# Patient Record
Sex: FEMALE | Race: BLACK OR AFRICAN AMERICAN | NOT HISPANIC OR LATINO | Employment: OTHER | URBAN - METROPOLITAN AREA
[De-identification: names, ages, dates, MRNs, and addresses within clinical notes are randomized per-mention and may not be internally consistent; named-entity substitution may affect disease eponyms.]

---

## 2017-01-01 ENCOUNTER — HOSPITAL ENCOUNTER (OUTPATIENT)
Dept: CARDIOLOGY | Facility: CLINIC | Age: 82
Discharge: HOME | End: 2017-01-11
Payer: MEDICARE

## 2017-01-01 ENCOUNTER — TELEPHONE (OUTPATIENT)
Dept: VASCULAR SURGERY | Facility: CLINIC | Age: 82
End: 2017-01-01

## 2017-01-01 ENCOUNTER — TELEPHONE (OUTPATIENT)
Dept: INTERNAL MEDICINE | Facility: CLINIC | Age: 82
End: 2017-01-01

## 2017-01-01 ENCOUNTER — HOSPITAL ENCOUNTER (INPATIENT)
Facility: HOSPITAL | Age: 82
LOS: 2 days | Discharge: ANOTHER HOSPITAL | DRG: 559 | End: 2017-04-15
Attending: HOSPITALIST | Admitting: HOSPITALIST
Payer: MEDICARE

## 2017-01-01 ENCOUNTER — TELEPHONE (OUTPATIENT)
Dept: ELECTROPHYSIOLOGY | Facility: CLINIC | Age: 82
End: 2017-01-01

## 2017-01-01 ENCOUNTER — CLINICAL SUPPORT (OUTPATIENT)
Dept: ELECTROPHYSIOLOGY | Facility: CLINIC | Age: 82
End: 2017-01-01
Payer: MEDICARE

## 2017-01-01 ENCOUNTER — HOSPITAL ENCOUNTER (OUTPATIENT)
Facility: HOSPITAL | Age: 82
Discharge: HOME | End: 2017-03-22
Attending: SURGERY | Admitting: SURGERY
Payer: MEDICARE

## 2017-01-01 ENCOUNTER — HOSPITAL ENCOUNTER (INPATIENT)
Facility: HOSPITAL | Age: 82
LOS: 10 days | Discharge: NURSING, REHAB OR LONG-TERM CARE | DRG: 469 | End: 2017-04-13
Attending: EMERGENCY MEDICINE | Admitting: HOSPITALIST
Payer: MEDICARE

## 2017-01-01 ENCOUNTER — OFFICE VISIT (OUTPATIENT)
Dept: ELECTROPHYSIOLOGY | Facility: CLINIC | Age: 82
End: 2017-01-01
Payer: MEDICARE

## 2017-01-01 ENCOUNTER — HOSPITAL ENCOUNTER (OUTPATIENT)
Dept: RADIOLOGY | Facility: HOSPITAL | Age: 82
Discharge: HOME | End: 2017-01-20
Attending: THORACIC SURGERY (CARDIOTHORACIC VASCULAR SURGERY)
Payer: MEDICARE

## 2017-01-01 ENCOUNTER — OFFICE VISIT (OUTPATIENT)
Dept: PODIATRY | Facility: CLINIC | Age: 82
End: 2017-01-01
Payer: MEDICARE

## 2017-01-01 ENCOUNTER — HOSPITAL ENCOUNTER (OUTPATIENT)
Dept: VASCULAR SURGERY | Facility: CLINIC | Age: 82
Discharge: HOME | End: 2017-03-17
Attending: SURGERY
Payer: MEDICARE

## 2017-01-01 ENCOUNTER — OFFICE VISIT (OUTPATIENT)
Dept: VASCULAR SURGERY | Facility: CLINIC | Age: 82
End: 2017-01-01
Attending: SURGERY
Payer: MEDICARE

## 2017-01-01 ENCOUNTER — HOSPITAL ENCOUNTER (OUTPATIENT)
Dept: RADIOLOGY | Facility: HOSPITAL | Age: 82
Discharge: HOME | End: 2017-02-22
Attending: THORACIC SURGERY (CARDIOTHORACIC VASCULAR SURGERY)
Payer: MEDICARE

## 2017-01-01 ENCOUNTER — HOSPITAL ENCOUNTER (OUTPATIENT)
Dept: PULMONOLOGY | Facility: CLINIC | Age: 82
Discharge: HOME | End: 2017-03-13
Payer: MEDICARE

## 2017-01-01 ENCOUNTER — CLINICAL SUPPORT (OUTPATIENT)
Dept: CARDIOLOGY | Facility: CLINIC | Age: 82
End: 2017-01-01
Payer: MEDICARE

## 2017-01-01 ENCOUNTER — PATIENT OUTREACH (OUTPATIENT)
Dept: ADMINISTRATIVE | Facility: CLINIC | Age: 82
End: 2017-01-01
Payer: MEDICARE

## 2017-01-01 ENCOUNTER — OFFICE VISIT (OUTPATIENT)
Dept: INTERNAL MEDICINE | Facility: CLINIC | Age: 82
End: 2017-01-01
Payer: MEDICARE

## 2017-01-01 ENCOUNTER — OFFICE VISIT (OUTPATIENT)
Dept: VASCULAR SURGERY | Facility: CLINIC | Age: 82
End: 2017-01-01
Payer: MEDICARE

## 2017-01-01 ENCOUNTER — HOSPITAL ENCOUNTER (OUTPATIENT)
Dept: VASCULAR SURGERY | Facility: CLINIC | Age: 82
Discharge: HOME | End: 2017-03-31
Attending: SURGERY
Payer: MEDICARE

## 2017-01-01 ENCOUNTER — ANESTHESIA (OUTPATIENT)
Dept: SURGERY | Facility: HOSPITAL | Age: 82
DRG: 469 | End: 2017-01-01
Payer: MEDICARE

## 2017-01-01 ENCOUNTER — ANESTHESIA (OUTPATIENT)
Dept: MEDSURG UNIT | Facility: HOSPITAL | Age: 82
DRG: 070 | End: 2017-01-01
Payer: MEDICARE

## 2017-01-01 ENCOUNTER — HOSPITAL ENCOUNTER (INPATIENT)
Facility: HOSPITAL | Age: 82
LOS: 11 days | DRG: 070 | End: 2017-04-26
Attending: EMERGENCY MEDICINE | Admitting: INTERNAL MEDICINE
Payer: MEDICARE

## 2017-01-01 ENCOUNTER — PATIENT MESSAGE (OUTPATIENT)
Dept: NEPHROLOGY | Facility: CLINIC | Age: 82
End: 2017-01-01

## 2017-01-01 ENCOUNTER — OFFICE VISIT (OUTPATIENT)
Dept: PULMONOLOGY | Facility: CLINIC | Age: 82
End: 2017-01-01
Payer: MEDICARE

## 2017-01-01 ENCOUNTER — ANESTHESIA EVENT (OUTPATIENT)
Dept: SURGERY | Facility: HOSPITAL | Age: 82
DRG: 469 | End: 2017-01-01
Payer: MEDICARE

## 2017-01-01 ENCOUNTER — ANESTHESIA EVENT (OUTPATIENT)
Dept: MEDSURG UNIT | Facility: HOSPITAL | Age: 82
DRG: 070 | End: 2017-01-01
Payer: MEDICARE

## 2017-01-01 ENCOUNTER — DOCUMENTATION ONLY (OUTPATIENT)
Dept: NEUROLOGY | Facility: CLINIC | Age: 82
End: 2017-01-01

## 2017-01-01 ENCOUNTER — TELEPHONE (OUTPATIENT)
Dept: CARDIOLOGY | Facility: CLINIC | Age: 82
End: 2017-01-01

## 2017-01-01 ENCOUNTER — OFFICE VISIT (OUTPATIENT)
Dept: CARDIOTHORACIC SURGERY | Facility: CLINIC | Age: 82
End: 2017-01-01
Payer: MEDICARE

## 2017-01-01 ENCOUNTER — SURGERY (OUTPATIENT)
Age: 82
End: 2017-01-01

## 2017-01-01 ENCOUNTER — PATIENT MESSAGE (OUTPATIENT)
Dept: INTERNAL MEDICINE | Facility: CLINIC | Age: 82
End: 2017-01-01

## 2017-01-01 VITALS
HEART RATE: 42 BPM | DIASTOLIC BLOOD PRESSURE: 66 MMHG | OXYGEN SATURATION: 97 % | WEIGHT: 117 LBS | BODY MASS INDEX: 21.4 KG/M2 | SYSTOLIC BLOOD PRESSURE: 118 MMHG

## 2017-01-01 VITALS
RESPIRATION RATE: 18 BRPM | HEIGHT: 62 IN | OXYGEN SATURATION: 93 % | BODY MASS INDEX: 25.4 KG/M2 | TEMPERATURE: 97 F | HEART RATE: 44 BPM | WEIGHT: 138 LBS | DIASTOLIC BLOOD PRESSURE: 54 MMHG | SYSTOLIC BLOOD PRESSURE: 102 MMHG

## 2017-01-01 VITALS
HEART RATE: 41 BPM | WEIGHT: 122 LBS | OXYGEN SATURATION: 92 % | RESPIRATION RATE: 20 BRPM | DIASTOLIC BLOOD PRESSURE: 65 MMHG | SYSTOLIC BLOOD PRESSURE: 142 MMHG | BODY MASS INDEX: 22.45 KG/M2 | TEMPERATURE: 98 F | HEIGHT: 62 IN

## 2017-01-01 VITALS
HEART RATE: 44 BPM | HEART RATE: 48 BPM | WEIGHT: 121 LBS | HEIGHT: 62 IN | SYSTOLIC BLOOD PRESSURE: 121 MMHG | DIASTOLIC BLOOD PRESSURE: 52 MMHG | WEIGHT: 122 LBS | SYSTOLIC BLOOD PRESSURE: 159 MMHG | HEIGHT: 62 IN | BODY MASS INDEX: 22.45 KG/M2 | BODY MASS INDEX: 22.26 KG/M2 | DIASTOLIC BLOOD PRESSURE: 46 MMHG

## 2017-01-01 VITALS
TEMPERATURE: 97 F | SYSTOLIC BLOOD PRESSURE: 109 MMHG | BODY MASS INDEX: 23 KG/M2 | WEIGHT: 125 LBS | HEIGHT: 62 IN | OXYGEN SATURATION: 96 % | RESPIRATION RATE: 18 BRPM | DIASTOLIC BLOOD PRESSURE: 56 MMHG | HEART RATE: 37 BPM

## 2017-01-01 VITALS
HEART RATE: 46 BPM | WEIGHT: 121 LBS | HEIGHT: 62 IN | BODY MASS INDEX: 22.26 KG/M2 | HEART RATE: 45 BPM | BODY MASS INDEX: 22.26 KG/M2 | WEIGHT: 121 LBS | DIASTOLIC BLOOD PRESSURE: 47 MMHG | TEMPERATURE: 97 F | SYSTOLIC BLOOD PRESSURE: 131 MMHG | SYSTOLIC BLOOD PRESSURE: 143 MMHG | DIASTOLIC BLOOD PRESSURE: 51 MMHG | HEIGHT: 62 IN | TEMPERATURE: 97 F

## 2017-01-01 VITALS
BODY MASS INDEX: 20.61 KG/M2 | DIASTOLIC BLOOD PRESSURE: 67 MMHG | TEMPERATURE: 92 F | OXYGEN SATURATION: 83 % | SYSTOLIC BLOOD PRESSURE: 142 MMHG | WEIGHT: 112 LBS | HEIGHT: 62 IN

## 2017-01-01 VITALS
BODY MASS INDEX: 22.22 KG/M2 | BODY MASS INDEX: 23.74 KG/M2 | WEIGHT: 121.5 LBS | SYSTOLIC BLOOD PRESSURE: 127 MMHG | HEART RATE: 92 BPM | HEART RATE: 64 BPM | HEIGHT: 62 IN | WEIGHT: 129 LBS | DIASTOLIC BLOOD PRESSURE: 57 MMHG | DIASTOLIC BLOOD PRESSURE: 49 MMHG | TEMPERATURE: 98 F | SYSTOLIC BLOOD PRESSURE: 130 MMHG | RESPIRATION RATE: 16 BRPM

## 2017-01-01 VITALS
OXYGEN SATURATION: 95 % | HEART RATE: 62 BPM | BODY MASS INDEX: 23.82 KG/M2 | SYSTOLIC BLOOD PRESSURE: 136 MMHG | WEIGHT: 129.44 LBS | HEIGHT: 62 IN | DIASTOLIC BLOOD PRESSURE: 66 MMHG

## 2017-01-01 VITALS — HEART RATE: 77 BPM | DIASTOLIC BLOOD PRESSURE: 58 MMHG | SYSTOLIC BLOOD PRESSURE: 116 MMHG | HEIGHT: 62 IN

## 2017-01-01 VITALS
HEIGHT: 62 IN | HEART RATE: 70 BPM | DIASTOLIC BLOOD PRESSURE: 66 MMHG | WEIGHT: 129 LBS | SYSTOLIC BLOOD PRESSURE: 126 MMHG | BODY MASS INDEX: 23.74 KG/M2

## 2017-01-01 VITALS
BODY MASS INDEX: 21.62 KG/M2 | HEART RATE: 95 BPM | OXYGEN SATURATION: 98 % | DIASTOLIC BLOOD PRESSURE: 75 MMHG | SYSTOLIC BLOOD PRESSURE: 157 MMHG | WEIGHT: 117.5 LBS | HEIGHT: 62 IN

## 2017-01-01 DIAGNOSIS — N18.6 ESRD ON HEMODIALYSIS: Chronic | ICD-10-CM

## 2017-01-01 DIAGNOSIS — I48.0 PAF (PAROXYSMAL ATRIAL FIBRILLATION): ICD-10-CM

## 2017-01-01 DIAGNOSIS — I50.42 CHRONIC COMBINED SYSTOLIC AND DIASTOLIC HEART FAILURE: ICD-10-CM

## 2017-01-01 DIAGNOSIS — Z95.818 PRESENCE OF CARDIAC DEVICE: ICD-10-CM

## 2017-01-01 DIAGNOSIS — I15.0 RENOVASCULAR HYPERTENSION: Chronic | ICD-10-CM

## 2017-01-01 DIAGNOSIS — I25.5 ISCHEMIC CARDIOMYOPATHY: ICD-10-CM

## 2017-01-01 DIAGNOSIS — Z99.2 ESRD ON HEMODIALYSIS: ICD-10-CM

## 2017-01-01 DIAGNOSIS — E03.9 ACQUIRED HYPOTHYROIDISM: ICD-10-CM

## 2017-01-01 DIAGNOSIS — J96.01 ACUTE RESPIRATORY FAILURE WITH HYPOXIA: ICD-10-CM

## 2017-01-01 DIAGNOSIS — R00.1 BRADYCARDIA, DRUG INDUCED: ICD-10-CM

## 2017-01-01 DIAGNOSIS — B35.1 DERMATOPHYTOSIS OF NAIL: ICD-10-CM

## 2017-01-01 DIAGNOSIS — D72.829 LEUKOCYTOSIS, UNSPECIFIED TYPE: ICD-10-CM

## 2017-01-01 DIAGNOSIS — R09.89 BIBASILAR CRACKLES: ICD-10-CM

## 2017-01-01 DIAGNOSIS — R60.9 EDEMA, UNSPECIFIED TYPE: ICD-10-CM

## 2017-01-01 DIAGNOSIS — I73.9 PERIPHERAL VASCULAR DISEASE: ICD-10-CM

## 2017-01-01 DIAGNOSIS — I50.9 PLEURAL EFFUSION DUE TO CHF (CONGESTIVE HEART FAILURE): Primary | ICD-10-CM

## 2017-01-01 DIAGNOSIS — I50.42 SYSTOLIC AND DIASTOLIC CHF, CHRONIC: ICD-10-CM

## 2017-01-01 DIAGNOSIS — L97.522 ULCER OF TOE, LEFT, WITH FAT LAYER EXPOSED: ICD-10-CM

## 2017-01-01 DIAGNOSIS — I73.9 PAD (PERIPHERAL ARTERY DISEASE): ICD-10-CM

## 2017-01-01 DIAGNOSIS — Z18.81 GLASS FRAGMENT IN LOWER EXTREMITY: Primary | ICD-10-CM

## 2017-01-01 DIAGNOSIS — R13.10 ODYNOPHAGIA: ICD-10-CM

## 2017-01-01 DIAGNOSIS — M79.606 ISCHEMIC REST PAIN OF LOWER EXTREMITY: ICD-10-CM

## 2017-01-01 DIAGNOSIS — T50.905A BRADYCARDIA, DRUG INDUCED: ICD-10-CM

## 2017-01-01 DIAGNOSIS — N18.6 ESRD (END STAGE RENAL DISEASE) ON DIALYSIS: Primary | ICD-10-CM

## 2017-01-01 DIAGNOSIS — I21.4 NON-ST ELEVATION MI (NSTEMI): ICD-10-CM

## 2017-01-01 DIAGNOSIS — D63.1 ANEMIA IN ESRD (END-STAGE RENAL DISEASE): ICD-10-CM

## 2017-01-01 DIAGNOSIS — I50.42 SYSTOLIC AND DIASTOLIC CHF, CHRONIC: Chronic | ICD-10-CM

## 2017-01-01 DIAGNOSIS — I27.20 PULMONARY HTN: Primary | ICD-10-CM

## 2017-01-01 DIAGNOSIS — E03.9 ACQUIRED HYPOTHYROIDISM: Chronic | ICD-10-CM

## 2017-01-01 DIAGNOSIS — I48.91 ATRIAL FIBRILLATION: ICD-10-CM

## 2017-01-01 DIAGNOSIS — D63.1 ANEMIA OF CHRONIC RENAL FAILURE, UNSPECIFIED STAGE: ICD-10-CM

## 2017-01-01 DIAGNOSIS — N18.6 ESRD (END STAGE RENAL DISEASE): ICD-10-CM

## 2017-01-01 DIAGNOSIS — G93.40 ENCEPHALOPATHY: Primary | ICD-10-CM

## 2017-01-01 DIAGNOSIS — E87.1 HYPONATREMIA: ICD-10-CM

## 2017-01-01 DIAGNOSIS — S72.001A CLOSED DISPLACED FRACTURE OF RIGHT FEMORAL NECK: ICD-10-CM

## 2017-01-01 DIAGNOSIS — N18.6 ESRD ON HEMODIALYSIS: ICD-10-CM

## 2017-01-01 DIAGNOSIS — Z99.2 ESRD (END STAGE RENAL DISEASE) ON DIALYSIS: ICD-10-CM

## 2017-01-01 DIAGNOSIS — D62 ACUTE BLOOD LOSS ANEMIA: ICD-10-CM

## 2017-01-01 DIAGNOSIS — R00.1 BRADYCARDIA: ICD-10-CM

## 2017-01-01 DIAGNOSIS — I27.20 PULMONARY HTN: ICD-10-CM

## 2017-01-01 DIAGNOSIS — I70.249 ATHEROSCLEROSIS OF NATIVE ARTERY OF LEFT LOWER EXTREMITY WITH ULCERATION, UNSPECIFIED ULCERATION SITE: ICD-10-CM

## 2017-01-01 DIAGNOSIS — I73.9 PAD (PERIPHERAL ARTERY DISEASE): Primary | ICD-10-CM

## 2017-01-01 DIAGNOSIS — I63.9 STROKE: ICD-10-CM

## 2017-01-01 DIAGNOSIS — Z01.818 PRE-OP TESTING: Primary | ICD-10-CM

## 2017-01-01 DIAGNOSIS — I48.0 PAF (PAROXYSMAL ATRIAL FIBRILLATION): Primary | ICD-10-CM

## 2017-01-01 DIAGNOSIS — D63.1 ANEMIA IN CHRONIC RENAL DISEASE: ICD-10-CM

## 2017-01-01 DIAGNOSIS — I25.10 3-VESSEL CAD: ICD-10-CM

## 2017-01-01 DIAGNOSIS — T82.858A STENOSIS OF ARTERIOVENOUS DIALYSIS FISTULA, INITIAL ENCOUNTER: ICD-10-CM

## 2017-01-01 DIAGNOSIS — Z99.2 ESRD ON HEMODIALYSIS: Chronic | ICD-10-CM

## 2017-01-01 DIAGNOSIS — J90 PLEURAL EFFUSION ON RIGHT: Primary | ICD-10-CM

## 2017-01-01 DIAGNOSIS — I10 ESSENTIAL HYPERTENSION: Chronic | ICD-10-CM

## 2017-01-01 DIAGNOSIS — T82.858A STENOSIS OF ARTERIOVENOUS DIALYSIS FISTULA, INITIAL ENCOUNTER: Primary | ICD-10-CM

## 2017-01-01 DIAGNOSIS — M25.551 RIGHT HIP PAIN: ICD-10-CM

## 2017-01-01 DIAGNOSIS — L97.529 TOE ULCER, LEFT, WITH UNSPECIFIED SEVERITY: ICD-10-CM

## 2017-01-01 DIAGNOSIS — S72.001D ENCOUNTER FOR AFTERCARE FOR HEALING CLOSED TRAUMATIC FRACTURE OF RIGHT HIP: ICD-10-CM

## 2017-01-01 DIAGNOSIS — I42.9 CARDIOMYOPATHY: ICD-10-CM

## 2017-01-01 DIAGNOSIS — J90 PLEURAL EFFUSION: ICD-10-CM

## 2017-01-01 DIAGNOSIS — I73.9 PERIPHERAL VASCULAR DISEASE: Primary | ICD-10-CM

## 2017-01-01 DIAGNOSIS — S72.001A CLOSED FRACTURE OF NECK OF RIGHT FEMUR, INITIAL ENCOUNTER: Primary | ICD-10-CM

## 2017-01-01 DIAGNOSIS — S72.001D CLOSED DISPLACED FRACTURE OF RIGHT FEMORAL NECK, WITH ROUTINE HEALING, SUBSEQUENT ENCOUNTER: ICD-10-CM

## 2017-01-01 DIAGNOSIS — G93.40 ACUTE ENCEPHALOPATHY: ICD-10-CM

## 2017-01-01 DIAGNOSIS — S72.001A CLOSED RIGHT HIP FRACTURE, INITIAL ENCOUNTER: ICD-10-CM

## 2017-01-01 DIAGNOSIS — I48.19 PERSISTENT ATRIAL FIBRILLATION: ICD-10-CM

## 2017-01-01 DIAGNOSIS — N18.6 ANEMIA IN ESRD (END-STAGE RENAL DISEASE): ICD-10-CM

## 2017-01-01 DIAGNOSIS — E78.2 MIXED HYPERLIPIDEMIA: Chronic | ICD-10-CM

## 2017-01-01 DIAGNOSIS — I70.244 ATHEROSCLEROSIS OF NATIVE ARTERY OF LEFT LOWER EXTREMITY WITH ULCERATION OF HEEL: ICD-10-CM

## 2017-01-01 DIAGNOSIS — I49.5 SINUS BRADY-TACHY SYNDROME: ICD-10-CM

## 2017-01-01 DIAGNOSIS — J90 PLEURAL EFFUSION: Primary | ICD-10-CM

## 2017-01-01 DIAGNOSIS — N18.6 ESRD ON DIALYSIS: ICD-10-CM

## 2017-01-01 DIAGNOSIS — I70.229 CRITICAL LOWER LIMB ISCHEMIA: Primary | ICD-10-CM

## 2017-01-01 DIAGNOSIS — N18.6 ESRD (END STAGE RENAL DISEASE) ON DIALYSIS: ICD-10-CM

## 2017-01-01 DIAGNOSIS — N18.9 ANEMIA IN CHRONIC RENAL DISEASE: ICD-10-CM

## 2017-01-01 DIAGNOSIS — I99.8 ISCHEMIC REST PAIN OF LOWER EXTREMITY: ICD-10-CM

## 2017-01-01 DIAGNOSIS — R00.0 TACHYCARDIA: ICD-10-CM

## 2017-01-01 DIAGNOSIS — R53.83 FATIGUE: ICD-10-CM

## 2017-01-01 DIAGNOSIS — Z99.2 ESRD ON DIALYSIS: ICD-10-CM

## 2017-01-01 DIAGNOSIS — R79.89 ABNORMAL LFTS: ICD-10-CM

## 2017-01-01 DIAGNOSIS — I50.43 ACUTE ON CHRONIC COMBINED SYSTOLIC AND DIASTOLIC HEART FAILURE: Primary | ICD-10-CM

## 2017-01-01 DIAGNOSIS — Z79.01 LONG-TERM (CURRENT) USE OF ANTICOAGULANTS: ICD-10-CM

## 2017-01-01 DIAGNOSIS — S72.001A CLOSED DISPLACED FRACTURE OF RIGHT FEMORAL NECK, INITIAL ENCOUNTER: ICD-10-CM

## 2017-01-01 DIAGNOSIS — Z78.9 FULL CODE STATUS: ICD-10-CM

## 2017-01-01 DIAGNOSIS — K59.01 SLOW TRANSIT CONSTIPATION: ICD-10-CM

## 2017-01-01 DIAGNOSIS — R09.02 HYPOXIA: ICD-10-CM

## 2017-01-01 DIAGNOSIS — J90 RECURRENT RIGHT PLEURAL EFFUSION: ICD-10-CM

## 2017-01-01 DIAGNOSIS — R29.6 RECURRENT FALLS WHILE WALKING: ICD-10-CM

## 2017-01-01 DIAGNOSIS — B37.0 ORAL THRUSH: ICD-10-CM

## 2017-01-01 DIAGNOSIS — N18.9 ANEMIA OF CHRONIC RENAL FAILURE, UNSPECIFIED STAGE: ICD-10-CM

## 2017-01-01 DIAGNOSIS — Z99.2 ESRD (END STAGE RENAL DISEASE) ON DIALYSIS: Primary | ICD-10-CM

## 2017-01-01 DIAGNOSIS — S80.859A GLASS FRAGMENT IN LOWER EXTREMITY: Primary | ICD-10-CM

## 2017-01-01 DIAGNOSIS — S72.001A CLOSED FRACTURE OF NECK OF RIGHT FEMUR, INITIAL ENCOUNTER: ICD-10-CM

## 2017-01-01 LAB
25(OH)D3+25(OH)D2 SERPL-MCNC: 21 NG/ML
ABO + RH BLD: NORMAL
ACANTHOCYTES BLD QL SMEAR: PRESENT
ACTH PLAS-MCNC: NORMAL PG/ML
ALBUMIN SERPL BCP-MCNC: 1.9 G/DL
ALBUMIN SERPL BCP-MCNC: 2 G/DL
ALBUMIN SERPL BCP-MCNC: 2.4 G/DL
ALBUMIN SERPL BCP-MCNC: 2.4 G/DL
ALBUMIN SERPL BCP-MCNC: 2.5 G/DL
ALBUMIN SERPL BCP-MCNC: 2.6 G/DL
ALBUMIN SERPL BCP-MCNC: 2.7 G/DL
ALBUMIN SERPL BCP-MCNC: 2.8 G/DL
ALBUMIN SERPL BCP-MCNC: 2.9 G/DL
ALBUMIN SERPL BCP-MCNC: 2.9 G/DL
ALBUMIN SERPL BCP-MCNC: 3.1 G/DL
ALBUMIN SERPL BCP-MCNC: 3.2 G/DL
ALBUMIN SERPL BCP-MCNC: 3.2 G/DL
ALLENS TEST: ABNORMAL
ALP SERPL-CCNC: 49 U/L
ALP SERPL-CCNC: 51 U/L
ALP SERPL-CCNC: 62 U/L
ALP SERPL-CCNC: 63 U/L
ALP SERPL-CCNC: 69 U/L
ALP SERPL-CCNC: 72 U/L
ALP SERPL-CCNC: 74 U/L
ALP SERPL-CCNC: 77 U/L
ALP SERPL-CCNC: 86 U/L
ALT SERPL W/O P-5'-P-CCNC: 10 U/L
ALT SERPL W/O P-5'-P-CCNC: 11 U/L
ALT SERPL W/O P-5'-P-CCNC: 134 U/L
ALT SERPL W/O P-5'-P-CCNC: 17 U/L
ALT SERPL W/O P-5'-P-CCNC: 17 U/L
ALT SERPL W/O P-5'-P-CCNC: 21 U/L
ALT SERPL W/O P-5'-P-CCNC: 5 U/L
ALT SERPL W/O P-5'-P-CCNC: 7 U/L
ALT SERPL W/O P-5'-P-CCNC: <5 U/L
ANION GAP SERPL CALC-SCNC: 10 MMOL/L
ANION GAP SERPL CALC-SCNC: 10 MMOL/L
ANION GAP SERPL CALC-SCNC: 11 MMOL/L
ANION GAP SERPL CALC-SCNC: 12 MMOL/L
ANION GAP SERPL CALC-SCNC: 16 MMOL/L
ANION GAP SERPL CALC-SCNC: 23 MMOL/L
ANION GAP SERPL CALC-SCNC: 26 MMOL/L
ANION GAP SERPL CALC-SCNC: 8 MMOL/L
ANION GAP SERPL CALC-SCNC: 8 MMOL/L
ANION GAP SERPL CALC-SCNC: 9 MMOL/L
ANISOCYTOSIS BLD QL SMEAR: SLIGHT
AORTIC VALVE STENOSIS: ABNORMAL
APTT BLDCRRT: 26.3 SEC
APTT BLDCRRT: 31.5 SEC
APTT BLDCRRT: 55.9 SEC
AST SERPL-CCNC: 102 U/L
AST SERPL-CCNC: 130 U/L
AST SERPL-CCNC: 135 U/L
AST SERPL-CCNC: 36 U/L
AST SERPL-CCNC: 38 U/L
AST SERPL-CCNC: 39 U/L
AST SERPL-CCNC: 43 U/L
AST SERPL-CCNC: 716 U/L
AST SERPL-CCNC: 93 U/L
BACTERIA #/AREA URNS AUTO: NORMAL /HPF
BACTERIA BLD CULT: NORMAL
BACTERIA UR CULT: NO GROWTH
BASO STIPL BLD QL SMEAR: ABNORMAL
BASOPHILS # BLD AUTO: 0 K/UL
BASOPHILS # BLD AUTO: 0.01 K/UL
BASOPHILS # BLD AUTO: 0.03 K/UL
BASOPHILS # BLD AUTO: ABNORMAL K/UL
BASOPHILS NFR BLD: 0 %
BASOPHILS NFR BLD: 0.1 %
BASOPHILS NFR BLD: 0.2 %
BASOPHILS NFR BLD: 0.3 %
BILIRUB SERPL-MCNC: 0.6 MG/DL
BILIRUB SERPL-MCNC: 0.7 MG/DL
BILIRUB SERPL-MCNC: 0.7 MG/DL
BILIRUB SERPL-MCNC: 0.8 MG/DL
BILIRUB SERPL-MCNC: 1.9 MG/DL
BILIRUB SERPL-MCNC: 2.1 MG/DL
BILIRUB SERPL-MCNC: 2.3 MG/DL
BILIRUB UR QL STRIP: NEGATIVE
BLD GP AB SCN CELLS X3 SERPL QL: NORMAL
BLD PROD TYP BPU: NORMAL
BLOOD GROUP ANTIBODIES SERPL: NORMAL
BLOOD GROUP ANTIBODIES SERPL: NORMAL
BLOOD UNIT EXPIRATION DATE: NORMAL
BLOOD UNIT TYPE CODE: 6200
BLOOD UNIT TYPE: NORMAL
BUN SERPL-MCNC: 10 MG/DL
BUN SERPL-MCNC: 13 MG/DL
BUN SERPL-MCNC: 13 MG/DL
BUN SERPL-MCNC: 15 MG/DL
BUN SERPL-MCNC: 15 MG/DL
BUN SERPL-MCNC: 17 MG/DL
BUN SERPL-MCNC: 20 MG/DL
BUN SERPL-MCNC: 22 MG/DL
BUN SERPL-MCNC: 24 MG/DL
BUN SERPL-MCNC: 25 MG/DL
BUN SERPL-MCNC: 26 MG/DL
BUN SERPL-MCNC: 28 MG/DL
BUN SERPL-MCNC: 30 MG/DL
BUN SERPL-MCNC: 31 MG/DL
BUN SERPL-MCNC: 32 MG/DL
BUN SERPL-MCNC: 34 MG/DL
BUN SERPL-MCNC: 35 MG/DL
BUN SERPL-MCNC: 36 MG/DL
BUN SERPL-MCNC: 41 MG/DL
BUN SERPL-MCNC: 41 MG/DL
BUN SERPL-MCNC: 42 MG/DL
BUN SERPL-MCNC: 45 MG/DL
BUN SERPL-MCNC: 46 MG/DL
BUN SERPL-MCNC: 47 MG/DL
BUN SERPL-MCNC: 51 MG/DL
BUN SERPL-MCNC: 57 MG/DL
BUN SERPL-MCNC: 63 MG/DL
BUN SERPL-MCNC: 72 MG/DL
BUN SERPL-MCNC: 9 MG/DL
BURR CELLS BLD QL SMEAR: ABNORMAL
BURR CELLS BLD QL SMEAR: ABNORMAL
CALCIUM SERPL-MCNC: 7.5 MG/DL
CALCIUM SERPL-MCNC: 7.5 MG/DL
CALCIUM SERPL-MCNC: 7.6 MG/DL
CALCIUM SERPL-MCNC: 7.8 MG/DL
CALCIUM SERPL-MCNC: 7.8 MG/DL
CALCIUM SERPL-MCNC: 7.9 MG/DL
CALCIUM SERPL-MCNC: 8 MG/DL
CALCIUM SERPL-MCNC: 8.1 MG/DL
CALCIUM SERPL-MCNC: 8.2 MG/DL
CALCIUM SERPL-MCNC: 8.2 MG/DL
CALCIUM SERPL-MCNC: 8.3 MG/DL
CALCIUM SERPL-MCNC: 8.4 MG/DL
CALCIUM SERPL-MCNC: 8.5 MG/DL
CALCIUM SERPL-MCNC: 8.6 MG/DL
CALCIUM SERPL-MCNC: 8.7 MG/DL
CALCIUM SERPL-MCNC: 8.8 MG/DL
CALCIUM SERPL-MCNC: 8.9 MG/DL
CALCIUM SERPL-MCNC: 9 MG/DL
CALCIUM SERPL-MCNC: 9 MG/DL
CALCIUM SERPL-MCNC: 9.4 MG/DL
CHLORIDE SERPL-SCNC: 100 MMOL/L
CHLORIDE SERPL-SCNC: 101 MMOL/L
CHLORIDE SERPL-SCNC: 102 MMOL/L
CHLORIDE SERPL-SCNC: 103 MMOL/L
CHLORIDE SERPL-SCNC: 104 MMOL/L
CHLORIDE SERPL-SCNC: 105 MMOL/L
CHLORIDE SERPL-SCNC: 106 MMOL/L
CHLORIDE SERPL-SCNC: 92 MMOL/L
CHLORIDE SERPL-SCNC: 92 MMOL/L
CHLORIDE SERPL-SCNC: 93 MMOL/L
CHLORIDE SERPL-SCNC: 95 MMOL/L
CHLORIDE SERPL-SCNC: 95 MMOL/L
CHLORIDE SERPL-SCNC: 96 MMOL/L
CHLORIDE SERPL-SCNC: 97 MMOL/L
CHLORIDE SERPL-SCNC: 98 MMOL/L
CLARITY UR REFRACT.AUTO: CLEAR
CO2 SERPL-SCNC: 19 MMOL/L
CO2 SERPL-SCNC: 20 MMOL/L
CO2 SERPL-SCNC: 21 MMOL/L
CO2 SERPL-SCNC: 22 MMOL/L
CO2 SERPL-SCNC: 23 MMOL/L
CO2 SERPL-SCNC: 24 MMOL/L
CO2 SERPL-SCNC: 25 MMOL/L
CO2 SERPL-SCNC: 25 MMOL/L
CO2 SERPL-SCNC: 26 MMOL/L
CO2 SERPL-SCNC: 26 MMOL/L
CODING SYSTEM: NORMAL
COLOR UR AUTO: YELLOW
CORTIS SERPL-MCNC: 14.7 UG/DL
CORTIS SERPL-MCNC: 18.7 UG/DL
CORTIS SERPL-MCNC: 18.7 UG/DL
CORTIS SERPL-MCNC: 19 UG/DL
CORTIS SERPL-MCNC: 29 UG/DL
CORTIS SERPL-MCNC: 29 UG/DL
CREAT SERPL-MCNC: 2.2 MG/DL
CREAT SERPL-MCNC: 2.4 MG/DL
CREAT SERPL-MCNC: 2.5 MG/DL
CREAT SERPL-MCNC: 2.5 MG/DL
CREAT SERPL-MCNC: 2.6 MG/DL
CREAT SERPL-MCNC: 2.7 MG/DL
CREAT SERPL-MCNC: 2.8 MG/DL
CREAT SERPL-MCNC: 3 MG/DL
CREAT SERPL-MCNC: 3 MG/DL
CREAT SERPL-MCNC: 3.1 MG/DL
CREAT SERPL-MCNC: 3.1 MG/DL
CREAT SERPL-MCNC: 3.2 MG/DL
CREAT SERPL-MCNC: 3.4 MG/DL
CREAT SERPL-MCNC: 3.7 MG/DL
CREAT SERPL-MCNC: 3.9 MG/DL
CREAT SERPL-MCNC: 4 MG/DL
CREAT SERPL-MCNC: 4.1 MG/DL
CREAT SERPL-MCNC: 4.2 MG/DL
CREAT SERPL-MCNC: 4.2 MG/DL
CREAT SERPL-MCNC: 4.5 MG/DL
CREAT SERPL-MCNC: 4.6 MG/DL
CREAT SERPL-MCNC: 4.7 MG/DL
CREAT SERPL-MCNC: 4.8 MG/DL
CREAT SERPL-MCNC: 5.5 MG/DL
CREAT SERPL-MCNC: 5.6 MG/DL
CREAT SERPL-MCNC: 5.7 MG/DL
CREAT SERPL-MCNC: 6.8 MG/DL
DACRYOCYTES BLD QL SMEAR: ABNORMAL
DELSYS: ABNORMAL
DIASTOLIC DYSFUNCTION: YES
DIFFERENTIAL METHOD: ABNORMAL
DISPENSE STATUS: NORMAL
EOSINOPHIL # BLD AUTO: 0 K/UL
EOSINOPHIL # BLD AUTO: 0.1 K/UL
EOSINOPHIL # BLD AUTO: 0.2 K/UL
EOSINOPHIL # BLD AUTO: 0.2 K/UL
EOSINOPHIL # BLD AUTO: ABNORMAL K/UL
EOSINOPHIL NFR BLD: 0 %
EOSINOPHIL NFR BLD: 0.1 %
EOSINOPHIL NFR BLD: 0.1 %
EOSINOPHIL NFR BLD: 0.2 %
EOSINOPHIL NFR BLD: 0.4 %
EOSINOPHIL NFR BLD: 0.5 %
EOSINOPHIL NFR BLD: 0.6 %
EOSINOPHIL NFR BLD: 1 %
EOSINOPHIL NFR BLD: 1.3 %
EOSINOPHIL NFR BLD: 1.5 %
EOSINOPHIL NFR BLD: 1.5 %
EOSINOPHIL NFR BLD: 1.6 %
EOSINOPHIL NFR BLD: 1.8 %
EOSINOPHIL NFR BLD: 2.1 %
EOSINOPHIL NFR BLD: 2.3 %
EOSINOPHIL NFR BLD: 2.5 %
EOSINOPHIL NFR BLD: 2.7 %
ERYTHROCYTE [DISTWIDTH] IN BLOOD BY AUTOMATED COUNT: 16.6 %
ERYTHROCYTE [DISTWIDTH] IN BLOOD BY AUTOMATED COUNT: 16.9 %
ERYTHROCYTE [DISTWIDTH] IN BLOOD BY AUTOMATED COUNT: 17.1 %
ERYTHROCYTE [DISTWIDTH] IN BLOOD BY AUTOMATED COUNT: 17.2 %
ERYTHROCYTE [DISTWIDTH] IN BLOOD BY AUTOMATED COUNT: 17.3 %
ERYTHROCYTE [DISTWIDTH] IN BLOOD BY AUTOMATED COUNT: 17.4 %
ERYTHROCYTE [DISTWIDTH] IN BLOOD BY AUTOMATED COUNT: 17.4 %
ERYTHROCYTE [DISTWIDTH] IN BLOOD BY AUTOMATED COUNT: 17.8 %
ERYTHROCYTE [DISTWIDTH] IN BLOOD BY AUTOMATED COUNT: 18.1 %
ERYTHROCYTE [DISTWIDTH] IN BLOOD BY AUTOMATED COUNT: 18.6 %
ERYTHROCYTE [DISTWIDTH] IN BLOOD BY AUTOMATED COUNT: 20 %
ERYTHROCYTE [DISTWIDTH] IN BLOOD BY AUTOMATED COUNT: 20.7 %
ERYTHROCYTE [DISTWIDTH] IN BLOOD BY AUTOMATED COUNT: 20.8 %
ERYTHROCYTE [DISTWIDTH] IN BLOOD BY AUTOMATED COUNT: 20.9 %
ERYTHROCYTE [SEDIMENTATION RATE] IN BLOOD BY WESTERGREN METHOD: 16 MM/H
EST. GFR  (AFRICAN AMERICAN): 10.5 ML/MIN/1.73 M^2
EST. GFR  (AFRICAN AMERICAN): 10.5 ML/MIN/1.73 M^2
EST. GFR  (AFRICAN AMERICAN): 10.8 ML/MIN/1.73 M^2
EST. GFR  (AFRICAN AMERICAN): 11.1 ML/MIN/1.73 M^2
EST. GFR  (AFRICAN AMERICAN): 11.5 ML/MIN/1.73 M^2
EST. GFR  (AFRICAN AMERICAN): 12.2 ML/MIN/1.73 M^2
EST. GFR  (AFRICAN AMERICAN): 13.5 ML/MIN/1.73 M^2
EST. GFR  (AFRICAN AMERICAN): 14.5 ML/MIN/1.73 M^2
EST. GFR  (AFRICAN AMERICAN): 15.1 ML/MIN/1.73 M^2
EST. GFR  (AFRICAN AMERICAN): 15.1 ML/MIN/1.73 M^2
EST. GFR  (AFRICAN AMERICAN): 15.7 ML/MIN/1.73 M^2
EST. GFR  (AFRICAN AMERICAN): 15.7 ML/MIN/1.73 M^2
EST. GFR  (AFRICAN AMERICAN): 17.1 ML/MIN/1.73 M^2
EST. GFR  (AFRICAN AMERICAN): 17.9 ML/MIN/1.73 M^2
EST. GFR  (AFRICAN AMERICAN): 18.7 ML/MIN/1.73 M^2
EST. GFR  (AFRICAN AMERICAN): 19.6 ML/MIN/1.73 M^2
EST. GFR  (AFRICAN AMERICAN): 19.6 ML/MIN/1.73 M^2
EST. GFR  (AFRICAN AMERICAN): 20.6 ML/MIN/1.73 M^2
EST. GFR  (AFRICAN AMERICAN): 22.9 ML/MIN/1.73 M^2
EST. GFR  (AFRICAN AMERICAN): 5.8 ML/MIN/1.73 M^2
EST. GFR  (AFRICAN AMERICAN): 7.2 ML/MIN/1.73 M^2
EST. GFR  (AFRICAN AMERICAN): 7.4 ML/MIN/1.73 M^2
EST. GFR  (AFRICAN AMERICAN): 7.6 ML/MIN/1.73 M^2
EST. GFR  (AFRICAN AMERICAN): 8.9 ML/MIN/1.73 M^2
EST. GFR  (AFRICAN AMERICAN): 9.1 ML/MIN/1.73 M^2
EST. GFR  (AFRICAN AMERICAN): 9.4 ML/MIN/1.73 M^2
EST. GFR  (AFRICAN AMERICAN): 9.6 ML/MIN/1.73 M^2
EST. GFR  (NON AFRICAN AMERICAN): 10.6 ML/MIN/1.73 M^2
EST. GFR  (NON AFRICAN AMERICAN): 11.7 ML/MIN/1.73 M^2
EST. GFR  (NON AFRICAN AMERICAN): 12.6 ML/MIN/1.73 M^2
EST. GFR  (NON AFRICAN AMERICAN): 13.1 ML/MIN/1.73 M^2
EST. GFR  (NON AFRICAN AMERICAN): 13.1 ML/MIN/1.73 M^2
EST. GFR  (NON AFRICAN AMERICAN): 13.6 ML/MIN/1.73 M^2
EST. GFR  (NON AFRICAN AMERICAN): 13.6 ML/MIN/1.73 M^2
EST. GFR  (NON AFRICAN AMERICAN): 14.8 ML/MIN/1.73 M^2
EST. GFR  (NON AFRICAN AMERICAN): 15.5 ML/MIN/1.73 M^2
EST. GFR  (NON AFRICAN AMERICAN): 16.2 ML/MIN/1.73 M^2
EST. GFR  (NON AFRICAN AMERICAN): 17 ML/MIN/1.73 M^2
EST. GFR  (NON AFRICAN AMERICAN): 17 ML/MIN/1.73 M^2
EST. GFR  (NON AFRICAN AMERICAN): 17.9 ML/MIN/1.73 M^2
EST. GFR  (NON AFRICAN AMERICAN): 19.9 ML/MIN/1.73 M^2
EST. GFR  (NON AFRICAN AMERICAN): 5.1 ML/MIN/1.73 M^2
EST. GFR  (NON AFRICAN AMERICAN): 6.3 ML/MIN/1.73 M^2
EST. GFR  (NON AFRICAN AMERICAN): 6.4 ML/MIN/1.73 M^2
EST. GFR  (NON AFRICAN AMERICAN): 6.6 ML/MIN/1.73 M^2
EST. GFR  (NON AFRICAN AMERICAN): 7.7 ML/MIN/1.73 M^2
EST. GFR  (NON AFRICAN AMERICAN): 7.9 ML/MIN/1.73 M^2
EST. GFR  (NON AFRICAN AMERICAN): 8.1 ML/MIN/1.73 M^2
EST. GFR  (NON AFRICAN AMERICAN): 8.4 ML/MIN/1.73 M^2
EST. GFR  (NON AFRICAN AMERICAN): 9.1 ML/MIN/1.73 M^2
EST. GFR  (NON AFRICAN AMERICAN): 9.1 ML/MIN/1.73 M^2
EST. GFR  (NON AFRICAN AMERICAN): 9.4 ML/MIN/1.73 M^2
EST. GFR  (NON AFRICAN AMERICAN): 9.6 ML/MIN/1.73 M^2
EST. GFR  (NON AFRICAN AMERICAN): 9.9 ML/MIN/1.73 M^2
ESTIMATED PA SYSTOLIC PRESSURE: 43
FERRITIN SERPL-MCNC: 674 NG/ML
FIO2: 100
FIO2: 100
FIO2: 40
FLOW: 15
FLOW: 2
FOLATE SERPL-MCNC: 11.6 NG/ML
GIANT PLATELETS BLD QL SMEAR: PRESENT
GLUCOSE SERPL-MCNC: 109 MG/DL
GLUCOSE SERPL-MCNC: 110 MG/DL
GLUCOSE SERPL-MCNC: 113 MG/DL
GLUCOSE SERPL-MCNC: 122 MG/DL
GLUCOSE SERPL-MCNC: 145 MG/DL
GLUCOSE SERPL-MCNC: 151 MG/DL
GLUCOSE SERPL-MCNC: 57 MG/DL
GLUCOSE SERPL-MCNC: 58 MG/DL
GLUCOSE SERPL-MCNC: 62 MG/DL
GLUCOSE SERPL-MCNC: 64 MG/DL
GLUCOSE SERPL-MCNC: 65 MG/DL
GLUCOSE SERPL-MCNC: 68 MG/DL
GLUCOSE SERPL-MCNC: 68 MG/DL
GLUCOSE SERPL-MCNC: 69 MG/DL
GLUCOSE SERPL-MCNC: 69 MG/DL
GLUCOSE SERPL-MCNC: 72 MG/DL
GLUCOSE SERPL-MCNC: 76 MG/DL
GLUCOSE SERPL-MCNC: 78 MG/DL
GLUCOSE SERPL-MCNC: 78 MG/DL
GLUCOSE SERPL-MCNC: 80 MG/DL
GLUCOSE SERPL-MCNC: 82 MG/DL
GLUCOSE SERPL-MCNC: 83 MG/DL
GLUCOSE SERPL-MCNC: 83 MG/DL
GLUCOSE SERPL-MCNC: 90 MG/DL
GLUCOSE SERPL-MCNC: 92 MG/DL
GLUCOSE SERPL-MCNC: 93 MG/DL
GLUCOSE SERPL-MCNC: 98 MG/DL
GLUCOSE UR QL STRIP: NEGATIVE
HAPTOGLOB SERPL-MCNC: <10 MG/DL
HCO3 UR-SCNC: 24.3 MMOL/L (ref 24–28)
HCO3 UR-SCNC: 26.1 MMOL/L (ref 24–28)
HCO3 UR-SCNC: 28.2 MMOL/L (ref 24–28)
HCO3 UR-SCNC: 31.1 MMOL/L (ref 24–28)
HCT VFR BLD AUTO: 14.2 %
HCT VFR BLD AUTO: 17.6 %
HCT VFR BLD AUTO: 17.8 %
HCT VFR BLD AUTO: 20.8 %
HCT VFR BLD AUTO: 21.8 %
HCT VFR BLD AUTO: 22.1 %
HCT VFR BLD AUTO: 22.9 %
HCT VFR BLD AUTO: 23.4 %
HCT VFR BLD AUTO: 24.6 %
HCT VFR BLD AUTO: 24.8 %
HCT VFR BLD AUTO: 25.4 %
HCT VFR BLD AUTO: 25.7 %
HCT VFR BLD AUTO: 25.9 %
HCT VFR BLD AUTO: 26 %
HCT VFR BLD AUTO: 26.1 %
HCT VFR BLD AUTO: 26.1 %
HCT VFR BLD AUTO: 26.3 %
HCT VFR BLD AUTO: 26.3 %
HCT VFR BLD AUTO: 27 %
HCT VFR BLD AUTO: 28.4 %
HCT VFR BLD AUTO: 32.4 %
HCT VFR BLD AUTO: 33.7 %
HCT VFR BLD AUTO: 34.1 %
HCT VFR BLD CALC: 35 %PCV (ref 36–54)
HCT VFR BLD CALC: <15 %PCV (ref 36–54)
HGB BLD-MCNC: 10.2 G/DL
HGB BLD-MCNC: 11 G/DL
HGB BLD-MCNC: 11.2 G/DL
HGB BLD-MCNC: 4.6 G/DL
HGB BLD-MCNC: 5.6 G/DL
HGB BLD-MCNC: 6 G/DL
HGB BLD-MCNC: 6.8 G/DL
HGB BLD-MCNC: 7.4 G/DL
HGB BLD-MCNC: 7.5 G/DL
HGB BLD-MCNC: 7.5 G/DL
HGB BLD-MCNC: 7.7 G/DL
HGB BLD-MCNC: 7.7 G/DL
HGB BLD-MCNC: 8.1 G/DL
HGB BLD-MCNC: 8.3 G/DL
HGB BLD-MCNC: 8.4 G/DL
HGB BLD-MCNC: 8.6 G/DL
HGB BLD-MCNC: 8.7 G/DL
HGB BLD-MCNC: 8.7 G/DL
HGB BLD-MCNC: 9.5 G/DL
HGB UR QL STRIP: NEGATIVE
HYALINE CASTS UR QL AUTO: 0 /LPF
HYPOCHROMIA BLD QL SMEAR: ABNORMAL
INR PPP: 1.2
INR PPP: 1.3
INR PPP: 1.4
INR PPP: 3.9
IRON SERPL-MCNC: 13 UG/DL
KETONES UR QL STRIP: NEGATIVE
LACTATE SERPL-SCNC: 2.2 MMOL/L
LACTATE SERPL-SCNC: >12 MMOL/L
LDH SERPL L TO P-CCNC: 657 U/L
LEUKOCYTE ESTERASE UR QL STRIP: NEGATIVE
LYMPHOCYTES # BLD AUTO: 0.4 K/UL
LYMPHOCYTES # BLD AUTO: 0.5 K/UL
LYMPHOCYTES # BLD AUTO: 0.6 K/UL
LYMPHOCYTES # BLD AUTO: 0.7 K/UL
LYMPHOCYTES # BLD AUTO: 0.9 K/UL
LYMPHOCYTES # BLD AUTO: 1.4 K/UL
LYMPHOCYTES # BLD AUTO: 3 K/UL
LYMPHOCYTES # BLD AUTO: ABNORMAL K/UL
LYMPHOCYTES NFR BLD: 0 %
LYMPHOCYTES NFR BLD: 10 %
LYMPHOCYTES NFR BLD: 10.2 %
LYMPHOCYTES NFR BLD: 10.4 %
LYMPHOCYTES NFR BLD: 10.6 %
LYMPHOCYTES NFR BLD: 13.2 %
LYMPHOCYTES NFR BLD: 14.6 %
LYMPHOCYTES NFR BLD: 26.7 %
LYMPHOCYTES NFR BLD: 4 %
LYMPHOCYTES NFR BLD: 5.4 %
LYMPHOCYTES NFR BLD: 6.1 %
LYMPHOCYTES NFR BLD: 6.6 %
LYMPHOCYTES NFR BLD: 6.9 %
LYMPHOCYTES NFR BLD: 7 %
LYMPHOCYTES NFR BLD: 7 %
LYMPHOCYTES NFR BLD: 7.2 %
LYMPHOCYTES NFR BLD: 7.7 %
LYMPHOCYTES NFR BLD: 7.9 %
LYMPHOCYTES NFR BLD: 8.6 %
LYMPHOCYTES NFR BLD: 8.7 %
LYMPHOCYTES NFR BLD: 9.8 %
MAGNESIUM SERPL-MCNC: 1.6 MG/DL
MAGNESIUM SERPL-MCNC: 1.8 MG/DL
MAGNESIUM SERPL-MCNC: 1.9 MG/DL
MAGNESIUM SERPL-MCNC: 1.9 MG/DL
MAGNESIUM SERPL-MCNC: 2 MG/DL
MAGNESIUM SERPL-MCNC: 2 MG/DL
MAGNESIUM SERPL-MCNC: 2.1 MG/DL
MAYO MISCELLANEOUS RESULT (REF): NORMAL
MCH RBC QN AUTO: 26.5 PG
MCH RBC QN AUTO: 26.8 PG
MCH RBC QN AUTO: 26.9 PG
MCH RBC QN AUTO: 26.9 PG
MCH RBC QN AUTO: 27 PG
MCH RBC QN AUTO: 27.2 PG
MCH RBC QN AUTO: 27.3 PG
MCH RBC QN AUTO: 27.3 PG
MCH RBC QN AUTO: 27.4 PG
MCH RBC QN AUTO: 27.5 PG
MCH RBC QN AUTO: 27.7 PG
MCH RBC QN AUTO: 27.7 PG
MCHC RBC AUTO-ENTMCNC: 30.7 %
MCHC RBC AUTO-ENTMCNC: 31.3 %
MCHC RBC AUTO-ENTMCNC: 31.5 %
MCHC RBC AUTO-ENTMCNC: 31.8 %
MCHC RBC AUTO-ENTMCNC: 31.9 %
MCHC RBC AUTO-ENTMCNC: 31.9 %
MCHC RBC AUTO-ENTMCNC: 32.1 %
MCHC RBC AUTO-ENTMCNC: 32.3 %
MCHC RBC AUTO-ENTMCNC: 32.3 %
MCHC RBC AUTO-ENTMCNC: 32.4 %
MCHC RBC AUTO-ENTMCNC: 32.7 %
MCHC RBC AUTO-ENTMCNC: 33.1 %
MCHC RBC AUTO-ENTMCNC: 33.1 %
MCHC RBC AUTO-ENTMCNC: 33.2 %
MCHC RBC AUTO-ENTMCNC: 33.5 %
MCHC RBC AUTO-ENTMCNC: 33.5 %
MCHC RBC AUTO-ENTMCNC: 33.6 %
MCHC RBC AUTO-ENTMCNC: 33.9 %
MCHC RBC AUTO-ENTMCNC: 33.9 %
MCV RBC AUTO: 80 FL
MCV RBC AUTO: 80 FL
MCV RBC AUTO: 81 FL
MCV RBC AUTO: 82 FL
MCV RBC AUTO: 82 FL
MCV RBC AUTO: 83 FL
MCV RBC AUTO: 84 FL
MCV RBC AUTO: 84 FL
MCV RBC AUTO: 85 FL
MCV RBC AUTO: 85 FL
MCV RBC AUTO: 86 FL
MCV RBC AUTO: 87 FL
MCV RBC AUTO: 88 FL
MCV RBC AUTO: 89 FL
METAMYELOCYTES NFR BLD MANUAL: 2 %
MICROSCOPIC COMMENT: NORMAL
MITRAL VALVE MOBILITY: NORMAL
MITRAL VALVE REGURGITATION: ABNORMAL
MODE: ABNORMAL
MONOCYTES # BLD AUTO: 0.4 K/UL
MONOCYTES # BLD AUTO: 0.4 K/UL
MONOCYTES # BLD AUTO: 0.5 K/UL
MONOCYTES # BLD AUTO: 0.6 K/UL
MONOCYTES # BLD AUTO: 0.7 K/UL
MONOCYTES # BLD AUTO: 0.8 K/UL
MONOCYTES # BLD AUTO: 0.8 K/UL
MONOCYTES # BLD AUTO: 0.9 K/UL
MONOCYTES # BLD AUTO: 0.9 K/UL
MONOCYTES # BLD AUTO: 1 K/UL
MONOCYTES # BLD AUTO: 1.9 K/UL
MONOCYTES # BLD AUTO: ABNORMAL K/UL
MONOCYTES NFR BLD: 1 %
MONOCYTES NFR BLD: 10.3 %
MONOCYTES NFR BLD: 10.5 %
MONOCYTES NFR BLD: 10.6 %
MONOCYTES NFR BLD: 11.1 %
MONOCYTES NFR BLD: 11.6 %
MONOCYTES NFR BLD: 12.7 %
MONOCYTES NFR BLD: 13.1 %
MONOCYTES NFR BLD: 13.6 %
MONOCYTES NFR BLD: 14.4 %
MONOCYTES NFR BLD: 14.6 %
MONOCYTES NFR BLD: 3.3 %
MONOCYTES NFR BLD: 4.4 %
MONOCYTES NFR BLD: 5.6 %
MONOCYTES NFR BLD: 7 %
MONOCYTES NFR BLD: 7.4 %
MONOCYTES NFR BLD: 7.6 %
MONOCYTES NFR BLD: 7.8 %
MONOCYTES NFR BLD: 8.6 %
MONOCYTES NFR BLD: 8.9 %
MONOCYTES NFR BLD: 9.3 %
NEUTROPHILS # BLD AUTO: 10.9 K/UL
NEUTROPHILS # BLD AUTO: 22.3 K/UL
NEUTROPHILS # BLD AUTO: 3.2 K/UL
NEUTROPHILS # BLD AUTO: 3.5 K/UL
NEUTROPHILS # BLD AUTO: 3.6 K/UL
NEUTROPHILS # BLD AUTO: 3.9 K/UL
NEUTROPHILS # BLD AUTO: 4.6 K/UL
NEUTROPHILS # BLD AUTO: 4.6 K/UL
NEUTROPHILS # BLD AUTO: 4.8 K/UL
NEUTROPHILS # BLD AUTO: 5.2 K/UL
NEUTROPHILS # BLD AUTO: 5.5 K/UL
NEUTROPHILS # BLD AUTO: 5.9 K/UL
NEUTROPHILS # BLD AUTO: 6 K/UL
NEUTROPHILS # BLD AUTO: 6.2 K/UL
NEUTROPHILS # BLD AUTO: 6.2 K/UL
NEUTROPHILS # BLD AUTO: 6.4 K/UL
NEUTROPHILS # BLD AUTO: 6.6 K/UL
NEUTROPHILS # BLD AUTO: 6.8 K/UL
NEUTROPHILS # BLD AUTO: 7.6 K/UL
NEUTROPHILS # BLD AUTO: 7.6 K/UL
NEUTROPHILS NFR BLD: 67.5 %
NEUTROPHILS NFR BLD: 68.1 %
NEUTROPHILS NFR BLD: 70.8 %
NEUTROPHILS NFR BLD: 72.5 %
NEUTROPHILS NFR BLD: 74.2 %
NEUTROPHILS NFR BLD: 75.2 %
NEUTROPHILS NFR BLD: 78.2 %
NEUTROPHILS NFR BLD: 78.9 %
NEUTROPHILS NFR BLD: 79.5 %
NEUTROPHILS NFR BLD: 79.7 %
NEUTROPHILS NFR BLD: 80 %
NEUTROPHILS NFR BLD: 81.3 %
NEUTROPHILS NFR BLD: 81.5 %
NEUTROPHILS NFR BLD: 82.5 %
NEUTROPHILS NFR BLD: 83 %
NEUTROPHILS NFR BLD: 84.3 %
NEUTROPHILS NFR BLD: 84.7 %
NEUTROPHILS NFR BLD: 87.2 %
NEUTROPHILS NFR BLD: 87.3 %
NEUTROPHILS NFR BLD: 92.1 %
NEUTROPHILS NFR BLD: 97 %
NITRITE UR QL STRIP: NEGATIVE
NRBC BLD-RTO: ABNORMAL /100 WBC
OVALOCYTES BLD QL SMEAR: ABNORMAL
PCO2 BLDA: 38.8 MMHG (ref 35–45)
PCO2 BLDA: 47.2 MMHG (ref 35–45)
PCO2 BLDA: 50.4 MMHG (ref 35–45)
PCO2 BLDA: 52.5 MMHG (ref 35–45)
PEEP: 10
PEEP: 5
PH SMN: 7.32 [PH] (ref 7.35–7.45)
PH SMN: 7.32 [PH] (ref 7.35–7.45)
PH SMN: 7.38 [PH] (ref 7.35–7.45)
PH SMN: 7.47 [PH] (ref 7.35–7.45)
PH UR STRIP: 7 [PH] (ref 5–8)
PHOSPHATE SERPL-MCNC: 1.3 MG/DL
PHOSPHATE SERPL-MCNC: 1.6 MG/DL
PHOSPHATE SERPL-MCNC: 1.9 MG/DL
PHOSPHATE SERPL-MCNC: 2.8 MG/DL
PHOSPHATE SERPL-MCNC: 2.9 MG/DL
PHOSPHATE SERPL-MCNC: 2.9 MG/DL
PHOSPHATE SERPL-MCNC: 3 MG/DL
PHOSPHATE SERPL-MCNC: 3.1 MG/DL
PHOSPHATE SERPL-MCNC: 3.2 MG/DL
PHOSPHATE SERPL-MCNC: 3.4 MG/DL
PHOSPHATE SERPL-MCNC: 3.8 MG/DL
PHOSPHATE SERPL-MCNC: 3.8 MG/DL
PHOSPHATE SERPL-MCNC: 4 MG/DL
PHOSPHATE SERPL-MCNC: 4.3 MG/DL
PHOSPHATE SERPL-MCNC: 4.7 MG/DL
PHOSPHATE SERPL-MCNC: 4.9 MG/DL
PHOSPHATE SERPL-MCNC: 5.5 MG/DL
PHOSPHATE SERPL-MCNC: 6 MG/DL
PHOSPHATE SERPL-MCNC: 6.7 MG/DL
PHOSPHATE SERPL-MCNC: 7.7 MG/DL
PHOSPHATE SERPL-MCNC: 7.7 MG/DL
PHOSPHATE SERPL-MCNC: 8.7 MG/DL
PLATELET # BLD AUTO: 118 K/UL
PLATELET # BLD AUTO: 130 K/UL
PLATELET # BLD AUTO: 141 K/UL
PLATELET # BLD AUTO: 142 K/UL
PLATELET # BLD AUTO: 144 K/UL
PLATELET # BLD AUTO: 145 K/UL
PLATELET # BLD AUTO: 151 K/UL
PLATELET # BLD AUTO: 152 K/UL
PLATELET # BLD AUTO: 156 K/UL
PLATELET # BLD AUTO: 164 K/UL
PLATELET # BLD AUTO: 164 K/UL
PLATELET # BLD AUTO: 173 K/UL
PLATELET # BLD AUTO: 174 K/UL
PLATELET # BLD AUTO: 181 K/UL
PLATELET # BLD AUTO: 185 K/UL
PLATELET # BLD AUTO: 186 K/UL
PLATELET # BLD AUTO: 188 K/UL
PLATELET # BLD AUTO: 194 K/UL
PLATELET # BLD AUTO: 196 K/UL
PLATELET # BLD AUTO: 206 K/UL
PLATELET # BLD AUTO: 209 K/UL
PLATELET BLD QL SMEAR: ABNORMAL
PMV BLD AUTO: 10 FL
PMV BLD AUTO: 10.2 FL
PMV BLD AUTO: 10.2 FL
PMV BLD AUTO: 10.6 FL
PMV BLD AUTO: 10.7 FL
PMV BLD AUTO: 9.1 FL
PMV BLD AUTO: 9.2 FL
PMV BLD AUTO: 9.2 FL
PMV BLD AUTO: 9.3 FL
PMV BLD AUTO: 9.4 FL
PMV BLD AUTO: 9.5 FL
PMV BLD AUTO: 9.6 FL
PMV BLD AUTO: 9.7 FL
PMV BLD AUTO: 9.7 FL
PMV BLD AUTO: 9.8 FL
PMV BLD AUTO: 9.9 FL
PO2 BLDA: 103 MMHG (ref 80–100)
PO2 BLDA: 163 MMHG (ref 80–100)
PO2 BLDA: 275 MMHG (ref 80–100)
PO2 BLDA: 421 MMHG (ref 80–100)
POC BE: -2 MMOL/L
POC BE: 0 MMOL/L
POC BE: 5 MMOL/L
POC BE: 6 MMOL/L
POC IONIZED CALCIUM: 1.09 MMOL/L (ref 1.06–1.42)
POC IONIZED CALCIUM: 1.16 MMOL/L (ref 1.06–1.42)
POC SATURATED O2: 100 % (ref 95–100)
POC SATURATED O2: 100 % (ref 95–100)
POC SATURATED O2: 97 % (ref 95–100)
POC SATURATED O2: 99 % (ref 95–100)
POC TCO2: 26 MMOL/L (ref 23–27)
POC TCO2: 28 MMOL/L (ref 23–27)
POC TCO2: 29 MMOL/L (ref 23–27)
POC TCO2: 33 MMOL/L (ref 23–27)
POCT GLUCOSE: 113 MG/DL (ref 70–110)
POCT GLUCOSE: 136 MG/DL (ref 70–110)
POCT GLUCOSE: 143 MG/DL (ref 70–110)
POCT GLUCOSE: 151 MG/DL (ref 70–110)
POCT GLUCOSE: 155 MG/DL (ref 70–110)
POCT GLUCOSE: 163 MG/DL (ref 70–110)
POCT GLUCOSE: 62 MG/DL (ref 70–110)
POCT GLUCOSE: 63 MG/DL (ref 70–110)
POCT GLUCOSE: 64 MG/DL (ref 70–110)
POCT GLUCOSE: 66 MG/DL (ref 70–110)
POCT GLUCOSE: 66 MG/DL (ref 70–110)
POCT GLUCOSE: 69 MG/DL (ref 70–110)
POCT GLUCOSE: 71 MG/DL (ref 70–110)
POCT GLUCOSE: 81 MG/DL (ref 70–110)
POCT GLUCOSE: 82 MG/DL (ref 70–110)
POCT GLUCOSE: 83 MG/DL (ref 70–110)
POCT GLUCOSE: 86 MG/DL (ref 70–110)
POCT GLUCOSE: 90 MG/DL (ref 70–110)
POCT GLUCOSE: 98 MG/DL (ref 70–110)
POIKILOCYTOSIS BLD QL SMEAR: SLIGHT
POLYCHROMASIA BLD QL SMEAR: ABNORMAL
POTASSIUM BLD-SCNC: 3.9 MMOL/L (ref 3.5–5.1)
POTASSIUM BLD-SCNC: 4 MMOL/L (ref 3.5–5.1)
POTASSIUM SERPL-SCNC: 3.8 MMOL/L
POTASSIUM SERPL-SCNC: 3.9 MMOL/L
POTASSIUM SERPL-SCNC: 4 MMOL/L
POTASSIUM SERPL-SCNC: 4 MMOL/L
POTASSIUM SERPL-SCNC: 4.1 MMOL/L
POTASSIUM SERPL-SCNC: 4.2 MMOL/L
POTASSIUM SERPL-SCNC: 4.3 MMOL/L
POTASSIUM SERPL-SCNC: 4.3 MMOL/L
POTASSIUM SERPL-SCNC: 4.4 MMOL/L
POTASSIUM SERPL-SCNC: 4.5 MMOL/L
POTASSIUM SERPL-SCNC: 4.6 MMOL/L
POTASSIUM SERPL-SCNC: 4.6 MMOL/L
POTASSIUM SERPL-SCNC: 4.7 MMOL/L
POTASSIUM SERPL-SCNC: 4.9 MMOL/L
POTASSIUM SERPL-SCNC: 5.1 MMOL/L
POTASSIUM SERPL-SCNC: 5.2 MMOL/L
POTASSIUM SERPL-SCNC: 5.3 MMOL/L
POTASSIUM SERPL-SCNC: 5.4 MMOL/L
POTASSIUM SERPL-SCNC: 5.4 MMOL/L
POTASSIUM SERPL-SCNC: 5.9 MMOL/L
POTASSIUM SERPL-SCNC: 6.4 MMOL/L
PRE FEV1 FVC: 75
PRE FEV1: 0.62
PRE FVC: 0.83
PREDICTED FEV1 FVC: 76
PREDICTED FEV1: 1.76
PREDICTED FVC: 2.38
PROT SERPL-MCNC: 4.5 G/DL
PROT SERPL-MCNC: 5.1 G/DL
PROT SERPL-MCNC: 5.1 G/DL
PROT SERPL-MCNC: 5.5 G/DL
PROT SERPL-MCNC: 5.8 G/DL
PROT SERPL-MCNC: 6 G/DL
PROT SERPL-MCNC: 6.7 G/DL
PROT SERPL-MCNC: 6.7 G/DL
PROT SERPL-MCNC: 7 G/DL
PROT UR QL STRIP: ABNORMAL
PROTHROMBIN TIME: 12.7 SEC
PROTHROMBIN TIME: 13.5 SEC
PROTHROMBIN TIME: 14.5 SEC
PROTHROMBIN TIME: 39.6 SEC
PS: 10
RBC # BLD AUTO: 1.66 M/UL
RBC # BLD AUTO: 2.06 M/UL
RBC # BLD AUTO: 2.52 M/UL
RBC # BLD AUTO: 2.7 M/UL
RBC # BLD AUTO: 2.75 M/UL
RBC # BLD AUTO: 2.76 M/UL
RBC # BLD AUTO: 2.8 M/UL
RBC # BLD AUTO: 2.95 M/UL
RBC # BLD AUTO: 3.02 M/UL
RBC # BLD AUTO: 3.04 M/UL
RBC # BLD AUTO: 3.05 M/UL
RBC # BLD AUTO: 3.06 M/UL
RBC # BLD AUTO: 3.07 M/UL
RBC # BLD AUTO: 3.1 M/UL
RBC # BLD AUTO: 3.13 M/UL
RBC # BLD AUTO: 3.23 M/UL
RBC # BLD AUTO: 3.25 M/UL
RBC # BLD AUTO: 3.47 M/UL
RBC # BLD AUTO: 3.72 M/UL
RBC # BLD AUTO: 4.09 M/UL
RBC # BLD AUTO: 4.15 M/UL
RBC #/AREA URNS AUTO: 0 /HPF (ref 0–4)
RETICS/RBC NFR AUTO: 2.9 %
RETIRED EF AND QEF - SEE NOTES: 50 (ref 55–65)
SAMPLE: ABNORMAL
SATURATED IRON: 11 %
SCHISTOCYTES BLD QL SMEAR: PRESENT
SITE: ABNORMAL
SODIUM BLD-SCNC: 136 MMOL/L (ref 136–145)
SODIUM BLD-SCNC: 145 MMOL/L (ref 136–145)
SODIUM SERPL-SCNC: 125 MMOL/L
SODIUM SERPL-SCNC: 126 MMOL/L
SODIUM SERPL-SCNC: 127 MMOL/L
SODIUM SERPL-SCNC: 128 MMOL/L
SODIUM SERPL-SCNC: 129 MMOL/L
SODIUM SERPL-SCNC: 131 MMOL/L
SODIUM SERPL-SCNC: 132 MMOL/L
SODIUM SERPL-SCNC: 133 MMOL/L
SODIUM SERPL-SCNC: 134 MMOL/L
SODIUM SERPL-SCNC: 134 MMOL/L
SODIUM SERPL-SCNC: 135 MMOL/L
SODIUM SERPL-SCNC: 136 MMOL/L
SODIUM SERPL-SCNC: 137 MMOL/L
SODIUM SERPL-SCNC: 137 MMOL/L
SODIUM SERPL-SCNC: 138 MMOL/L
SODIUM SERPL-SCNC: 138 MMOL/L
SODIUM SERPL-SCNC: 148 MMOL/L
SODIUM SERPL-SCNC: 149 MMOL/L
SP GR UR STRIP: 1.02 (ref 1–1.03)
SP02: 100
SP02: 98
SPONT RATE: 22
SQUAMOUS #/AREA URNS AUTO: 1 /HPF
T3FREE SERPL-MCNC: <1 PG/ML
T3REVERSE SERPL-MCNC: 313.5 NG/DL (ref 9–27)
T4 FREE SERPL-MCNC: 0.75 NG/DL
T4 FREE SERPL-MCNC: 0.81 NG/DL
T4 FREE SERPL-MCNC: 0.86 NG/DL
T4 FREE SERPL-MCNC: 0.88 NG/DL
T4 FREE SERPL-MCNC: 0.89 NG/DL
TARGETS BLD QL SMEAR: ABNORMAL
TARGETS BLD QL SMEAR: ABNORMAL
TB INDURATION 48 - 72 HR READ: 0 MM
TOTAL IRON BINDING CAPACITY: 115 UG/DL
TOXIC GRANULES BLD QL SMEAR: PRESENT
TOXIC GRANULES BLD QL SMEAR: PRESENT
TRANS ERYTHROCYTES VOL PATIENT: NORMAL ML
TRANSFERRIN SERPL-MCNC: 78 MG/DL
TRICUSPID VALVE REGURGITATION: ABNORMAL
TROPONIN I SERPL DL<=0.01 NG/ML-MCNC: 0.27 NG/ML
TROPONIN I SERPL DL<=0.01 NG/ML-MCNC: 0.32 NG/ML
TROPONIN I SERPL DL<=0.01 NG/ML-MCNC: 0.45 NG/ML
TSH SERPL DL<=0.005 MIU/L-ACNC: 15.56 UIU/ML
TSH SERPL DL<=0.005 MIU/L-ACNC: 21.68 UIU/ML
TSH SERPL DL<=0.005 MIU/L-ACNC: 9.76 UIU/ML
URN SPEC COLLECT METH UR: ABNORMAL
UROBILINOGEN UR STRIP-ACNC: NEGATIVE EU/DL
VIT B1 SERPL-MCNC: 45 UG/L (ref 38–122)
VIT B12 SERPL-MCNC: >2000 PG/ML
VT: 380
WBC # BLD AUTO: 11.19 K/UL
WBC # BLD AUTO: 11.82 K/UL
WBC # BLD AUTO: 13.93 K/UL
WBC # BLD AUTO: 25.84 K/UL
WBC # BLD AUTO: 4.72 K/UL
WBC # BLD AUTO: 4.72 K/UL
WBC # BLD AUTO: 4.92 K/UL
WBC # BLD AUTO: 5.28 K/UL
WBC # BLD AUTO: 5.61 K/UL
WBC # BLD AUTO: 5.95 K/UL
WBC # BLD AUTO: 6.31 K/UL
WBC # BLD AUTO: 6.41 K/UL
WBC # BLD AUTO: 6.73 K/UL
WBC # BLD AUTO: 6.87 K/UL
WBC # BLD AUTO: 7.12 K/UL
WBC # BLD AUTO: 7.52 K/UL
WBC # BLD AUTO: 7.61 K/UL
WBC # BLD AUTO: 7.8 K/UL
WBC # BLD AUTO: 8.26 K/UL
WBC # BLD AUTO: 8.71 K/UL
WBC # BLD AUTO: 9.24 K/UL
WBC #/AREA URNS AUTO: 1 /HPF (ref 0–5)

## 2017-01-01 PROCEDURE — 93990 DOPPLER FLOW TESTING: CPT | Mod: 59,S$GLB,, | Performed by: SURGERY

## 2017-01-01 PROCEDURE — 25000003 PHARM REV CODE 250: Performed by: INTERNAL MEDICINE

## 2017-01-01 PROCEDURE — 25000003 PHARM REV CODE 250: Performed by: ANESTHESIOLOGY

## 2017-01-01 PROCEDURE — 1157F ADVNC CARE PLAN IN RCRD: CPT | Mod: S$GLB,,, | Performed by: THORACIC SURGERY (CARDIOTHORACIC VASCULAR SURGERY)

## 2017-01-01 PROCEDURE — 1159F MED LIST DOCD IN RCRD: CPT | Mod: S$GLB,,, | Performed by: PODIATRIST

## 2017-01-01 PROCEDURE — 12000000 HC SNF SEMI-PRIVATE ROOM

## 2017-01-01 PROCEDURE — 93010 ELECTROCARDIOGRAM REPORT: CPT | Mod: ,,, | Performed by: INTERNAL MEDICINE

## 2017-01-01 PROCEDURE — 97535 SELF CARE MNGMENT TRAINING: CPT

## 2017-01-01 PROCEDURE — 99900035 HC TECH TIME PER 15 MIN (STAT)

## 2017-01-01 PROCEDURE — 80100016 HC MAINTENANCE HEMODIALYSIS

## 2017-01-01 PROCEDURE — 99233 SBSQ HOSP IP/OBS HIGH 50: CPT | Mod: ,,, | Performed by: HOSPITALIST

## 2017-01-01 PROCEDURE — 86900 BLOOD TYPING SEROLOGIC ABO: CPT

## 2017-01-01 PROCEDURE — 99232 SBSQ HOSP IP/OBS MODERATE 35: CPT | Mod: ,,, | Performed by: INTERNAL MEDICINE

## 2017-01-01 PROCEDURE — 99499 UNLISTED E&M SERVICE: CPT | Mod: S$GLB,,, | Performed by: SURGERY

## 2017-01-01 PROCEDURE — 83735 ASSAY OF MAGNESIUM: CPT

## 2017-01-01 PROCEDURE — 85730 THROMBOPLASTIN TIME PARTIAL: CPT

## 2017-01-01 PROCEDURE — 1160F RVW MEDS BY RX/DR IN RCRD: CPT | Mod: S$GLB,,, | Performed by: INTERNAL MEDICINE

## 2017-01-01 PROCEDURE — 36620 INSERTION CATHETER ARTERY: CPT

## 2017-01-01 PROCEDURE — 36000710: Performed by: ORTHOPAEDIC SURGERY

## 2017-01-01 PROCEDURE — 20000000 HC ICU ROOM

## 2017-01-01 PROCEDURE — 93005 ELECTROCARDIOGRAM TRACING: CPT | Mod: S$GLB,,, | Performed by: INTERNAL MEDICINE

## 2017-01-01 PROCEDURE — 92950 HEART/LUNG RESUSCITATION CPR: CPT | Mod: ,,, | Performed by: GENERAL ACUTE CARE HOSPITAL

## 2017-01-01 PROCEDURE — 1125F AMNT PAIN NOTED PAIN PRSNT: CPT | Mod: S$GLB,,, | Performed by: SURGERY

## 2017-01-01 PROCEDURE — 97802 MEDICAL NUTRITION INDIV IN: CPT

## 2017-01-01 PROCEDURE — 94761 N-INVAS EAR/PLS OXIMETRY MLT: CPT

## 2017-01-01 PROCEDURE — 25000003 PHARM REV CODE 250

## 2017-01-01 PROCEDURE — P9047 ALBUMIN (HUMAN), 25%, 50ML: HCPCS | Performed by: HOSPITALIST

## 2017-01-01 PROCEDURE — 86902 BLOOD TYPE ANTIGEN DONOR EA: CPT | Mod: 91

## 2017-01-01 PROCEDURE — 1159F MED LIST DOCD IN RCRD: CPT | Mod: S$GLB,,, | Performed by: INTERNAL MEDICINE

## 2017-01-01 PROCEDURE — 93005 ELECTROCARDIOGRAM TRACING: CPT

## 2017-01-01 PROCEDURE — 90935 HEMODIALYSIS ONE EVALUATION: CPT | Mod: ,,, | Performed by: INTERNAL MEDICINE

## 2017-01-01 PROCEDURE — 25000003 PHARM REV CODE 250: Performed by: HOSPITALIST

## 2017-01-01 PROCEDURE — 85025 COMPLETE CBC W/AUTO DIFF WBC: CPT

## 2017-01-01 PROCEDURE — 99214 OFFICE O/P EST MOD 30 MIN: CPT | Mod: S$GLB,,, | Performed by: INTERNAL MEDICINE

## 2017-01-01 PROCEDURE — 97116 GAIT TRAINING THERAPY: CPT

## 2017-01-01 PROCEDURE — 88311 DECALCIFY TISSUE: CPT | Mod: 26,,, | Performed by: PATHOLOGY

## 2017-01-01 PROCEDURE — 97530 THERAPEUTIC ACTIVITIES: CPT

## 2017-01-01 PROCEDURE — 99222 1ST HOSP IP/OBS MODERATE 55: CPT | Mod: ,,, | Performed by: INTERNAL MEDICINE

## 2017-01-01 PROCEDURE — 86922 COMPATIBILITY TEST ANTIGLOB: CPT

## 2017-01-01 PROCEDURE — 97162 PT EVAL MOD COMPLEX 30 MIN: CPT

## 2017-01-01 PROCEDURE — 27201423 OPTIME MED/SURG SUP & DEVICES STERILE SUPPLY: Performed by: ORTHOPAEDIC SURGERY

## 2017-01-01 PROCEDURE — 80053 COMPREHEN METABOLIC PANEL: CPT | Mod: 91

## 2017-01-01 PROCEDURE — 1157F ADVNC CARE PLAN IN RCRD: CPT | Mod: S$GLB,,, | Performed by: INTERNAL MEDICINE

## 2017-01-01 PROCEDURE — 99233 SBSQ HOSP IP/OBS HIGH 50: CPT | Mod: ,,, | Performed by: INTERNAL MEDICINE

## 2017-01-01 PROCEDURE — 80053 COMPREHEN METABOLIC PANEL: CPT

## 2017-01-01 PROCEDURE — 82306 VITAMIN D 25 HYDROXY: CPT

## 2017-01-01 PROCEDURE — 92610 EVALUATE SWALLOWING FUNCTION: CPT

## 2017-01-01 PROCEDURE — 86901 BLOOD TYPING SEROLOGIC RH(D): CPT

## 2017-01-01 PROCEDURE — 99239 HOSP IP/OBS DSCHRG MGMT >30: CPT | Mod: ,,, | Performed by: INTERNAL MEDICINE

## 2017-01-01 PROCEDURE — G8987 SELF CARE CURRENT STATUS: HCPCS | Mod: CL

## 2017-01-01 PROCEDURE — 99212 OFFICE O/P EST SF 10 MIN: CPT | Mod: 25,S$GLB,, | Performed by: PODIATRIST

## 2017-01-01 PROCEDURE — 80069 RENAL FUNCTION PANEL: CPT

## 2017-01-01 PROCEDURE — 85025 COMPLETE CBC W/AUTO DIFF WBC: CPT | Mod: 91

## 2017-01-01 PROCEDURE — 99232 SBSQ HOSP IP/OBS MODERATE 35: CPT | Mod: ,,, | Performed by: HOSPITALIST

## 2017-01-01 PROCEDURE — 11042 DBRDMT SUBQ TIS 1ST 20SQCM/<: CPT | Mod: S$GLB,,, | Performed by: PODIATRIST

## 2017-01-01 PROCEDURE — 25000003 PHARM REV CODE 250: Performed by: STUDENT IN AN ORGANIZED HEALTH CARE EDUCATION/TRAINING PROGRAM

## 2017-01-01 PROCEDURE — 84100 ASSAY OF PHOSPHORUS: CPT

## 2017-01-01 PROCEDURE — 36415 COLL VENOUS BLD VENIPUNCTURE: CPT

## 2017-01-01 PROCEDURE — 1160F RVW MEDS BY RX/DR IN RCRD: CPT | Mod: S$GLB,,, | Performed by: PODIATRIST

## 2017-01-01 PROCEDURE — 84100 ASSAY OF PHOSPHORUS: CPT | Mod: 91

## 2017-01-01 PROCEDURE — 1157F ADVNC CARE PLAN IN RCRD: CPT | Mod: S$GLB,,, | Performed by: PODIATRIST

## 2017-01-01 PROCEDURE — 1157F ADVNC CARE PLAN IN RCRD: CPT | Mod: S$GLB,,, | Performed by: SURGERY

## 2017-01-01 PROCEDURE — 99233 SBSQ HOSP IP/OBS HIGH 50: CPT | Mod: GC,,, | Performed by: INTERNAL MEDICINE

## 2017-01-01 PROCEDURE — 99152 MOD SED SAME PHYS/QHP 5/>YRS: CPT | Mod: ,,, | Performed by: SURGERY

## 2017-01-01 PROCEDURE — 94660 CPAP INITIATION&MGMT: CPT

## 2017-01-01 PROCEDURE — 84481 FREE ASSAY (FT-3): CPT

## 2017-01-01 PROCEDURE — 87040 BLOOD CULTURE FOR BACTERIA: CPT | Mod: 59

## 2017-01-01 PROCEDURE — 97110 THERAPEUTIC EXERCISES: CPT

## 2017-01-01 PROCEDURE — 99285 EMERGENCY DEPT VISIT HI MDM: CPT | Mod: ,,, | Performed by: EMERGENCY MEDICINE

## 2017-01-01 PROCEDURE — 84482 T3 REVERSE: CPT

## 2017-01-01 PROCEDURE — 87186 SC STD MICRODIL/AGAR DIL: CPT

## 2017-01-01 PROCEDURE — 63600175 PHARM REV CODE 636 W HCPCS: Performed by: STUDENT IN AN ORGANIZED HEALTH CARE EDUCATION/TRAINING PROGRAM

## 2017-01-01 PROCEDURE — 99499 UNLISTED E&M SERVICE: CPT | Mod: S$GLB,,, | Performed by: PODIATRIST

## 2017-01-01 PROCEDURE — 87040 BLOOD CULTURE FOR BACTERIA: CPT

## 2017-01-01 PROCEDURE — 11721 DEBRIDE NAIL 6 OR MORE: CPT | Mod: Q9,S$GLB,, | Performed by: PODIATRIST

## 2017-01-01 PROCEDURE — 83605 ASSAY OF LACTIC ACID: CPT

## 2017-01-01 PROCEDURE — 93306 TTE W/DOPPLER COMPLETE: CPT | Mod: S$GLB,,, | Performed by: INTERNAL MEDICINE

## 2017-01-01 PROCEDURE — 86077 PHYS BLOOD BANK SERV XMATCH: CPT | Mod: ,,, | Performed by: PATHOLOGY

## 2017-01-01 PROCEDURE — 93922 UPR/L XTREMITY ART 2 LEVELS: CPT

## 2017-01-01 PROCEDURE — 25000003 PHARM REV CODE 250: Performed by: ORTHOPAEDIC SURGERY

## 2017-01-01 PROCEDURE — 80048 BASIC METABOLIC PNL TOTAL CA: CPT

## 2017-01-01 PROCEDURE — 1160F RVW MEDS BY RX/DR IN RCRD: CPT | Mod: S$GLB,,, | Performed by: THORACIC SURGERY (CARDIOTHORACIC VASCULAR SURGERY)

## 2017-01-01 PROCEDURE — D9220A PRA ANESTHESIA: Mod: ANES,,, | Performed by: ANESTHESIOLOGY

## 2017-01-01 PROCEDURE — 99999 PR PBB SHADOW E&M-EST. PATIENT-LVL III: CPT | Mod: PBBFAC,,, | Performed by: THORACIC SURGERY (CARDIOTHORACIC VASCULAR SURGERY)

## 2017-01-01 PROCEDURE — 84439 ASSAY OF FREE THYROXINE: CPT

## 2017-01-01 PROCEDURE — 63600175 PHARM REV CODE 636 W HCPCS: Performed by: HOSPITALIST

## 2017-01-01 PROCEDURE — 1125F AMNT PAIN NOTED PAIN PRSNT: CPT | Mod: S$GLB,,, | Performed by: PODIATRIST

## 2017-01-01 PROCEDURE — 82533 TOTAL CORTISOL: CPT | Mod: 91

## 2017-01-01 PROCEDURE — 92611 MOTION FLUOROSCOPY/SWALLOW: CPT

## 2017-01-01 PROCEDURE — 93297 REM INTERROG DEV EVAL ICPMS: CPT | Mod: S$GLB,,, | Performed by: INTERNAL MEDICINE

## 2017-01-01 PROCEDURE — 36556 INSERT NON-TUNNEL CV CATH: CPT

## 2017-01-01 PROCEDURE — 99999 PR PBB SHADOW E&M-EST. PATIENT-LVL III: CPT | Mod: PBBFAC,,, | Performed by: INTERNAL MEDICINE

## 2017-01-01 PROCEDURE — 36430 TRANSFUSION BLD/BLD COMPNT: CPT

## 2017-01-01 PROCEDURE — 11721 DEBRIDE NAIL 6 OR MORE: CPT | Mod: 59,Q7,S$GLB, | Performed by: PODIATRIST

## 2017-01-01 PROCEDURE — 36600 WITHDRAWAL OF ARTERIAL BLOOD: CPT

## 2017-01-01 PROCEDURE — 20600001 HC STEP DOWN PRIVATE ROOM

## 2017-01-01 PROCEDURE — 1126F AMNT PAIN NOTED NONE PRSNT: CPT | Mod: S$GLB,,, | Performed by: THORACIC SURGERY (CARDIOTHORACIC VASCULAR SURGERY)

## 2017-01-01 PROCEDURE — 99223 1ST HOSP IP/OBS HIGH 75: CPT | Mod: ,,, | Performed by: INTERNAL MEDICINE

## 2017-01-01 PROCEDURE — 63600175 PHARM REV CODE 636 W HCPCS: Performed by: INTERNAL MEDICINE

## 2017-01-01 PROCEDURE — 86870 RBC ANTIBODY IDENTIFICATION: CPT

## 2017-01-01 PROCEDURE — 97530 THERAPEUTIC ACTIVITIES: CPT | Performed by: PHYSICAL THERAPIST

## 2017-01-01 PROCEDURE — 94003 VENT MGMT INPAT SUBQ DAY: CPT

## 2017-01-01 PROCEDURE — 94760 N-INVAS EAR/PLS OXIMETRY 1: CPT

## 2017-01-01 PROCEDURE — 94010 BREATHING CAPACITY TEST: CPT | Mod: S$GLB,,, | Performed by: INTERNAL MEDICINE

## 2017-01-01 PROCEDURE — 84425 ASSAY OF VITAMIN B-1: CPT

## 2017-01-01 PROCEDURE — 97165 OT EVAL LOW COMPLEX 30 MIN: CPT

## 2017-01-01 PROCEDURE — C1713 ANCHOR/SCREW BN/BN,TIS/BN: HCPCS | Performed by: ORTHOPAEDIC SURGERY

## 2017-01-01 PROCEDURE — 11000001 HC ACUTE MED/SURG PRIVATE ROOM

## 2017-01-01 PROCEDURE — 1159F MED LIST DOCD IN RCRD: CPT | Mod: S$GLB,,, | Performed by: SURGERY

## 2017-01-01 PROCEDURE — 85014 HEMATOCRIT: CPT

## 2017-01-01 PROCEDURE — 27000221 HC OXYGEN, UP TO 24 HOURS

## 2017-01-01 PROCEDURE — 99999 PR PBB SHADOW E&M-EST. PATIENT-LVL III: CPT | Mod: PBBFAC,,, | Performed by: SURGERY

## 2017-01-01 PROCEDURE — 85610 PROTHROMBIN TIME: CPT

## 2017-01-01 PROCEDURE — 84443 ASSAY THYROID STIM HORMONE: CPT

## 2017-01-01 PROCEDURE — 82607 VITAMIN B-12: CPT

## 2017-01-01 PROCEDURE — 27000190 HC CPAP FULL FACE MASK W/VALVE

## 2017-01-01 PROCEDURE — 82803 BLOOD GASES ANY COMBINATION: CPT

## 2017-01-01 PROCEDURE — 86902 BLOOD TYPE ANTIGEN DONOR EA: CPT

## 2017-01-01 PROCEDURE — 80100014 HC HEMODIALYSIS 1:1

## 2017-01-01 PROCEDURE — 85045 AUTOMATED RETICULOCYTE COUNT: CPT

## 2017-01-01 PROCEDURE — 25000003 PHARM REV CODE 250: Performed by: NURSE ANESTHETIST, CERTIFIED REGISTERED

## 2017-01-01 PROCEDURE — 85027 COMPLETE CBC AUTOMATED: CPT

## 2017-01-01 PROCEDURE — 84484 ASSAY OF TROPONIN QUANT: CPT | Mod: 91

## 2017-01-01 PROCEDURE — 82024 ASSAY OF ACTH: CPT

## 2017-01-01 PROCEDURE — G8988 SELF CARE GOAL STATUS: HCPCS | Mod: CK

## 2017-01-01 PROCEDURE — 25000003 PHARM REV CODE 250: Performed by: GENERAL ACUTE CARE HOSPITAL

## 2017-01-01 PROCEDURE — 93923 UPR/LXTR ART STDY 3+ LVLS: CPT | Mod: S$GLB,,, | Performed by: SURGERY

## 2017-01-01 PROCEDURE — 97166 OT EVAL MOD COMPLEX 45 MIN: CPT

## 2017-01-01 PROCEDURE — 82533 TOTAL CORTISOL: CPT

## 2017-01-01 PROCEDURE — 99900026 HC AIRWAY MAINTENANCE (STAT)

## 2017-01-01 PROCEDURE — 63600175 PHARM REV CODE 636 W HCPCS

## 2017-01-01 PROCEDURE — 37000009 HC ANESTHESIA EA ADD 15 MINS: Performed by: ORTHOPAEDIC SURGERY

## 2017-01-01 PROCEDURE — 85018 HEMOGLOBIN: CPT

## 2017-01-01 PROCEDURE — 99223 1ST HOSP IP/OBS HIGH 75: CPT | Mod: 57,GC,, | Performed by: ORTHOPAEDIC SURGERY

## 2017-01-01 PROCEDURE — 87086 URINE CULTURE/COLONY COUNT: CPT

## 2017-01-01 PROCEDURE — 84484 ASSAY OF TROPONIN QUANT: CPT

## 2017-01-01 PROCEDURE — 99285 EMERGENCY DEPT VISIT HI MDM: CPT | Mod: 25

## 2017-01-01 PROCEDURE — 1160F RVW MEDS BY RX/DR IN RCRD: CPT | Mod: S$GLB,,, | Performed by: SURGERY

## 2017-01-01 PROCEDURE — 86580 TB INTRADERMAL TEST: CPT | Performed by: HOSPITALIST

## 2017-01-01 PROCEDURE — 99900017 HC EXTUBATION W/PARAMETERS (STAT)

## 2017-01-01 PROCEDURE — 36907 BALO ANGIOP CTR DIALYSIS SEG: CPT | Mod: ,,, | Performed by: SURGERY

## 2017-01-01 PROCEDURE — 85007 BL SMEAR W/DIFF WBC COUNT: CPT

## 2017-01-01 PROCEDURE — 31500 INSERT EMERGENCY AIRWAY: CPT

## 2017-01-01 PROCEDURE — 99213 OFFICE O/P EST LOW 20 MIN: CPT | Mod: S$GLB,,, | Performed by: THORACIC SURGERY (CARDIOTHORACIC VASCULAR SURGERY)

## 2017-01-01 PROCEDURE — 27236 TREAT THIGH FRACTURE: CPT | Mod: RT,,, | Performed by: ORTHOPAEDIC SURGERY

## 2017-01-01 PROCEDURE — 93299 LOOP RECORDER REMOTE: CPT | Mod: S$GLB,,, | Performed by: INTERNAL MEDICINE

## 2017-01-01 PROCEDURE — 99231 SBSQ HOSP IP/OBS SF/LOW 25: CPT | Mod: ,,, | Performed by: SURGERY

## 2017-01-01 PROCEDURE — 1159F MED LIST DOCD IN RCRD: CPT | Mod: S$GLB,,, | Performed by: THORACIC SURGERY (CARDIOTHORACIC VASCULAR SURGERY)

## 2017-01-01 PROCEDURE — 71020 XR CHEST PA AND LATERAL: CPT | Mod: 26,,, | Performed by: RADIOLOGY

## 2017-01-01 PROCEDURE — 51702 INSERT TEMP BLADDER CATH: CPT

## 2017-01-01 PROCEDURE — 99223 1ST HOSP IP/OBS HIGH 75: CPT | Mod: ,,, | Performed by: GENERAL ACUTE CARE HOSPITAL

## 2017-01-01 PROCEDURE — 12000002 HC ACUTE/MED SURGE SEMI-PRIVATE ROOM

## 2017-01-01 PROCEDURE — 99306 1ST NF CARE HIGH MDM 50: CPT | Mod: ,,, | Performed by: HOSPITALIST

## 2017-01-01 PROCEDURE — 83735 ASSAY OF MAGNESIUM: CPT | Mod: 91

## 2017-01-01 PROCEDURE — 93010 ELECTROCARDIOGRAM REPORT: CPT | Mod: 77,,, | Performed by: INTERNAL MEDICINE

## 2017-01-01 PROCEDURE — 94002 VENT MGMT INPAT INIT DAY: CPT

## 2017-01-01 PROCEDURE — 37000008 HC ANESTHESIA 1ST 15 MINUTES: Performed by: ORTHOPAEDIC SURGERY

## 2017-01-01 PROCEDURE — 18500000 HC LEAVE OF ABSENCE HOSPITAL SERVICES

## 2017-01-01 PROCEDURE — 99499 UNLISTED E&M SERVICE: CPT | Mod: S$GLB,,, | Performed by: INTERNAL MEDICINE

## 2017-01-01 PROCEDURE — 83615 LACTATE (LD) (LDH) ENZYME: CPT

## 2017-01-01 PROCEDURE — 27800903 OPTIME MED/SURG SUP & DEVICES OTHER IMPLANTS: Performed by: ORTHOPAEDIC SURGERY

## 2017-01-01 PROCEDURE — 82746 ASSAY OF FOLIC ACID SERUM: CPT

## 2017-01-01 PROCEDURE — 83010 ASSAY OF HAPTOGLOBIN QUANT: CPT

## 2017-01-01 PROCEDURE — D9220A PRA ANESTHESIA: Mod: ,,, | Performed by: ANESTHESIOLOGY

## 2017-01-01 PROCEDURE — 82728 ASSAY OF FERRITIN: CPT

## 2017-01-01 PROCEDURE — 99213 OFFICE O/P EST LOW 20 MIN: CPT | Mod: 25,S$GLB,, | Performed by: PODIATRIST

## 2017-01-01 PROCEDURE — 1126F AMNT PAIN NOTED NONE PRSNT: CPT | Mod: S$GLB,,, | Performed by: PODIATRIST

## 2017-01-01 PROCEDURE — 99999 PR PBB SHADOW E&M-EST. PATIENT-LVL IV: CPT | Mod: PBBFAC,,, | Performed by: SURGERY

## 2017-01-01 PROCEDURE — 99214 OFFICE O/P EST MOD 30 MIN: CPT | Mod: S$GLB,,, | Performed by: PODIATRIST

## 2017-01-01 PROCEDURE — 96372 THER/PROPH/DIAG INJ SC/IM: CPT

## 2017-01-01 PROCEDURE — 93990 DOPPLER FLOW TESTING: CPT | Mod: S$GLB,,, | Performed by: SURGERY

## 2017-01-01 PROCEDURE — C1725 CATH, TRANSLUMIN NON-LASER: HCPCS

## 2017-01-01 PROCEDURE — 1126F AMNT PAIN NOTED NONE PRSNT: CPT | Mod: S$GLB,,, | Performed by: INTERNAL MEDICINE

## 2017-01-01 PROCEDURE — 31500 INSERT EMERGENCY AIRWAY: CPT | Performed by: PAIN MEDICINE

## 2017-01-01 PROCEDURE — P9021 RED BLOOD CELLS UNIT: HCPCS

## 2017-01-01 PROCEDURE — 96374 THER/PROPH/DIAG INJ IV PUSH: CPT

## 2017-01-01 PROCEDURE — 84132 ASSAY OF SERUM POTASSIUM: CPT

## 2017-01-01 PROCEDURE — 99999 PR PBB SHADOW E&M-EST. PATIENT-LVL III: CPT | Mod: PBBFAC,,, | Performed by: PODIATRIST

## 2017-01-01 PROCEDURE — 99214 OFFICE O/P EST MOD 30 MIN: CPT | Mod: S$GLB,,, | Performed by: SURGERY

## 2017-01-01 PROCEDURE — 99213 OFFICE O/P EST LOW 20 MIN: CPT | Mod: S$GLB,,, | Performed by: PODIATRIST

## 2017-01-01 PROCEDURE — 80069 RENAL FUNCTION PANEL: CPT | Mod: 91

## 2017-01-01 PROCEDURE — 99499 UNLISTED E&M SERVICE: CPT | Mod: ,,, | Performed by: NURSE PRACTITIONER

## 2017-01-01 PROCEDURE — 94150 VITAL CAPACITY TEST: CPT

## 2017-01-01 PROCEDURE — D9220A PRA ANESTHESIA: Mod: CRNA,,, | Performed by: NURSE ANESTHETIST, CERTIFIED REGISTERED

## 2017-01-01 PROCEDURE — 64447 NJX AA&/STRD FEMORAL NRV IMG: CPT | Mod: 59,RT,, | Performed by: ANESTHESIOLOGY

## 2017-01-01 PROCEDURE — 83540 ASSAY OF IRON: CPT

## 2017-01-01 PROCEDURE — B41G1ZZ FLUOROSCOPY OF LEFT LOWER EXTREMITY ARTERIES USING LOW OSMOLAR CONTRAST: ICD-10-PCS | Performed by: SURGERY

## 2017-01-01 PROCEDURE — 99900058 HC 022 PAID UNDER SNF PPS

## 2017-01-01 PROCEDURE — 36902 INTRO CATH DIALYSIS CIRCUIT: CPT | Mod: ,,, | Performed by: SURGERY

## 2017-01-01 PROCEDURE — 27100025 HC TUBING, SET FLUID WARMER: Performed by: NURSE ANESTHETIST, CERTIFIED REGISTERED

## 2017-01-01 PROCEDURE — 93010 ELECTROCARDIOGRAM REPORT: CPT | Mod: 76,,, | Performed by: INTERNAL MEDICINE

## 2017-01-01 PROCEDURE — 27200188 HC TRANSDUCER, ART ADULT/PEDS

## 2017-01-01 PROCEDURE — 63600175 PHARM REV CODE 636 W HCPCS: Performed by: GENERAL ACUTE CARE HOSPITAL

## 2017-01-01 PROCEDURE — 99499 UNLISTED E&M SERVICE: CPT | Mod: S$GLB,,, | Performed by: THORACIC SURGERY (CARDIOTHORACIC VASCULAR SURGERY)

## 2017-01-01 PROCEDURE — 99223 1ST HOSP IP/OBS HIGH 75: CPT | Mod: ,,, | Performed by: PSYCHIATRY & NEUROLOGY

## 2017-01-01 PROCEDURE — 63600175 PHARM REV CODE 636 W HCPCS: Performed by: ORTHOPAEDIC SURGERY

## 2017-01-01 PROCEDURE — 37799 UNLISTED PX VASCULAR SURGERY: CPT

## 2017-01-01 PROCEDURE — 36800 INSERTION OF CANNULA: CPT | Mod: ,,, | Performed by: NURSE PRACTITIONER

## 2017-01-01 PROCEDURE — 63600175 PHARM REV CODE 636 W HCPCS: Performed by: EMERGENCY MEDICINE

## 2017-01-01 PROCEDURE — 63600175 PHARM REV CODE 636 W HCPCS: Performed by: NURSE ANESTHETIST, CERTIFIED REGISTERED

## 2017-01-01 PROCEDURE — 93971 EXTREMITY STUDY: CPT | Mod: S$GLB,,, | Performed by: INTERNAL MEDICINE

## 2017-01-01 PROCEDURE — 99499 UNLISTED E&M SERVICE: CPT | Mod: S$PBB,,, | Performed by: PODIATRIST

## 2017-01-01 PROCEDURE — 99232 SBSQ HOSP IP/OBS MODERATE 35: CPT | Mod: ,,, | Performed by: NURSE PRACTITIONER

## 2017-01-01 PROCEDURE — 92950 HEART/LUNG RESUSCITATION CPR: CPT

## 2017-01-01 PROCEDURE — 36000711: Performed by: ORTHOPAEDIC SURGERY

## 2017-01-01 PROCEDURE — 81001 URINALYSIS AUTO W/SCOPE: CPT

## 2017-01-01 PROCEDURE — C1751 CATH, INF, PER/CENT/MIDLINE: HCPCS

## 2017-01-01 PROCEDURE — 25000003 PHARM REV CODE 250: Performed by: EMERGENCY MEDICINE

## 2017-01-01 PROCEDURE — 93010 ELECTROCARDIOGRAM REPORT: CPT | Mod: S$GLB,,, | Performed by: INTERNAL MEDICINE

## 2017-01-01 PROCEDURE — 63600175 PHARM REV CODE 636 W HCPCS: Performed by: ANESTHESIOLOGY

## 2017-01-01 PROCEDURE — 88305 TISSUE EXAM BY PATHOLOGIST: CPT | Mod: 26,,, | Performed by: PATHOLOGY

## 2017-01-01 PROCEDURE — 047L3ZZ DILATION OF LEFT FEMORAL ARTERY, PERCUTANEOUS APPROACH: ICD-10-PCS | Performed by: SURGERY

## 2017-01-01 PROCEDURE — 1126F AMNT PAIN NOTED NONE PRSNT: CPT | Mod: S$GLB,,, | Performed by: SURGERY

## 2017-01-01 PROCEDURE — C1776 JOINT DEVICE (IMPLANTABLE): HCPCS | Performed by: ORTHOPAEDIC SURGERY

## 2017-01-01 PROCEDURE — 93886 INTRACRANIAL COMPLETE STUDY: CPT

## 2017-01-01 PROCEDURE — 88305 TISSUE EXAM BY PATHOLOGIST: CPT | Performed by: PATHOLOGY

## 2017-01-01 PROCEDURE — 99222 1ST HOSP IP/OBS MODERATE 55: CPT | Mod: GC,,, | Performed by: INTERNAL MEDICINE

## 2017-01-01 PROCEDURE — 84295 ASSAY OF SERUM SODIUM: CPT

## 2017-01-01 PROCEDURE — 99223 1ST HOSP IP/OBS HIGH 75: CPT | Mod: ,,, | Performed by: HOSPITALIST

## 2017-01-01 PROCEDURE — 0SRR0J9 REPLACEMENT OF RIGHT HIP JOINT, FEMORAL SURFACE WITH SYNTHETIC SUBSTITUTE, CEMENTED, OPEN APPROACH: ICD-10-PCS | Performed by: ORTHOPAEDIC SURGERY

## 2017-01-01 DEVICE — STEM OMNIFIT EON 132DEG SZ6: Type: IMPLANTABLE DEVICE | Site: HIP | Status: FUNCTIONAL

## 2017-01-01 DEVICE — PLUG BONE: Type: IMPLANTABLE DEVICE | Site: HIP | Status: FUNCTIONAL

## 2017-01-01 DEVICE — SPACER CEMENT DISTAL 10MM: Type: IMPLANTABLE DEVICE | Site: HIP | Status: FUNCTIONAL

## 2017-01-01 DEVICE — CEMENT BONE SIMPLE P INDIVID: Type: IMPLANTABLE DEVICE | Site: HIP | Status: FUNCTIONAL

## 2017-01-01 DEVICE — SLEEVE FEM C-TAPER UNI +5MM: Type: IMPLANTABLE DEVICE | Site: HIP | Status: FUNCTIONAL

## 2017-01-01 DEVICE — HEAD FEM ENDO UNI MOD 45MM: Type: IMPLANTABLE DEVICE | Site: HIP | Status: FUNCTIONAL

## 2017-01-01 RX ORDER — ACETAMINOPHEN 500 MG
500 TABLET ORAL
Status: COMPLETED | OUTPATIENT
Start: 2017-01-01 | End: 2017-01-01

## 2017-01-01 RX ORDER — ALBUMIN HUMAN 250 G/1000ML
25 SOLUTION INTRAVENOUS
Status: DISCONTINUED | OUTPATIENT
Start: 2017-01-01 | End: 2017-01-01

## 2017-01-01 RX ORDER — DEXTROSE MONOHYDRATE 25 G/50ML
INJECTION, SOLUTION INTRAVENOUS
Status: COMPLETED | OUTPATIENT
Start: 2017-01-01 | End: 2017-01-01

## 2017-01-01 RX ORDER — SODIUM CHLORIDE 9 MG/ML
INJECTION, SOLUTION INTRAVENOUS ONCE
Status: COMPLETED | OUTPATIENT
Start: 2017-01-01 | End: 2017-01-01

## 2017-01-01 RX ORDER — CARVEDILOL 6.25 MG/1
6.25 TABLET ORAL 2 TIMES DAILY
Qty: 60 TABLET | Refills: 5 | Status: ON HOLD | OUTPATIENT
Start: 2017-01-01 | End: 2017-01-01 | Stop reason: HOSPADM

## 2017-01-01 RX ORDER — CALCIUM CHLORIDE INJECTION 100 MG/ML
INJECTION, SOLUTION INTRAVENOUS CODE/TRAUMA/SEDATION MEDICATION
Status: COMPLETED | OUTPATIENT
Start: 2017-01-01 | End: 2017-01-01

## 2017-01-01 RX ORDER — ACETAMINOPHEN 325 MG/1
650 TABLET ORAL EVERY 6 HOURS PRN
Status: DISCONTINUED | OUTPATIENT
Start: 2017-01-01 | End: 2017-01-01 | Stop reason: HOSPADM

## 2017-01-01 RX ORDER — ACETAMINOPHEN 325 MG/1
650 TABLET ORAL EVERY 6 HOURS PRN
Status: DISCONTINUED | OUTPATIENT
Start: 2017-01-01 | End: 2017-01-01

## 2017-01-01 RX ORDER — SODIUM CHLORIDE 9 MG/ML
INJECTION, SOLUTION INTRAVENOUS CONTINUOUS
Status: CANCELLED | OUTPATIENT
Start: 2017-01-01

## 2017-01-01 RX ORDER — RAMELTEON 8 MG/1
8 TABLET ORAL NIGHTLY PRN
Status: DISCONTINUED | OUTPATIENT
Start: 2017-01-01 | End: 2017-01-01 | Stop reason: HOSPADM

## 2017-01-01 RX ORDER — MIDODRINE HYDROCHLORIDE 5 MG/1
10 TABLET ORAL 3 TIMES DAILY
Status: DISCONTINUED | OUTPATIENT
Start: 2017-01-01 | End: 2017-01-01 | Stop reason: HOSPADM

## 2017-01-01 RX ORDER — SEVELAMER CARBONATE FOR ORAL SUSPENSION 800 MG/1
1.6 POWDER, FOR SUSPENSION ORAL
Status: DISCONTINUED | OUTPATIENT
Start: 2017-01-01 | End: 2017-01-01 | Stop reason: HOSPADM

## 2017-01-01 RX ORDER — MIDODRINE HYDROCHLORIDE 2.5 MG/1
2.5 TABLET ORAL ONCE
Status: COMPLETED | OUTPATIENT
Start: 2017-01-01 | End: 2017-01-01

## 2017-01-01 RX ORDER — GABAPENTIN 100 MG/1
100 CAPSULE ORAL NIGHTLY
Status: CANCELLED | OUTPATIENT
Start: 2017-01-01

## 2017-01-01 RX ORDER — FENTANYL CITRATE 50 UG/ML
INJECTION, SOLUTION INTRAMUSCULAR; INTRAVENOUS
Status: DISCONTINUED | OUTPATIENT
Start: 2017-01-01 | End: 2017-01-01

## 2017-01-01 RX ORDER — INSULIN ASPART 100 [IU]/ML
0-5 INJECTION, SOLUTION INTRAVENOUS; SUBCUTANEOUS EVERY 6 HOURS PRN
Status: DISCONTINUED | OUTPATIENT
Start: 2017-01-01 | End: 2017-01-01 | Stop reason: HOSPADM

## 2017-01-01 RX ORDER — CEFAZOLIN SODIUM 2 G/50ML
2 SOLUTION INTRAVENOUS
Status: CANCELLED | OUTPATIENT
Start: 2017-01-01

## 2017-01-01 RX ORDER — DIPHENHYDRAMINE HYDROCHLORIDE 50 MG/ML
INJECTION INTRAMUSCULAR; INTRAVENOUS
Status: DISCONTINUED | OUTPATIENT
Start: 2017-01-01 | End: 2017-01-01

## 2017-01-01 RX ORDER — SODIUM CHLORIDE 9 MG/ML
INJECTION, SOLUTION INTRAVENOUS
Status: DISCONTINUED | OUTPATIENT
Start: 2017-01-01 | End: 2017-01-01

## 2017-01-01 RX ORDER — POLYETHYLENE GLYCOL 3350 17 G/17G
17 POWDER, FOR SOLUTION ORAL DAILY
Status: DISCONTINUED | OUTPATIENT
Start: 2017-01-01 | End: 2017-01-01 | Stop reason: HOSPADM

## 2017-01-01 RX ORDER — OXYCODONE AND ACETAMINOPHEN 5; 325 MG/1; MG/1
1 TABLET ORAL EVERY 4 HOURS PRN
Status: DISCONTINUED | OUTPATIENT
Start: 2017-01-01 | End: 2017-01-01 | Stop reason: HOSPADM

## 2017-01-01 RX ORDER — SODIUM,POTASSIUM PHOSPHATES 280-250MG
2 POWDER IN PACKET (EA) ORAL
Status: COMPLETED | OUTPATIENT
Start: 2017-01-01 | End: 2017-01-01

## 2017-01-01 RX ORDER — LEVOTHYROXINE SODIUM ANHYDROUS 100 UG/5ML
40 INJECTION, POWDER, LYOPHILIZED, FOR SOLUTION INTRAVENOUS DAILY
Status: DISCONTINUED | OUTPATIENT
Start: 2017-01-01 | End: 2017-01-01

## 2017-01-01 RX ORDER — BISACODYL 10 MG
10 SUPPOSITORY, RECTAL RECTAL ONCE
Status: COMPLETED | OUTPATIENT
Start: 2017-01-01 | End: 2017-01-01

## 2017-01-01 RX ORDER — LIDOCAINE HCL/PF 100 MG/5ML
SYRINGE (ML) INTRAVENOUS
Status: DISCONTINUED | OUTPATIENT
Start: 2017-01-01 | End: 2017-01-01

## 2017-01-01 RX ORDER — LIOTHYRONINE SODIUM 5 UG/1
5 TABLET ORAL 2 TIMES DAILY
Status: DISCONTINUED | OUTPATIENT
Start: 2017-01-01 | End: 2017-01-01

## 2017-01-01 RX ORDER — CEFAZOLIN SODIUM 1 G/50ML
1 SOLUTION INTRAVENOUS ONCE
Status: COMPLETED | OUTPATIENT
Start: 2017-01-01 | End: 2017-01-01

## 2017-01-01 RX ORDER — NEOSTIGMINE METHYLSULFATE 1 MG/ML
INJECTION, SOLUTION INTRAVENOUS
Status: DISCONTINUED | OUTPATIENT
Start: 2017-01-01 | End: 2017-01-01

## 2017-01-01 RX ORDER — SODIUM CHLORIDE 9 MG/ML
INJECTION, SOLUTION INTRAVENOUS
Status: DISCONTINUED | OUTPATIENT
Start: 2017-01-01 | End: 2017-01-01 | Stop reason: HOSPADM

## 2017-01-01 RX ORDER — SEVELAMER CARBONATE FOR ORAL SUSPENSION 800 MG/1
1.6 POWDER, FOR SUSPENSION ORAL
Status: DISCONTINUED | OUTPATIENT
Start: 2017-01-01 | End: 2017-01-01

## 2017-01-01 RX ORDER — NYSTATIN 100000 [USP'U]/ML
500000 SUSPENSION ORAL 4 TIMES DAILY
Status: DISCONTINUED | OUTPATIENT
Start: 2017-01-01 | End: 2017-01-01 | Stop reason: HOSPADM

## 2017-01-01 RX ORDER — SODIUM CHLORIDE 9 MG/ML
INJECTION, SOLUTION INTRAVENOUS CONTINUOUS PRN
Status: DISCONTINUED | OUTPATIENT
Start: 2017-01-01 | End: 2017-01-01

## 2017-01-01 RX ORDER — ONDANSETRON 4 MG/1
4 TABLET, ORALLY DISINTEGRATING ORAL EVERY 6 HOURS
Status: DISCONTINUED | OUTPATIENT
Start: 2017-01-01 | End: 2017-01-01

## 2017-01-01 RX ORDER — MIDODRINE HYDROCHLORIDE 5 MG/1
10 TABLET ORAL 3 TIMES DAILY
Status: CANCELLED | OUTPATIENT
Start: 2017-01-01

## 2017-01-01 RX ORDER — RAMELTEON 8 MG/1
8 TABLET ORAL NIGHTLY PRN
Status: DISCONTINUED | OUTPATIENT
Start: 2017-01-01 | End: 2017-01-01

## 2017-01-01 RX ORDER — HYDROCODONE BITARTRATE AND ACETAMINOPHEN 500; 5 MG/1; MG/1
TABLET ORAL
Status: DISCONTINUED | OUTPATIENT
Start: 2017-01-01 | End: 2017-01-01 | Stop reason: HOSPADM

## 2017-01-01 RX ORDER — DEXTROSE 50 % IN WATER (D50W) INTRAVENOUS SYRINGE
Status: COMPLETED
Start: 2017-01-01 | End: 2017-01-01

## 2017-01-01 RX ORDER — OXYCODONE HYDROCHLORIDE 5 MG/1
10 TABLET ORAL EVERY 4 HOURS PRN
Status: DISCONTINUED | OUTPATIENT
Start: 2017-01-01 | End: 2017-01-01 | Stop reason: HOSPADM

## 2017-01-01 RX ORDER — ALBUTEROL SULFATE 90 UG/1
AEROSOL, METERED RESPIRATORY (INHALATION)
Status: DISCONTINUED | OUTPATIENT
Start: 2017-01-01 | End: 2017-01-01

## 2017-01-01 RX ORDER — SEVELAMER CARBONATE FOR ORAL SUSPENSION 800 MG/1
1.6 POWDER, FOR SUSPENSION ORAL
Status: CANCELLED | OUTPATIENT
Start: 2017-01-01

## 2017-01-01 RX ORDER — RAMELTEON 8 MG/1
8 TABLET ORAL NIGHTLY PRN
Status: CANCELLED | OUTPATIENT
Start: 2017-01-01

## 2017-01-01 RX ORDER — PANTOPRAZOLE SODIUM 40 MG/1
40 TABLET, DELAYED RELEASE ORAL DAILY
Status: DISCONTINUED | OUTPATIENT
Start: 2017-01-01 | End: 2017-01-01

## 2017-01-01 RX ORDER — PANTOPRAZOLE SODIUM 40 MG/1
40 TABLET, DELAYED RELEASE ORAL DAILY
Status: CANCELLED | OUTPATIENT
Start: 2017-01-01

## 2017-01-01 RX ORDER — NITROGLYCERIN 0.4 MG/1
0.4 TABLET SUBLINGUAL EVERY 5 MIN PRN
Qty: 25 TABLET | Refills: 5 | Status: SHIPPED | OUTPATIENT
Start: 2017-01-01 | End: 2017-01-01

## 2017-01-01 RX ORDER — HYDROCODONE BITARTRATE AND ACETAMINOPHEN 5; 325 MG/1; MG/1
1 TABLET ORAL EVERY 4 HOURS PRN
Status: DISCONTINUED | OUTPATIENT
Start: 2017-01-01 | End: 2017-01-01 | Stop reason: HOSPADM

## 2017-01-01 RX ORDER — OXYCODONE AND ACETAMINOPHEN 5; 325 MG/1; MG/1
1 TABLET ORAL EVERY 4 HOURS PRN
Status: CANCELLED | OUTPATIENT
Start: 2017-01-01

## 2017-01-01 RX ORDER — FENTANYL CITRAT/DEXTROSE 5%/PF 100 MCG/10
12.5 PATIENT CONTROLLED ANALGESIA SYRINGE INTRAVENOUS CONTINUOUS
Status: DISCONTINUED | OUTPATIENT
Start: 2017-01-01 | End: 2017-01-01 | Stop reason: HOSPADM

## 2017-01-01 RX ORDER — LEVOTHYROXINE SODIUM 25 UG/1
25 TABLET ORAL ONCE
Status: DISCONTINUED | OUTPATIENT
Start: 2017-01-01 | End: 2017-01-01

## 2017-01-01 RX ORDER — VANCOMYCIN HYDROCHLORIDE 1 G/20ML
INJECTION, POWDER, LYOPHILIZED, FOR SOLUTION INTRAVENOUS
Status: DISCONTINUED | OUTPATIENT
Start: 2017-01-01 | End: 2017-01-01 | Stop reason: HOSPADM

## 2017-01-01 RX ORDER — NAPROXEN SODIUM 220 MG/1
81 TABLET, FILM COATED ORAL DAILY
Status: DISCONTINUED | OUTPATIENT
Start: 2017-01-01 | End: 2017-01-01 | Stop reason: HOSPADM

## 2017-01-01 RX ORDER — ASCORBIC ACID, THIAMINE, RIBOFLAVIN, NIACINAMIDE, PYRIDOXINE, FOLIC ACID, COBALAMIN, BIOTIN, PANTOTHENIC ACID 100; 1.5; 1.7; 20; 10; 1; 6; 300; 1 MG/1; MG/1; MG/1; MG/1; MG/1; MG/1; UG/1; UG/1; MG/1
TABLET, COATED ORAL
Qty: 30 TABLET | Refills: 4 | OUTPATIENT
Start: 2017-01-01

## 2017-01-01 RX ORDER — LIDOCAINE HYDROCHLORIDE 10 MG/ML
1 INJECTION, SOLUTION EPIDURAL; INFILTRATION; INTRACAUDAL; PERINEURAL ONCE
Status: CANCELLED | OUTPATIENT
Start: 2017-01-01 | End: 2017-01-01

## 2017-01-01 RX ORDER — ALBUMIN HUMAN 250 G/1000ML
25 SOLUTION INTRAVENOUS
Status: DISCONTINUED | OUTPATIENT
Start: 2017-01-01 | End: 2017-01-01 | Stop reason: HOSPADM

## 2017-01-01 RX ORDER — LEVOTHYROXINE SODIUM 50 UG/1
TABLET ORAL
Qty: 90 TABLET | Refills: 2 | Status: SHIPPED | OUTPATIENT
Start: 2017-01-01

## 2017-01-01 RX ORDER — METOCLOPRAMIDE HYDROCHLORIDE 5 MG/5ML
5 SOLUTION ORAL EVERY 6 HOURS PRN
Status: CANCELLED | OUTPATIENT
Start: 2017-01-01

## 2017-01-01 RX ORDER — LEVOTHYROXINE SODIUM 75 UG/1
75 TABLET ORAL
Status: DISCONTINUED | OUTPATIENT
Start: 2017-01-01 | End: 2017-01-01 | Stop reason: HOSPADM

## 2017-01-01 RX ORDER — MAGNESIUM SULFATE HEPTAHYDRATE 40 MG/ML
2 INJECTION, SOLUTION INTRAVENOUS
Status: DISCONTINUED | OUTPATIENT
Start: 2017-01-01 | End: 2017-01-01

## 2017-01-01 RX ORDER — SODIUM CHLORIDE 9 MG/ML
INJECTION, SOLUTION INTRAVENOUS ONCE
Status: DISCONTINUED | OUTPATIENT
Start: 2017-01-01 | End: 2017-01-01 | Stop reason: HOSPADM

## 2017-01-01 RX ORDER — COSYNTROPIN 0.25 MG/ML
0.25 INJECTION, POWDER, FOR SOLUTION INTRAMUSCULAR; INTRAVENOUS ONCE
Status: COMPLETED | OUTPATIENT
Start: 2017-01-01 | End: 2017-01-01

## 2017-01-01 RX ORDER — PROTAMINE SULFATE 10 MG/ML
INJECTION, SOLUTION INTRAVENOUS
Status: DISCONTINUED | OUTPATIENT
Start: 2017-01-01 | End: 2017-01-01

## 2017-01-01 RX ORDER — DOCUSATE SODIUM 100 MG/1
100 CAPSULE, LIQUID FILLED ORAL 3 TIMES DAILY
Status: CANCELLED | OUTPATIENT
Start: 2017-01-01

## 2017-01-01 RX ORDER — FENTANYL CITRATE 50 UG/ML
INJECTION, SOLUTION INTRAMUSCULAR; INTRAVENOUS
Status: DISCONTINUED
Start: 2017-01-01 | End: 2017-01-01 | Stop reason: WASHOUT

## 2017-01-01 RX ORDER — POLYETHYLENE GLYCOL 3350 17 G/17G
17 POWDER, FOR SOLUTION ORAL DAILY
Status: DISCONTINUED | OUTPATIENT
Start: 2017-01-01 | End: 2017-01-01

## 2017-01-01 RX ORDER — FAMOTIDINE 20 MG/1
20 TABLET, FILM COATED ORAL DAILY
Status: DISCONTINUED | OUTPATIENT
Start: 2017-01-01 | End: 2017-01-01 | Stop reason: HOSPADM

## 2017-01-01 RX ORDER — SODIUM CHLORIDE 9 MG/ML
INJECTION, SOLUTION INTRAVENOUS CONTINUOUS
Status: DISCONTINUED | OUTPATIENT
Start: 2017-01-01 | End: 2017-01-01 | Stop reason: HOSPADM

## 2017-01-01 RX ORDER — ETOMIDATE 2 MG/ML
INJECTION INTRAVENOUS
Status: DISCONTINUED | OUTPATIENT
Start: 2017-01-01 | End: 2017-01-01

## 2017-01-01 RX ORDER — HEPARIN SODIUM 5000 [USP'U]/ML
5000 INJECTION, SOLUTION INTRAVENOUS; SUBCUTANEOUS ONCE
Status: DISCONTINUED | OUTPATIENT
Start: 2017-01-01 | End: 2017-01-01

## 2017-01-01 RX ORDER — SODIUM BICARBONATE 1 MEQ/ML
SYRINGE (ML) INTRAVENOUS CODE/TRAUMA/SEDATION MEDICATION
Status: COMPLETED | OUTPATIENT
Start: 2017-01-01 | End: 2017-01-01

## 2017-01-01 RX ORDER — TRAMADOL HYDROCHLORIDE 50 MG/1
50 TABLET ORAL ONCE
Status: COMPLETED | OUTPATIENT
Start: 2017-01-01 | End: 2017-01-01

## 2017-01-01 RX ORDER — HYDROCODONE BITARTRATE AND ACETAMINOPHEN 500; 5 MG/1; MG/1
TABLET ORAL
Status: DISCONTINUED | OUTPATIENT
Start: 2017-01-01 | End: 2017-01-01

## 2017-01-01 RX ORDER — DOCUSATE SODIUM 100 MG/1
100 CAPSULE, LIQUID FILLED ORAL 3 TIMES DAILY
Status: DISCONTINUED | OUTPATIENT
Start: 2017-01-01 | End: 2017-01-01 | Stop reason: HOSPADM

## 2017-01-01 RX ORDER — ATORVASTATIN CALCIUM 20 MG/1
40 TABLET, FILM COATED ORAL EVERY MORNING
Status: DISCONTINUED | OUTPATIENT
Start: 2017-01-01 | End: 2017-01-01 | Stop reason: HOSPADM

## 2017-01-01 RX ORDER — LEVOTHYROXINE SODIUM 75 UG/1
75 TABLET ORAL
Status: DISCONTINUED | OUTPATIENT
Start: 2017-01-01 | End: 2017-01-01

## 2017-01-01 RX ORDER — GABAPENTIN 100 MG/1
100 CAPSULE ORAL NIGHTLY
Status: DISCONTINUED | OUTPATIENT
Start: 2017-01-01 | End: 2017-01-01 | Stop reason: HOSPADM

## 2017-01-01 RX ORDER — ONDANSETRON 2 MG/ML
4 INJECTION INTRAMUSCULAR; INTRAVENOUS EVERY 12 HOURS PRN
Status: DISCONTINUED | OUTPATIENT
Start: 2017-01-01 | End: 2017-01-01 | Stop reason: HOSPADM

## 2017-01-01 RX ORDER — GLUCAGON 1 MG
1 KIT INJECTION
Status: DISCONTINUED | OUTPATIENT
Start: 2017-01-01 | End: 2017-01-01 | Stop reason: HOSPADM

## 2017-01-01 RX ORDER — LEVOTHYROXINE SODIUM 75 UG/1
75 TABLET ORAL
Status: CANCELLED | OUTPATIENT
Start: 2017-01-01

## 2017-01-01 RX ORDER — OXYCODONE HYDROCHLORIDE 5 MG/1
5 TABLET ORAL EVERY 6 HOURS PRN
Status: DISCONTINUED | OUTPATIENT
Start: 2017-01-01 | End: 2017-01-01 | Stop reason: HOSPADM

## 2017-01-01 RX ORDER — LIDOCAINE 50 MG/G
1 PATCH TOPICAL DAILY
Status: DISCONTINUED | OUTPATIENT
Start: 2017-01-01 | End: 2017-01-01 | Stop reason: HOSPADM

## 2017-01-01 RX ORDER — CHLORHEXIDINE GLUCONATE ORAL RINSE 1.2 MG/ML
15 SOLUTION DENTAL 2 TIMES DAILY
Status: DISCONTINUED | OUTPATIENT
Start: 2017-01-01 | End: 2017-01-01 | Stop reason: HOSPADM

## 2017-01-01 RX ORDER — HYDRALAZINE HYDROCHLORIDE 20 MG/ML
10 INJECTION INTRAMUSCULAR; INTRAVENOUS EVERY 4 HOURS PRN
Status: DISCONTINUED | OUTPATIENT
Start: 2017-01-01 | End: 2017-01-01 | Stop reason: RX

## 2017-01-01 RX ORDER — METOCLOPRAMIDE HYDROCHLORIDE 5 MG/5ML
5 SOLUTION ORAL EVERY 6 HOURS PRN
Status: DISCONTINUED | OUTPATIENT
Start: 2017-01-01 | End: 2017-01-01 | Stop reason: HOSPADM

## 2017-01-01 RX ORDER — PANTOPRAZOLE SODIUM 40 MG/1
40 TABLET, DELAYED RELEASE ORAL DAILY
Status: DISCONTINUED | OUTPATIENT
Start: 2017-01-01 | End: 2017-01-01 | Stop reason: HOSPADM

## 2017-01-01 RX ORDER — GLUCAGON 1 MG
1 KIT INJECTION
Status: DISCONTINUED | OUTPATIENT
Start: 2017-01-01 | End: 2017-01-01

## 2017-01-01 RX ORDER — MORPHINE SULFATE 2 MG/ML
2 INJECTION, SOLUTION INTRAMUSCULAR; INTRAVENOUS
Status: COMPLETED | OUTPATIENT
Start: 2017-01-01 | End: 2017-01-01

## 2017-01-01 RX ORDER — CEFUROXIME AXETIL 500 MG/1
500 TABLET ORAL 2 TIMES DAILY
Qty: 20 TABLET | Refills: 0 | Status: SHIPPED | OUTPATIENT
Start: 2017-01-01 | End: 2017-01-01

## 2017-01-01 RX ORDER — MORPHINE SULFATE 2 MG/ML
2 INJECTION, SOLUTION INTRAMUSCULAR; INTRAVENOUS EVERY 4 HOURS PRN
Status: DISCONTINUED | OUTPATIENT
Start: 2017-01-01 | End: 2017-01-01

## 2017-01-01 RX ORDER — ONDANSETRON 8 MG/1
8 TABLET, ORALLY DISINTEGRATING ORAL EVERY 8 HOURS PRN
Status: DISCONTINUED | OUTPATIENT
Start: 2017-01-01 | End: 2017-01-01

## 2017-01-01 RX ORDER — METOPROLOL TARTRATE 1 MG/ML
INJECTION, SOLUTION INTRAVENOUS
Status: COMPLETED
Start: 2017-01-01 | End: 2017-01-01

## 2017-01-01 RX ORDER — CEPHALEXIN 500 MG/1
500 CAPSULE ORAL EVERY 12 HOURS
Qty: 20 CAPSULE | Refills: 0 | Status: ON HOLD | OUTPATIENT
Start: 2017-01-01 | End: 2017-01-01 | Stop reason: HOSPADM

## 2017-01-01 RX ORDER — SEVELAMER CARBONATE 800 MG/1
800 TABLET, FILM COATED ORAL
Status: DISCONTINUED | OUTPATIENT
Start: 2017-01-01 | End: 2017-01-01

## 2017-01-01 RX ORDER — LIOTHYRONINE SODIUM 5 UG/1
TABLET ORAL
Status: COMPLETED
Start: 2017-01-01 | End: 2017-01-01

## 2017-01-01 RX ORDER — DOCUSATE SODIUM 100 MG/1
100 CAPSULE, LIQUID FILLED ORAL 2 TIMES DAILY
Status: DISCONTINUED | OUTPATIENT
Start: 2017-01-01 | End: 2017-01-01

## 2017-01-01 RX ORDER — GLYCOPYRROLATE 0.2 MG/ML
INJECTION INTRAMUSCULAR; INTRAVENOUS
Status: DISCONTINUED | OUTPATIENT
Start: 2017-01-01 | End: 2017-01-01

## 2017-01-01 RX ORDER — INSULIN ASPART 100 [IU]/ML
0-5 INJECTION, SOLUTION INTRAVENOUS; SUBCUTANEOUS EVERY 6 HOURS PRN
Status: DISCONTINUED | OUTPATIENT
Start: 2017-01-01 | End: 2017-01-01

## 2017-01-01 RX ORDER — APIXABAN 2.5 MG/1
TABLET, FILM COATED ORAL
Qty: 180 TABLET | Refills: 3 | Status: SHIPPED | OUTPATIENT
Start: 2017-01-01

## 2017-01-01 RX ORDER — LIDOCAINE HYDROCHLORIDE 10 MG/ML
INJECTION INFILTRATION; PERINEURAL
Status: COMPLETED
Start: 2017-01-01 | End: 2017-01-01

## 2017-01-01 RX ORDER — PROPOFOL 10 MG/ML
VIAL (ML) INTRAVENOUS
Status: DISCONTINUED | OUTPATIENT
Start: 2017-01-01 | End: 2017-01-01

## 2017-01-01 RX ORDER — MORPHINE SULFATE 2 MG/ML
2 INJECTION, SOLUTION INTRAMUSCULAR; INTRAVENOUS EVERY 4 HOURS PRN
Status: DISCONTINUED | OUTPATIENT
Start: 2017-01-01 | End: 2017-01-01 | Stop reason: HOSPADM

## 2017-01-01 RX ORDER — LIOTHYRONINE SODIUM 5 UG/1
5 TABLET ORAL DAILY
Status: DISCONTINUED | OUTPATIENT
Start: 2017-01-01 | End: 2017-01-01

## 2017-01-01 RX ORDER — EPINEPHRINE 1 MG/ML
INJECTION INTRAMUSCULAR; INTRAVENOUS; SUBCUTANEOUS CODE/TRAUMA/SEDATION MEDICATION
Status: COMPLETED | OUTPATIENT
Start: 2017-01-01 | End: 2017-01-01

## 2017-01-01 RX ORDER — ISOSORBIDE DINITRATE AND HYDRALAZINE HYDROCHLORIDE 37.5; 2 MG/1; MG/1
1 TABLET ORAL 3 TIMES DAILY
Status: DISCONTINUED | OUTPATIENT
Start: 2017-01-01 | End: 2017-01-01

## 2017-01-01 RX ORDER — METOPROLOL TARTRATE 1 MG/ML
5 INJECTION, SOLUTION INTRAVENOUS ONCE
Status: COMPLETED | OUTPATIENT
Start: 2017-01-01 | End: 2017-01-01

## 2017-01-01 RX ORDER — CHOLECALCIFEROL (VITAMIN D3) 25 MCG
1000 TABLET ORAL DAILY
Status: DISCONTINUED | OUTPATIENT
Start: 2017-01-01 | End: 2017-01-01

## 2017-01-01 RX ORDER — SEVELAMER CARBONATE 800 MG/1
1600 TABLET, FILM COATED ORAL
Status: DISCONTINUED | OUTPATIENT
Start: 2017-01-01 | End: 2017-01-01

## 2017-01-01 RX ORDER — LEVOTHYROXINE SODIUM 50 UG/1
50 TABLET ORAL
Status: DISCONTINUED | OUTPATIENT
Start: 2017-01-01 | End: 2017-01-01

## 2017-01-01 RX ORDER — NAPROXEN SODIUM 220 MG/1
81 TABLET, FILM COATED ORAL DAILY
Status: CANCELLED | OUTPATIENT
Start: 2017-01-01

## 2017-01-01 RX ORDER — LEVOTHYROXINE SODIUM ANHYDROUS 100 UG/5ML
40 INJECTION, POWDER, LYOPHILIZED, FOR SOLUTION INTRAVENOUS DAILY
Status: DISCONTINUED | OUTPATIENT
Start: 2017-01-01 | End: 2017-01-01 | Stop reason: HOSPADM

## 2017-01-01 RX ORDER — ATORVASTATIN CALCIUM 10 MG/1
40 TABLET, FILM COATED ORAL EVERY MORNING
Status: CANCELLED | OUTPATIENT
Start: 2017-01-01

## 2017-01-01 RX ORDER — POLYETHYLENE GLYCOL 3350 17 G/17G
17 POWDER, FOR SOLUTION ORAL DAILY
Status: CANCELLED | OUTPATIENT
Start: 2017-01-01

## 2017-01-01 RX ORDER — ALBUMIN HUMAN 250 G/1000ML
25 SOLUTION INTRAVENOUS ONCE
Status: COMPLETED | OUTPATIENT
Start: 2017-01-01 | End: 2017-01-01

## 2017-01-01 RX ORDER — OXYCODONE AND ACETAMINOPHEN 10; 325 MG/1; MG/1
1 TABLET ORAL ONCE
Status: COMPLETED | OUTPATIENT
Start: 2017-01-01 | End: 2017-01-01

## 2017-01-01 RX ORDER — INSULIN ASPART 100 [IU]/ML
0-5 INJECTION, SOLUTION INTRAVENOUS; SUBCUTANEOUS EVERY 6 HOURS PRN
Status: CANCELLED | OUTPATIENT
Start: 2017-01-01

## 2017-01-01 RX ORDER — METOPROLOL TARTRATE 25 MG/1
12.5 TABLET ORAL 2 TIMES DAILY
Status: DISCONTINUED | OUTPATIENT
Start: 2017-01-01 | End: 2017-01-01

## 2017-01-01 RX ORDER — DOXYCYCLINE HYCLATE 100 MG
100 TABLET ORAL EVERY 12 HOURS
Status: DISCONTINUED | OUTPATIENT
Start: 2017-01-01 | End: 2017-01-01 | Stop reason: HOSPADM

## 2017-01-01 RX ORDER — AMIODARONE HYDROCHLORIDE 200 MG/1
TABLET ORAL
Qty: 180 TABLET | Refills: 3 | Status: ON HOLD | OUTPATIENT
Start: 2017-01-01 | End: 2017-01-01

## 2017-01-01 RX ORDER — OXYCODONE AND ACETAMINOPHEN 5; 325 MG/1; MG/1
1 TABLET ORAL EVERY 4 HOURS PRN
Status: DISCONTINUED | OUTPATIENT
Start: 2017-01-01 | End: 2017-01-01

## 2017-01-01 RX ORDER — SODIUM CHLORIDE 9 MG/ML
INJECTION, SOLUTION INTRAVENOUS ONCE
Status: DISCONTINUED | OUTPATIENT
Start: 2017-01-01 | End: 2017-01-01

## 2017-01-01 RX ORDER — ATORVASTATIN CALCIUM 10 MG/1
40 TABLET, FILM COATED ORAL EVERY MORNING
Status: DISCONTINUED | OUTPATIENT
Start: 2017-01-01 | End: 2017-01-01 | Stop reason: HOSPADM

## 2017-01-01 RX ORDER — CISATRACURIUM BESYLATE 10 MG/ML
INJECTION, SOLUTION INTRAVENOUS
Status: DISCONTINUED | OUTPATIENT
Start: 2017-01-01 | End: 2017-01-01

## 2017-01-01 RX ORDER — GLUCAGON 1 MG
1 KIT INJECTION
Status: CANCELLED | OUTPATIENT
Start: 2017-01-01

## 2017-01-01 RX ORDER — CARVEDILOL 3.12 MG/1
3.12 TABLET ORAL 2 TIMES DAILY
Status: DISCONTINUED | OUTPATIENT
Start: 2017-01-01 | End: 2017-01-01

## 2017-01-01 RX ORDER — ATORVASTATIN CALCIUM 20 MG/1
40 TABLET, FILM COATED ORAL EVERY MORNING
Status: DISCONTINUED | OUTPATIENT
Start: 2017-01-01 | End: 2017-01-01

## 2017-01-01 RX ORDER — SODIUM CHLORIDE 0.9 % (FLUSH) 0.9 %
3 SYRINGE (ML) INJECTION EVERY 8 HOURS
Status: DISCONTINUED | OUTPATIENT
Start: 2017-01-01 | End: 2017-01-01 | Stop reason: HOSPADM

## 2017-01-01 RX ORDER — MORPHINE SULFATE 2 MG/ML
4 INJECTION, SOLUTION INTRAMUSCULAR; INTRAVENOUS EVERY 4 HOURS PRN
Status: DISCONTINUED | OUTPATIENT
Start: 2017-01-01 | End: 2017-01-01

## 2017-01-01 RX ORDER — OXYCODONE HYDROCHLORIDE 5 MG/1
10 TABLET ORAL EVERY 4 HOURS PRN
Status: CANCELLED | OUTPATIENT
Start: 2017-01-01

## 2017-01-01 RX ORDER — HEPARIN SODIUM 1000 [USP'U]/ML
INJECTION, SOLUTION INTRAVENOUS; SUBCUTANEOUS
Status: DISCONTINUED | OUTPATIENT
Start: 2017-01-01 | End: 2017-01-01

## 2017-01-01 RX ORDER — MUPIROCIN 20 MG/G
1 OINTMENT TOPICAL
Status: CANCELLED | OUTPATIENT
Start: 2017-01-01

## 2017-01-01 RX ADMIN — SODIUM BICARBONATE 50 MEQ: 84 INJECTION, SOLUTION INTRAVENOUS at 12:04

## 2017-01-01 RX ADMIN — MIDODRINE HYDROCHLORIDE 10 MG: 5 TABLET ORAL at 04:04

## 2017-01-01 RX ADMIN — OXYCODONE HYDROCHLORIDE AND ACETAMINOPHEN 1 TABLET: 5; 325 TABLET ORAL at 10:04

## 2017-01-01 RX ADMIN — RAMELTEON 8 MG: 8 TABLET, FILM COATED ORAL at 09:04

## 2017-01-01 RX ADMIN — VITAMIN D, TAB 1000IU (100/BT) 1000 UNITS: 25 TAB at 08:04

## 2017-01-01 RX ADMIN — ATORVASTATIN CALCIUM 40 MG: 20 TABLET, FILM COATED ORAL at 06:04

## 2017-01-01 RX ADMIN — HYDRALAZINE HYDROCHLORIDE AND ISOSORBIDE DINITRATE 1 TABLET: 37.5; 2 TABLET, FILM COATED ORAL at 06:04

## 2017-01-01 RX ADMIN — NYSTATIN 500000 UNITS: 500000 SUSPENSION ORAL at 05:04

## 2017-01-01 RX ADMIN — POLYETHYLENE GLYCOL 3350 17 G: 17 POWDER, FOR SOLUTION ORAL at 09:04

## 2017-01-01 RX ADMIN — EPINEPHRINE 1 MG: 1 INJECTION INTRAMUSCULAR; INTRAVENOUS; SUBCUTANEOUS at 10:04

## 2017-01-01 RX ADMIN — LIOTHYRONINE SODIUM 5 MCG: 5 TABLET ORAL at 01:04

## 2017-01-01 RX ADMIN — Medication 3 ML: at 06:04

## 2017-01-01 RX ADMIN — MIDODRINE HYDROCHLORIDE 10 MG: 5 TABLET ORAL at 09:04

## 2017-01-01 RX ADMIN — OXYCODONE HYDROCHLORIDE AND ACETAMINOPHEN 1 TABLET: 10; 325 TABLET ORAL at 12:04

## 2017-01-01 RX ADMIN — OXYCODONE HYDROCHLORIDE 10 MG: 5 TABLET ORAL at 02:04

## 2017-01-01 RX ADMIN — ACETAMINOPHEN 650 MG: 325 TABLET ORAL at 01:04

## 2017-01-01 RX ADMIN — MIDODRINE HYDROCHLORIDE 10 MG: 5 TABLET ORAL at 06:04

## 2017-01-01 RX ADMIN — LIDOCAINE 1 PATCH: 50 PATCH TOPICAL at 09:04

## 2017-01-01 RX ADMIN — EPHEDRINE SULFATE 10 MG: 50 INJECTION, SOLUTION INTRAMUSCULAR; INTRAVENOUS; SUBCUTANEOUS at 05:04

## 2017-01-01 RX ADMIN — NYSTATIN 500000 UNITS: 500000 SUSPENSION ORAL at 12:04

## 2017-01-01 RX ADMIN — APIXABAN 2.5 MG: 2.5 TABLET, FILM COATED ORAL at 09:04

## 2017-01-01 RX ADMIN — NYSTATIN 500000 UNITS: 500000 SUSPENSION ORAL at 02:04

## 2017-01-01 RX ADMIN — NYSTATIN 500000 UNITS: 500000 SUSPENSION ORAL at 06:04

## 2017-01-01 RX ADMIN — ASPIRIN 81 MG CHEWABLE TABLET 81 MG: 81 TABLET CHEWABLE at 09:04

## 2017-01-01 RX ADMIN — SEVELAMER CARBONATE 1.6 G: 800 POWDER, FOR SUSPENSION ORAL at 09:04

## 2017-01-01 RX ADMIN — DOCUSATE SODIUM 100 MG: 100 CAPSULE, LIQUID FILLED ORAL at 08:04

## 2017-01-01 RX ADMIN — HYDROCORTISONE SODIUM SUCCINATE 100 MG: 100 INJECTION, POWDER, FOR SOLUTION INTRAMUSCULAR; INTRAVENOUS at 11:04

## 2017-01-01 RX ADMIN — SODIUM CHLORIDE: 0.9 INJECTION, SOLUTION INTRAVENOUS at 11:04

## 2017-01-01 RX ADMIN — NYSTATIN 500000 UNITS: 500000 SUSPENSION ORAL at 01:04

## 2017-01-01 RX ADMIN — APIXABAN 2.5 MG: 2.5 TABLET, FILM COATED ORAL at 10:04

## 2017-01-01 RX ADMIN — MIDODRINE HYDROCHLORIDE 10 MG: 5 TABLET ORAL at 10:04

## 2017-01-01 RX ADMIN — Medication 3 ML: at 10:04

## 2017-01-01 RX ADMIN — DOCUSATE SODIUM 100 MG: 100 CAPSULE, LIQUID FILLED ORAL at 10:04

## 2017-01-01 RX ADMIN — VANCOMYCIN HYDROCHLORIDE 1 G: 1 INJECTION, POWDER, LYOPHILIZED, FOR SOLUTION INTRAVENOUS at 06:04

## 2017-01-01 RX ADMIN — HYDRALAZINE HYDROCHLORIDE AND ISOSORBIDE DINITRATE 1 TABLET: 37.5; 2 TABLET, FILM COATED ORAL at 09:04

## 2017-01-01 RX ADMIN — ATORVASTATIN CALCIUM 40 MG: 10 TABLET, FILM COATED ORAL at 06:04

## 2017-01-01 RX ADMIN — SODIUM CHLORIDE: 0.9 INJECTION, SOLUTION INTRAVENOUS at 08:04

## 2017-01-01 RX ADMIN — ASPIRIN 81 MG CHEWABLE TABLET 81 MG: 81 TABLET CHEWABLE at 08:04

## 2017-01-01 RX ADMIN — ACETAMINOPHEN 650 MG: 325 TABLET ORAL at 04:04

## 2017-01-01 RX ADMIN — SEVELAMER CARBONATE 1.6 G: 800 POWDER, FOR SUSPENSION ORAL at 04:04

## 2017-01-01 RX ADMIN — VITAMIN D, TAB 1000IU (100/BT) 1000 UNITS: 25 TAB at 09:04

## 2017-01-01 RX ADMIN — POTASSIUM & SODIUM PHOSPHATES POWDER PACK 280-160-250 MG 2 PACKET: 280-160-250 PACK at 09:04

## 2017-01-01 RX ADMIN — GABAPENTIN 100 MG: 100 CAPSULE ORAL at 10:04

## 2017-01-01 RX ADMIN — LEVOTHYROXINE SODIUM ANHYDROUS 40 MCG: 100 INJECTION, POWDER, LYOPHILIZED, FOR SOLUTION INTRAVENOUS at 08:04

## 2017-01-01 RX ADMIN — ALBUMIN (HUMAN) 25 G: 12.5 SOLUTION INTRAVENOUS at 09:04

## 2017-01-01 RX ADMIN — MIDODRINE HYDROCHLORIDE 10 MG: 5 TABLET ORAL at 01:04

## 2017-01-01 RX ADMIN — OXYCODONE HYDROCHLORIDE AND ACETAMINOPHEN 1 TABLET: 5; 325 TABLET ORAL at 11:04

## 2017-01-01 RX ADMIN — POLYETHYLENE GLYCOL 3350 17 G: 17 POWDER, FOR SOLUTION ORAL at 10:04

## 2017-01-01 RX ADMIN — MIDODRINE HYDROCHLORIDE 10 MG: 5 TABLET ORAL at 05:04

## 2017-01-01 RX ADMIN — POLYETHYLENE GLYCOL 3350 17 G: 17 POWDER, FOR SOLUTION ORAL at 02:04

## 2017-01-01 RX ADMIN — DOCUSATE SODIUM 100 MG: 100 CAPSULE, LIQUID FILLED ORAL at 06:04

## 2017-01-01 RX ADMIN — OXYCODONE HYDROCHLORIDE AND ACETAMINOPHEN 1 TABLET: 5; 325 TABLET ORAL at 08:04

## 2017-01-01 RX ADMIN — OXYCODONE HYDROCHLORIDE AND ACETAMINOPHEN 1 TABLET: 5; 325 TABLET ORAL at 05:04

## 2017-01-01 RX ADMIN — MIDODRINE HYDROCHLORIDE 10 MG: 5 TABLET ORAL at 08:04

## 2017-01-01 RX ADMIN — PANTOPRAZOLE SODIUM 40 MG: 40 TABLET, DELAYED RELEASE ORAL at 09:04

## 2017-01-01 RX ADMIN — LIOTHYRONINE SODIUM 5 MCG: 5 TABLET ORAL at 10:04

## 2017-01-01 RX ADMIN — POTASSIUM & SODIUM PHOSPHATES POWDER PACK 280-160-250 MG 2 PACKET: 280-160-250 PACK at 05:04

## 2017-01-01 RX ADMIN — SODIUM CHLORIDE: 0.9 INJECTION, SOLUTION INTRAVENOUS at 02:04

## 2017-01-01 RX ADMIN — OXYCODONE HYDROCHLORIDE 10 MG: 5 TABLET ORAL at 10:04

## 2017-01-01 RX ADMIN — ONDANSETRON 8 MG: 8 TABLET, ORALLY DISINTEGRATING ORAL at 12:04

## 2017-01-01 RX ADMIN — EPINEPHRINE 1.2 MCG/KG/MIN: 1 INJECTION PARENTERAL at 11:04

## 2017-01-01 RX ADMIN — SEVELAMER CARBONATE 1.6 G: 800 POWDER, FOR SUSPENSION ORAL at 05:04

## 2017-01-01 RX ADMIN — GABAPENTIN 100 MG: 100 CAPSULE ORAL at 09:04

## 2017-01-01 RX ADMIN — SEVELAMER CARBONATE 1.6 G: 800 POWDER, FOR SUSPENSION ORAL at 06:04

## 2017-01-01 RX ADMIN — MIDODRINE HYDROCHLORIDE 2.5 MG: 2.5 TABLET ORAL at 10:04

## 2017-01-01 RX ADMIN — EPINEPHRINE 1 MG: 1 INJECTION INTRAMUSCULAR; INTRAVENOUS; SUBCUTANEOUS at 11:04

## 2017-01-01 RX ADMIN — DOCUSATE SODIUM 100 MG: 100 CAPSULE, LIQUID FILLED ORAL at 01:04

## 2017-01-01 RX ADMIN — LEVOTHYROXINE SODIUM 50 MCG: 50 TABLET ORAL at 06:04

## 2017-01-01 RX ADMIN — LIDOCAINE 1 PATCH: 50 PATCH TOPICAL at 02:04

## 2017-01-01 RX ADMIN — LIDOCAINE 1 PATCH: 50 PATCH TOPICAL at 07:04

## 2017-01-01 RX ADMIN — APIXABAN 2.5 MG: 2.5 TABLET, FILM COATED ORAL at 08:04

## 2017-01-01 RX ADMIN — RAMELTEON 8 MG: 8 TABLET, FILM COATED ORAL at 10:04

## 2017-01-01 RX ADMIN — LEVOTHYROXINE SODIUM ANHYDROUS 40 MCG: 100 INJECTION, POWDER, LYOPHILIZED, FOR SOLUTION INTRAVENOUS at 09:04

## 2017-01-01 RX ADMIN — SODIUM CHLORIDE: 0.9 INJECTION, SOLUTION INTRAVENOUS at 03:04

## 2017-01-01 RX ADMIN — Medication 3 ML: at 03:04

## 2017-01-01 RX ADMIN — SEVELAMER CARBONATE 800 MG: 800 TABLET, FILM COATED ORAL at 11:04

## 2017-01-01 RX ADMIN — RAMELTEON 8 MG: 8 TABLET, FILM COATED ORAL at 01:04

## 2017-01-01 RX ADMIN — ERYTHROPOIETIN 2850 UNITS: 3000 INJECTION, SOLUTION INTRAVENOUS; SUBCUTANEOUS at 06:04

## 2017-01-01 RX ADMIN — PANTOPRAZOLE SODIUM 40 MG: 40 TABLET, DELAYED RELEASE ORAL at 08:04

## 2017-01-01 RX ADMIN — MORPHINE SULFATE 2 MG: 2 INJECTION, SOLUTION INTRAMUSCULAR; INTRAVENOUS at 07:04

## 2017-01-01 RX ADMIN — GELATIN ABSORBABLE SPONGE 12-7 MM 1 APPLICATOR: 12-7 MISC at 05:04

## 2017-01-01 RX ADMIN — ERYTHROPOIETIN 2800 UNITS: 10000 INJECTION, SOLUTION INTRAVENOUS; SUBCUTANEOUS at 09:04

## 2017-01-01 RX ADMIN — ACETAMINOPHEN 650 MG: 325 TABLET ORAL at 09:04

## 2017-01-01 RX ADMIN — LIDOCAINE 1 PATCH: 50 PATCH TOPICAL at 12:04

## 2017-01-01 RX ADMIN — ONDANSETRON 4 MG: 4 TABLET, ORALLY DISINTEGRATING ORAL at 12:04

## 2017-01-01 RX ADMIN — ALBUMIN (HUMAN) 25 G: 12.5 SOLUTION INTRAVENOUS at 06:04

## 2017-01-01 RX ADMIN — ALBUMIN (HUMAN) 25 G: 12.5 SOLUTION INTRAVENOUS at 10:04

## 2017-01-01 RX ADMIN — HEPARIN SODIUM 5000 UNITS: 1000 INJECTION, SOLUTION INTRAVENOUS; SUBCUTANEOUS at 12:04

## 2017-01-01 RX ADMIN — NYSTATIN 500000 UNITS: 500000 SUSPENSION ORAL at 11:04

## 2017-01-01 RX ADMIN — FENTANYL CITRATE 25 MCG: 50 INJECTION, SOLUTION INTRAMUSCULAR; INTRAVENOUS at 11:04

## 2017-01-01 RX ADMIN — LEVOTHYROXINE SODIUM 75 MCG: 75 TABLET ORAL at 06:04

## 2017-01-01 RX ADMIN — LEVOTHYROXINE SODIUM ANHYDROUS 40 MCG: 100 INJECTION, POWDER, LYOPHILIZED, FOR SOLUTION INTRAVENOUS at 07:04

## 2017-01-01 RX ADMIN — OXYCODONE HYDROCHLORIDE AND ACETAMINOPHEN 1 TABLET: 5; 325 TABLET ORAL at 09:04

## 2017-01-01 RX ADMIN — POLYETHYLENE GLYCOL 3350 17 G: 17 POWDER, FOR SOLUTION ORAL at 08:04

## 2017-01-01 RX ADMIN — SODIUM PHOSPHATE, MONOBASIC, MONOHYDRATE 30 MMOL: 276; 142 INJECTION, SOLUTION INTRAVENOUS at 06:04

## 2017-01-01 RX ADMIN — EPHEDRINE SULFATE 5 MG: 50 INJECTION, SOLUTION INTRAMUSCULAR; INTRAVENOUS; SUBCUTANEOUS at 04:04

## 2017-01-01 RX ADMIN — HEPARIN SODIUM 10000 UNITS: 1000 INJECTION, SOLUTION INTRAVENOUS; SUBCUTANEOUS at 11:04

## 2017-01-01 RX ADMIN — SEVELAMER CARBONATE 800 MG: 800 TABLET, FILM COATED ORAL at 08:04

## 2017-01-01 RX ADMIN — ACETAMINOPHEN 650 MG: 325 TABLET ORAL at 08:04

## 2017-01-01 RX ADMIN — DOXYCYCLINE HYCLATE 100 MG: 100 TABLET, COATED ORAL at 10:04

## 2017-01-01 RX ADMIN — CISATRACURIUM BESYLATE 10 MG: 10 INJECTION INTRAVENOUS at 03:04

## 2017-01-01 RX ADMIN — SEVELAMER CARBONATE 1.6 G: 800 POWDER, FOR SUSPENSION ORAL at 07:04

## 2017-01-01 RX ADMIN — OXYCODONE HYDROCHLORIDE 10 MG: 5 TABLET ORAL at 11:04

## 2017-01-01 RX ADMIN — NEOSTIGMINE METHYLSULFATE 3 MG: 1 INJECTION INTRAVENOUS at 05:04

## 2017-01-01 RX ADMIN — DIPHENHYDRAMINE HYDROCHLORIDE 25 MG: 50 INJECTION, SOLUTION INTRAMUSCULAR; INTRAVENOUS at 11:04

## 2017-01-01 RX ADMIN — RAMELTEON 8 MG: 8 TABLET, FILM COATED ORAL at 12:04

## 2017-01-01 RX ADMIN — ATORVASTATIN CALCIUM 40 MG: 20 TABLET, FILM COATED ORAL at 05:04

## 2017-01-01 RX ADMIN — BISACODYL 10 MG: 10 SUPPOSITORY RECTAL at 12:04

## 2017-01-01 RX ADMIN — ETOMIDATE 10 MG: 2 INJECTION, SOLUTION INTRAVENOUS at 03:04

## 2017-01-01 RX ADMIN — ONDANSETRON 4 MG: 4 TABLET, ORALLY DISINTEGRATING ORAL at 05:04

## 2017-01-01 RX ADMIN — LEVOTHYROXINE SODIUM 50 MCG: 50 TABLET ORAL at 05:04

## 2017-01-01 RX ADMIN — SODIUM CHLORIDE: 0.9 INJECTION, SOLUTION INTRAVENOUS at 01:04

## 2017-01-01 RX ADMIN — GLYCOPYRROLATE 0.2 MG: 0.2 INJECTION, SOLUTION INTRAMUSCULAR; INTRAVENOUS at 04:04

## 2017-01-01 RX ADMIN — SODIUM CHLORIDE 300 ML: 0.9 INJECTION, SOLUTION INTRAVENOUS at 04:04

## 2017-01-01 RX ADMIN — ACETAMINOPHEN 650 MG: 325 TABLET ORAL at 12:04

## 2017-01-01 RX ADMIN — SEVELAMER CARBONATE 1.6 G: 800 POWDER, FOR SUSPENSION ORAL at 08:04

## 2017-01-01 RX ADMIN — POLYETHYLENE GLYCOL 3350 17 G: 17 POWDER, FOR SOLUTION ORAL at 12:04

## 2017-01-01 RX ADMIN — TRAMADOL HYDROCHLORIDE 50 MG: 50 TABLET, FILM COATED ORAL at 08:04

## 2017-01-01 RX ADMIN — HYDRALAZINE HYDROCHLORIDE AND ISOSORBIDE DINITRATE 1 TABLET: 37.5; 2 TABLET, FILM COATED ORAL at 05:04

## 2017-01-01 RX ADMIN — VITAMIN D, TAB 1000IU (100/BT) 1000 UNITS: 25 TAB at 11:04

## 2017-01-01 RX ADMIN — SODIUM CHLORIDE: 0.9 INJECTION, SOLUTION INTRAVENOUS at 04:04

## 2017-01-01 RX ADMIN — GELATIN ABSORBABLE SPONGE 12-7 MM 2 APPLICATOR: 12-7 MISC at 12:04

## 2017-01-01 RX ADMIN — Medication 3 ML: at 05:04

## 2017-01-01 RX ADMIN — LIOTHYRONINE SODIUM 5 MCG: 5 TABLET ORAL at 07:04

## 2017-01-01 RX ADMIN — PANTOPRAZOLE SODIUM 40 MG: 40 TABLET, DELAYED RELEASE ORAL at 07:04

## 2017-01-01 RX ADMIN — FENTANYL CITRATE 50 MCG: 50 INJECTION, SOLUTION INTRAMUSCULAR; INTRAVENOUS at 03:04

## 2017-01-01 RX ADMIN — Medication 3 ML: at 02:04

## 2017-01-01 RX ADMIN — ONDANSETRON 4 MG: 2 INJECTION INTRAMUSCULAR; INTRAVENOUS at 12:04

## 2017-01-01 RX ADMIN — MIDODRINE HYDROCHLORIDE 10 MG: 5 TABLET ORAL at 07:04

## 2017-01-01 RX ADMIN — APIXABAN 2.5 MG: 2.5 TABLET, FILM COATED ORAL at 12:04

## 2017-01-01 RX ADMIN — DOCUSATE SODIUM 100 MG: 100 CAPSULE, LIQUID FILLED ORAL at 02:04

## 2017-01-01 RX ADMIN — VANCOMYCIN HYDROCHLORIDE 1000 MG: 1 INJECTION, POWDER, LYOPHILIZED, FOR SOLUTION INTRAVENOUS at 02:04

## 2017-01-01 RX ADMIN — ALBUMIN (HUMAN) 25 G: 12.5 SOLUTION INTRAVENOUS at 05:04

## 2017-01-01 RX ADMIN — ALBUMIN (HUMAN) 25 G: 12.5 SOLUTION INTRAVENOUS at 03:04

## 2017-01-01 RX ADMIN — LEVOTHYROXINE SODIUM 75 MCG: 75 TABLET ORAL at 05:04

## 2017-01-01 RX ADMIN — GLYCOPYRROLATE 0.4 MG: 0.2 INJECTION, SOLUTION INTRAMUSCULAR; INTRAVENOUS at 05:04

## 2017-01-01 RX ADMIN — DOCUSATE SODIUM 100 MG: 100 CAPSULE, LIQUID FILLED ORAL at 09:04

## 2017-01-01 RX ADMIN — SEVELAMER CARBONATE 1600 MG: 800 TABLET, FILM COATED ORAL at 09:04

## 2017-01-01 RX ADMIN — ONDANSETRON 4 MG: 4 TABLET, ORALLY DISINTEGRATING ORAL at 06:04

## 2017-01-01 RX ADMIN — ATORVASTATIN CALCIUM 40 MG: 10 TABLET, FILM COATED ORAL at 07:04

## 2017-01-01 RX ADMIN — SODIUM BICARBONATE 50 MEQ: 84 INJECTION, SOLUTION INTRAVENOUS at 10:04

## 2017-01-01 RX ADMIN — MORPHINE SULFATE 2 MG: 2 INJECTION, SOLUTION INTRAMUSCULAR; INTRAVENOUS at 05:04

## 2017-01-01 RX ADMIN — Medication 3 ML: at 09:04

## 2017-01-01 RX ADMIN — SEVELAMER CARBONATE 1.6 G: 800 POWDER, FOR SUSPENSION ORAL at 03:04

## 2017-01-01 RX ADMIN — IODIXANOL 70 ML: 320 INJECTION, SOLUTION INTRAVASCULAR at 01:04

## 2017-01-01 RX ADMIN — PROPOFOL 50 MG: 10 INJECTION, EMULSION INTRAVENOUS at 03:04

## 2017-01-01 RX ADMIN — EPHEDRINE SULFATE 5 MG: 50 INJECTION, SOLUTION INTRAMUSCULAR; INTRAVENOUS; SUBCUTANEOUS at 05:04

## 2017-01-01 RX ADMIN — APIXABAN 2.5 MG: 2.5 TABLET, FILM COATED ORAL at 02:04

## 2017-01-01 RX ADMIN — ATORVASTATIN CALCIUM 40 MG: 10 TABLET, FILM COATED ORAL at 09:04

## 2017-01-01 RX ADMIN — VITAMIN D, TAB 1000IU (100/BT) 1000 UNITS: 25 TAB at 12:04

## 2017-01-01 RX ADMIN — ALBUTEROL SULFATE 2 PUFF: 90 AEROSOL, METERED RESPIRATORY (INHALATION) at 06:04

## 2017-01-01 RX ADMIN — ACETAMINOPHEN 650 MG: 325 TABLET ORAL at 05:04

## 2017-01-01 RX ADMIN — LIDOCAINE HYDROCHLORIDE 40 MG: 20 INJECTION, SOLUTION INTRAVENOUS at 03:04

## 2017-01-01 RX ADMIN — EPHEDRINE SULFATE 10 MG: 50 INJECTION, SOLUTION INTRAMUSCULAR; INTRAVENOUS; SUBCUTANEOUS at 03:04

## 2017-01-01 RX ADMIN — ACETAMINOPHEN 500 MG: 500 TABLET ORAL at 10:04

## 2017-01-01 RX ADMIN — LIDOCAINE 1 PATCH: 50 PATCH TOPICAL at 08:04

## 2017-01-01 RX ADMIN — POTASSIUM & SODIUM PHOSPHATES POWDER PACK 280-160-250 MG 2 PACKET: 280-160-250 PACK at 12:04

## 2017-01-01 RX ADMIN — EPINEPHRINE 1 MG: 1 INJECTION INTRAMUSCULAR; INTRAVENOUS; SUBCUTANEOUS at 12:04

## 2017-01-01 RX ADMIN — NYSTATIN 500000 UNITS: 500000 SUSPENSION ORAL at 07:04

## 2017-01-01 RX ADMIN — FENTANYL CITRATE 25 MCG: 50 INJECTION, SOLUTION INTRAMUSCULAR; INTRAVENOUS at 05:04

## 2017-01-01 RX ADMIN — MORPHINE SULFATE 2 MG: 2 INJECTION, SOLUTION INTRAMUSCULAR; INTRAVENOUS at 12:04

## 2017-01-01 RX ADMIN — OXYCODONE HYDROCHLORIDE AND ACETAMINOPHEN 1 TABLET: 5; 325 TABLET ORAL at 06:04

## 2017-01-01 RX ADMIN — DOCUSATE SODIUM 100 MG: 100 CAPSULE, LIQUID FILLED ORAL at 07:04

## 2017-01-01 RX ADMIN — SEVELAMER CARBONATE 1.6 G: 800 POWDER, FOR SUSPENSION ORAL at 12:04

## 2017-01-01 RX ADMIN — METOPROLOL TARTRATE 5 MG: 5 INJECTION INTRAVENOUS at 02:04

## 2017-01-01 RX ADMIN — OXYCODONE HYDROCHLORIDE AND ACETAMINOPHEN 1 TABLET: 5; 325 TABLET ORAL at 04:04

## 2017-01-01 RX ADMIN — ONDANSETRON 4 MG: 4 TABLET, ORALLY DISINTEGRATING ORAL at 01:04

## 2017-01-01 RX ADMIN — PANTOPRAZOLE SODIUM 40 MG: 40 TABLET, DELAYED RELEASE ORAL at 02:04

## 2017-01-01 RX ADMIN — MIDODRINE HYDROCHLORIDE 10 MG: 5 TABLET ORAL at 02:04

## 2017-01-01 RX ADMIN — ERYTHROPOIETIN 3000 UNITS: 10000 INJECTION, SOLUTION INTRAVENOUS; SUBCUTANEOUS at 07:04

## 2017-01-01 RX ADMIN — CEFAZOLIN SODIUM 2 G: 2 SOLUTION INTRAVENOUS at 11:04

## 2017-01-01 RX ADMIN — DOCUSATE SODIUM 100 MG: 100 CAPSULE, LIQUID FILLED ORAL at 03:04

## 2017-01-01 RX ADMIN — LIOTHYRONINE SODIUM 5 MCG: 5 TABLET ORAL at 08:04

## 2017-01-01 RX ADMIN — Medication 12.5 MG: at 02:04

## 2017-01-01 RX ADMIN — HYDRALAZINE HYDROCHLORIDE AND ISOSORBIDE DINITRATE 1 TABLET: 37.5; 2 TABLET, FILM COATED ORAL at 12:04

## 2017-01-01 RX ADMIN — ERYTHROPOIETIN 3000 UNITS: 3000 INJECTION, SOLUTION INTRAVENOUS; SUBCUTANEOUS at 10:04

## 2017-01-01 RX ADMIN — LIDOCAINE HYDROCHLORIDE 15 MG: 10 INJECTION, SOLUTION EPIDURAL; INFILTRATION; INTRACAUDAL; PERINEURAL at 12:04

## 2017-01-01 RX ADMIN — GABAPENTIN 100 MG: 100 CAPSULE ORAL at 02:04

## 2017-01-01 RX ADMIN — SEVELAMER CARBONATE 1.6 G: 800 POWDER, FOR SUSPENSION ORAL at 02:04

## 2017-01-01 RX ADMIN — SODIUM BICARBONATE 50 MEQ: 84 INJECTION, SOLUTION INTRAVENOUS at 11:04

## 2017-01-01 RX ADMIN — ASPIRIN 81 MG CHEWABLE TABLET 81 MG: 81 TABLET CHEWABLE at 02:04

## 2017-01-01 RX ADMIN — SODIUM CHLORIDE: 0.9 INJECTION, SOLUTION INTRAVENOUS at 10:04

## 2017-01-01 RX ADMIN — DOCUSATE SODIUM 100 MG: 100 CAPSULE, LIQUID FILLED ORAL at 05:04

## 2017-01-01 RX ADMIN — METOPROLOL TARTRATE 5 MG: 1 INJECTION, SOLUTION INTRAVENOUS at 02:04

## 2017-01-01 RX ADMIN — HYDRALAZINE HYDROCHLORIDE AND ISOSORBIDE DINITRATE 1 TABLET: 37.5; 2 TABLET, FILM COATED ORAL at 01:04

## 2017-01-01 RX ADMIN — PROTAMINE SULFATE 40 MG: 10 INJECTION, SOLUTION INTRAVENOUS at 12:04

## 2017-01-01 RX ADMIN — CEFAZOLIN SODIUM 1 G: 1 SOLUTION INTRAVENOUS at 08:04

## 2017-01-01 RX ADMIN — ALBUMIN (HUMAN) 25 G: 12.5 SOLUTION INTRAVENOUS at 11:04

## 2017-01-01 RX ADMIN — DEXTROSE MONOHYDRATE 12.5 G: 500 INJECTION PARENTERAL at 04:04

## 2017-01-01 RX ADMIN — SEVELAMER CARBONATE 800 MG: 800 TABLET, FILM COATED ORAL at 04:04

## 2017-01-01 RX ADMIN — ERYTHROPOIETIN 3000 UNITS: 3000 INJECTION, SOLUTION INTRAVENOUS; SUBCUTANEOUS at 11:04

## 2017-01-01 RX ADMIN — Medication 5 UNITS: at 09:04

## 2017-01-01 RX ADMIN — ATORVASTATIN CALCIUM 40 MG: 20 TABLET, FILM COATED ORAL at 08:04

## 2017-01-01 RX ADMIN — CALCIUM CHLORIDE 1 G: 100 INJECTION, SOLUTION INTRAVENOUS at 10:04

## 2017-01-01 RX ADMIN — ONDANSETRON 8 MG: 8 TABLET, ORALLY DISINTEGRATING ORAL at 08:04

## 2017-01-01 RX ADMIN — SODIUM CHLORIDE: 0.9 INJECTION, SOLUTION INTRAVENOUS at 09:04

## 2017-01-01 RX ADMIN — LEVOTHYROXINE SODIUM ANHYDROUS 40 MCG: 100 INJECTION, POWDER, LYOPHILIZED, FOR SOLUTION INTRAVENOUS at 02:04

## 2017-01-01 RX ADMIN — LIDOCAINE HYDROCHLORIDE 1 ML: 10 INJECTION, SOLUTION INFILTRATION; PERINEURAL at 02:04

## 2017-01-01 RX ADMIN — ASPIRIN 81 MG CHEWABLE TABLET 81 MG: 81 TABLET CHEWABLE at 07:04

## 2017-01-01 RX ADMIN — VITAMIN D, TAB 1000IU (100/BT) 1000 UNITS: 25 TAB at 07:04

## 2017-01-01 RX ADMIN — TRANEXAMIC ACID 3000 MG: 100 INJECTION, SOLUTION INTRAVENOUS at 04:04

## 2017-01-01 RX ADMIN — DEXTROSE MONOHYDRATE 25 G: 25 INJECTION, SOLUTION INTRAVENOUS at 10:04

## 2017-01-01 RX ADMIN — HYDRALAZINE HYDROCHLORIDE AND ISOSORBIDE DINITRATE 1 TABLET: 37.5; 2 TABLET, FILM COATED ORAL at 04:04

## 2017-01-01 RX ADMIN — COSYNTROPIN 0.25 MG: 0.25 INJECTION, POWDER, LYOPHILIZED, FOR SOLUTION INTRAVENOUS at 02:04

## 2017-01-01 RX ADMIN — DEXTROSE MONOHYDRATE 12.5 G: 500 INJECTION PARENTERAL at 12:04

## 2017-01-01 RX ADMIN — ONDANSETRON 8 MG: 8 TABLET, ORALLY DISINTEGRATING ORAL at 02:04

## 2017-01-01 RX ADMIN — Medication 3 ML: at 12:04

## 2017-01-01 RX ADMIN — LIOTHYRONINE SODIUM 5 MCG: 5 TABLET ORAL at 09:04

## 2017-01-01 RX ADMIN — RAMELTEON 8 MG: 8 TABLET, FILM COATED ORAL at 08:04

## 2017-01-01 RX ADMIN — SEVELAMER CARBONATE 1600 MG: 800 TABLET, FILM COATED ORAL at 01:04

## 2017-01-01 RX ADMIN — ONDANSETRON 4 MG: 2 INJECTION INTRAMUSCULAR; INTRAVENOUS at 04:04

## 2017-01-01 RX ADMIN — PANTOPRAZOLE SODIUM 40 MG: 40 TABLET, DELAYED RELEASE ORAL at 12:04

## 2017-01-01 RX ADMIN — METOCLOPRAMIDE HYDROCHLORIDE 5 MG: 5 SOLUTION ORAL at 02:04

## 2017-01-01 RX ADMIN — SEVELAMER CARBONATE 1.6 G: 800 POWDER, FOR SUSPENSION ORAL at 01:04

## 2017-01-01 RX ADMIN — ASPIRIN 81 MG CHEWABLE TABLET 81 MG: 81 TABLET CHEWABLE at 12:04

## 2017-01-01 RX ADMIN — EPHEDRINE SULFATE 10 MG: 50 INJECTION, SOLUTION INTRAMUSCULAR; INTRAVENOUS; SUBCUTANEOUS at 06:04

## 2017-01-01 RX ADMIN — MORPHINE SULFATE 2 MG: 2 INJECTION, SOLUTION INTRAMUSCULAR; INTRAVENOUS at 03:04

## 2017-01-01 RX ADMIN — DEXTROSE 2 G: 50 INJECTION, SOLUTION INTRAVENOUS at 03:04

## 2017-01-01 RX ADMIN — LIDOCAINE 1 PATCH: 50 PATCH TOPICAL at 10:04

## 2017-01-01 RX ADMIN — OXYCODONE HYDROCHLORIDE 10 MG: 5 TABLET ORAL at 06:04

## 2017-01-01 RX ADMIN — ERYTHROPOIETIN 3000 UNITS: 10000 INJECTION, SOLUTION INTRAVENOUS; SUBCUTANEOUS at 04:04

## 2017-01-01 RX ADMIN — ACETAMINOPHEN 650 MG: 325 TABLET ORAL at 10:04

## 2017-01-01 RX ADMIN — LEVOTHYROXINE SODIUM ANHYDROUS 40 MCG: 100 INJECTION, POWDER, LYOPHILIZED, FOR SOLUTION INTRAVENOUS at 12:04

## 2017-01-01 RX ADMIN — RAMELTEON 8 MG: 8 TABLET, FILM COATED ORAL at 11:04

## 2017-01-01 RX ADMIN — DEXTROSE MONOHYDRATE 12.5 G: 500 INJECTION PARENTERAL at 06:04

## 2017-01-01 RX ADMIN — ONDANSETRON 4 MG: 4 TABLET, ORALLY DISINTEGRATING ORAL at 11:04

## 2017-01-01 RX ADMIN — ALBUTEROL SULFATE 2 PUFF: 90 AEROSOL, METERED RESPIRATORY (INHALATION) at 04:04

## 2017-01-04 NOTE — TELEPHONE ENCOUNTER
----- Message from Lorena Bailon sent at 1/3/2017 11:39 AM CST -----  Contact: [facility]- [phone] -[place]   Need orders to continue home care,please advise.

## 2017-01-06 NOTE — PROGRESS NOTES
CC:   Foot exam     HPI: Padmini Miranda is a 85 y.o. female who presents to clinic today for foot exam.   ESRD on dialysis, +PVD  Constitutional symptoms:  Negative for fever, chills, nights sweats, nausea, vomiting, disorientation, or increased blood sugars    1/6/17:  follow up for foot exam.  history of partial right hallux amputation now healed.  Notes blood blister at left hallux after bumping toe a few weeks ago.     PCP:  Randy Lyles MD  Last PCP visit:    vascular surgery: 12/16/16        Past Medical History   Diagnosis Date    ALLERGIC RHINITIS     Anemia     Anticoagulant long-term use     Blood transfusion     Cardiomyopathy 10/20/2015    Cataract     CHF exacerbation 1/23/2015    Chronic kidney disease     Cramp of both lower extremities 12/30/2016    Hyperlipidemia     Hypertension     Hypothyroidism     Persistent atrial fibrillation 3/28/2016           Current Outpatient Prescriptions on File Prior to Visit   Medication Sig Dispense Refill    apixaban 2.5 mg Tab Take 1 tablet (2.5 mg total) by mouth 2 (two) times daily. 180 tablet 3    aspirin 81 MG Chew Take 1 tablet (81 mg total) by mouth once daily. (Patient taking differently: Take 81 mg by mouth every morning. ) 30 tablet 3    atorvastatin (LIPITOR) 40 MG tablet Take 1 tablet (40 mg total) by mouth once daily. (Patient taking differently: Take 40 mg by mouth every morning. ) 30 tablet 11    carvedilol (COREG) 6.25 MG tablet Take 1 tablet (6.25 mg total) by mouth 2 (two) times daily. 60 tablet 2    epoetin syed (PROCRIT) 10,000 unit/mL injection Inject 0.28 mLs (2,800 Units total) into the skin every Tues, Thurs, Sat. 0.28 mL 30    ergocalciferol (ERGOCALCIFEROL) 50,000 unit Cap Take 1 capsule (50,000 Units total) by mouth every 7 days. 4 capsule 1    isosorbide-hydrALAZINE 20-37.5 mg (BIDIL) 20-37.5 mg Tab Take 1 tablet by mouth 3 (three) times daily. 90 tablet 2    levothyroxine (SYNTHROID) 50 MCG tablet Take 1  "tablet (50 mcg total) by mouth before breakfast. 90 tablet 3    multivitamin (ONE DAILY MULTIVITAMIN) per tablet Take 1 tablet by mouth every morning.      PROTONIX 40 mg tablet TAKE 1 BY MOUTH DAILY (Patient taking differently: TAKE 1 TABLET  BY MOUTH  DAILY) 30 tablet 5    RENVELA 0.8 gram PwPk       sevelamer carbonate (RENVELA) 800 mg Tab Take 1 tablet (800 mg total) by mouth 3 (three) times daily with meals. 90 tablet 3     No current facility-administered medications on file prior to visit.            Review of patient's allergies indicates:   Allergen Reactions    Ativan [lorazepam] Hallucinations    Crab Other (See Comments)     Causes Gout    Crayfish Other (See Comments)     Causes Gout    Promethazine Other (See Comments)     Hallucinations            ROS:  Negative for fever, chills, nights sweats, nausea, vomiting, disorientation, or increased blood sugars        EXAM:  Vitals:    01/06/17 0951   BP: (!) 116/58   Pulse: 77   Height: 5' 2" (1.575 m)        General:   appears stated age, no distress      Bilateral Lower extremity physical exam:  Vascular: Dorsalis Pedis:  absent   Posterior Tibial:  absent  capillary refill time:  5 seconds  Temperature of toes cool to touch  Hair on toes:  absent    There is 1+ edema.  no varicosities.      Neurological:     sharp dull - B/L normal and light touch - B/L normal  SWMF: Diminshed      Musculoskeletal:    Right foot: partial hallux amputation, hammertoes 2-5;  Limited range of motion   Left foot: hammertoe and bunion.  2nd toe overlaps the hallux      Dermatological:     Atrophic thin skin.   No new wounds  Right hallux amputation site: epithelialized.  No redness or swelling.   Toenails x 9 are dystrophic and elongated by 1-3mm with subungual debris   No paronychia.   Porokeratosis, without underlying wound, noted sub 5th metatarsal head of the right foot.     Ulcer, left hallux tip: 4x4 x 1 cm  Fibro granular base. No probe to bone. No signs of " infection.        Assessment / Plan:     I counseled the patient on her conditions, their implications and medical management.     Peripheral vascular disease    Great toe amputation status, right    ESRD on hemodialysis    Ulcer of toe, left, with fat layer exposed      Left hallux ulcer debrided. Dressed with betadine, bandaid. Offload with surgical shoe. Daughter will change dressing daily.    F/u 2 weeks or sooner as needed.    Shoe inspection. Diabetic Foot Education. Patient reminded of the importance of good nutrition and blood sugar control to help prevent podiatric complications of diabetes. Patient instructed on proper foot hygeine. We discussed wearing proper shoe gear, daily foot inspections, never walking without protective shoe gear, never putting sharp instruments to feet    - With patient's permission, nails were aggressively reduced and debrided x 9 to their soft tissue attachment mechanically and with electric , removing all offending nail and debris. Patient relates relief following the procedure. She will continue to monitor the areas daily, inspect her feet, wear protective shoe gear when ambulatory, moisturizer to maintain skin integrity.    Obtained verbal consent from the patient for excisional wound debridement, left hallux. No anesthesia was required, Utilizing a sterile curet, all non-viable soft tissue was excised to level of health subcutaneous tissue. A healthy appearing wound base was achieved with minimal blood loss. Hemostasis achieved with compression. Wound site was irrigated with normal saline and dressed. Wound care instructions were reviewed with the patient. Patient tolerated the procedure well.

## 2017-01-06 NOTE — MR AVS SNAPSHOT
[facility]  [address]  [facility] [address]  Phone: [phone]                  Padmini Miranda   2017 10:00 AM   Office Visit    Description:  Female : 1931   Provider:  Axel Philip DPM   Department:  [facility]           Reason for Visit     Foot Problem     Foot Pain     Toe Pain     Follow-up           Diagnoses this Visit        Comments    Peripheral vascular disease    -  Primary     Great toe amputation status, right         ESRD on hemodialysis                To Do List           Future Appointments        Provider Department Dept Phone    1/10/2017 8:00 AM HOME MONITOR DEVICE CHECK, [facility] Arr[facility]ia [phone]    2017 9:30 AM ECHO, [facility] - Echo/Stress Lab [phone]    2017 8:45 AM [facility] [id number] LB LIMIT [facility] [phone]    2017 9:15 AM Tejinder Miller MD [place] - Thoracic Surgery [phone]    2017 2:20 PM Randy Lyles MD [place] - Internal Medicine [phone]      Goals (5 Years of Data)     None      [facility]     [facility] -  Assistance  Registered nurses in the [facility] provide clinical advisement, health education, appointment booking, and other advisory services.  Call for this free service at [phone].             Medications           Message regarding Medications     Verify the changes and/or additions to your medication regime listed below are the same as discussed with your clinician today.  If any of these changes or additions are incorrect, please notify your healthcare provider.             Verify that the below list of medications is an accurate representation of the medications you are currently taking.  If none reported, the list may be blank. If incorrect, please contact your healthcare provider. Carry this list with you in case of emergency.           Current Medications     apixaban 2.5 mg  "Tab Take 1 tablet (2.5 mg total) by mouth 2 (two) times daily.    aspirin 81 MG Chew Take 1 tablet (81 mg total) by mouth once daily.    atorvastatin (LIPITOR) 40 MG tablet Take 1 tablet (40 mg total) by mouth once daily.    carvedilol (COREG) 6.25 MG tablet Take 1 tablet (6.25 mg total) by mouth 2 (two) times daily.    epoetin syed (PROCRIT) 10,000 unit/mL injection Inject 0.28 mLs (2,800 Units total) into the skin every Tues, Thurs, Sat.    ergocalciferol (ERGOCALCIFEROL) 50,000 unit Cap Take 1 capsule (50,000 Units total) by mouth every 7 days.    isosorbide-hydrALAZINE 20-37.5 mg (BIDIL) 20-37.5 mg Tab Take 1 tablet by mouth 3 (three) times daily.    levothyroxine (SYNTHROID) 50 MCG tablet Take 1 tablet (50 mcg total) by mouth before breakfast.    multivitamin (ONE DAILY MULTIVITAMIN) per tablet Take 1 tablet by mouth every morning.    PROTONIX 40 mg tablet TAKE 1 BY MOUTH DAILY    RENVELA 0.8 gram PwPk     sevelamer carbonate (RENVELA) 800 mg Tab Take 1 tablet (800 mg total) by mouth 3 (three) times daily with meals.           Clinical Reference Information           Vital Signs - Last Recorded  Most recent update: 1/6/2017  9:52 AM by Daphne Hernandez MA    BP Pulse Ht LMP          (!) 116/58 77 5' 2" (1.575 m) (LMP Unknown)        Blood Pressure          Most Recent Value    BP  (!)  116/58      Allergies as of 1/6/2017     Ativan [Lorazepam]    Crab    Crayfish    Promethazine      Immunizations Administered on Date of Encounter - 1/6/2017     None      "

## 2017-01-20 NOTE — PROGRESS NOTES
GENERAL SURGERY CLINIC NOTE    Padmini Miranda is a 85 y.o.  female with Hx of CHF and recurrent pleural effusion s/p Pleurx placement on 16 who presents to clinic for follow up and pleurx removal.  Patient says that she has only drained about 20cc of fluid from her pleurx per week and last week there was too little to measure.  She denies CP and SOB.  Complains of cough but says she has had a cold for the past few days.  Denies fever and chills.        ROS:  A 10-point review of systems is negative except for the above mentioned in the HPI.      Past Medical History   Diagnosis Date    ALLERGIC RHINITIS     Anemia     Anticoagulant long-term use     Blood transfusion     Cardiomyopathy 10/20/2015    Cataract     CHF exacerbation 2015    Chronic kidney disease     Cramp of both lower extremities 2016    Hyperlipidemia     Hypertension     Hypothyroidism     Persistent atrial fibrillation 3/28/2016       Past Surgical History   Procedure Laterality Date     section      Hysterectomy       ovaries intact    Cardiac catheterization      Angioplasty       Renal PTA    Renal artery stent      Vascular surgery      Tonsillectomy         Social History     Social History    Marital status:      Spouse name: N/A    Number of children: N/A    Years of education: N/A     Occupational History    Not on file.     Social History Main Topics    Smoking status: Never Smoker    Smokeless tobacco: Never Used    Alcohol use Yes      Comment: occasional wine use    Drug use: No    Sexual activity: No     Other Topics Concern    Not on file     Social History Narrative       Review of patient's allergies indicates:   Allergen Reactions    Ativan [lorazepam] Hallucinations    Crab Other (See Comments)     Causes Gout    Crayfish Other (See Comments)     Causes Gout    Promethazine Other (See Comments)     Hallucinations          PHYSICAL EXAM:  Vitals:     01/20/17 1008   BP: (!) 157/75   Pulse: 95       General: NAD  Neuro: AAOx3  Cardio: RRR  Resp: CTAB, breathing even and unlabored  Abd: Soft, ND, NT, no palpable mass, BS+  Ext: Warm and well perfused, +1 bilateral lower extremity edema      PERTINENT IMAGING:  CXR Reviewed       ASSESSMENT/PLAN:  85 y.o. female with     - Pleurx catheter removed today in clinic   - RTC in 1 month with CXR      Merrill Shaver M.D.  General Surgery PGY1  [phone]      ATTENDING ATTESTATION:    I evaluated the patient and I agree with the assessment and plan.

## 2017-02-20 NOTE — PROGRESS NOTES
Subjective:       Patient ID: Padmini Miranda is a 85 y.o. female.    Chief Complaint: Follow-up and Hyperlipidemia    HPI   The patient presents for follow-up evaluation.  She has acute on chronic combined systolic and diastolic heart failure.  She is using nasal oxygen during her dialysis treatments.  She complains of shortness of breath with ADLs such as showering and dressing.  No PND is noted.  Decreased level of activity is reported due to her shortness of breath.  She has chronic lower extremity edema with symptoms being more prominent in the left lower extremity compared to the right.  She is status post right Pleurx catheter removal for management of a right pleural effusion.  She also has hypertension, aortic stenosis, and pulmonary hypertension.  She has end-stage renal disease and requires regular hemodialysis.  Review of Systems   Constitutional: Positive for activity change and fatigue. Negative for appetite change, chills, fever and unexpected weight change.   Eyes: Negative for visual disturbance.   Respiratory: Positive for shortness of breath. Negative for cough.    Cardiovascular: Positive for leg swelling. Negative for chest pain and palpitations.   Gastrointestinal: Negative for abdominal pain, blood in stool, constipation and diarrhea.   Genitourinary: Negative for dysuria and hematuria.   Musculoskeletal: Positive for myalgias (intermittent leg cramps are noted especially during hemodialysis treatments.). Negative for arthralgias, neck pain and neck stiffness.   Skin: Negative for rash.   Neurological: Negative for dizziness, syncope and headaches.   Psychiatric/Behavioral: Negative for sleep disturbance.       Objective:      Physical Exam   Constitutional: She is oriented to person, place, and time. She appears well-developed and well-nourished. No distress.   HENT:   Head: Normocephalic and atraumatic.   Eyes: Conjunctivae and EOM are normal. No scleral icterus.   Neck: Normal range of  motion. Neck supple. No JVD present. No thyromegaly present.   Cardiovascular: Exam reveals no gallop and no friction rub.    No murmur heard.  An irregular rhythm is present.  1+ left lower extremity edema is noted; trace right lower extremity edema is present.   Pulmonary/Chest: Effort normal and breath sounds normal. No respiratory distress. She has no wheezes. She has no rales.   Abdominal: Soft. Bowel sounds are normal. She exhibits no mass. There is no tenderness.   Musculoskeletal: Normal range of motion. She exhibits no tenderness.   Lymphadenopathy:     She has no cervical adenopathy.   Neurological: She is alert and oriented to person, place, and time.   Skin: Skin is warm and dry. No rash noted.   Nursing note and vitals reviewed.      Assessment:       1. Acute on chronic combined systolic and diastolic heart failure    2. Pulmonary HTN    3. Peripheral vascular disease    4. Ischemic cardiomyopathy    5. Anemia in chronic renal disease    6. ESRD on hemodialysis    7. Systolic and diastolic CHF, chronic    8. Mixed hyperlipidemia    9. Acquired hypothyroidism    10. Edema, unspecified type        Plan:       Padmini was seen today for follow-up.  Venous ultrasound of left lower extremity will be obtained.  Pulse ox at rest is 97% present.  Nitroglycerin tablets (as requested.  The patient is to return to clinic in 3 months.    Diagnoses and all orders for this visit:    Acute on chronic combined systolic and diastolic heart failure    Pulmonary HTN    Peripheral vascular disease    Ischemic cardiomyopathy    Anemia in chronic renal disease    ESRD on hemodialysis    Systolic and diastolic CHF, chronic    Mixed hyperlipidemia    Acquired hypothyroidism    Edema, unspecified type  -     CAR Ultrasound doppler venous leg left; Future    Other orders  -     nitroGLYCERIN (NITROSTAT) 0.4 MG SL tablet; Place 1 tablet (0.4 mg total) under the tongue every 5 (five) minutes as needed for Chest pain.

## 2017-02-22 NOTE — MR AVS SNAPSHOT
[facility]  [address]  [facility] [address]  Phone: [phone]  Fax: [phone]                  Padmini Miranda   2017 8:45 AM   Office Visit    Description:  Female : 1931   Provider:  Tejinder Miller MD   Department:  [facility]           Reason for Visit     Follow-up                To Do List           Future Appointments        Provider Department Dept Phone    2017 10:00 AM Axel Philip DPM [facility] - Podiatry [phone]      Goals (5 Years of Data)     None      Follow-Up and Disposition     Return if symptoms worsen or fail to improve.      [facility]     [facility] On Call Nurse [facility] -  Assistance  Registered nurses in the [facility] Center provide clinical advisement, health education, appointment booking, and other advisory services.  Call for this free service at [phone].             Medications           Message regarding Medications     Verify the changes and/or additions to your medication regime listed below are the same as discussed with your clinician today.  If any of these changes or additions are incorrect, please notify your healthcare provider.             Verify that the below list of medications is an accurate representation of the medications you are currently taking.  If none reported, the list may be blank. If incorrect, please contact your healthcare provider. Carry this list with you in case of emergency.           Current Medications     apixaban 2.5 mg Tab Take 1 tablet (2.5 mg total) by mouth 2 (two) times daily.    aspirin 81 MG Chew Take 1 tablet (81 mg total) by mouth once daily.    atorvastatin (LIPITOR) 40 MG tablet Take 1 tablet (40 mg total) by mouth once daily.    carvedilol (COREG) 6.25 MG tablet Take 1 tablet (6.25 mg total) by mouth 2 (two) times daily.    epoetin syed (PROCRIT) 10,000 unit/mL injection Inject 0.28 mLs (2,800 Units total) into the skin every Tues,  "Thurs, Sat.    isosorbide-hydrALAZINE 20-37.5 mg (BIDIL) 20-37.5 mg Tab Take 1 tablet by mouth 3 (three) times daily.    multivitamin (ONE DAILY MULTIVITAMIN) per tablet Take 1 tablet by mouth every morning.    nitroGLYCERIN (NITROSTAT) 0.4 MG SL tablet Place 1 tablet (0.4 mg total) under the tongue every 5 (five) minutes as needed for Chest pain.    PROTONIX 40 mg tablet TAKE 1 BY MOUTH DAILY    RENVELA 0.8 gram PwPk     SYNTHROID 50 mcg tablet TAKE 1 BY MOUTH DAILY      BEFORE BREAKFAST           Clinical Reference Information           Your Vitals Were     BP Pulse Height Weight Last Period SpO2    136/66 62 5' 2" (1.575 m) 58.7 kg (129 lb 6.6 oz) (LMP Unknown) 95%    BMI                23.67 kg/m2          Blood Pressure          Most Recent Value    BP  136/66      Allergies as of 2/22/2017     Ativan [Lorazepam]    Crab    Crayfish    Promethazine      Immunizations Administered on Date of Encounter - 2/22/2017     None      Language Assistance Services     ATTENTION: Language assistance services are available, free of charge. Please call [phone].      ATENCIÓN: Si habla español, tiene a mccabe disposición servicios gratuitos de asistencia lingüística. Llame al [phone].     HENRIQUE Ý: N?u b?n nói Ti?ng Vi?t, có các d?ch v? h? tr? ngôn ng? mi?n phí dành cho b?n. G?i s? [phone].         [facility] complies with applicable Federal civil rights laws and does not discriminate on the basis of race, color, national origin, age, disability, or sex.        "

## 2017-02-22 NOTE — PROGRESS NOTES
Subjective:       Patient ID: Padmini Miranda is a 85 y.o. female.    Chief Complaint: Follow-up    HPI     85 y.o.  female with Hx of CHF and recurrent pleural effusion s/p right Pleurx placement on 12/5/16 and removed on 1/20/17. Returns today for 1 month follow up after Pleurx removal. Today she reports ongoing SOB that gets worse later in the day. States it feels different than when she previously had fluid accumulation. Does not wear home O2. Arrives in wheelchair today.     Review of Systems   Constitutional: Positive for activity change and fatigue. Negative for appetite change, chills, diaphoresis and fever.   HENT: Negative for trouble swallowing and voice change.    Eyes: Negative.    Respiratory: Positive for chest tightness and shortness of breath. Negative for cough, choking, wheezing and stridor.    Cardiovascular: Positive for leg swelling. Negative for chest pain and palpitations.   Gastrointestinal: Negative.    Endocrine: Negative.    Genitourinary: Negative.    Musculoskeletal: Positive for arthralgias, back pain, gait problem and myalgias.   Skin: Negative.    Allergic/Immunologic: Negative.    Hematological: Negative.    Psychiatric/Behavioral: Negative.            Objective:      Physical Exam   Constitutional: She is oriented to person, place, and time. She appears well-developed and well-nourished.   HENT:   Head: Normocephalic and atraumatic.   Mouth/Throat: Oropharynx is clear and moist.   Eyes: EOM are normal. Pupils are equal, round, and reactive to light.   Neck: Normal range of motion. Neck supple. No thyromegaly present.   Cardiovascular: Regular rhythm.    Murmur heard.   Systolic murmur is present   Pulses:       Dorsalis pedis pulses are 1+ on the right side, and 1+ on the left side.        Posterior tibial pulses are 1+ on the right side, and 1+ on the left side.   Pulmonary/Chest: Effort normal. No respiratory distress. She has no decreased breath sounds. She  has no wheezes. She has no rhonchi.   Abdominal: Soft. Bowel sounds are normal. She exhibits no distension. There is no tenderness.   Musculoskeletal: Normal range of motion.        Right ankle: She exhibits swelling.        Left ankle: She exhibits swelling.   Lymphadenopathy:     She has no cervical adenopathy.   Neurological: She is alert and oriented to person, place, and time.   Skin: Skin is warm and dry.   Psychiatric: She has a normal mood and affect.   Vitals reviewed.      CXR: Reviewed.  Loop recorder as before.  Cardiomegaly and tortuous and ectatic aorta.  Bilateral pleural effusion and associated atelectatic changes at the lung bases.  The lung apices are clear.     Assessment:       85 y.o.  female with Hx of CHF and recurrent pleural effusion s/p right Pleurx placement on 12/5/16 and removed on 1/20/17.    Plan:       RTC on PRN basis if pleural effusion recurs     ATTENDING ATTESTATION:    I evaluated the patient and I agree with the assessment and plan

## 2017-02-24 NOTE — PROGRESS NOTES
CC:   Foot exam     HPI: Padmini Miranda is a 85 y.o. female who presents to clinic today for foot exam.   ESRD on dialysis, +PVD  Constitutional symptoms:  Negative for fever, chills, nights sweats, nausea, vomiting, disorientation, or increased blood sugars    2/24/17:  follow up for foot exam. History of partial right hallux amputation now healed. Notes that left hallux wound has healed with local care and offloading. Complains of increased pain at left foot when in bed. Notes that pain improves when she hangs foot over bed or stands.     PCP:  Randy Lyles MD  Last PCP visit:    vascular surgery: 12/16/16        Past Medical History:   Diagnosis Date    ALLERGIC RHINITIS     Anemia     Anticoagulant long-term use     Blood transfusion     Cardiomyopathy 10/20/2015    Cataract     CHF exacerbation 1/23/2015    Chronic kidney disease     Cramp of both lower extremities 12/30/2016    Hyperlipidemia     Hypertension     Hypothyroidism     Persistent atrial fibrillation 3/28/2016           Current Outpatient Prescriptions on File Prior to Visit   Medication Sig Dispense Refill    apixaban 2.5 mg Tab Take 1 tablet (2.5 mg total) by mouth 2 (two) times daily. 180 tablet 3    aspirin 81 MG Chew Take 1 tablet (81 mg total) by mouth once daily. (Patient taking differently: Take 81 mg by mouth every morning. ) 30 tablet 3    atorvastatin (LIPITOR) 40 MG tablet Take 1 tablet (40 mg total) by mouth once daily. (Patient taking differently: Take 40 mg by mouth every morning. ) 30 tablet 11    carvedilol (COREG) 6.25 MG tablet Take 1 tablet (6.25 mg total) by mouth 2 (two) times daily. 60 tablet 5    epoetin syed (PROCRIT) 10,000 unit/mL injection Inject 0.28 mLs (2,800 Units total) into the skin every Tues, Thurs, Sat. 0.28 mL 30    isosorbide-hydrALAZINE 20-37.5 mg (BIDIL) 20-37.5 mg Tab Take 1 tablet by mouth 3 (three) times daily. 90 tablet 2    multivitamin (ONE DAILY MULTIVITAMIN) per tablet Take  "1 tablet by mouth every morning.      nitroGLYCERIN (NITROSTAT) 0.4 MG SL tablet Place 1 tablet (0.4 mg total) under the tongue every 5 (five) minutes as needed for Chest pain. 25 tablet 5    PROTONIX 40 mg tablet TAKE 1 BY MOUTH DAILY (Patient taking differently: TAKE 1 TABLET  BY MOUTH  DAILY) 30 tablet 5    RENVELA 0.8 gram PwPk       SYNTHROID 50 mcg tablet TAKE 1 BY MOUTH DAILY      BEFORE BREAKFAST 90 tablet 2     No current facility-administered medications on file prior to visit.            Review of patient's allergies indicates:   Allergen Reactions    Ativan [lorazepam] Hallucinations    Crab Other (See Comments)     Causes Gout    Crayfish Other (See Comments)     Causes Gout    Promethazine Other (See Comments)     Hallucinations            ROS:  Negative for fever, chills, nights sweats, nausea, vomiting, disorientation, or increased blood sugars        EXAM:  Vitals:    02/24/17 0957   BP: (!) 127/57   Pulse: 64   Weight: 58.5 kg (129 lb)   Height: 5' 2" (1.575 m)        General:   appears stated age, no distress      Bilateral Lower extremity physical exam:  Vascular: Dorsalis Pedis:  absent   Posterior Tibial:  absent  capillary refill time:  5 seconds  Temperature of toes cool to touch  Hair on toes:  absent    There is 1+ edema.  no varicosities.      Neurological:     sharp dull - B/L normal and light touch - B/L normal  SWMF: Diminshed      Musculoskeletal:    Right foot: partial hallux amputation, hammertoes 2-5;  Limited range of motion   Left foot: hammertoe and bunion.  2nd toe overlaps the hallux      Dermatological:     Atrophic thin skin.   No new wounds  Right hallux amputation site: epithelialized.  No redness or swelling.     Ulcer, left hallux tip: dry, eschar 3 mm diameter. No signs of infection.        Assessment / Plan:     I counseled the patient on her conditions, their implications and medical management.     Peripheral vascular disease    Ischemic rest pain of lower " extremity    Great toe amputation status, right    ESRD on hemodialysis    6 months s/p Left SFA PTA.    Betadine to left hallux daily. Offload with surgical shoe. Daughter will inspect daily.    Protect heels when seated or in bed with pillows or offloading boots.     F/u with Dr. Dolan for PAD with increased pain, concern for early gangene to left hallux. Coordination of care organized.     F/u 3 weeks or sooner as needed.    Shoe inspection. Diabetic Foot Education. Patient reminded of the importance of good nutrition and blood sugar control to help prevent podiatric complications of diabetes. Patient instructed on proper foot hygeine. We discussed wearing proper shoe gear, daily foot inspections, never walking without protective shoe gear, never putting sharp instruments to feet.

## 2017-03-01 NOTE — TELEPHONE ENCOUNTER
----- Message from Randy Lyles MD sent at 2/19/2017 11:24 PM CST -----  Venous ultrasound of the left lower extremity was negative for blood clots.  No DVT.

## 2017-03-02 NOTE — TELEPHONE ENCOUNTER
i called melina the daughter who is caretaker to patient,  Due to patient Mrs. Miranda (Patient)  was scheduled an Echo/ Appointment Combined in 1 appointment.   daughter canceled  This  appointment stated she never called to schedule any echo / appointment. Daughter   stated, the only appointment she is waiting on is a DCCV procedure , after 4 good levels of INRs Levels. Stated Bruse ,'s nurse is working on this.

## 2017-03-10 PROBLEM — I48.0 PAF (PAROXYSMAL ATRIAL FIBRILLATION): Status: ACTIVE | Noted: 2017-01-01

## 2017-03-10 NOTE — MR AVS SNAPSHOT
[facility] - Arrhythmia  [address]  [facility] [address]  Phone: [phone]  Fax: [phone]                  Padmini Miranda   3/10/2017 8:00 AM   Office Visit    Description:  Female : 1931   Provider:  Trung Antoine MD   Department:  [facility] - Arrhythmia           Reason for Visit     Atrial Fibrillation           Diagnoses this Visit        Comments    Pulmonary HTN    -  Primary     PAF (paroxysmal atrial fibrillation)         Non-ST elevation MI (NSTEMI)         Systolic and diastolic CHF, chronic         ESRD on hemodialysis         Acquired hypothyroidism                To Do List           Future Appointments        Provider Department Dept Phone    3/10/2017 9:00 AM LAB, SAME DAY [facility] [phone]    3/13/2017 9:30 AM PULMONARY FUNCTION [facility] Pulmonary Lab [phone]    3/15/2017 8:00 AM HOME MONITOR DEVICE CHECK, [facility] Arrhythmia [phone]    3/17/2017 11:15 AM RUBY Aranda III, MD [facility] - Vascular Surgery [phone]    3/24/2017 12:30 PM Macrina Hein DPM [facility] - Podiatry [phone]      Goals (5 Years of Data)     None      Follow-Up and Disposition     Return in about 1 year (around 3/10/2018).       These Medications        Disp Refills Start End    amiodarone (PACERONE) 200 MG Tab 180 tablet 3 3/10/2017     Take 2 tabs by mouth twice a day for two weeks, then 2 tabs by mouth once a day for two weeks, then one tab by mouth each day.    Pharmacy: [facility] [address] [place] [address] Ph #: [phone]         [facility]     [facility] -  Assistance  Registered nurses in the [facility] provide clinical advisement, health education, appointment booking, and other advisory services.  Call for this free service at [phone].             Medications           Message regarding Medications     Verify the changes and/or additions to  your medication regime listed below are the same as discussed with your clinician today.  If any of these changes or additions are incorrect, please notify your healthcare provider.        START taking these NEW medications        Refills    amiodarone (PACERONE) 200 MG Tab 3    Sig: Take 2 tabs by mouth twice a day for two weeks, then 2 tabs by mouth once a day for two weeks, then one tab by mouth each day.    Class: Normal           Verify that the below list of medications is an accurate representation of the medications you are currently taking.  If none reported, the list may be blank. If incorrect, please contact your healthcare provider. Carry this list with you in case of emergency.           Current Medications     apixaban 2.5 mg Tab Take 1 tablet (2.5 mg total) by mouth 2 (two) times daily.    aspirin 81 MG Chew Take 1 tablet (81 mg total) by mouth once daily.    atorvastatin (LIPITOR) 40 MG tablet Take 1 tablet (40 mg total) by mouth once daily.    carvedilol (COREG) 6.25 MG tablet Take 1 tablet (6.25 mg total) by mouth 2 (two) times daily.    epoetin syed (PROCRIT) 10,000 unit/mL injection Inject 0.28 mLs (2,800 Units total) into the skin every Tues, Thurs, Sat.    isosorbide-hydrALAZINE 20-37.5 mg (BIDIL) 20-37.5 mg Tab Take 1 tablet by mouth 3 (three) times daily.    multivitamin (ONE DAILY MULTIVITAMIN) per tablet Take 1 tablet by mouth every morning.    PROTONIX 40 mg tablet TAKE 1 BY MOUTH DAILY    RENVELA 0.8 gram PwPk     SYNTHROID 50 mcg tablet TAKE 1 BY MOUTH DAILY      BEFORE BREAKFAST    amiodarone (PACERONE) 200 MG Tab Take 2 tabs by mouth twice a day for two weeks, then 2 tabs by mouth once a day for two weeks, then one tab by mouth each day.    nitroGLYCERIN (NITROSTAT) 0.4 MG SL tablet Place 1 tablet (0.4 mg total) under the tongue every 5 (five) minutes as needed for Chest pain.           Clinical Reference Information           Your Vitals Were     BP Pulse Height Weight Last Period BMI  "   126/66 70 5' 2" (1.575 m) 58.5 kg (128 lb 15.5 oz) (LMP Unknown) 23.59 kg/m2      Blood Pressure          Most Recent Value    BP  126/66      Allergies as of 3/10/2017     Ativan [Lorazepam]    Crab    Crayfish    Promethazine      Immunizations Administered on Date of Encounter - 3/10/2017     None      Orders Placed During Today's Visit      Normal Orders This Visit    Ambulatory consult to Cardiology     Ambulatory consult to Pulmonology     Rhythm strip     Future Labs/Procedures Expected by Expires    TSH  3/10/2017 5/9/2018    2D echo with color flow doppler  2/13/2018 3/10/2018    Complete PFT without bronchodilator  2/23/2018 (Approximate) 3/10/2018    Complete PFT without bronchodilator  2/23/2018 (Approximate) 3/10/2018    Hepatic function panel  2/23/2018 (Approximate) 3/10/2018    TSH  2/23/2018 (Approximate) 3/10/2018      Language Assistance Services     ATTENTION: Language assistance services are available, free of charge. Please call [phone].      ATENCIÓN: Si habla español, tiene a mccabe disposición servicios gratuitos de asistencia lingüística. Llame al [phone].     HENRIQUE Ý: N?u b?n nói Ti?ng Vi?t, có các d?ch v? h? tr? ngôn ng? mi?n phí dành cho b?n. G?i s? [phone].         [facility] complies with applicable Federal civil rights laws and does not discriminate on the basis of race, color, national origin, age, disability, or sex.        "

## 2017-03-10 NOTE — PROGRESS NOTES
Subjective:    Patient ID:  Padmini Miranda is a 85 y.o. female who presents for follow-up of CMP  Atrial Fibrillation   Symptoms are negative for chest pain, dizziness, palpitations, shortness of breath, syncope and weakness. Past medical history includes atrial fibrillation.     Review of Systems   Constitution: Positive for malaise/fatigue. Negative for weakness.   HENT: Negative.  Negative for ear pain and tinnitus.    Eyes: Negative for blurred vision.   Cardiovascular: Positive for dyspnea on exertion. Negative for chest pain, near-syncope, palpitations and syncope.   Respiratory: Negative.  Negative for shortness of breath.    Endocrine: Negative.  Negative for polyuria.   Hematologic/Lymphatic: Does not bruise/bleed easily.   Skin: Negative.  Negative for rash.   Musculoskeletal: Negative.  Negative for joint pain and muscle weakness.   Gastrointestinal: Negative.  Negative for abdominal pain and change in bowel habit.   Genitourinary: Negative for frequency.   Neurological: Negative.  Negative for dizziness.   Psychiatric/Behavioral: Negative.  Negative for depression. The patient is not nervous/anxious.    Allergic/Immunologic: Negative for environmental allergies.     Objective:    Physical Exam   Constitutional: She is oriented to person, place, and time. Vital signs are normal. She appears well-developed and well-nourished. She is active and cooperative.   HENT:   Head: Normocephalic and atraumatic.   Eyes: Conjunctivae and EOM are normal.   Neck: Normal range of motion. Carotid bruit is not present. No tracheal deviation and no edema present. No thyroid mass and no thyromegaly present.   Cardiovascular: Normal rate, regular rhythm, intact distal pulses and normal pulses.   No extrasystoles are present. PMI is not displaced.  Exam reveals no gallop and no friction rub.    Murmur heard.   Harsh midsystolic murmur is present with a grade of 2/6  at the upper right sternal border radiating to the  neck  Pulmonary/Chest: Effort normal and breath sounds normal. No respiratory distress. She has no wheezes. She has no rales.   Abdominal: Soft. Normal appearance. She exhibits no distension. There is no hepatosplenomegaly.   Musculoskeletal: Normal range of motion.   Neurological: She is alert and oriented to person, place, and time. Coordination normal.   Skin: Skin is warm and dry. No rash noted.   Psychiatric: She has a normal mood and affect. Her speech is normal and behavior is normal. Thought content normal. Cognition and memory are normal.   Nursing note and vitals reviewed.        Subjective:    Patient ID:  Padmini Miranda is a 85 y.o. female who presents for evaluation of Atrial Fibrillation      HPI   85 y.o. F  ESRD,  HD  HTN HL hypothyroid  moderate AS    Feels pretty well considering above comorbidities and LVEF 25%.  Had never had cath. Referred for consideration of ICD therapy for primary prophylaxis.  At cath, had 3vd but nothing amenable to revascularization. No PCI done.  At repeat echo, LVEF 40-45%! -> 50%! moderate AS, though.    In interim, at HD one day in 1/2016, developed palpitations. Sent to ER, where she was found in AF/AFL (only one ECG is available) with RVR. Converted to SR on own. Palpitations went away when AF/AFL stopped. Since, no palpitations. Feeling well.  ILR placed. AF burden 41%.    Develops symptomatic AF at HD sessions. Feels better when placed on O2. c/o 2+ LE edema.    My interpretation of today's ECG is NSR with APCs 70 bpm.    Review of Systems   Constitution: Negative. Negative for weakness and malaise/fatigue.   HENT: Negative.  Negative for ear pain and tinnitus.    Eyes: Negative for blurred vision.   Cardiovascular: Positive for dyspnea on exertion. Negative for chest pain, near-syncope, palpitations and syncope.   Respiratory: Negative.  Negative for shortness of breath.    Endocrine: Negative.  Negative for polyuria.   Hematologic/Lymphatic: Does not  bruise/bleed easily.   Skin: Negative.  Negative for rash.   Musculoskeletal: Negative.  Negative for joint pain and muscle weakness.   Gastrointestinal: Negative.  Negative for abdominal pain and change in bowel habit.   Genitourinary: Negative for frequency.   Neurological: Negative.  Negative for dizziness.   Psychiatric/Behavioral: Negative.  Negative for depression. The patient is not nervous/anxious.    Allergic/Immunologic: Negative for environmental allergies.        Objective:    Physical Exam   Constitutional: She is oriented to person, place, and time. Vital signs are normal. She appears well-developed and well-nourished. She is active and cooperative.   HENT:   Head: Normocephalic and atraumatic.   Eyes: Conjunctivae and EOM are normal.   Neck: Normal range of motion. Carotid bruit is not present. No tracheal deviation and no edema present. No thyroid mass and no thyromegaly present.   Cardiovascular: Normal rate, regular rhythm, normal heart sounds, intact distal pulses and normal pulses.   No extrasystoles are present. PMI is not displaced.  Exam reveals no gallop and no friction rub.    No murmur heard.  Pulmonary/Chest: Effort normal and breath sounds normal. No respiratory distress. She has no wheezes. She has no rales.   Abdominal: Soft. Normal appearance. She exhibits no distension. There is no hepatosplenomegaly.   Musculoskeletal: Normal range of motion.   Neurological: She is alert and oriented to person, place, and time. Coordination normal.   Skin: Skin is warm and dry. No rash noted.   Psychiatric: She has a normal mood and affect. Her speech is normal and behavior is normal. Thought content normal. Cognition and memory are normal.   Nursing note and vitals reviewed.        Assessment:       1. Pulmonary HTN    2. PAF (paroxysmal atrial fibrillation)    3. Non-ST elevation MI (NSTEMI)    4. Systolic and diastolic CHF, chronic    5. ESRD on hemodialysis    6. Acquired hypothyroidism          Plan:       1. CM  LVEF is now out of ICD range. Will follow and readdress if LVEF again dips <=35%.    2. PAF/AFL  Symptomatic. Start amio.  PFTs, TSH now   Discussed the indications and possible side effects of the medications prescribed to the patient by me.    3. Fatigue/dyspnea  Likely multifactorial, including significant anemia. Has started procrit; hopefully this will improve things  Pulm c/s  PFTs  f/u with primary cardiologist    Return in 1 year with echo and amio tests, or earlier prn.

## 2017-03-17 PROBLEM — T82.858A STENOSIS OF ARTERIOVENOUS DIALYSIS FISTULA: Status: ACTIVE | Noted: 2017-01-01

## 2017-03-17 NOTE — PROGRESS NOTES
See my prior note; review of systems, family history and social history are   unchanged.    HISTORY OF PRESENT ILLNESS:  An 85-year-old female with end-stage renal disease,   dialyzing Tuesday, Thursday, Saturday, well known to me, status post:  1.  Angioplasty of left SFA, 08/01/2016 for ischemic rest pain, which totally   resolved after this intervention.  2.  Right toe amputation, 06/07/2016 (Dr. Chopra, Podiatry).  3.  Recanalization of complete right SFA occlusion with stent placement,   05/02/2016.  3.  Left brachiocephalic AV fistula creation, 11/02/2015.    She now returns with a week to 10 days of prolonged bleeding of her fistula.    She also notes that she is having increasing left heel pain, which she says is   improved by dangling her foot.  However, she said this is a different kind of   pain than she had before her intervention of the left leg six months ago.  She   denies having any pain in the forefoot.    PAST MEDICAL HISTORY:  1.  End-stage renal disease as above.  2.  History of CHF.  3.  Hyperlipidemia.  4.  Hypertension.  5.  Atrial fibrillation, on apixaban.    PAST SURGICAL HISTORY:  1.  AV access surgery and peripheral intervention as above.  2.  Prior PCI.    FAMILY HISTORY:  Father with heart disease.    SOCIAL HISTORY:  Nonsmoker.    MEDICATIONS:  Include apixaban and aspirin.  See EPIC for full list.    ALLERGIES:  Ativan, crayfish and promethazine.    REVIEW OF SYSTEMS:  Denies postprandial pain or DVT.  All other systems include eyes, ENT, respiratory, musculoskeletal, breast,   psychiatric, lymph, allergy and immune are negative.    PHYSICAL EXAMINATION:  VITAL SIGNS:  See nursing notes.  GENERAL:  In no acute distress.  RESPIRATORY:  Normal effort.  Clear to auscultation.  CARDIOVASCULAR:  Regular rate and rhythm, nondisplaced PMI and no murmur.  EXTREMITIES:  Left arm shows a brachiocephalic fistula that has actually a very   soft thrill and absolutely no pulsatility whatsoever.   No hemodynamically   significant stenosis is noted.  However, it is noted by start for the flow   appears sluggish.  Flow volume is 710 mL, decreased from 1.2 liters prior.    Left foot shows some swelling and modest cellulitis of the foot.  There is no   heel tissue loss.    IMAGING:  No new ABIs today.  Prior ABIs were 0.61 on the right and 0.51 on the   left.    ASSESSMENT:  1.  Dysfunctional left AV fistula.  This clinically does not feel like it has an   outflow stenosis given the complete lack of any pulsatility, the increased   bleeding would suggest that there is indeed an issue.  An alternate explanation   for decreased flow would be some inflow problem, but this should not prolong her   bleeding.  2.  Second issue is that of left foot pain.  This pain could be recurrent   ischemia, although she says it feels different than prior.    Her prior arteriograms were personally reviewed and she has very, very severe   highly calcific occlusive disease with a long segment distal SFA and complete   popliteal occlusion.  With some difficulty, we were able to open some high-grade   stenosis of the proximal SFA, which improved perfusion to her collaterals.    PLAN:  1.  Left fistulogram and intervention, 03/22/2017.  2.  Cath Lab case.  3.  Keflex 500 mg b.i.d. for her left foot cellulitis and elevation as   tolerated.  4.  When she returns to see me, we will repeat her ABIs as well as the duplex of   the fistula and decide whether she needs lower extremity arteriogram as well.          /eduard [id number] charity(s)        YEN  dd: 03/17/2017 11:36:18 (CDT)  td: 03/17/2017 12:37:27 (CDT)  Doc ID   #[id number]  Job ID #[id number]    CC:

## 2017-03-17 NOTE — MR AVS SNAPSHOT
[facility] - Vascular Surgery  [address]  [facility] [address]  Phone: [phone]  Fax: [phone]                  Padmini Miranda   3/17/2017 11:15 AM   Office Visit    Description:  Female : 1931   Provider:  RUBY Aranda III, MD   Department:  [facility]           Reason for Visit     Follow-up           Diagnoses this Visit        Comments    Stenosis of arteriovenous dialysis fistula, initial encounter                To Do List           Future Appointments        Provider Department Dept Phone    3/24/2017 12:30 PM QUINN Rucker [facility] - Podiatry [phone]    3/27/2017 1:00 PM MD [facility] - Pul[facility] Services [phone]      Goals (5 Years of Data)     None       These Medications        Disp Refills Start End    cephALEXin (KEFLEX) 500 MG capsule 20 capsule 0 3/17/2017     Take 1 capsule (500 mg total) by mouth every 12 (twelve) hours. - Oral    Pharmacy: [facility] [address] [place] [address] Ph #: [phone]         [facility] On Call     [facility] On Call Nurse Care Line -  Assistance  Registered nurses in the [facility] On Call Center provide clinical advisement, health education, appointment booking, and other advisory services.  Call for this free service at [phone].             Medications           Message regarding Medications     Verify the changes and/or additions to your medication regime listed below are the same as discussed with your clinician today.  If any of these changes or additions are incorrect, please notify your healthcare provider.        START taking these NEW medications        Refills    cephALEXin (KEFLEX) 500 MG capsule 0    Sig: Take 1 capsule (500 mg total) by mouth every 12 (twelve) hours.    Class: Normal    Route: Oral           Verify that the below list of medications is an accurate representation of the medications you are currently taking.  If none  "reported, the list may be blank. If incorrect, please contact your healthcare provider. Carry this list with you in case of emergency.           Current Medications     amiodarone (PACERONE) 200 MG Tab Take 2 tabs by mouth twice a day for two weeks, then 2 tabs by mouth once a day for two weeks, then one tab by mouth each day.    apixaban 2.5 mg Tab Take 1 tablet (2.5 mg total) by mouth 2 (two) times daily.    aspirin 81 MG Chew Take 1 tablet (81 mg total) by mouth once daily.    atorvastatin (LIPITOR) 40 MG tablet Take 1 tablet (40 mg total) by mouth once daily.    carvedilol (COREG) 6.25 MG tablet Take 1 tablet (6.25 mg total) by mouth 2 (two) times daily.    epoetin syed (PROCRIT) 10,000 unit/mL injection Inject 0.28 mLs (2,800 Units total) into the skin every Tues, Thurs, Sat.    isosorbide-hydrALAZINE 20-37.5 mg (BIDIL) 20-37.5 mg Tab Take 1 tablet by mouth 3 (three) times daily.    multivitamin (ONE DAILY MULTIVITAMIN) per tablet Take 1 tablet by mouth every morning.    PROTONIX 40 mg tablet TAKE 1 BY MOUTH DAILY    RENVELA 0.8 gram PwPk     SYNTHROID 50 mcg tablet TAKE 1 BY MOUTH DAILY      BEFORE BREAKFAST    cephALEXin (KEFLEX) 500 MG capsule Take 1 capsule (500 mg total) by mouth every 12 (twelve) hours.    nitroGLYCERIN (NITROSTAT) 0.4 MG SL tablet Place 1 tablet (0.4 mg total) under the tongue every 5 (five) minutes as needed for Chest pain.           Clinical Reference Information           Your Vitals Were     BP Pulse Temp Height Weight Last Period    131/51 (BP Location: Right arm, Patient Position: Sitting, BP Method: Automatic) 46 97.1 °F (36.2 °C) (Oral) 5' 2" (1.575 m) 54.9 kg (121 lb) (LMP Unknown)    BMI                22.13 kg/m2          Blood Pressure          Most Recent Value    BP  (!)  131/51      Allergies as of 3/17/2017     Ativan [Lorazepam]    Crab    Crayfish    Promethazine      Immunizations Administered on Date of Encounter - 3/17/2017     None      Language Assistance Services  "    ATTENTION: Language assistance services are available, free of charge. Please call [phone].      ATENCIÓN: Si habla andrewañol, tiene a mccabe disposición servicios gratuitos de asistencia lingüística. Llame al [phone].     CHÚ Ý: N?u b?n nói Ti?ng Vi?t, có các d?ch v? h? tr? ngôn ng? mi?n phí dành cho b?n. G?i s? [phone].         [facility] complies with applicable Federal civil rights laws and does not discriminate on the basis of race, color, national origin, age, disability, or sex.

## 2017-03-21 NOTE — TELEPHONE ENCOUNTER
Called patient no answer left message with time of arrival 1100am for surgery on 3/22/2017 with a callback number [phone].

## 2017-03-22 NOTE — OP NOTE
[facility]  Vascular Surgery  Operative Note    SUMMARY     Date of Procedure: 3/22/2017     Procedure:      1. PTA, cephalic confluence (10x40 dorado)  2. PTA, L subclavian vein (12x40 [place])  3. Fistualgram  4. Conscious sedation    Surgeon(s) and Role:     * RUBY Aranda III, MD - Primary       Yun Wright DO- Fellow    Assisting Surgeon: None    Pre-Operative Diagnosis: Stenosis of arteriovenous dialysis fistula, initial encounter [T82.748A]    Post-Operative Diagnosis: same    Anesthesia: RN IV Sedation    Description of the Findings of the Procedure: minimal stenosis found at subclavian vein centrally; minimal stenosis of the cephalic confluence arch      Complications: No    Estimated Blood Loss (EBL): <3 mL           Implants:none    Specimens:   Specimen     None                  Condition: Good    Disposition: PACU - hemodynamically stable.     Operation in detail: After an informed consent was obtained the patient was taken to the cath suite and placed in the supine position.  The left arm was prepped and draped in the standard surgical fashion. The proximal aspect of the left brachiocephalic fistula was accessed with a micropuncture needle; followed by placement of 4/3-Bolivian micropuncture dilator. Fistulogram demonstrated no areas of proximal stenosis. Central venogram demonstrated area of stenosis at cephalic arch confluence and proximally in the subclavian vein.  A 0.035-inch wire was placed into ashort 6-Bolivian sheath.The subclavian stenosis was treated with a 12x40 [place] balloon, with minimal waste noted on inflation The cephalic arch stenosis was treated with a 10x40 [place] balloon again with minimal waste noted on inflation. Completion venogram demonstrated good flow in fistula with no identified areas of stenosis.  Strong thrill could be felt. The sheath was removed, 3-0 monocryl U-stitch was placed with good hemostasis.  Attending staff continuously monitored the  cardio-respiratory systems throughout the procedure.  See nursing notes for dosing of fentanyl and versed.  30 minutes of conscious sedation.

## 2017-03-22 NOTE — PLAN OF CARE
Problem: Cardiac Catheterization (Diagnostic/Interventional) (Adult)  Goal: Signs and Symptoms of Listed Potential Problems Will be Absent, Minimized or Managed (Cardiac Catheterization)  Signs and symptoms of listed potential problems will be absent, minimized or managed by discharge/transition of care (reference Cardiac Catheterization (Diagnostic/Interventional) (Adult) CPG).  Outcome: Ongoing (interventions implemented as appropriate)  Admit assessment done. Labs sent. IV placed x 1. Plan of care and safety prec initiated. Will continue to monitor.

## 2017-03-22 NOTE — IP AVS SNAPSHOT
[facility]  [address]  [facility] [address]  Phone: [phone]           Patient Discharge Instructions     Our goal is to set you up for success. This packet includes information on your condition, medications, and your home care. It will help you to care for yourself so you don't get sicker and need to go back to the hospital.     Please ask your nurse if you have any questions.        There are many details to remember when preparing to leave the hospital. Here is what you will need to do:    1. Take your medicine. If you are prescribed medications, review your Medication List in the following pages. You may have new medications to  at the pharmacy and others that you'll need to stop taking. Review the instructions for how and when to take your medications. Talk with your doctor or nurses if you are unsure of what to do.     2. Go to your follow-up appointments. Specific follow-up information is listed in the following pages. Your may be contacted by a transition nurse or clinical provider about future appointments. Be sure we have all of the phone numbers to reach you, if needed. Please contact your provider's office if you are unable to make an appointment.     3. Watch for warning signs. Your doctor or nurse will give you detailed warning signs to watch for and when to call for assistance. These instructions may also include educational information about your condition. If you experience any of warning signs to your health, call your doctor.               [facility]  Unless otherwise directed by your provider, please contact [facility], our nurse care line that is available for 24/7 assistance.     [phone] (toll-free)    Registered nurses in the [facility] provide clinical advisement, health education, appointment booking, and other advisory services.                    ** Verify the list of medication(s) below is accurate and up  to date. Carry this with you in case of emergency. If your medications have changed, please notify your healthcare provider.             Medication List      CHANGE how you take these medications        Additional Info                      aspirin 81 MG Chew   Quantity:  30 tablet   Refills:  3   Dose:  81 mg   What changed:  when to take this    Instructions:  Take 1 tablet (81 mg total) by mouth once daily.     Begin Date    AM    Noon    PM    Bedtime       atorvastatin 40 MG tablet   Commonly known as:  LIPITOR   Quantity:  30 tablet   Refills:  11   Dose:  40 mg   What changed:  when to take this   Comments:  New dosage as of 7/21/14.    Instructions:  Take 1 tablet (40 mg total) by mouth once daily.     Begin Date    AM    Noon    PM    Bedtime       PROTONIX 40 MG tablet   Quantity:  30 tablet   Refills:  5   What changed:  See the new instructions.   Generic drug:  pantoprazole    Instructions:  TAKE 1 BY MOUTH DAILY     Begin Date    AM    Noon    PM    Bedtime         CONTINUE taking these medications        Additional Info                      amiodarone 200 MG Tab   Commonly known as:  PACERONE   Quantity:  180 tablet   Refills:  3    Instructions:  Take 2 tabs by mouth twice a day for two weeks, then 2 tabs by mouth once a day for two weeks, then one tab by mouth each day.     Begin Date    AM    Noon    PM    Bedtime       apixaban 2.5 mg Tab   Quantity:  180 tablet   Refills:  3   Dose:  2.5 mg    Instructions:  Take 1 tablet (2.5 mg total) by mouth 2 (two) times daily.     Begin Date    AM    Noon    PM    Bedtime       carvedilol 6.25 MG tablet   Commonly known as:  COREG   Quantity:  60 tablet   Refills:  5   Dose:  6.25 mg    Instructions:  Take 1 tablet (6.25 mg total) by mouth 2 (two) times daily.     Begin Date    AM    Noon    PM    Bedtime       cephALEXin 500 MG capsule   Commonly known as:  KEFLEX   Quantity:  20 capsule   Refills:  0   Dose:  500 mg    Instructions:  Take 1 capsule (500 mg  total) by mouth every 12 (twelve) hours.     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     Begin Date    AM    Noon    PM    Bedtime       isosorbide-hydrALAZINE 20-37.5 mg 20-37.5 mg Tab   Commonly known as:  BIDIL   Quantity:  90 tablet   Refills:  2   Dose:  1 tablet    Instructions:  Take 1 tablet by mouth 3 (three) times daily.     Begin Date    AM    Noon    PM    Bedtime       nitroGLYCERIN 0.4 MG SL tablet   Commonly known as:  NITROSTAT   Quantity:  25 tablet   Refills:  5   Dose:  0.4 mg    Instructions:  Place 1 tablet (0.4 mg total) under the tongue every 5 (five) minutes as needed for Chest pain.     Begin Date    AM    Noon    PM    Bedtime       ONE DAILY MULTIVITAMIN per tablet   Refills:  0   Dose:  1 tablet   Generic drug:  multivitamin    Instructions:  Take 1 tablet by mouth every morning.     Begin Date    AM    Noon    PM    Bedtime       RENVELA 0.8 gram Pwpk   Refills:  0   Generic drug:  sevelamer carbonate      Begin Date    AM    Noon    PM    Bedtime       SYNTHROID 50 MCG tablet   Quantity:  90 tablet   Refills:  2   Generic drug:  levothyroxine    Instructions:  TAKE 1 BY MOUTH DAILY      BEFORE BREAKFAST     Begin Date    AM    Noon    PM    Bedtime                  Please bring to all follow up appointments:    1. A copy of your discharge instructions.  2. All medicines you are currently taking in their original bottles.  3. Identification and insurance card.    Please arrive 15 minutes ahead of scheduled appointment time.    Please call 24 hours in advance if you must reschedule your appointment and/or time.        Your Scheduled Appointments     Mar 24, 2017 12:30 PM CDT   Established Patient Visit with Macirna Hein DPM  "  [facility] ([address] )    [address]  [facility] [address]   [phone]            Mar 27, 2017  1:00 PM CDT   Consult with MD [facility] - Pulmonary Services ([facility] )    [address]  [facility] [id number] [phone]            Apr 21, 2017  8:00 AM CDT   Remote Interrogation with HOME MONITOR DEVICE CHECK, [facility]   [facility] Arr[facility]ia ([facility] )    [address]  [facility] [id number] [phone]              Follow-up Information     Follow up with RUBY Aranda Iii, MD On 3/28/2017.    Specialty:  Vascular Surgery    Why:  s/p fistulogram    Contact information:    [address]  [facility] [address]  [phone]          Discharge Instructions     Future Orders    Call MD for:  difficulty breathing, headache or visual disturbances     Call MD for:  redness, tenderness, or signs of infection (pain, swelling, redness, odor or green/yellow discharge around incision site)     Call MD for:  temperature >100.4     Diet general     Questions:    Total calories:      Fat restriction, if any:      Protein restriction, if any:      Na restriction, if any:      Fluid restriction:      Additional restrictions:      Remove dressing in 24 hours     Scheduling Instructions:    Can be removed at HD tomorrow    [facility]         Admission Information     Date & Time Provider Department CSN    3/22/2017 10:51 AM RUBY Aranda III, MD [facility] [id number]      Care Providers     Provider Role Specialty Primary office phone    RUBY Aranda III, MD Attending Provider Vascular Surgery [phone]      Your Vitals Were     BP Pulse Temp Resp Height Weight    142/65 41 97.6 °F (36.4 °C) (Oral) 20 5' 2" (1.575 m) 55.3 kg (122 lb)    Last Period SpO2 BMI          (LMP Unknown) 92% 22.31 kg/m2        Recent Lab Values     No lab values to display.      Allergies as of 3/22/2017        " Reactions    Ativan [Lorazepam] Hallucinations    Crab Other (See Comments)    Causes Gout    Crayfish Other (See Comments)    Causes Gout    Promethazine Other (See Comments)    Hallucinations       Advance Directives     An advance directive is a document which, in the event you are no longer able to make decisions for yourself, tells your healthcare team what kind of treatment you do or do not want to receive, or who you would like to make those decisions for you.  If you do not currently have an advance directive, [facility] encourages you to create one.  For more information call:  [phone]), [phone] ([phone]),  or log on to [email or web address].        Language Assistance Services     ATTENTION: Language assistance services are available, free of charge. Please call [phone].      ATENCIÓN: Si habla español, tiene a mccabe disposición servicios gratuitos de asistencia lingüística. Llame al [phone].     CHÚ Ý: N?u b?n nói Ti?ng Vi?t, có các d?ch v? h? tr? ngôn ng? mi?n phí dành cho b?n. G?i s? [phone].        Heart Failure Education       Heart Failure: Being Active  You have a condition called heart failure. Being active doesnt mean that you have to wear yourself out. Even a little movement each day helps to strengthen your heart. If you cant get out to exercise, you can do simple stretching and strengthening exercises at home. These are good ways to keep you well-conditioned and prevent you and your heart from becoming excessively weak.    Ideas to get you started  · Add a little movement to things you do now. Walk to mail letters. Park your car at the far end of the parking lot and walk to the store. Walk up a flight of stairs instead of taking the elevator.  · Choose activities you enjoy. You might walk, swim, or ride an exercise bike. Things like gardening and washing the car count, too. Other possibilities include: washing dishes, walking the dog, walking  around the mall, and doing aerobic activities with friends.  · Join a group exercise program at a [facility] or [facility], a senior center, or a community center. Or look into a hospital cardiac rehabilitation program. Ask your doctor if you qualify.  Tips to keep you going  · Get up and get dressed each day. Go to a coffee shop and read a newspaper or go somewhere that you'll be in the presence of other active people. Youll feel more like being active.  · Make a plan. Choose one or more activities that you enjoy and that you can easily do. Then plan to do at least one each day. You might write your plan on a calendar.  · Go with a friend or a group if you like company. This can help you feel supported and stay motivated, too.  · Plan social events that you enjoy. This will keep you mentally engaged as well as physically motivated to do things you find pleasure in.  For your safety  · Talk with your healthcare provider before starting an exercise program.  · Exercise indoors when its too hot or too cold outside, or when the air quality is poor. Try walking at a shopping mall.  · Wear socks and sturdy shoes to maintain your balance and prevent falls.  · Start slowly. Do a few minutes several times a day at first. Increase your time and speed little by little.  · Stop and rest whenever you feel tired or get short of breath.  · Dont push yourself on days when you dont feel well.  Date Last Reviewed: 3/20/2016  © 5446-5959 [employer]. [address] [place] [address], [place] [address]. All rights reserved. This information is not intended as a substitute for professional medical care. Always follow your healthcare professional's instructions.              Heart Failure: Evaluating Your Heart  You have a condition called heart failure. To evaluate your condition, your doctor will examine you, ask questions, and do some tests. Along with looking for signs of heart failure, the doctor looks for any other health problems  that may have led to heart failure. The results of your evaluation will help your doctor form a treatment plan.  Health history and physical exam  Your visit will start with a health history. Tell the doctor about any symptoms youve noticed and about all medicines you take. Then youll have a physical exam. This includes listening to your heartbeat and breathing. Youll also be checked for swelling (edema) in your legs and neck. When you have fluid buildup or fluid in the lungs, it may be called congestive heart failure.  Diagnosing heart failure     During an echocardiogram, sound waves bounce off the heart. These are converted into a picture on the screen.   The following may be done to help your doctor form a diagnosis:  · X-rays show the size and shape of your heart. These pictures can also show fluid in your lungs.  · An electrocardiogram (ECG or EKG) shows the pattern of your heartbeat. Small pads (electrodes) are placed on your chest, arms, and legs. Wires connect the pads to the ECG machine, which records your hearts electrical signals. This can give the doctor information about heart function.  · An echocardiogram uses ultrasound waves to show the structure and movement of your heart muscle. This shows how well the heart pumps. It also shows the thickness of the heart walls, and if the heart is enlarged. It is one of the most useful, non-invasive tests as it provides information about the heart's general function. This helps your doctor make treatment decisions.  · Lab tests evaluate small amounts of blood or urine for signs of problems. A BNP lab test can help diagnose and evaluate heart failure. BNP stands for B-type natriuretic peptide. The ventricles secrete more BNP when heart failure worsens. Lab tests can also provide information about metabolic dysfunction or heart dysfunction.  Your treatment plan  Based on the results of your evaluation and tests, your doctor will develop a treatment plan. This  plan is designed to relieve some of your heart failure symptoms and help make you more comfortable. Your treatment plan may include:  · Medicine to help your heart work better and improve your quality of life  · Changes in what you eat and drink to help prevent fluid from backing up in your body  · Daily monitoring of your weight and heart failure symptoms to see how well your treatment plan is working  · Exercise to help you stay healthy  · Help with quitting smoking  · Emotional and psychological support to help adjust to the changes  · Referrals to other specialists to make sure you are being treated comprehensively  Date Last Reviewed: 3/21/2016  © 0345-9519 [employer]. [address] [place] [address], [place] [address]. All rights reserved. This information is not intended as a substitute for professional medical care. Always follow your healthcare professional's instructions.              Heart Failure: Making Changes to Your Diet  You have a condition called heart failure. When you have heart failure, excess fluid is more likely to build up in your body because your heart isn't working well. This makes the heart work harder to pump blood. Fluid buildup causes symptoms such as shortness of breath and swelling (edema). This is often referred to as congestive heart failure or CHF. Controlling the amount of salt (sodium) you eat may help stop fluid from building up. Your doctor may also tell you to reduce the amount of fluid you drink.  Reading food labels    Your healthcare provider will tell you how much sodium you can eat each day. Read food labels to keep track. Keep in mind that certain foods are high in salt. These include canned, frozen, and processed foods. Check the amount of sodium in each serving. Watch out for high-sodium ingredients. These include MSG (monosodium glutamate), baking soda, and sodium phosphate.   Eating less salt  Give yourself time to get used to eating less salt. It may take a  little while. Here are some tips to help:  · Take the saltshaker off the table. Replace it with salt-free herb mixes and spices.  · Eat fresh or plain frozen vegetables. These have much less salt than canned vegetables.  · Choose low-sodium snacks like sodium-free pretzels, crackers, or air-popped popcorn.  · Dont add salt to your food when youre cooking. Instead, season your foods with pepper, lemon, garlic, or onion.  · When you eat out, ask that your food be cooked without added salt.  · Avoid eating fried foods as these often have a great deal of salt.  If youre told to limit fluids  You may need to limit how much fluid you have to help prevent swelling. This includes anything that is liquid at room temperature, such as ice cream and soup. If your doctor tells you to limit fluid, try these tips:  · Measure drinks in a measuring cup before you drink them. This will help you meet daily goals.  · Chill drinks to make them more refreshing.  · Suck on frozen lemon wedges to quench thirst.  · Only drink when youre thirsty.  · Chew sugarless gum or suck on hard candy to keep your mouth moist.  · Weigh yourself daily to know if your body's fluid content is rising.  My sodium goal  Your healthcare provider may give you a sodium goal to meet each day. This includes sodium found in food as well as salt that you add. My goal is to eat no more than ___________ mg of sodium per day.     When to call your doctor  Call your doctor right away if you have any symptoms of worsening heart failure. These can include:  · Sudden weight gain  · Increased swelling of your legs or ankles  · Trouble breathing when youre resting or at night  · Increase in the number of pillows you have to sleep on  · Chest pain, pressure, discomfort, or pain in the jaw, neck, or back   Date Last Reviewed: 3/21/2016  © 3561-8503 [employer]. [address] [place] [address], [place] [address]. All rights reserved. This information is not intended  as a substitute for professional medical care. Always follow your healthcare professional's instructions.              Heart Failure: Medicines to Help Your Heart    You have a condition called heart failure (also known as congestive heart failure, or CHF). Your doctor will likely prescribe medicines for heart failure and any underlying health problems you have. Most heart failure patients take one or more types of medicinen. Your healthcare provider will work to find the combination of medicines that works best for you.  Heart failure medicines  Here are the most common heart failure medicines:  · ACE inhibitors lower blood pressure and decrease strain on the heart. This makes it easier for the heart to pump. Angiotensin receptor blockers have similar effects. These are prescribed for some patients instead of ACE inhibitors.  · Beta-blockers relieve stress on the heart. They also improve symptoms. They may also improve the heart's pumping action over time.  · Diuretics (also called water pills) help rid your body of excess water. This can help rid your body of swelling (edema). Having less fluid to pump means your heart doesnt have to work as hard. Some diuretics make your body lose a mineral called potassium. Your doctor will tell you if you need to take supplements or eat more foods high in potassium.  · Digoxin helps your heart pump with more strength. This helps your heart pump more blood with each beat. So, more oxygen-rich blood travels to the rest of the body.  · Aldosterone antagonists help alter hormones and decrease strain on the heart.  · Hydralazine and nitrates are two separate medicines used together to treat heart failure. They may come in one combination pill. They lower blood pressure and decrease how hard the heart has to pump.  Medicines for related conditions  Controlling other heart problems helps keep heart failure under control, too. Depending on other heart problems you have, medicines  may be prescribed to:  · Lower blood pressure (antihypertensives).  · Lower cholesterol levels (statins).  · Prevent blood clots (anticoagulants or aspirin).  · Keep the heartbeat steady (antiarrhythmics).  Date Last Reviewed: 3/5/2016  © 5338-6238 [employer]. [address] [place] [address]. All rights reserved. This information is not intended as a substitute for professional medical care. Always follow your healthcare professional's instructions.              Heart Failure: Procedures That May Help    The heart is a muscle that pumps oxygen-rich blood to all parts of the body. When you have heart failure, the heart is not able to pump as well as it should. Blood and fluid may back up into the lungs (congestive heart failure), and some parts of the body dont get enough oxygen-rich blood to work normally. These problems lead to the symptoms of heart failure.     Certain procedures may help the heart pump better in some cases of heart failure. Some procedures are done to treat health problems that may have caused the heart failure such as coronary artery disease or heart rhythm problems. For more serious heart failure, other options are available.  Treating artery and valve problems  If you have coronary artery disease or valve disease, procedures may be done to improve blood flow. This helps the heart pump better, which can improve heart failure symptoms. First, your doctor may do a cardiac catheterization to help detect clogged blood vessels or valve damage. During this procedure, a  thin tube (catheter) in inserted into a blood vessel and guided to the heart. There a dye is injected and a special type of X-ray (angiogram) is taken of the blood vessels. Procedures to open a blocked artery or fix damaged valves can also be done using catheterization.  · Angioplasty uses a balloon-tipped instrument at the end of the catheter. The balloon is inflated to widen the narrowed artery. In many  cases, a stent is expanded to further support the narrowed artery. A stent is a metal mesh tube.  · Valve surgery repairs or replacement of faulty valves can also be done during catheterization so blood can flow properly through the chambers of the heart.  Bypass surgery is another option to help treat blocked arteries. It uses a healthy blood vessel from elsewhere in the body. The healthy blood vessel is attached above and below the blocked area so that blood can flow around the blocked artery.  Treating heart rhythm problems  A device may be placed in the chest to help a weak heart maintain a healthy, heartbeat so the heart can pump more effectively:  · Pacemaker. A pacemaker is an implanted device that regulates your heartbeat electronically. It monitors your heart's rhythm and generates a painless electric impulse that helps the heart beat in a regular rhythm. A pacemaker is programmed to meet your specific heart rhythm needs.  · Biventricular pacing/cardiac resynchronization therapy. A type of pacemaker that paces both pumping chambers of the heart at the same time to coordinate contractions and to improve the heart's function. Some people with heart failure are candidates for this therapy.  · Implantable cardioverter defibrillator. A device similar to a pacemaker that senses when the heart is beating too fast and delivers an electrical shock to convert the fast rhythm to a normal rhythm. This can be a life saving device.  In severe cases  In more serious cases of heart failure when other treatments no longer work, other options may include:  · Ventricular assist devices (VADs). These are mechanical devices used to take over the pumping function for one or both of the heart's ventricles, or pumping chambers. A VAD may be necessary when heart failure progresses to the point that medicines and other treatments no longer help. In some cases, a VAD may be used as a bridge to transplant.  · Heart transplant. This is  replacing the diseased heart with a healthy one from a donor. This is an option for a few people who are very sick. A heart transplant is very serious and not an option for all patients. Your doctor can tell you more.  Date Last Reviewed: 3/20/2016  © 3934-0262 [employer]. [address] [place] [address]. All rights reserved. This information is not intended as a substitute for professional medical care. Always follow your healthcare professional's instructions.              Heart Failure: Tracking Your Weight  You have a condition called heart failure. When you have heart failure, a sudden weight gain or a steady rise in weight is a warning sign that your body is retaining too much water and salt. This could mean your heart failure is getting worse. If left untreated, it can cause problems for your lungs and result in shortness of breath. Weighing yourself each day is the best way to know if youre retaining water. If your weight goes up quickly, call your doctor. You will be given instructions on how to get rid of the excess water. You will likely need medicines and to avoid salt. This will help your heart work better.  Call your doctor if you gain more than 2 pounds in 1 day, more than 5 pounds in 1 week, or whatever weight gain you were told to report by your doctor. This is often a sign of worsening heart failure and needs to be evaluated and treated. Your doctor will tell you what to do next.   Tips for weighing yourself    · Weigh yourself at the same time each morning, wearing the same clothes. Weigh yourself after urinating and before eating.  · Use the same scale each day. Make sure the numbers are easy to read. Put the scale on a flat, hard surface -- not on a rug or carpet.  · Do not stop weighing yourself. If you forget one day, weigh again the next morning.  How to use your weight chart  · Keep your weight chart near the scale. Write your weight on the chart as soon as you get  off the scale.  · Fill in the month and the start date on the chart. Then write down your weight each day. Your chart will look like this:    · If you miss a day, leave the space blank. Weigh yourself the next day and write your weight in the next space.  · Take your weight chart with you when you go to see your doctor.  Date Last Reviewed: 3/20/2016  © 6627-6620 [employer]. [address] [place] [address], [place] [address]. All rights reserved. This information is not intended as a substitute for professional medical care. Always follow your healthcare professional's instructions.              Heart Failure: Warning Signs of a Flare-Up  You have a condition called heart failure. Once you have heart failure, flare-ups can happen. Below are signs that can mean your heart failure is getting worse. If you notice any of these warning signs, call your healthcare provider.  Swelling    · Your feet, ankles, or lower legs get puffier.  · You notice skin changes on your lower legs.  · Your shoes feel too tight.  · Your clothes are tighter in the waist.  · You have trouble getting rings on or off your fingers.  Shortness of breath  · You have to breathe harder even when youre doing your normal activities or when youre resting.  · You are short of breath walking up stairs or even short distances.  · You wake up at night short of breath or coughing.  · You need to use more pillows or sit up to sleep.  · You wake up tired or restless.  Other warning signs  · You feel weaker, dizzy, or more tired.  · You have chest pain or changes in your heartbeat.  · You have a cough that wont go away.  · You cant remember things or dont feel like eating.  Tracking your weight  Gaining weight is often the first warning sign that heart failure is getting worse. Gaining even a few pounds can be a sign that your body is retaining excess water and salt. Weighing yourself each day in the morning after you urinate and before you eat, is  the best way to know if you're retaining water. Get a scale that is easy to read and make sure you wear the same clothes and use the same scale every time you weigh. Your healthcare provider will show you how to track your weight. Call your doctor if you gain more than 2 pounds in 1 day, 5 pounds in 1 week, or whatever weight gain you were told to report by your doctor. This is often a sign of worsening heart failure and needs to be evaluated and treated before it compromises your breathing. Your doctor will tell you what to do next.    Date Last Reviewed: 3/15/2016  © 8863-7812 [employer]. [address] [place] [address], [place] [address]. All rights reserved. This information is not intended as a substitute for professional medical care. Always follow your healthcare professional's instructions.              Chronic Kindey Disease Education             [employer] [facility] complies with applicable Federal civil rights laws and does not discriminate on the basis of race, color, national origin, age, disability, or sex.

## 2017-03-22 NOTE — H&P (VIEW-ONLY)
See my prior note; review of systems, family history and social history are   unchanged.    HISTORY OF PRESENT ILLNESS:  An 85-year-old female with end-stage renal disease,   dialyzing Tuesday, Thursday, Saturday, well known to me, status post:  1.  Angioplasty of left SFA, 08/01/2016 for ischemic rest pain, which totally   resolved after this intervention.  2.  Right toe amputation, 06/07/2016 (Dr. Chopra, Podiatry).  3.  Recanalization of complete right SFA occlusion with stent placement,   05/02/2016.  3.  Left brachiocephalic AV fistula creation, 11/02/2015.    She now returns with a week to 10 days of prolonged bleeding of her fistula.    She also notes that she is having increasing left heel pain, which she says is   improved by dangling her foot.  However, she said this is a different kind of   pain than she had before her intervention of the left leg six months ago.  She   denies having any pain in the forefoot.    PAST MEDICAL HISTORY:  1.  End-stage renal disease as above.  2.  History of CHF.  3.  Hyperlipidemia.  4.  Hypertension.  5.  Atrial fibrillation, on apixaban.    PAST SURGICAL HISTORY:  1.  AV access surgery and peripheral intervention as above.  2.  Prior PCI.    FAMILY HISTORY:  Father with heart disease.    SOCIAL HISTORY:  Nonsmoker.    MEDICATIONS:  Include apixaban and aspirin.  See EPIC for full list.    ALLERGIES:  Ativan, crayfish and promethazine.    REVIEW OF SYSTEMS:  Denies postprandial pain or DVT.  All other systems include eyes, ENT, respiratory, musculoskeletal, breast,   psychiatric, lymph, allergy and immune are negative.    PHYSICAL EXAMINATION:  VITAL SIGNS:  See nursing notes.  GENERAL:  In no acute distress.  RESPIRATORY:  Normal effort.  Clear to auscultation.  CARDIOVASCULAR:  Regular rate and rhythm, nondisplaced PMI and no murmur.  EXTREMITIES:  Left arm shows a brachiocephalic fistula that has actually a very   soft thrill and absolutely no pulsatility whatsoever.   No hemodynamically   significant stenosis is noted.  However, it is noted by start for the flow   appears sluggish.  Flow volume is 710 mL, decreased from 1.2 liters prior.    Left foot shows some swelling and modest cellulitis of the foot.  There is no   heel tissue loss.    IMAGING:  No new ABIs today.  Prior ABIs were 0.61 on the right and 0.51 on the   left.    ASSESSMENT:  1.  Dysfunctional left AV fistula.  This clinically does not feel like it has an   outflow stenosis given the complete lack of any pulsatility, the increased   bleeding would suggest that there is indeed an issue.  An alternate explanation   for decreased flow would be some inflow problem, but this should not prolong her   bleeding.  2.  Second issue is that of left foot pain.  This pain could be recurrent   ischemia, although she says it feels different than prior.    Her prior arteriograms were personally reviewed and she has very, very severe   highly calcific occlusive disease with a long segment distal SFA and complete   popliteal occlusion.  With some difficulty, we were able to open some high-grade   stenosis of the proximal SFA, which improved perfusion to her collaterals.    PLAN:  1.  Left fistulogram and intervention, 03/22/2017.  2.  Cath Lab case.  3.  Keflex 500 mg b.i.d. for her left foot cellulitis and elevation as   tolerated.  4.  When she returns to see me, we will repeat her ABIs as well as the duplex of   the fistula and decide whether she needs lower extremity arteriogram as well.          /eduard [id number] charity(s)        YEN  dd: 03/17/2017 11:36:18 (CDT)  td: 03/17/2017 12:37:27 (CDT)  Doc ID   #[id number]  Job ID #[id number]    CC:

## 2017-03-22 NOTE — BRIEF OP NOTE
[facility]  Brief Operative Note     SUMMARY     Surgery Date: 3/22/2017     Surgeon(s) and Role:     * RUBY Aranda III, MD - Primary    Assisting Surgeon: klaus cloud    Pre-op Diagnosis:  Stenosis of arteriovenous dialysis fistula, initial encounter [T82.089J]    Post-op Diagnosis:  same    PROCEDURES:    1.   PTA, cephalic confluence (10x40 [place])  2. PTA, L sunclaian vein (12x40 [place])  3. Fistualgram  4. Conscious sedation    Anesthesia: RN IV Sedation    Description of the findings of the procedure: as above    Findings/Key Components: as above    Estimated Blood Loss: <3cc         Specimens:   Specimen     None          Discharge Note    SUMMARY     Admit Date: 3/22/2017    Discharge Date and Time: 3/22/17    Hospital Course (synopsis of major diagnoses, care, treatment, and services provided during the course of the hospital stay): successful outpatient procedure    Final Diagnosis: dysfunctional AVF    Disposition: home    Follow Up/Patient Instructions: Diet: renal  Act: ad sadie  FU: 1 week with AVF duplex     Medications: pre-op

## 2017-03-22 NOTE — INTERVAL H&P NOTE
The patient has been examined and the H&P has been reviewed:    I concur with the findings and no changes have occurred since H&P was written.    Anesthesia/Surgery risks, benefits and alternative options discussed and understood by patient/family.          Active Hospital Problems    Diagnosis  POA    Stenosis of arteriovenous dialysis fistula [T82.288M]  Yes      Resolved Hospital Problems    Diagnosis Date Resolved POA   No resolved problems to display.

## 2017-03-22 NOTE — NURSING
Pt is AAOx3 and in no apparent distress.  Provided a copy of discharge instructions.  Teaching performed.  Pt verbalized understanding and denied any questions.  Provided pt with prescriptions.  PIV d/c catheter tip intact.  2x2 applied and no active bleeding noted.  Provided family member with wheelchair and she is going to transport pt to garage for discharge.

## 2017-03-23 NOTE — TELEPHONE ENCOUNTER
----- Message from Heidi Stewart sent at 3/23/2017  3:11 PM CDT -----  Contact: Self  Padmini Called and wanted to schedule a appointment with  And also had general questions. Please call Padmini @ [phone]. Thanks :)

## 2017-03-24 NOTE — PROGRESS NOTES
CC:   Foot exam     HPI: Padmini Miranda is a 85 y.o. female who presents to clinic today for foot exam.   ESRD on dialysis, +PVD  Constitutional symptoms:  Negative for fever, chills, nights sweats, nausea, vomiting, disorientation, or increased blood sugars    2/24/17:  follow up for foot exam. History of partial right hallux amputation now healed. Notes that left hallux wound has healed with local care and offloading. Complains of increased pain at left foot when in bed. Notes that pain improves when she hangs foot over bed or stands.     3/24/17:  follow up for foot exam.  Concerned for early signs of left hallux gangrene now.   New ABIs pending.  Daughter has been applying betadine to the area.  Patient reports that toe has been sore for the past month.        PCP:  Randy Lyles MD  Last PCP visit:   2/10/17        Past Medical History:   Diagnosis Date    ALLERGIC RHINITIS     Anemia     Anticoagulant long-term use     Blood transfusion     Cardiomyopathy 10/20/2015    Cataract     CHF exacerbation 1/23/2015    Chronic kidney disease     Cramp of both lower extremities 12/30/2016    Hyperlipidemia     Hypertension     Hypothyroidism     Persistent atrial fibrillation 3/28/2016           Current Outpatient Prescriptions on File Prior to Visit   Medication Sig Dispense Refill    amiodarone (PACERONE) 200 MG Tab Take 2 tabs by mouth twice a day for two weeks, then 2 tabs by mouth once a day for two weeks, then one tab by mouth each day. 180 tablet 3    apixaban 2.5 mg Tab Take 1 tablet (2.5 mg total) by mouth 2 (two) times daily. 180 tablet 3    aspirin 81 MG Chew Take 1 tablet (81 mg total) by mouth once daily. (Patient taking differently: Take 81 mg by mouth every morning. ) 30 tablet 3    atorvastatin (LIPITOR) 40 MG tablet Take 1 tablet (40 mg total) by mouth once daily. (Patient taking differently: Take 40 mg by mouth every morning. ) 30 tablet 11    carvedilol (COREG) 6.25 MG tablet  "Take 1 tablet (6.25 mg total) by mouth 2 (two) times daily. 60 tablet 5    cephALEXin (KEFLEX) 500 MG capsule Take 1 capsule (500 mg total) by mouth every 12 (twelve) hours. 20 capsule 0    epoetin syed (PROCRIT) 10,000 unit/mL injection Inject 0.28 mLs (2,800 Units total) into the skin every Tues, Thurs, Sat. 0.28 mL 30    isosorbide-hydrALAZINE 20-37.5 mg (BIDIL) 20-37.5 mg Tab Take 1 tablet by mouth 3 (three) times daily. 90 tablet 2    multivitamin (ONE DAILY MULTIVITAMIN) per tablet Take 1 tablet by mouth every morning.      PROTONIX 40 mg tablet TAKE 1 BY MOUTH DAILY (Patient taking differently: TAKE 1 TABLET  BY MOUTH  DAILY) 30 tablet 5    RENVELA 0.8 gram PwPk       SYNTHROID 50 mcg tablet TAKE 1 BY MOUTH DAILY      BEFORE BREAKFAST 90 tablet 2    nitroGLYCERIN (NITROSTAT) 0.4 MG SL tablet Place 1 tablet (0.4 mg total) under the tongue every 5 (five) minutes as needed for Chest pain. 25 tablet 5     No current facility-administered medications on file prior to visit.            Review of patient's allergies indicates:   Allergen Reactions    Ativan [lorazepam] Hallucinations    Crab Other (See Comments)     Causes Gout    Crayfish Other (See Comments)     Causes Gout    Promethazine Other (See Comments)     Hallucinations            ROS:  Negative for fever, chills, nights sweats, nausea, vomiting, disorientation, or increased blood sugars        EXAM:  Vitals:    03/24/17 1233   BP: (!) 121/46   Pulse: (!) 48   Weight: 55.3 kg (122 lb)   Height: 5' 2" (1.575 m)        General:   appears stated age, no distress      Bilateral Lower extremity physical exam:  Vascular: Dorsalis Pedis:  absent   Posterior Tibial:  absent  capillary refill time:  5 seconds  Temperature of toes cool to touch  Hair on toes:  absent    There is 2+ edema to the left lower extremity.        Neurological:     sharp dull - B/L normal and light touch - B/L normal  SWMF: Diminshed      Musculoskeletal:    Right foot: partial " hallux amputation, hammertoes 2-5;  Limited range of motion   Left foot: hammertoe and bunion.  2nd toe overlaps the hallux      Dermatological:     Atrophic thin skin.    Right hallux amputation site: epithelialized.  No redness or swelling.     Ulcer, left hallux tip: dry, dried blood/eschar 3 mm diameter. No signs of infection.  And left 5th toe superficial ulcer at the lateral nail border.       TL  12/2016    Lower Extremities Segmental Pressure [mmHg]:                    Right             Left  _______________________________________________________________  Brachial          150                 Calf              132               109  Dorsalis Pedis    91                76  TL (Dors. Ped.)  0.61              0.51    Report Summary:  Impression:   The Doppler signals are too faint to serve as reliable end point detectors thus pressure measurements are suspect.  PVR waveforms suggest bilateral severe distal PAOD.            Assessment / Plan:     I counseled the patient on her conditions, their implications and medical management.     Peripheral vascular disease  -     Foot Care    ESRD on hemodialysis  -     Foot Care    Toe ulcer, left, with unspecified severity    Dermatophytosis of nail  -     Foot Care    See Foot Care report under the Procedures tab.     Topical antibiotics to the left hallux wound and left 5th toe wound.   With patient's permission, Yani Soria LPN assisted me with the application of a football dressing under my direct supervision.  Darco shoe applied and patient instructed to never walk without the Darco shoe on the foot.       follow up with Vascular surgery for PAD.     Return here in a week for follow up wound.

## 2017-03-24 NOTE — PROCEDURES
Routine Foot Care  Date/Time: 3/24/2017 12:59 PM  Performed by: JOSE CALLE  Authorized by: JOSE CALLE     Consent Done?:  Yes (Verbal)    Nail Care Type:  Debride(Left 1st Toe, Left 3rd Toe, Left 2nd Toe, Left 4th Toe, Left 5th Toe, Right 2nd Toe, Right 3rd Toe, Right 4th Toe and Right 5th Toe)  Patient tolerance:  Patient tolerated the procedure well with no immediate complications

## 2017-03-27 NOTE — PROGRESS NOTES
Subjective:       Patient ID: Padmini Miranda is a 85 y.o. female.    Chief Complaint: Shortness of Breath and Abnormal Chest X-ray    HPI   Xi 86 yo lady with ESRD on dialysis for the past two years is referred for assessment of shortness of breath. She states that it is better in the past week or so. She has on her recent chest x-ray massive cardiomegaly and moderate right sided pleural effusion . In November Dr. Miller put a pleurex catheter in the right pleural space for drainage and after three removed it after drainage stopped. Today's assessment shows a moderate right pleural effusion, decreased breath sounds at the right base. She is no distress at rest and Sa02: 97%. Her chest x-ray shows massive cardiomegaly and some vasular engorgement. I do not see any evidence of alveolitis. Concern about risk of amiodarone. Toxicity. She is not able to perform a meaningful DLCO maneuver.   Review of Systems   Constitutional: Negative.    HENT: Negative.    Eyes: Negative.    Respiratory: Negative.  Positive for dyspnea on extertion.         Shortness of breath is better  Persistent right pleural effusio     Trial of pleurex catheter in the past.    Cardiovascular: Negative.         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         ESRD on dialysis for two years.    Musculoskeletal: Negative.    Skin: Negative.    Gastrointestinal: Negative.         GERD/s   Neurological: Negative.    Psychiatric/Behavioral: Negative.        Objective:      Physical Exam   Constitutional: She is oriented to person, place, and time. She appears well-developed and well-nourished. No distress.   Alert and oriented in a wheelchair   HENT:   Head: Normocephalic and atraumatic.   Right Ear: External ear normal.   Left Ear: External ear normal.   Nose: Nose normal.   Mouth/Throat: Oropharynx is clear and moist.   Eyes: Conjunctivae and EOM are normal.  Pupils are equal, round, and reactive to light.   Neck: Normal range of motion. Neck supple. No JVD present. No thyromegaly present.   Cardiovascular: Normal rate, regular rhythm, normal heart sounds and intact distal pulses.  Exam reveals no gallop.    No murmur heard.  Pulmonary/Chest: Breath sounds normal. No stridor. No respiratory distress. She has no wheezes. She has no rales. She exhibits no tenderness.   No distress at rest.   Dullness right base. Sa02 97%    PFT's severe restrictive impairment compatible with her effusion and cardiomegaly   Abdominal: Soft. Bowel sounds are normal. She exhibits no distension and no mass. There is no tenderness. There is no rebound and no guarding.   Musculoskeletal: Normal range of motion. She exhibits no edema.   Lymphadenopathy:     She has no cervical adenopathy.   Neurological: She is alert and oriented to person, place, and time. She has normal reflexes. She displays normal reflexes. No cranial nerve deficit.   Skin: Skin is warm and dry. No rash noted.   Psychiatric: She has a normal mood and affect. Her behavior is normal. Judgment and thought content normal.   Nursing note and vitals reviewed.        Assessment:       No diagnosis found.    Outpatient Encounter Prescriptions as of 3/27/2017   Medication Sig Dispense Refill    amiodarone (PACERONE) 200 MG Tab Take 2 tabs by mouth twice a day for two weeks, then 2 tabs by mouth once a day for two weeks, then one tab by mouth each day. 180 tablet 3    apixaban 2.5 mg Tab Take 1 tablet (2.5 mg total) by mouth 2 (two) times daily. 180 tablet 3    aspirin 81 MG Chew Take 1 tablet (81 mg total) by mouth once daily. (Patient taking differently: Take 81 mg by mouth every morning. ) 30 tablet 3    atorvastatin (LIPITOR) 40 MG tablet Take 1 tablet (40 mg total) by mouth once daily. (Patient taking differently: Take 40 mg by mouth every morning. ) 30 tablet 11    carvedilol (COREG) 6.25 MG tablet Take 1 tablet (6.25 mg  total) by mouth 2 (two) times daily. 60 tablet 5    cephALEXin (KEFLEX) 500 MG capsule Take 1 capsule (500 mg total) by mouth every 12 (twelve) hours. 20 capsule 0    epoetin syed (PROCRIT) 10,000 unit/mL injection Inject 0.28 mLs (2,800 Units total) into the skin every Tues, Thurs, Sat. 0.28 mL 30    isosorbide-hydrALAZINE 20-37.5 mg (BIDIL) 20-37.5 mg Tab Take 1 tablet by mouth 3 (three) times daily. 90 tablet 2    multivitamin (ONE DAILY MULTIVITAMIN) per tablet Take 1 tablet by mouth every morning.      nitroGLYCERIN (NITROSTAT) 0.4 MG SL tablet Place 1 tablet (0.4 mg total) under the tongue every 5 (five) minutes as needed for Chest pain. 25 tablet 5    PROTONIX 40 mg tablet TAKE 1 BY MOUTH DAILY (Patient taking differently: TAKE 1 TABLET  BY MOUTH  DAILY) 30 tablet 5    RENVELA 0.8 gram PwPk       SYNTHROID 50 mcg tablet TAKE 1 BY MOUTH DAILY      BEFORE BREAKFAST 90 tablet 2    [DISCONTINUED] 0.9%  NaCl infusion       [DISCONTINUED] hydrocodone-acetaminophen 5-325mg per tablet 1 tablet        No facility-administered encounter medications on file as of 3/27/2017.      No orders of the defined types were placed in this encounter.    Plan:             Persistent right pleural effusion secondary to her heart situation. I do not find radiographic findings of amiodarone lung  Disease. The effusion has been addressed with pleurex catheter in the past.

## 2017-03-29 NOTE — TELEPHONE ENCOUNTER
----- Message from Rylan Iqbal sent at 3/29/2017 12:29 PM CDT -----  Pt said she missed her appt on 3/22/17 and wants to know if she can be rescheduled for this Friday. Pt says she has a podiatry appt for 1:00pm. Please call pt regarding this at [phone].

## 2017-03-31 NOTE — PROGRESS NOTES
See my prior note; review of systems, family history and social history are   unchanged.    HISTORY OF PRESENT ILLNESS:  An 85-year-old female with end-stage renal disease,   dialyzing Tuesday, Thursday, Saturday, well known to me, status post:  1.  Angioplasty of left SFA, 08/01/2016 for ischemic rest pain, which totally   resolved after this intervention.  2.  Right toe amputation, 06/07/2016 (Dr. Chopra, Podiatry).  3.  Recanalization of complete right SFA occlusion with stent placement,   05/02/2016.  3.  Left brachiocephalic AV fistula creation, 11/02/2015.  5. PTA, L AVF 3/22/2017    The bleeding in her AVF is better but not resolved    She also notes that she is having increasing left heel pain, which she says is   improved by dangling her foot.  However, she said this is a different kind of   pain than she had before her intervention of the left leg six months ago.  She   denies having any pain in the forefoot.    PAST MEDICAL HISTORY:  1.  End-stage renal disease as above.  2.  History of CHF.  3.  Hyperlipidemia.  4.  Hypertension.  5.  Atrial fibrillation, on apixaban.    PAST SURGICAL HISTORY:  1.  AV access surgery and peripheral intervention as above.  2.  Prior PCI.    FAMILY HISTORY:  Father with heart disease.    SOCIAL HISTORY:  Nonsmoker.    MEDICATIONS:  Include apixaban and aspirin.  See EPIC for full list.    ALLERGIES:  Ativan, crayfish and promethazine.    REVIEW OF SYSTEMS:  Denies postprandial pain or DVT.  All other systems include eyes, ENT, respiratory, musculoskeletal, breast,   psychiatric, lymph, allergy and immune are negative.    PHYSICAL EXAMINATION:  VITAL SIGNS:  See nursing notes.  GENERAL:  In no acute distress.  RESPIRATORY:  Normal effort.  Clear to auscultation.  CARDIOVASCULAR:  Regular rate and rhythm, nondisplaced PMI and no murmur.  EXTREMITIES:  Left arm shows a brachiocephalic fistula that has actually a very   soft thrill and absolutely no pulsatility whatsoever.   No hemodynamically   significant stenosis is noted.   Left foot shows some swelling and modest cellulitis of the foot.  There is no   heel tissue loss. n However, there is an ulceration of L 1st toe    IMAGING:  No new ABIs today.  Prior ABIs were 0.61 on the right and 0.51 on the   Left.    Prior L LE angio reviewed:  Compete distal SFA/pop occlusion;  She had high grade Ca++ proximal SFA stenosis which was Rxed    ASSESSMENT:  PVD with ulceration left foot    Her prior arteriograms were personally reviewed and she has very, very severe   highly calcific occlusive disease with a long segment distal SFA and complete   popliteal occlusion.  With some difficulty, we were able to open some high-grade   stenosis of the proximal SFA, which improved perfusion to her collaterals.    PLAN:  1. R CFA approach L LE angio and intervention 4/3/17    I have explained the risks, benefits and alternatives for this procedure in detail.  The patients voices understanding and all questions have be answered, and agrees to proceed with the procedure.      DECLAN Aranda III, MD, FACS  Professor and Chief, Vascular and Endovascular Surgery

## 2017-03-31 NOTE — MR AVS SNAPSHOT
[facility]  [address]  [place] [address]  Phone: [phone]                  Padmini Miranda   3/31/2017 12:00 PM   Office Visit    Description:  Female : 1931   Provider:  Macrina Hein DPM   Department:  [facility]           Reason for Visit     Heel Pain                To Do List           Future Appointments        Provider Department Dept Phone    2017 2:30 PM Macrina Hein DPM [facility] [phone]    2017 8:00 AM HOME MONITOR DEVICE CHECK, [facility] [phone]    2017 3:00 PM Karsten Torres MD [facility] Card[facility] [phone]      Your Future Surgeries/Procedures     2017   Surgery with RUBY Aranda III, MD   [facility] ([facility])    [address] [facility] [address] [phone]              Goals (5 Years of Data)     None      [facility]     [facility] Nurse Care Line -  Assistance  Unless otherwise directed by your provider, please contact [facility], our nurse care line that is available for  assistance.     Registered nurses in the [facility] provide: appointment scheduling, clinical advisement, health education, and other advisory services.  Call: [phone] (toll free)               Medications           Message regarding Medications     Verify the changes and/or additions to your medication regime listed below are the same as discussed with your clinician today.  If any of these changes or additions are incorrect, please notify your healthcare provider.             Verify that the below list of medications is an accurate representation of the medications you are currently taking.  If none reported, the list may be blank. If incorrect, please contact your healthcare provider. Carry this list with you in case of emergency.           Current Medications     amiodarone (PACERONE) 200 MG Tab Take 2  "tabs by mouth twice a day for two weeks, then 2 tabs by mouth once a day for two weeks, then one tab by mouth each day.    aspirin 81 MG Chew Take 1 tablet (81 mg total) by mouth once daily.    atorvastatin (LIPITOR) 40 MG tablet Take 1 tablet (40 mg total) by mouth once daily.    carvedilol (COREG) 6.25 MG tablet Take 1 tablet (6.25 mg total) by mouth 2 (two) times daily.    cephALEXin (KEFLEX) 500 MG capsule Take 1 capsule (500 mg total) by mouth every 12 (twelve) hours.    ELIQUIS 2.5 mg Tab TAKE 1 BY MOUTH TWO TIMES  DAILY    epoetin syed (PROCRIT) 10,000 unit/mL injection Inject 0.28 mLs (2,800 Units total) into the skin every Tues, Thurs, Sat.    isosorbide-hydrALAZINE 20-37.5 mg (BIDIL) 20-37.5 mg Tab Take 1 tablet by mouth 3 (three) times daily.    multivitamin (ONE DAILY MULTIVITAMIN) per tablet Take 1 tablet by mouth every morning.    PROTONIX 40 mg tablet TAKE 1 BY MOUTH DAILY    RENVELA 0.8 gram PwPk     SYNTHROID 50 mcg tablet TAKE 1 BY MOUTH DAILY      BEFORE BREAKFAST    nitroGLYCERIN (NITROSTAT) 0.4 MG SL tablet Place 1 tablet (0.4 mg total) under the tongue every 5 (five) minutes as needed for Chest pain.           Clinical Reference Information           Your Vitals Were     BP Pulse Height Weight Last Period BMI    159/52 44 5' 2" (1.575 m) 54.9 kg (121 lb) (LMP Unknown) 22.13 kg/m2      Blood Pressure          Most Recent Value    BP  (!)  159/52      Allergies as of 3/31/2017     Ativan [Lorazepam]    Crab    Crayfish    Promethazine      Immunizations Administered on Date of Encounter - 3/31/2017     None      Language Assistance Services     ATTENTION: Language assistance services are available, free of charge. Please call [phone].      ATENCIÓN: Si alanla barbie, tiene a mccabe disposición servicios gratuitos de asistencia lingüística. Llame al [phone].     CHÚ Ý: N?u b?n nói Ti?ng Vi?t, có các d?ch v? h? tr? ngôn ng? mi?n phí dành cho b?n. G?i s? [phone].         Ralph Gupta - " Podiatry complies with applicable Federal civil rights laws and does not discriminate on the basis of race, color, national origin, age, disability, or sex.

## 2017-03-31 NOTE — PROGRESS NOTES
CC:   Foot exam     HPI: Padmini Miranda is a 85 y.o. female who presents to clinic today for foot exam.   ESRD on dialysis, +PVD  Constitutional symptoms:  Negative for fever, chills, nights sweats, nausea, vomiting, disorientation, or increased blood sugars    2/24/17:  follow up for foot exam. History of partial right hallux amputation now healed. Notes that left hallux wound has healed with local care and offloading. Complains of increased pain at left foot when in bed. Notes that pain improves when she hangs foot over bed or stands.     3/24/17:  follow up for foot exam.  Concerned for early signs of left hallux gangrene now.   New ABIs pending.  Daughter has been applying betadine to the area.  Patient reports that toe has been sore for the past month.    3/31/17:   follow up left hallux and left heel pain. Plan for angiogram with Dr. Aranda on Monday.  She continues to have heel pain.    She has been applying betadine to the left great toe wound.  In Darco shoe.  No fevers          PCP:  Randy Lyles MD  Last PCP visit:   2/10/17        Past Medical History:   Diagnosis Date    ALLERGIC RHINITIS     Anemia     Anticoagulant long-term use     Blood transfusion     Cardiomyopathy 10/20/2015    Cataract     CHF exacerbation 1/23/2015    Chronic kidney disease     Cramp of both lower extremities 12/30/2016    Hyperlipidemia     Hypertension     Hypothyroidism     Persistent atrial fibrillation 3/28/2016           Current Outpatient Prescriptions on File Prior to Visit   Medication Sig Dispense Refill    amiodarone (PACERONE) 200 MG Tab Take 2 tabs by mouth twice a day for two weeks, then 2 tabs by mouth once a day for two weeks, then one tab by mouth each day. 180 tablet 3    aspirin 81 MG Chew Take 1 tablet (81 mg total) by mouth once daily. (Patient taking differently: Take 81 mg by mouth every morning. ) 30 tablet 3    atorvastatin (LIPITOR) 40 MG tablet Take 1 tablet (40 mg total) by  "mouth once daily. (Patient taking differently: Take 40 mg by mouth every morning. ) 30 tablet 11    carvedilol (COREG) 6.25 MG tablet Take 1 tablet (6.25 mg total) by mouth 2 (two) times daily. 60 tablet 5    cephALEXin (KEFLEX) 500 MG capsule Take 1 capsule (500 mg total) by mouth every 12 (twelve) hours. 20 capsule 0    ELIQUIS 2.5 mg Tab TAKE 1 BY MOUTH TWO TIMES  DAILY 180 tablet 3    epoetin syed (PROCRIT) 10,000 unit/mL injection Inject 0.28 mLs (2,800 Units total) into the skin every Tues, Thurs, Sat. 0.28 mL 30    isosorbide-hydrALAZINE 20-37.5 mg (BIDIL) 20-37.5 mg Tab Take 1 tablet by mouth 3 (three) times daily. 90 tablet 2    multivitamin (ONE DAILY MULTIVITAMIN) per tablet Take 1 tablet by mouth every morning.      PROTONIX 40 mg tablet TAKE 1 BY MOUTH DAILY (Patient taking differently: TAKE 1 TABLET  BY MOUTH  DAILY) 30 tablet 5    RENVELA 0.8 gram PwPk       SYNTHROID 50 mcg tablet TAKE 1 BY MOUTH DAILY      BEFORE BREAKFAST 90 tablet 2    nitroGLYCERIN (NITROSTAT) 0.4 MG SL tablet Place 1 tablet (0.4 mg total) under the tongue every 5 (five) minutes as needed for Chest pain. 25 tablet 5     No current facility-administered medications on file prior to visit.            Review of patient's allergies indicates:   Allergen Reactions    Ativan [lorazepam] Hallucinations    Crab Other (See Comments)     Causes Gout    Crayfish Other (See Comments)     Causes Gout    Promethazine Other (See Comments)     Hallucinations            ROS:  Negative for fever, chills, nights sweats, nausea, vomiting, disorientation, or increased blood sugars        EXAM:  Vitals:    03/31/17 1242   BP: (!) 159/52   Pulse: (!) 44   Weight: 54.9 kg (121 lb)   Height: 5' 2" (1.575 m)        General:   appears stated age, no distress      Bilateral Lower extremity physical exam:  Vascular: Dorsalis Pedis:  absent   Posterior Tibial:  absent  capillary refill time:  5 seconds  Temperature of toes cool to touch  Hair on " toes:  absent    There is 2+ edema to the left lower extremity.        Neurological:     sharp dull - B/L normal and light touch - B/L normal  SWMF: Diminshed      Musculoskeletal:    Right foot: partial hallux amputation, hammertoes 2-5;  Limited range of motion   Left foot: hammertoe and bunion.  2nd toe overlaps the hallux      Dermatological:     Atrophic thin skin.    Right hallux amputation site: epithelialized.  No redness or swelling.     Ulcer, left hallux tip: superficial ulcer.  No signs of infection.  And left 5th toe superficial ulcer at the lateral nail border.  No new changes.  Stable.    A small piece of glass fragment was noted embedded in the epidermal layer at the plantar aspect of the left heel.  This was removed.  No purulent drainage noted.  No necrosis noted.            TL  12/2016    Lower Extremities Segmental Pressure [mmHg]:                    Right             Left  _______________________________________________________________  Brachial          150                 Calf              132               109  Dorsalis Pedis    91                76  TL (Dors. Ped.)  0.61              0.51    Report Summary:  Impression:   The Doppler signals are too faint to serve as reliable end point detectors thus pressure measurements are suspect.  PVR waveforms suggest bilateral severe distal PAOD.            Assessment / Plan:     I counseled the patient on her conditions, their implications and medical management.     Glass fragment in lower extremity    Peripheral vascular disease    ESRD on hemodialysis    Toe ulcer, left, with unspecified severity      Topical antibiotics to the left hallux wound and left plantar wound.  Change daily.   Return in about 12 days (around 4/12/2017) for foot exam, or sooner if concerned.

## 2017-04-03 PROBLEM — S72.001A CLOSED FRACTURE OF NECK OF RIGHT FEMUR: Status: ACTIVE | Noted: 2017-01-01

## 2017-04-03 PROBLEM — I70.229 CRITICAL LOWER LIMB ISCHEMIA: Status: ACTIVE | Noted: 2017-01-01

## 2017-04-03 NOTE — ANESTHESIA PREPROCEDURE EVALUATION
04/03/2017  Padmini Miranda is a 85 y.o., female.    [facility] Anesthesia Evaluation    I have reviewed the Patient Summary Reports.     I have reviewed the Medications.     Review of Systems  Anesthesia Hx:  No problems with previous Anesthesia    Social:  Non-Smoker, No Alcohol Use    Cardiovascular:   Hypertension CHF Echo done 3 months ago shows normal RV and LV function, moderate AS and moderate pulmonary HTN   Pulmonary:  Pulmonary Normal    Neurological:  Neurology Normal    Endocrine:   Hypothyroidism        Physical Exam  General:  Well nourished    Airway/Jaw/Neck:  Airway Findings: Mouth Opening: Normal Tongue: Normal  General Airway Assessment: Good  Mallampati: I  TM Distance: Normal, at least 6 cm  Jaw/Neck Findings:  Neck ROM: Normal ROM      Dental:  Dental Findings: Edentulous   Chest/Lungs:  Chest/Lungs Findings: Clear to auscultation, Normal Respiratory Rate     Heart/Vascular:  Heart Findings: Rate: Normal  Rhythm: Regular Rhythm  Heart Murmur  Systolic, Diastolic  Systolic Heart Murmur Description: L Upper Sternal Border  Systolic Heart Murmur Grade: Grade III  Diastolic Heart Murmur Grade II        Mental Status:  Mental Status Findings:  Cooperative, Alert and Oriented         Anesthesia Plan  Type of Anesthesia, risks & benefits discussed:  Anesthesia Type:  MAC  Patient's Preference:   Intra-op Monitoring Plan:   Intra-op Monitoring Plan Comments:   Post Op Pain Control Plan:   Post Op Pain Control Plan Comments:   Induction:   IV  Beta Blocker:  Patient is not currently on a Beta-Blocker (No further documentation required).       Informed Consent: Patient understands risks and agrees with Anesthesia plan.  Questions answered. Anesthesia consent signed with patient.  ASA Score: 3     Day of Surgery Review of History & Physical:    H&P update referred to the provider.         Ready For  Surgery From Anesthesia Perspective.

## 2017-04-03 NOTE — ANESTHESIA RELEASE NOTE
"Anesthesia Release from PACU Note    Patient: Padmini Miranda    Procedure(s) Performed: Procedure(s) (LRB):  ANGIOGRAM-EXTREMITY-LOWER (Left)    Anesthesia type: MAC    Post pain: Adequate analgesia    Post assessment: no apparent anesthetic complications and tolerated procedure well    Last Vitals:   Visit Vitals    BP (!) 152/45 (BP Location: Right arm, Patient Position: Lying, BP Method: Automatic)    Pulse (!) 39    Temp 36.1 °C (97 °F) (Temporal)    Resp 16    Ht 5' 2" (1.575 m)    Wt 54.9 kg (121 lb)    LMP  (LMP Unknown)    SpO2 95%    Breastfeeding No    BMI 22.13 kg/m2       Post vital signs: stable    Level of consciousness: awake and alert     Nausea/Vomiting: no nausea/no vomiting    Complications: none    Airway Patency: patent    Respiratory: unassisted, spontaneous ventilation    Cardiovascular: stable and blood pressure at baseline    Hydration: euvolemic  "

## 2017-04-03 NOTE — TELEPHONE ENCOUNTER
Appointment scheduled to see  this Wednesday 4-5-2017, I called and left V/Message ,for patient to call and confirm this appointment.

## 2017-04-03 NOTE — ANESTHESIA POSTPROCEDURE EVALUATION
"Anesthesia Post Evaluation    Patient: Padmini Miranda    Procedure(s) Performed: Procedure(s) (LRB):  ANGIOGRAM-EXTREMITY-LOWER (Left)    Final Anesthesia Type: MAC  Patient location during evaluation: PACU  Patient participation: Yes- Able to Participate  Level of consciousness: awake and alert and oriented  Post-procedure vital signs: reviewed and stable  Pain management: adequate  Airway patency: patent  PONV status at discharge: No PONV  Anesthetic complications: no      Cardiovascular status: blood pressure returned to baseline and hemodynamically stable  Respiratory status: unassisted and spontaneous ventilation  Hydration status: euvolemic  Follow-up not needed.        Visit Vitals    BP (!) 152/45 (BP Location: Right arm, Patient Position: Lying, BP Method: Automatic)    Pulse (!) 39    Temp 36.1 °C (97 °F) (Temporal)    Resp 16    Ht 5' 2" (1.575 m)    Wt 54.9 kg (121 lb)    LMP  (LMP Unknown)    SpO2 95%    Breastfeeding No    BMI 22.13 kg/m2       Pain/Jose Guadalupe Score: Pain Assessment Performed: Yes (4/3/2017  3:00 PM)  Presence of Pain: denies (4/3/2017  3:00 PM)      "

## 2017-04-03 NOTE — TRANSFER OF CARE
"Anesthesia Transfer of Care Note    Patient: Padmini Miranda    Procedure(s) Performed: Procedure(s) (LRB):  ANGIOGRAM-EXTREMITY-LOWER (Left)    Patient location: [facility]    Anesthesia Type: MAC    Transport from OR: Transported from OR on room air with adequate spontaneous ventilation    Post pain: adequate analgesia    Post assessment: no apparent anesthetic complications    Post vital signs: stable    Level of consciousness: awake, alert and oriented    Nausea/Vomiting: no nausea/vomiting    Complications: none    Comments: Spoke w/ [facility] nurses about notifying pt to stop her coreg 2/2 her bradycardia.       Last vitals:   Visit Vitals    BP (!) 150/46    Pulse (!) 337    Temp 36.4 °C (97.6 °F) (Oral)    Resp 16    Ht 5' 2" (1.575 m)    Wt 54.9 kg (121 lb)    LMP  (LMP Unknown)    SpO2 96%    Breastfeeding No    BMI 22.13 kg/m2     "

## 2017-04-03 NOTE — TELEPHONE ENCOUNTER
----- Message from Kaleb Stevens MD sent at 4/3/2017  2:23 PM CDT -----  Gabrielle Barcenas, I discussed with Elina. Can we schedule EP appointment sooner? She can keep general appointment as scheduled with Torres.

## 2017-04-03 NOTE — TELEPHONE ENCOUNTER
----- Message from Sonia Cloud RN sent at 4/3/2017  2:58 PM CDT -----  [facility] staff    Dr Antoine and Dr Stevens will like the patient to be seen soon. Can we book pt with the Np or can you book pt with Dr Antoine? Please advise  ----- Message -----     From: Kaleb Stevens MD     Sent: 4/3/2017   2:23 PM       To: [facility] Clinical Support Staff    Gabrielle Barcenas, I discussed with Elina. Can we schedule EP appointment sooner? She can keep general appointment as scheduled with Torres.

## 2017-04-03 NOTE — TELEPHONE ENCOUNTER
----- Message from Chantel Medina sent at 4/3/2017  2:35 PM CDT -----  Contact: melina  [phone]//caller states that she needs to speak with nurse in ref to scheduling an appt//please call//thank you

## 2017-04-04 PROBLEM — S72.001A CLOSED DISPLACED FRACTURE OF RIGHT FEMORAL NECK: Status: ACTIVE | Noted: 2017-01-01

## 2017-04-04 PROBLEM — R00.1 BRADYCARDIA, DRUG INDUCED: Status: ACTIVE | Noted: 2017-01-01

## 2017-04-04 PROBLEM — I25.10 3-VESSEL CAD: Status: ACTIVE | Noted: 2017-01-01

## 2017-04-04 PROBLEM — T50.905A BRADYCARDIA, DRUG INDUCED: Status: ACTIVE | Noted: 2017-01-01

## 2017-04-04 PROBLEM — Z01.810 PREOPERATIVE CARDIOVASCULAR EXAMINATION: Status: ACTIVE | Noted: 2017-01-01

## 2017-04-04 NOTE — ASSESSMENT & PLAN NOTE
On bidil and carvedilol.  Continue bidil, will decrease carvedilol to 3.125 given bradycardia and need to continue beta blocker perioperatively.

## 2017-04-04 NOTE — ASSESSMENT & PLAN NOTE
Without angina presently but at risk of myocardial ischemia intra-operatively.  Continue ASA and BB perioperatively.  Consider trending troponins post-operatively given high risk.  Not amenable to PCI per [facility] 2015 and unlikely to benefit from surgical revascularization either.

## 2017-04-04 NOTE — ASSESSMENT & PLAN NOTE
On HD T/Th/Sat; will consult Nephrology to continue dialysis.  She will be due for dialysis today; will need to schedule around surgery.  Her dialysis access is in the right femoral region which adds another complicating factor to her need for right hemiarthoplasty.

## 2017-04-04 NOTE — ED NOTES
Patient identifiers for Padmini Miranda checked and correct.  LOC: Patient is awake, alert, and aware of environment with an appropriate affect. Patient is oriented x 3 and speaking appropriately.  APPEARANCE: Patient resting comfortably and in no acute distress. Patient is clean and well groomed, patient's clothing is properly fastened.  SKIN: The skin is warm and dry. Patient has normal skin turgor and moist mucus membrances. Skin is intact; no bruising or breakdown noted.  MUSKULOSKELETAL: Patient is having difficulty moving right lower ext and hip. , no obvious deformities noted. Pulses intact.   RESPIRATORY: Airway is open and patent. Respirations are spontaneous and non-labored with normal effort and rate.  CARDIAC: Patient has a normal rate and rhythm. No peripheral edema noted. Capillary refill < 3 seconds.  ABDOMEN: No distention noted. Bowel sounds active in all 4 quadrants. Soft and non-tender upon palpation.  NEUROLOGICAL: PERRL. Facial expression is symmetrical. Hand grasps are equal bilaterally. Normal sensation in all extremities when touched with finger.  Allergies reported:   Review of patient's allergies indicates:   Allergen Reactions    Ativan [lorazepam] Hallucinations    Crab Other (See Comments)     Causes Gout    Crayfish Other (See Comments)     Causes Gout    Promethazine Other (See Comments)     Hallucinations

## 2017-04-04 NOTE — CONSULTS
[facility]  Consult Note Nephrology    Consult Requested By: Tahira Tomlinson MD  Reason for Consult: ESRD on iHD    SUBJECTIVE:     History of Present Illness:  Padmini Miranda is a 85 y.o. female, with a PMHx relevant for HTN, ESRD on iHD, Anemia of chronic disease, Chronic anticoagulation, HLD, Afib, HFrEF, AVS, and hypothyroidism was admitted to hospital medicine secondary to a fall. She was found in ED to have Right Femoral Neck fracture. Today she was evaluated and noticed her HR has been decreasing throughout the day. Cardiology evaluated and recommended to hold amiodarone and BB with close monitoring. She dialyzes at [facility] under the care of Dr. Kaminski for 3.75 hrs., via EDILBERTO AVF. She has a EDW 54 kgs. Orho is planning for repair Right hip tomorrow.      Past Medical History:   Diagnosis Date    ALLERGIC RHINITIS     Anemia     Anticoagulant long-term use     Blood transfusion     Cardiomyopathy 10/20/2015    Cataract     CHF exacerbation 2015    Chronic kidney disease     Cramp of both lower extremities 2016    Hyperlipidemia     Hypertension     Hypothyroidism     Persistent atrial fibrillation 3/28/2016     Past Surgical History:   Procedure Laterality Date    ANGIOPLASTY      Renal PTA    CARDIAC CATHETERIZATION       SECTION      HYSTERECTOMY      ovaries intact    RENAL ARTERY STENT      TONSILLECTOMY      VASCULAR SURGERY       Family History   Problem Relation Age of Onset    Adopted: Yes    Heart disease Father      Social History   Substance Use Topics    Smoking status: Never Smoker    Smokeless tobacco: Never Used    Alcohol use Yes      Comment: occasional wine use     Review of patient's allergies indicates:   Allergen Reactions    Ativan [lorazepam] Hallucinations    Crab Other (See Comments)     Causes Gout    Crayfish Other (See Comments)     Causes Gout    Promethazine Other (See Comments)     Hallucinations         Current Facility-Administered Medications   Medication Dose Route Frequency Provider Last Rate Last Dose    0.9%  NaCl infusion (for blood administration)   Intravenous Q24H PRN Samy Pinzon MD        aspirin chewable tablet 81 mg  81 mg Oral Daily Merrill Mendoza MD   81 mg at 04/04/17 0932    atorvastatin tablet 40 mg  40 mg Oral QAM Merrill Mendoza MD   40 mg at 04/04/17 0605    epoetin syed injection 2,800 Units  50 Units/kg Subcutaneous Every Tues, Thurs, Sat Merrill Mendoza MD        isosorbide-hydrALAZINE 20-37.5 mg per tablet 1 tablet  1 tablet Oral TID Merrill Mendoza MD   1 tablet at 04/04/17 0932    levothyroxine tablet 50 mcg  50 mcg Oral Before breakfast Merrill Mendoza MD   50 mcg at 04/04/17 0601    morphine injection 2 mg  2 mg Intravenous Q4H PRN Merrill Mendoza MD   2 mg at 04/04/17 0330    morphine injection 4 mg  4 mg Intravenous Q4H PRN Merrill Mendoza MD        ondansetron injection 4 mg  4 mg Intravenous Q12H PRN Merrill Mendoza MD        oxycodone-acetaminophen 5-325 mg per tablet 1 tablet  1 tablet Oral Q4H PRN Tahira Tomlinson MD   1 tablet at 04/04/17 0926    [START ON 4/5/2017] pantoprazole EC tablet 40 mg  40 mg Oral Daily Merrill Mendoza MD        ramelteon tablet 8 mg  8 mg Oral Nightly PRN Merrill Mendoza MD        sodium chloride 0.9% flush 3 mL  3 mL Intravenous Q8H Merrill Mendoza MD   3 mL at 04/04/17 0600       No Known Allergies     Review of Systems:  Constitutional: no fever or chills  Respiratory: no cough or shortness of breath  Cardiovascular: no chest pain or palpitations  Gastrointestinal: no nausea or vomiting, no abdominal pain or change in bowel habits  Hematologic/Lymphatic: no easy bruising or lymphadenopathy  Musculoskeletal: positive arthralgias and myalgias  Neurological: no seizures or tremors          OBJECTIVE:     Vital Signs (Most Recent)  Temp: 96.4 °F (35.8 °C)  (04/04/17 0800)  Pulse: (!) 38 (04/04/17 0800)  Resp: 18 (04/04/17 0800)  BP: (!) 135/59 (04/04/17 0800)  SpO2: 99 % (04/04/17 0800)    Vital Signs Range (Last 24H):  Temp:  [96.4 °F (35.8 °C)-98 °F (36.7 °C)]   Pulse:  []   Resp:  [14-18]   BP: (135-179)/(36-78)   SpO2:  [9 %-99 %]     No intake or output data in the 24 hours ending 04/04/17 0945    Physical Exam:  General: Well developed, well nourished in NAD  HEENT: Conjunctiva clear; Oropharynx clear  Neck: No JVD noted, Supple  CV- Normal S1, S2 with no murmurs,gallops,rubs  Resp- Lungs Decreased BS Bilaterally, Unlabored no wheezing/rhonchi/rales  Abdomen- NTND, BS normoactive x4 quads, soft  Extrem- No cyanosis, clubbing, edema. Painful right hip.   Skin- No rashes, lesions, ulcers  Neuro: awake, Oriented x3, no FND      Laboratory:  CBC:   Recent Labs  Lab 04/04/17  0002   WBC 6.73   RBC 4.15   HGB 11.2*   HCT 33.7*      MCV 81*   MCH 27.0   MCHC 33.2     BMP:   Recent Labs  Lab 04/04/17  0002   *   CL 98   CO2 23   BUN 57*   CREATININE 5.7*   CALCIUM 8.5*     CMP:   Recent Labs  Lab 04/04/17  0002   *   CALCIUM 8.5*   ALBUMIN 2.6*   PROT 7.0   *   K 5.9*   CO2 23   CL 98   BUN 57*   CREATININE 5.7*   ALKPHOS 62   ALT 21   AST 43*   BILITOT 0.7     PTH: No results for input(s): PTH in the last 168 hours.  Coagulation:   Recent Labs  Lab 04/04/17  0002   INR 1.2   APTT 26.3     Cardiac Markers: No results for input(s): CKMB, TROPONINT, MYOGLOBIN in the last 168 hours.    Diagnostic Results:  Labs: Reviewed  ECG: Reviewed    ASSESSMENT/PLAN:     Active Hospital Problems    Diagnosis  POA    *Closed displaced fracture of right femoral neck [S72.001A]  Yes    3-vessel CAD [I25.10]  Yes    Preoperative cardiovascular examination [Z01.810]  Not Applicable    Bradycardia, drug induced [R00.1, T50.905A]  Yes    PAF (paroxysmal atrial fibrillation) [I48.0]  Yes    Atherosclerotic PVD with ulceration [I73.9, L98.499]  Yes     ESRD on hemodialysis [N18.6, Z99.2]  Not Applicable     Chronic    Systolic and diastolic CHF, 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       Padmini Miranda is a 85 y.o. female, with HTN, ESRD on iHD, Anemia of chronic disease, Chronic anticoagulation, HLD, Afib, HFrEF, AVS, hypothyroidism and Right hip fracture. Nephrology consulted for ESRD management.    ESRD on IHD TTS   Out patient [facility] - [facility]/ Dr. Kaminski  On HD for: ~ 1 year  Duration of outpatient dialysis session - 3.75 hrs  EDW - 54 kg  Residual Mary Ann Function - minimal  - Will provide dialysis for metabolic clearance and volume management in am secondary to bradycardia not safe for HD. Will reassess in am.  - Sinus bradycardia amiodarone and BB stopped   - Target ultrafiltration 1-2 lts as tolerated  - Dialysate will be adjusted to current labs   Aceess: EDILBERTO AVF with good thrill and bruit    Active problem in hospital  - Right Hip fracture  - Sinus Bradycardia    Anemia of Chronic Kidney Disease   - Will resume CURTIS as outpatient   - Hg 11.2 at goal   - Will request iron studies to assess further needs of supplementation     Mineral Bone Disease in CKD   - Renal Function Panel Daily for electrolytes monitoring  - Phos was not checked on admission  - Already on binders as outpatient Please continue Renvela 0.8 gms AC and scnacks  - CoCa 9.6    Nutrition   - Renal Diet    HTN   -   Improving BP, will continue to monitor. Goal for BP is <130 mmHg SBP and BDP <80 mmHg.   - Cont Bidil 1 tab TID  - Coreg on hold secondary to bradycardia    Case discuss with Staff further recs with attestation.    cOtavio Gong MD  Nephrology Fellow PGY4  [phone]    Patient seen and examined;   I have reviewed and agree with assessment and plan

## 2017-04-04 NOTE — CONSULTS
History & Physical  Preoperative Cardiovascular risk assessment for surgery  Cardiology Consult Service         Admission Date: 4/3/2017  Referring physician: Tahira Tomlinson MD     Reason for consult:   Preoperative cardiovascular risk assessment for Right Hemiarthroplasty    HPI:   Patient information was obtained from patient, relative(s) and past medical records. Primary care Physician: Randy Lyles MD    85 y.o.female with significant past medical history of HTN, ESRD on HD (T/TH/Sat), HFrEF (most recently 50%), 3 vessel CAD not amenable to PCI per [facility] in 2015 and Afib (on Eliquis for AC and recently started on Amiodarone) presented to hospital after mechanical fall and diagnosed with Right Femoral Neck fracture. Primary service plans to take patient to the OR on tomorrow (4/5) for Hemiarthroplasty and cardiology consulted for preoperative cardiac risk assessment for planned surgical procedure.   Cardiac History, per chart check:   - 11/15 Dobutamine Stress Echo: EF 25%, Severe LAE, + Stress c/w 3 vessel dz.  - 12/15 [facility]: Prox RCA Stenosis (95%), OM1 (90%), SANDRO (75%), and LAD (60%) but none amenable to stent placement  - 1/16 Afib noted: Loop recorder ordered, later started on Eliquis and Coreg for rate control  - 1/17 TTE: EF improved to 50%, Moderate AS  - 3/17 started on Amiodarone (+ Coreg) for rate control  - 4/3 Bradycardia noted prior to LLE Angiogram, procedure performed without complication, seen by Cards (concerned for possible Tachy-Feroz Syndrome, as patient with episodes of Feroz as well as Afib RVR). Recommended holding B Blocker until f/u with Dr. Antoine.    Prior to admission patient's functional mobility was moderate assist with no assistive device for home distances. Patient does have any previous cardiac history such as MI or CAD. Patient does not have previous history of diabetes. Patient does have history of advanced kidney disease with creatinine > 2. Patient currently lives with  their family.    Surgical Risk Assessment     Active cardiac issues:  Active decompensated heart failure? No   Unstable angina?  No   Significant uncontrolled arrhythmias? Yes   Severe valvular heart disease-Aortic or Mitral Stenosis? No   Recent MI or coronary revascularization < 30 days? No     Cardiac Risk Factors  High risk surgery? No   History of CAD/ischemic heart disease? Yes   History of cerebrovascular disease? No   History of compensated heart failure? Yes   Type 2 diabetes requiring insulin? No   Serum Creatinine > 2? Yes   Total cardiac risk factors 3     Functional mets <4    < 4 METs -unable to walk > 2 blocks on level ground without stopping due to symptoms  - eating, dressing, toileting, walking indoors, light housework. POOR   > 4 METs -climbing > 1 flight of stairs without stopping  -walking up hill > 1-2 blocks  -scrubbing floors  -moving furniture  - golf, bowling, dancing or tennis  -running short distance MODERATE to EXCELLENT     Review of Systems:     Constitutional: no fever or chills  Eyes: no visual changes  ENT: no nasal congestion or sore throat  Respiratory: positive for dyspnea on exertion, negative for cough  Cardiovascular: negative for chest pressure/discomfort, negative for palpitations  Gastrointestinal: no nausea or vomiting, no abdominal pain or change in bowel habits  Genitourinary: no hematuria or dysuria  Integument/Breast: no rash or pruritis  Hematologic/Lymphatic: no easy bruising or lymphadenopathy  Musculoskeletal: + hip pain  Neurological: no seizures or tremors  Behavioral/Psych: no auditory or visual hallucinations  Endocrine: no heat or cold intolerance    Past Medical and Surgical History:     Past Medical History:   Diagnosis Date    ALLERGIC RHINITIS     Anemia     Anticoagulant long-term use     Blood transfusion     Cardiomyopathy 10/20/2015    Cataract     CHF exacerbation 1/23/2015    Chronic kidney disease     Cramp of both lower extremities  2016    Hyperlipidemia     Hypertension     Hypothyroidism     Persistent atrial fibrillation 3/28/2016     Past Surgical History:   Procedure Laterality Date    ANGIOPLASTY      Renal PTA    CARDIAC CATHETERIZATION       SECTION      HYSTERECTOMY      ovaries intact    RENAL ARTERY STENT      TONSILLECTOMY      VASCULAR SURGERY          Home Medications:     Prior to Admission medications    Medication Sig Start Date End Date Taking? Authorizing Provider   amiodarone (PACERONE) 200 MG Tab Take 2 tabs by mouth twice a day for two weeks, then 2 tabs by mouth once a day for two weeks, then one tab by mouth each day. 3/10/17   Trung Antoine MD   aspirin 81 MG Chew Take 1 tablet (81 mg total) by mouth once daily.  Patient taking differently: Take 81 mg by mouth every morning.  16   Randy Lyles MD   atorvastatin (LIPITOR) 40 MG tablet Take 1 tablet (40 mg total) by mouth once daily.  Patient taking differently: Take 40 mg by mouth every morning.  16   Randy Lyles MD   ELIQUIS 2.5 mg Tab TAKE 1 BY MOUTH TWO TIMES  DAILY 3/31/17   Trung Antoine MD   epoetin syed (PROCRIT) 10,000 unit/mL injection Inject 0.28 mLs (2,800 Units total) into the skin every Tues, Thurs, Sat. 10/29/15   Dulce Veliz MD   isosorbide-hydrALAZINE 20-37.5 mg (BIDIL) 20-37.5 mg Tab Take 1 tablet by mouth 3 (three) times daily. 16  Ethan Devlin PA-C   multivitamin (ONE DAILY MULTIVITAMIN) per tablet Take 1 tablet by mouth every morning.    Historical Provider, MD   nitroGLYCERIN (NITROSTAT) 0.4 MG SL tablet Place 1 tablet (0.4 mg total) under the tongue every 5 (five) minutes as needed for Chest pain. 2/10/17 3/12/17  Randy Lyles MD   PROTONIX 40 mg tablet TAKE 1 BY MOUTH DAILY  Patient taking differently: TAKE 1 TABLET  BY MOUTH  DAILY 16   Randy Lyles MD   RENVELA 0.8 gram PwPk  16   Historical Provider, MD   SYNTHROID 50 mcg tablet TAKE 1 BY MOUTH DAILY       "BEFORE BREAKFAST 1/12/17   Randy Lyles MD       Allergies:     Review of patient's allergies indicates:   Allergen Reactions    Ativan [lorazepam] Hallucinations    Crab Other (See Comments)     Causes Gout    Crayfish Other (See Comments)     Causes Gout    Promethazine Other (See Comments)     Hallucinations         Social and Family History:     Social History     Social History    Marital status:      Spouse name: N/A    Number of children: N/A    Years of education: N/A     Social History Main Topics    Smoking status: Never Smoker    Smokeless tobacco: Never Used    Alcohol use Yes      Comment: occasional wine use    Drug use: No    Sexual activity: No     Other Topics Concern    None     Social History Narrative     Family History   Problem Relation Age of Onset    Adopted: Yes    Heart disease Father        Physical Exam:     BP (!) 162/75  Pulse (!) 38  Temp 98 °F (36.7 °C)  Resp 18  Ht 5' 2" (1.575 m)  Wt 54.4 kg (120 lb)  LMP  (LMP Unknown)  SpO2 (!) 91%  Breastfeeding? No  BMI 21.95 kg/m2Body mass index is 21.95 kg/(m^2).   General appearance: elderly female  Head: NCAT w/o lesions or tendernes  Neck: supple, symmetrical, trachea midline, no JVD and thyroid not enlarged, symmetric, no tenderness/mass/nodules  Lungs: unlabored respirations, no intercostal retractions or accessory muscle use, clear to auscultation without rales or wheezes  Heart: Sinus bradycardia  Abdomen: soft, non-tender non-distented; bowel sounds normal; no masses,  no organomegaly  Extremities: no cyanosis or edema, or clubbing  Skin: Skin color, texture, turgor normal. No rashes or lesions  Psych: pleasant and appropriate,Oriented x3    Labs and Diagnostic Results:     Recent Labs      04/04/17   0002   WBC  6.73   RBC  4.15   HGB  11.2*   HCT  33.7*   PLT  209   MCV  81*   MCH  27.0   MCHC  33.2      Recent Labs      04/04/17   0002   GLU  122*   NA  133*   K  5.9*   CL  98   CO2  23   BUN  57* "   CREATININE  5.7*   ANIONGAP  12   CALCIUM  8.5*   BILITOT  0.7   AST  43*   ALT  21   ALKPHOS  62   ALBUMIN  2.6*   PROT  7.0       Recent Labs      04/04/17   0002   INR  1.2   APTT  26.3       EKG: I have personally reviewed and shows Bradycardia  Chest X-Ray: cardiomegaly, pleural effusion: bilaterally  EF   Date Value Ref Range Status   01/11/2017 50 55 - 65    12/04/2016 38 (A) 55 - 65        Assessment/Plan:   Surgical Preoperative Risk Assessment:     Active Hospital Problems    Diagnosis  POA    *Closed displaced fracture of right femoral neck [S72.001A]  Yes    3-vessel CAD [I25.10]  Yes    Preoperative cardiovascular examination [Z01.810]  Not Applicable    Bradycardia, drug induced [R00.1, T50.905A]  Yes    PAF (paroxysmal atrial fibrillation) [I48.0]  Yes    Atherosclerotic PVD with ulceration [I73.9, L98.499]  Yes    ESRD on hemodialysis [N18.6, Z99.2]  Not Applicable     Chronic    Systolic and diastolic CHF, 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       Preoperative Cardiovascular Risk   - Patient currently remains bradycardiac, recommended stopping Beta blocker prior to procedure.  - Electrolyte imbalances must be corrected prior to surgery. Please dialyze prior to OR.  - Recommend nutrition consult given low albumin.  - Patient with multiple co morbidities with Revised Cardiac Risk Index Class IV correlating to 11% Risk of Major Cardiac Events.  - This is a High Risk patient going for Intermediate Risk procedure. We do not recommend taking patient to OR until she has been evaluated by EP for potential pacemaker placement.  - Recommend restarting Amiodarone and Coreg after surgery to prevent rebound tachy/RVR but will defer to EP.  - OK to continue to hold AC as patient may go for surgery in near future  once seen by EP.      Thank you for the consult. Staff attestation to follow.    Allie Ortiz MD  PGY1

## 2017-04-04 NOTE — ASSESSMENT & PLAN NOTE
Likely secondary to amiodarone plus carvedilol.  She is presently asymptomatic and has sinus bradycardia, so will need further guidance from Cardiology prior to proceeding to surgery regarding management intraoperatively.  For now will hold amiodarone and continue carvedilol at reduced dose of 3.125mg bid.  DO NOT HOLD DAY OF SURGERY!

## 2017-04-04 NOTE — SUBJECTIVE & OBJECTIVE
Past Medical History:   Diagnosis Date    ALLERGIC RHINITIS     Anemia     Anticoagulant long-term use     Blood transfusion     Cardiomyopathy 10/20/2015    Cataract     CHF exacerbation 2015    Chronic kidney disease     Cramp of both lower extremities 2016    Hyperlipidemia     Hypertension     Hypothyroidism     Persistent atrial fibrillation 3/28/2016       Past Surgical History:   Procedure Laterality Date    ANGIOPLASTY      Renal PTA    CARDIAC CATHETERIZATION       SECTION      HYSTERECTOMY      ovaries intact    RENAL ARTERY STENT      TONSILLECTOMY      VASCULAR SURGERY         Review of patient's allergies indicates:   Allergen Reactions    Ativan [lorazepam] Hallucinations    Crab Other (See Comments)     Causes Gout    Crayfish Other (See Comments)     Causes Gout    Promethazine Other (See Comments)     Hallucinations        Current Facility-Administered Medications on File Prior to Encounter   Medication    [COMPLETED] cefazolin (ANCEF) 2 gram in dextrose 5% 50 mL IVPB (premix)    [DISCONTINUED] 0.9%  NaCl infusion    [DISCONTINUED] diphenhydrAMINE injection    [DISCONTINUED] fentaNYL 50 mcg/mL injection    [DISCONTINUED] heparin (porcine) injection    [DISCONTINUED] heparin (porcine) injection    [DISCONTINUED] hydrocodone-acetaminophen 5-325mg per tablet 1 tablet    [DISCONTINUED] hydrocortisone sodium succinate injection    [DISCONTINUED] lidocaine (PF) 10 mg/ml (1%) injection 10 mg    [DISCONTINUED] lidocaine (PF) 10 mg/ml (1%) injection 10 mg    [DISCONTINUED] lidocaine (PF) 10 mg/ml (1%) injection    [DISCONTINUED] metoclopramide HCl injection 10 mg    [DISCONTINUED] protamine injection    [DISCONTINUED] sodium chloride 0.9% flush 3 mL    [DISCONTINUED] visipaque 320 iodixanol     Current Outpatient Prescriptions on File Prior to Encounter   Medication Sig    amiodarone (PACERONE) 200 MG Tab Take 2 tabs by mouth twice a day for two  weeks, then 2 tabs by mouth once a day for two weeks, then one tab by mouth each day.    aspirin 81 MG Chew Take 1 tablet (81 mg total) by mouth once daily. (Patient taking differently: Take 81 mg by mouth every morning. )    atorvastatin (LIPITOR) 40 MG tablet Take 1 tablet (40 mg total) by mouth once daily. (Patient taking differently: Take 40 mg by mouth every morning. )    ELIQUIS 2.5 mg Tab TAKE 1 BY MOUTH TWO TIMES  DAILY    epoetin syed (PROCRIT) 10,000 unit/mL injection Inject 0.28 mLs (2,800 Units total) into the skin every Tues, Thurs, Sat.    isosorbide-hydrALAZINE 20-37.5 mg (BIDIL) 20-37.5 mg Tab Take 1 tablet by mouth 3 (three) times daily.    multivitamin (ONE DAILY MULTIVITAMIN) per tablet Take 1 tablet by mouth every morning.    nitroGLYCERIN (NITROSTAT) 0.4 MG SL tablet Place 1 tablet (0.4 mg total) under the tongue every 5 (five) minutes as needed for Chest pain.    PROTONIX 40 mg tablet TAKE 1 BY MOUTH DAILY (Patient taking differently: TAKE 1 TABLET  BY MOUTH  DAILY)    RENVELA 0.8 gram PwPk     SYNTHROID 50 mcg tablet TAKE 1 BY MOUTH DAILY      BEFORE BREAKFAST     Family History     Problem Relation (Age of Onset)    Heart disease Father        Social History Main Topics    Smoking status: Never Smoker    Smokeless tobacco: Never Used    Alcohol use Yes      Comment: occasional wine use    Drug use: No    Sexual activity: No     Review of Systems   Constitutional: Negative for chills, diaphoresis and fever.   HENT: Negative for nosebleeds and trouble swallowing.    Eyes: Negative for photophobia and visual disturbance.   Respiratory: Negative for cough and shortness of breath.    Cardiovascular: Positive for chest pain. Negative for palpitations and leg swelling.   Gastrointestinal: Negative for abdominal pain, constipation, nausea and vomiting.   Endocrine: Negative for polydipsia and polyuria.   Genitourinary: Negative for dysuria and hematuria.   Musculoskeletal: Negative for  arthralgias and myalgias.   Skin: Positive for wound (left heel). Negative for color change.   Neurological: Negative for dizziness, syncope and light-headedness.     Objective:     Vital Signs (Most Recent):  Temp: 98 °F (36.7 °C) (04/03/17 2019)  Pulse: (!) 45 (04/04/17 0330)  Resp: 18 (04/03/17 2019)  BP: (!) 162/75 (04/04/17 0330)  SpO2: (!) 91 % (04/04/17 0330) Vital Signs (24h Range):  Temp:  [97 °F (36.1 °C)-98 °F (36.7 °C)] 98 °F (36.7 °C)  Pulse:  [] 45  Resp:  [14-18] 18  SpO2:  [9 %-98 %] 91 %  BP: (140-179)/(36-78) 162/75     Weight: 54.4 kg (120 lb)  Body mass index is 21.95 kg/(m^2).    Physical Exam   Constitutional: She is oriented to person, place, and time. She appears well-developed. She appears distressed (in pain).   HENT:   Head: Normocephalic and atraumatic.   Mouth/Throat: Oropharynx is clear and moist.   Eyes: Conjunctivae and EOM are normal. Pupils are equal, round, and reactive to light. No scleral icterus.   Neck: Normal range of motion. Neck supple.   Cardiovascular: Regular rhythm.  Bradycardia present.    Murmur heard.   Crescendo decrescendo systolic murmur is present with a grade of 3/6   Pulmonary/Chest: Effort normal and breath sounds normal.   Abdominal: Soft. Bowel sounds are normal. She exhibits no distension. There is no tenderness. There is no guarding.   Musculoskeletal: She exhibits deformity.   RLE shortened and externally rotated   Lymphadenopathy:     She has no cervical adenopathy.   Neurological: She is alert and oriented to person, place, and time. No cranial nerve deficit.   Skin: Skin is warm and dry. No erythema.   Small ulcer L heel, covered with bandage   Nursing note and vitals reviewed.       Significant Labs:   CBC:   Recent Labs  Lab 04/04/17  0002   WBC 6.73   HGB 11.2*   HCT 33.7*        CMP:   Recent Labs  Lab 04/04/17  0002   *   K 5.9*   CL 98   CO2 23   *   BUN 57*   CREATININE 5.7*   CALCIUM 8.5*   PROT 7.0   ALBUMIN 2.6*    BILITOT 0.7   ALKPHOS 62   AST 43*   ALT 21   ANIONGAP 12   EGFRNONAA 6.3*     Urine Studies:   Recent Labs  Lab 04/04/17  0149   COLORU Yellow   APPEARANCEUA Clear   PHUR 7.0   SPECGRAV 1.020   PROTEINUA 2+*   GLUCUA Negative   KETONESU Negative   BILIRUBINUA Negative   OCCULTUA Negative   NITRITE Negative   UROBILINOGEN Negative   LEUKOCYTESUR Negative   RBCUA 0   WBCUA 1   BACTERIA None   SQUAMEPITHEL 1   HYALINECASTS 0       Significant Imaging: EKG: I have reviewed all pertinent results/findings within the past 24 hours and my personal findings are: sinus bradycardia with 1st-degree AV block; no acute ischemic changes  X-ray hips reviewed personally shows subcapital femoral neck fracture of the right femur

## 2017-04-04 NOTE — NURSING
Notified hospital team of patient c/o right sided jaw pain which has resolved Patient normal heart rate in the 40's current telemetry 37-39.  No new orders given continue to monitor.

## 2017-04-04 NOTE — ED TRIAGE NOTES
Pt states that she was at [facility] today and had stent put in to aid in circulation to legs. Pt was d/c after procedure and was at home. Pt was walking in her house and missed a step and fell injuring her right hip. Pt denies LOC and states she did not hit her head. Pt c/o right hip pain but denies numbness and tingling. Pt rates pain as 10 out of 10.

## 2017-04-04 NOTE — PLAN OF CARE
Plans for hemiarthroplasty on 4/5/17 pending medical clearance. Patient currently lives with her adult daughter (Maira Miranda) who works during the day. Patient's other daughter (Rafaela Champagne) is at the bedside. CM completed discharge assessment and planning with patient. Patient and family verbalized understanding. All questions and concerns addressed. SW and CM will continue to follow for any additional needs. Plan A to discharge home with home health as soon as medically stable. Plan B to discharge to SNF. Patient requested [facility] if SNF placement becomes her discharge plan.    PCP: Randy Lyles MD    Pharmacy:   [id number] [place] [place] [facility] [address]  [address]  [place] [address]  Phone: [phone] Fax: [phone]    [facility] - [place], [place] - [address]  [address]  [facility] [address]  Phone: [phone] Fax: [phone]    [facility] [place] - [address]  [address]  [place] [address]  Phone: [phone] Fax: [phone]    Payor: [employer] [facility] MEDICARE / Plan: [employer] [facility] MEDICARE / Product Type: Medicare Advantage /      04/04/17 0815   Discharge Assessment   Assessment Type Discharge Planning Assessment   Confirmed/corrected address and phone number on facesheet? Yes   Assessment information obtained from? Patient;Caregiver;Medical Record   Expected Length of Stay (days) 5   Communicated expected length of stay with patient/caregiver yes   Prior to hospitilization cognitive status: Alert/Oriented   Prior to hospitalization functional status: Assistive Equipment   Current cognitive status: Alert/Oriented   Current Functional Status: Assistive Equipment   Arrived From home or self-care   Lives With child(tania), adult  (daughter)   Able to Return to Prior Arrangements yes   Is patient able to care for self after discharge? Yes   How many people do you have in your home  that can help with your care after discharge? 2   Who are your caregiver(s) and their phone number(s)? daughter- Maira Miranda- [phone]; daughter- Rafaela Champagne [phone]   Patient's perception of discharge disposition home health   Readmission Within The Last 30 Days no previous admission in last 30 days   Patient currently being followed by outpatient case management? No   Patient currently receives home health services? No   Does the patient currently use HME? Yes   Patient currently receives private duty nursing? No   Patient currently receives any other outside agency services? No   Equipment Currently Used at Home cane, straight;wheelchair;shower chair;rollator;raised toilet   Do you have any problems affording any of your prescribed medications? No   Is the patient taking medications as prescribed? yes   Do you have any financial concerns preventing you from receiving the healthcare you need? No   Does the patient have transportation to healthcare appointments? Yes   Transportation Available family or friend will provide  (daughter)   On Dialysis? Yes   If yes, what is the name of the dialysis unit? [facility]   Does the patient receive outpatient dialysis? Yes   Does the patient receive services at the [facility]? No   Are there any open cases? No   Discharge Plan A Home Health;Home with family   Discharge Plan B Skilled Nursing Facility   Patient/Family In Agreement With Plan yes

## 2017-04-04 NOTE — ASSESSMENT & PLAN NOTE
"RCRI 3 points with 11% risk of perioperative MACE.  She is not capable of at least 4 METS activity.  She also has known 3-vessel disease with inducible multi-site WMA on DSE.  NSQIP risk calculator yields "Above Average" risk of Serious Complication at 23.5%, "Above Average" risk of Any Complication at 24.5%, "Above Average" risk of Cardiac Complication at 5.4%, and "Above Average" risk of Death at 21.4%.  Given her functional status and known cardiac history as well as present bradycardia, she would be at high risk of cardiovascular complications.  Cardiology consult planned for am to assist with risk factor management and further discussion of risk/benefit with proceeding with surgery.    "

## 2017-04-04 NOTE — ASSESSMENT & PLAN NOTE
Monitor on telemetry.  To resume anticoagulation after surgery, but due to evidence of poorer surgical outcomes with use of DOACs after orthopedic procedures will probably want to switch to coumadin post-op for DVT and stroke prophylaxis.

## 2017-04-04 NOTE — CONSULTS
Consult received. Patient seen examined and chart reviewed. Please see full consult note with recs to follow. Case discussed with Staff.   Octavio Gong MD  Nephrology Fellow PGY4  [phone]

## 2017-04-04 NOTE — H&P
[facility] Hospital Medicine  History & Physical    Patient Name: Padmini Miranda  MRN: [id number]  Admission Date: 4/3/2017  Attending Physician: Tahira Tomlnison MD   Primary Care Provider: Randy Lyles MD    [facility] Medicine Team: [facility] Merrill Mendoza MD     Patient information was obtained from patient, relative(s), past medical records and ER records.     Subjective:     Principal Problem:Closed displaced fracture of right femoral neck    Chief Complaint:   Chief Complaint   Patient presents with    right hip pain     pt presents to the ed with right hip pain after a fall         HPI: 85F presents to ED after fall at home.  She reports that she was stepping down a step into the den and missed the step, falling onto her right side.  She denies any dizziness or lightheadedness prior to falling.  She normally ambulates unassisted in the house but uses a walker when going out, such as to [employer].  She fell not long after getting home from angioplasty on her left leg done at [facility] for treatment of chronic lower extremity ischemia resulting in a heel ulcer.  When she presented for that scheduled procedure she was noted to have a heart rate in the 30s without any symptoms.  She underwent the procedure as planned after evaluation by a cardiology fellow with plan to hold her carvedilol and to see Dr. Antoine (EP) in clinic 4/5.  Dr. Antoine started her on amiodarone 3/10 after an implantable loop recorder captured frequent episodes of a-fib with RVR with HR up to the 160s.  She is anticoagulated with Eliquis, and her last dose was on Friday 3/31 in anticipation of the angiogram yesterday (4/3).  She states that prior to starting the amiodarone her ambulation was limited by dyspnea on exertion but that has since improved.  She endorses an episode of chest pressure during a recent dialysis session.  She is on dialysis T/Th/Sat with an access in her right femoral region.  She has  combined systolic and diastolic heart failure with EF recently improved to 50%, but was as low as 20% 2 years ago.  She has known 3-vessel CAD found on [facility] in  not amenable to PCI, which was discovered after an abnormal dobutamine stress echo showing inducible global hypokinesis.    Past Medical History:   Diagnosis Date    ALLERGIC RHINITIS     Anemia     Anticoagulant long-term use     Blood transfusion     Cardiomyopathy 10/20/2015    Cataract     CHF exacerbation 2015    Chronic kidney disease     Cramp of both lower extremities 2016    Hyperlipidemia     Hypertension     Hypothyroidism     Persistent atrial fibrillation 3/28/2016       Past Surgical History:   Procedure Laterality Date    ANGIOPLASTY      Renal PTA    CARDIAC CATHETERIZATION       SECTION      HYSTERECTOMY      ovaries intact    RENAL ARTERY STENT      TONSILLECTOMY      VASCULAR SURGERY         Review of patient's allergies indicates:   Allergen Reactions    Ativan [lorazepam] Hallucinations    Crab Other (See Comments)     Causes Gout    Crayfish Other (See Comments)     Causes Gout    Promethazine Other (See Comments)     Hallucinations        Current Facility-Administered Medications on File Prior to Encounter   Medication    [COMPLETED] cefazolin (ANCEF) 2 gram in dextrose 5% 50 mL IVPB (premix)    [DISCONTINUED] 0.9%  NaCl infusion    [DISCONTINUED] diphenhydrAMINE injection    [DISCONTINUED] fentaNYL 50 mcg/mL injection    [DISCONTINUED] heparin (porcine) injection    [DISCONTINUED] heparin (porcine) injection    [DISCONTINUED] hydrocodone-acetaminophen 5-325mg per tablet 1 tablet    [DISCONTINUED] hydrocortisone sodium succinate injection    [DISCONTINUED] lidocaine (PF) 10 mg/ml (1%) injection 10 mg    [DISCONTINUED] lidocaine (PF) 10 mg/ml (1%) injection 10 mg    [DISCONTINUED] lidocaine (PF) 10 mg/ml (1%) injection    [DISCONTINUED] metoclopramide HCl injection 10 mg     [DISCONTINUED] protamine injection    [DISCONTINUED] sodium chloride 0.9% flush 3 mL    [DISCONTINUED] visipaque 320 iodixanol     Current Outpatient Prescriptions on File Prior to Encounter   Medication Sig    amiodarone (PACERONE) 200 MG Tab Take 2 tabs by mouth twice a day for two weeks, then 2 tabs by mouth once a day for two weeks, then one tab by mouth each day.    aspirin 81 MG Chew Take 1 tablet (81 mg total) by mouth once daily. (Patient taking differently: Take 81 mg by mouth every morning. )    atorvastatin (LIPITOR) 40 MG tablet Take 1 tablet (40 mg total) by mouth once daily. (Patient taking differently: Take 40 mg by mouth every morning. )    ELIQUIS 2.5 mg Tab TAKE 1 BY MOUTH TWO TIMES  DAILY    epoetin syed (PROCRIT) 10,000 unit/mL injection Inject 0.28 mLs (2,800 Units total) into the skin every Tues, Thurs, Sat.    isosorbide-hydrALAZINE 20-37.5 mg (BIDIL) 20-37.5 mg Tab Take 1 tablet by mouth 3 (three) times daily.    multivitamin (ONE DAILY MULTIVITAMIN) per tablet Take 1 tablet by mouth every morning.    nitroGLYCERIN (NITROSTAT) 0.4 MG SL tablet Place 1 tablet (0.4 mg total) under the tongue every 5 (five) minutes as needed for Chest pain.    PROTONIX 40 mg tablet TAKE 1 BY MOUTH DAILY (Patient taking differently: TAKE 1 TABLET  BY MOUTH  DAILY)    RENVELA 0.8 gram PwPk     SYNTHROID 50 mcg tablet TAKE 1 BY MOUTH DAILY      BEFORE BREAKFAST     Family History     Problem Relation (Age of Onset)    Heart disease Father        Social History Main Topics    Smoking status: Never Smoker    Smokeless tobacco: Never Used    Alcohol use Yes      Comment: occasional wine use    Drug use: No    Sexual activity: No     Review of Systems   Constitutional: Negative for chills, diaphoresis and fever.   HENT: Negative for nosebleeds and trouble swallowing.    Eyes: Negative for photophobia and visual disturbance.   Respiratory: Negative for cough and shortness of breath.    Cardiovascular:  Positive for chest pain. Negative for palpitations and leg swelling.   Gastrointestinal: Negative for abdominal pain, constipation, nausea and vomiting.   Endocrine: Negative for polydipsia and polyuria.   Genitourinary: Negative for dysuria and hematuria.   Musculoskeletal: Negative for arthralgias and myalgias.   Skin: Positive for wound (left heel). Negative for color change.   Neurological: Negative for dizziness, syncope and light-headedness.     Objective:     Vital Signs (Most Recent):  Temp: 98 °F (36.7 °C) (04/03/17 2019)  Pulse: (!) 45 (04/04/17 0330)  Resp: 18 (04/03/17 2019)  BP: (!) 162/75 (04/04/17 0330)  SpO2: (!) 91 % (04/04/17 0330) Vital Signs (24h Range):  Temp:  [97 °F (36.1 °C)-98 °F (36.7 °C)] 98 °F (36.7 °C)  Pulse:  [] 45  Resp:  [14-18] 18  SpO2:  [9 %-98 %] 91 %  BP: (140-179)/(36-78) 162/75     Weight: 54.4 kg (120 lb)  Body mass index is 21.95 kg/(m^2).    Physical Exam   Constitutional: She is oriented to person, place, and time. She appears well-developed. She appears distressed (in pain).   HENT:   Head: Normocephalic and atraumatic.   Mouth/Throat: Oropharynx is clear and moist.   Eyes: Conjunctivae and EOM are normal. Pupils are equal, round, and reactive to light. No scleral icterus.   Neck: Normal range of motion. Neck supple.   Cardiovascular: Regular rhythm.  Bradycardia present.    Murmur heard.   Crescendo decrescendo systolic murmur is present with a grade of 3/6   Pulmonary/Chest: Effort normal and breath sounds normal.   Abdominal: Soft. Bowel sounds are normal. She exhibits no distension. There is no tenderness. There is no guarding.   Musculoskeletal: She exhibits deformity.   RLE shortened and externally rotated   Lymphadenopathy:     She has no cervical adenopathy.   Neurological: She is alert and oriented to person, place, and time. No cranial nerve deficit.   Skin: Skin is warm and dry. No erythema.   Small ulcer L heel, covered with bandage   Nursing note and  vitals reviewed.       Significant Labs:   CBC:   Recent Labs  Lab 04/04/17  0002   WBC 6.73   HGB 11.2*   HCT 33.7*        CMP:   Recent Labs  Lab 04/04/17  0002   *   K 5.9*   CL 98   CO2 23   *   BUN 57*   CREATININE 5.7*   CALCIUM 8.5*   PROT 7.0   ALBUMIN 2.6*   BILITOT 0.7   ALKPHOS 62   AST 43*   ALT 21   ANIONGAP 12   EGFRNONAA 6.3*     Urine Studies:   Recent Labs  Lab 04/04/17  0149   COLORU Yellow   APPEARANCEUA Clear   PHUR 7.0   SPECGRAV 1.020   PROTEINUA 2+*   GLUCUA Negative   KETONESU Negative   BILIRUBINUA Negative   OCCULTUA Negative   NITRITE Negative   UROBILINOGEN Negative   LEUKOCYTESUR Negative   RBCUA 0   WBCUA 1   BACTERIA None   SQUAMEPITHEL 1   HYALINECASTS 0       Significant Imaging: EKG: I have reviewed all pertinent results/findings within the past 24 hours and my personal findings are: sinus bradycardia with 1st-degree AV block; no acute ischemic changes  X-ray hips reviewed personally shows subcapital femoral neck fracture of the right femur    Assessment/Plan:     * Closed displaced fracture of right femoral neck  Counseled patient and family that proceeding with surgery only option to try to preserve mobility despite elevated risk of cardiovascular complications.  Will likely require hemiarthroplasty for repair.  Per Orthopedic Surgery resident will likely aim for OR in afternoon.  Keep NPO.  SCDs only for pre-op DVT prophylaxis unless further postponement of surgery anticipated, in which case can start sq heparin.  Patient and family counseled on expected post-op hospital LOS and likely discharge to a SNF for 1-2 more weeks of PT until being able to go home.      Acquired hypothyroidism  Continue levothyroxine      Renovascular hypertension  On bidil and carvedilol.  Continue bidil, will decrease carvedilol to 3.125 given bradycardia and need to continue beta blocker perioperatively.      Systolic and diastolic CHF, chronic  Recently improved EF to 50%.  Avoid  "intraoperative IVF unless patient becomes hypotensive.  Continue bidil and carvedilol.  Presently without signs of acute decompensation.      ESRD on hemodialysis  On HD T/Th/Sat; will consult Nephrology to continue dialysis.  She will be due for dialysis today; will need to schedule around surgery.  Her dialysis access is in the right femoral region which adds another complicating factor to her need for right hemiarthoplasty.        Atherosclerotic PVD with ulceration  S/p balloon angioplasty yesterday with apparent improvement in distal perfusion.  Continue local wound care for heel ulcer.        PAF (paroxysmal atrial fibrillation)  Monitor on telemetry.  To resume anticoagulation after surgery, but due to evidence of poorer surgical outcomes with use of DOACs after orthopedic procedures will probably want to switch to coumadin post-op for DVT and stroke prophylaxis.      3-vessel CAD  Without angina presently but at risk of myocardial ischemia intra-operatively.  Continue ASA and BB perioperatively.  Consider trending troponins post-operatively given high risk.  Not amenable to PCI per [facility] 2015 and unlikely to benefit from surgical revascularization either.      Preoperative cardiovascular examination  RCRI 3 points with 11% risk of perioperative MACE.  She is not capable of at least 4 METS activity.  She also has known 3-vessel disease with inducible multi-site WMA on DSE.  NSQIP risk calculator yields "Above Average" risk of Serious Complication at 23.5%, "Above Average" risk of Any Complication at 24.5%, "Above Average" risk of Cardiac Complication at 5.4%, and "Above Average" risk of Death at 21.4%.  Given her functional status and known cardiac history as well as present bradycardia, she would be at high risk of cardiovascular complications.  Cardiology consult planned for am to assist with risk factor management and further discussion of risk/benefit with proceeding with surgery.      Bradycardia, drug " induced  Likely secondary to amiodarone plus carvedilol.  She is presently asymptomatic and has sinus bradycardia, so will need further guidance from Cardiology prior to proceeding to surgery regarding management intraoperatively.  For now will hold amiodarone and continue carvedilol at reduced dose of 3.125mg bid.  DO NOT HOLD DAY OF SURGERY!      VTE Risk Mitigation         Ordered     Place sequential compression device  Until discontinued      04/04/17 0145     Reason for No Pharmacological VTE Prophylaxis  Once      04/04/17 0145     High Risk of VTE  Once      04/04/17 0145        Merrill Mendoza MD  Department of Hospital Medicine   [facility]

## 2017-04-04 NOTE — ASSESSMENT & PLAN NOTE
Recently improved EF to 50%.  Avoid intraoperative IVF unless patient becomes hypotensive.  Continue bidil and carvedilol.  Presently without signs of acute decompensation.

## 2017-04-04 NOTE — CONSULTS
[facility]  Cardiology Consult Note    Attending Physician: Tahira Tomlinson MD  Reason for Consult: Sinus bradycardia    HPI:     Ms. Miranda is an 84 yo F with a history of 3v CAD not amenable to PCI, ICM (EF 50%), moderate AS (JUANJO 1.04, PV 3, MG 22), PAF on apixaban and amiodarone, who presented with mechanical fall and found to have right femoral neck fracture.  EP consulted for sinus bradycardia with rates in the 30s.    She has a history of PAF and was amiodarone, carvedilol 6.25 BID and apixaban for antiocoagulation.    She underwent a fistulogram yesterday and was found to be bradycardic with heart rates in the 30s and 40s.  Cardiology was called and recommended stopping her BB with EP follow up scheduled for tomorrow (4/5).    She presented today with a mechanical fall (she did not lose consciousness) and was again found to be bradycardic. She denies any syncope, lightheadedness or dizziness.    Review of Systems   Review of Systems   Constitution: Negative for chills, fever and weakness.   HENT: Negative for headaches, hearing loss and hoarse voice.    Eyes: Negative for double vision, pain and photophobia.   Cardiovascular: Negative for near-syncope, orthopnea, palpitations and syncope.   Skin: Negative for dry skin, flushing and itching.   Musculoskeletal: Positive for falls.   Gastrointestinal: Negative for abdominal pain, constipation and diarrhea.   Genitourinary: Negative for dysuria, flank pain and frequency.   Neurological: Negative for dizziness, focal weakness and light-headedness.         PMH:     Past Medical History:   Diagnosis Date    ALLERGIC RHINITIS     Anemia     Anticoagulant long-term use     Blood transfusion     Cardiomyopathy 10/20/2015    Cataract     CHF exacerbation 1/23/2015    Chronic kidney disease     Cramp of both lower extremities 12/30/2016    Hyperlipidemia     Hypertension     Hypothyroidism     Persistent atrial fibrillation 3/28/2016      Past Surgical History:   Procedure Laterality Date    ANGIOPLASTY      Renal PTA    CARDIAC CATHETERIZATION       SECTION      HYSTERECTOMY      ovaries intact    RENAL ARTERY STENT      TONSILLECTOMY      VASCULAR SURGERY          Allergies:     Review of patient's allergies indicates:   Allergen Reactions    Ativan [lorazepam] Hallucinations    Crab Other (See Comments)     Causes Gout    Crayfish Other (See Comments)     Causes Gout    Promethazine Other (See Comments)     Hallucinations         Medications:     Current Facility-Administered Medications on File Prior to Encounter   Medication Dose Route Frequency Provider Last Rate Last Dose    [COMPLETED] cefazolin (ANCEF) 2 gram in dextrose 5% 50 mL IVPB (premix)  2 g Intravenous On Call Procedure Yun Wright MD   Stopped at 17 1630    [DISCONTINUED] 0.9%  NaCl infusion   Intravenous Continuous W RENALDO Aranda III, MD   Stopped at 17 1630    [DISCONTINUED] diphenhydrAMINE injection    PRN Varghese Cuevas MD   25 mg at 17 1151    [DISCONTINUED] fentaNYL 50 mcg/mL injection    PRN Varghese Cuevas MD   25 mcg at 17 1143    [DISCONTINUED] heparin (porcine) injection    PRN Varghese Cuevas MD   5,000 Units at 17 1203    [DISCONTINUED] hydrocodone-acetaminophen 5-325mg per tablet 1 tablet  1 tablet Oral Q4H PRN Ele Mehta MD        [DISCONTINUED] hydrocortisone sodium succinate injection    PRN Varghese Cuevas MD   100 mg at 17 1150    [DISCONTINUED] lidocaine (PF) 10 mg/ml (1%) injection 10 mg  1 mL Intradermal Once Yun Wright MD        [DISCONTINUED] lidocaine (PF) 10 mg/ml (1%) injection 10 mg  1 mL Intradermal Once RUBY Aranda III, MD        [DISCONTINUED] metoclopramide HCl injection 10 mg  10 mg Intravenous Q10 Min PRN Varghese Cuevas MD        [DISCONTINUED] protamine injection    PRN Varghese Cuevas MD   40 mg at 17  1251    [DISCONTINUED] sodium chloride 0.9% flush 3 mL  3 mL Intravenous PRN Varghese Cuevas MD         Current Outpatient Prescriptions on File Prior to Encounter   Medication Sig Dispense Refill    amiodarone (PACERONE) 200 MG Tab Take 2 tabs by mouth twice a day for two weeks, then 2 tabs by mouth once a day for two weeks, then one tab by mouth each day. 180 tablet 3    aspirin 81 MG Chew Take 1 tablet (81 mg total) by mouth once daily. (Patient taking differently: Take 81 mg by mouth every morning. ) 30 tablet 3    atorvastatin (LIPITOR) 40 MG tablet Take 1 tablet (40 mg total) by mouth once daily. (Patient taking differently: Take 40 mg by mouth every morning. ) 30 tablet 11    ELIQUIS 2.5 mg Tab TAKE 1 BY MOUTH TWO TIMES  DAILY 180 tablet 3    epoetin syed (PROCRIT) 10,000 unit/mL injection Inject 0.28 mLs (2,800 Units total) into the skin every Tues, Thurs, Sat. 0.28 mL 30    isosorbide-hydrALAZINE 20-37.5 mg (BIDIL) 20-37.5 mg Tab Take 1 tablet by mouth 3 (three) times daily. 90 tablet 2    multivitamin (ONE DAILY MULTIVITAMIN) per tablet Take 1 tablet by mouth every morning.      nitroGLYCERIN (NITROSTAT) 0.4 MG SL tablet Place 1 tablet (0.4 mg total) under the tongue every 5 (five) minutes as needed for Chest pain. 25 tablet 5    PROTONIX 40 mg tablet TAKE 1 BY MOUTH DAILY (Patient taking differently: TAKE 1 TABLET  BY MOUTH  DAILY) 30 tablet 5    RENVELA 0.8 gram PwPk       SYNTHROID 50 mcg tablet TAKE 1 BY MOUTH DAILY      BEFORE BREAKFAST 90 tablet 2        Social History:     Social History   Substance Use Topics    Smoking status: Never Smoker    Smokeless tobacco: Never Used    Alcohol use Yes      Comment: occasional wine use        Family History:     Family History   Problem Relation Age of Onset    Adopted: Yes    Heart disease Father         Physical Exam:     Vitals:  Temp:  [96.4 °F (35.8 °C)-98 °F (36.7 °C)]   Pulse:  [33-52]   Resp:  [14-18]   BP: (106-179)/(36-78)    SpO2:  [9 %-99 %]   I/O's:  No intake or output data in the 24 hours ending 04/04/17 1327     Physical Exam   Constitutional: She is oriented to person, place, and time. She appears well-developed and well-nourished.   HENT:   Head: Normocephalic and atraumatic.   Eyes: Conjunctivae and EOM are normal. Pupils are equal, round, and reactive to light.   Neck: Normal range of motion. Neck supple. No JVD present.   Cardiovascular: Regular rhythm.  Exam reveals no gallop and no friction rub.    No murmur heard.  Bradycardic   Pulmonary/Chest: Effort normal and breath sounds normal. No respiratory distress. She has no wheezes. She has no rales.   Abdominal: Soft. Bowel sounds are normal. She exhibits no distension. There is no tenderness.   Musculoskeletal: Normal range of motion. She exhibits no edema.   Neurological: She is alert and oriented to person, place, and time. No cranial nerve deficit.   Skin: Skin is warm and dry. No rash noted. No erythema.         Labs:     Recent Results (from the past 336 hour(s))   CBC auto differential    Collection Time: 04/04/17 12:02 AM   Result Value Ref Range    WBC 6.73 3.90 - 12.70 K/uL    Hemoglobin 11.2 (L) 12.0 - 16.0 g/dL    Hematocrit 33.7 (L) 37.0 - 48.5 %    Platelets 209 150 - 350 K/uL       Recent Results (from the past 336 hour(s))   Basic metabolic panel    Collection Time: 03/22/17 11:01 AM   Result Value Ref Range    Sodium 135 (L) 136 - 145 mmol/L    Potassium 4.5 3.5 - 5.1 mmol/L    Chloride 98 95 - 110 mmol/L    CO2 25 23 - 29 mmol/L    BUN, Bld 47 (H) 8 - 23 mg/dL    Creatinine 4.2 (H) 0.5 - 1.4 mg/dL    Calcium 8.6 (L) 8.7 - 10.5 mg/dL    Anion Gap 12 8 - 16 mmol/L       Lab Results   Component Value Date    CHOL 193 09/16/2014    HDL 67 09/16/2014    LDLCALC 105.2 09/16/2014    TRIG 104 09/16/2014       No results for input(s): TROPONINI, CPKMB, CPK, MB in the last 168 hours.    No results found for: HGBA1C    Estimated Creatinine Clearance: 5.7 mL/min (based  on Cr of 5.7).    Echo 1/17:    CONCLUSIONS     1 - Eccentric hypertrophy.     2 - Low normal to mildly depressed left ventricular systolic function (EF 50-55%).     3 - Right ventricular enlargement with hypertrophy, with normal systolic function.     4 - Left ventricular diastolic dysfunction.     5 - Biatrial enlargement.     6 - Mild mitral regurgitation.     7 - Mild tricuspid regurgitation.     8 - Moderate aortic stenosis, JUANJO = 1.04 cm2, peak velocity = 3.0 m/s, mean gradient = 22 mmHg.     9 - Pulmonary hypertension. The estimated PA systolic pressure is 43 mmHg.     10 - Mildly elevated central venous pressure.     EKG: Sinus bradycardia with a rate of 46.  Date: 4/3/17    Telemetry:   Telemetry currently shows: Sinus bradycardia with rates in the 30s.    Assessment & Recommendations:   In summary, Ms. Miranda is an 84 yo F with a history of 3v CAD not amenable to PCI, ICM (EF 50%), moderate AS (JUANJO 1.04, PV 3, MG 22), PAF on apixaban and amiodarone, who presented with mechanical fall and found to have right femoral neck fracture.  EP consulted for sinus bradycardia with rates in the 40s.    #Sinus bradycardia: Profound sinus bradycardia with rates in the 30s-40s.  Pt is asymptomatic. She had been on amiodarone and carvedilol as an outpatient.  Carvedilol stopped on 4/3 after pt was noted to be bradycardic during fistula procedure.    - Suspect carvedilol may continue to wash out and HR may improve, but pt will likely need PPM placement.  Timing dependent on HR improvement.  OK to proceed with surgery tomorrow from EP standpoint.  B agonists (dobutamine, levophed) or atropine can be used intraoperatively if needed.  - Will continue to follow to determine whether pacemaker is needed prior to discharge.    Thank you for this consult. Further recommendations are pending the attending addendum. Please do not hesitate to page with questions.     Signed:  Tatiana Christopher MD, MPH  Cardiology Fellow,  PGY-[id number]  Pager: [phone]  4/4/2017 1:27 PM    Attending Addendum:

## 2017-04-04 NOTE — CONSULTS
Orthopaedic Surgery  Consult Note      Padmini Wrightoughby  2017    HPI:    CC: Right Femoral Neck fracture    Patient is a 85 y.o. female with PMHx significant for  PVD s/p LLE cannulization on 4/3 w/ heparin infusion presents with right hip pain following mechanical fall from standing height. Patient is s/p right sided pubic rami fracture in  managed non-operatively.       Patient has significant bradycardia that is currently asymptomatic, at time of consultation they recommended holding B-blocker and obtaining outpatient follow up for possible pacemaker placement.    On Eliquis - Last dose Friday  Currently on dialysis T/R/S, makes some urine  Ambulates at baseline with a walker.   Patient manages majority medical decisions  Lives with grandson, ADL's intact    Prior TL: 3/16 TL's were 0.61 on the right and 0.51 on the Left.    Past Medical History:   Diagnosis Date    ALLERGIC RHINITIS     Anemia     Anticoagulant long-term use     Blood transfusion     Cardiomyopathy 10/20/2015    Cataract     CHF exacerbation 2015    Chronic kidney disease     Cramp of both lower extremities 2016    Hyperlipidemia     Hypertension     Hypothyroidism     Persistent atrial fibrillation 3/28/2016     Past Surgical History:   Procedure Laterality Date    ANGIOPLASTY      Renal PTA    CARDIAC CATHETERIZATION       SECTION      HYSTERECTOMY      ovaries intact    RENAL ARTERY STENT      TONSILLECTOMY      VASCULAR SURGERY       Family History   Problem Relation Age of Onset    Adopted: Yes    Heart disease Father      Social History     Social History    Marital status:      Spouse name: N/A    Number of children: N/A    Years of education: N/A     Occupational History    Not on file.     Social History Main Topics    Smoking status: Never Smoker    Smokeless tobacco: Never Used    Alcohol use Yes      Comment: occasional wine use    Drug use: No    Sexual activity:  No     Other Topics Concern    Not on file     Social History Narrative     Current Facility-Administered Medications on File Prior to Encounter   Medication    [COMPLETED] cefazolin (ANCEF) 2 gram in dextrose 5% 50 mL IVPB (premix)    [DISCONTINUED] 0.9%  NaCl infusion    [DISCONTINUED] diphenhydrAMINE injection    [DISCONTINUED] fentaNYL 50 mcg/mL injection    [DISCONTINUED] heparin (porcine) injection    [DISCONTINUED] heparin (porcine) injection    [DISCONTINUED] hydrocodone-acetaminophen 5-325mg per tablet 1 tablet    [DISCONTINUED] hydrocortisone sodium succinate injection    [DISCONTINUED] lidocaine (PF) 10 mg/ml (1%) injection 10 mg    [DISCONTINUED] lidocaine (PF) 10 mg/ml (1%) injection 10 mg    [DISCONTINUED] lidocaine (PF) 10 mg/ml (1%) injection    [DISCONTINUED] metoclopramide HCl injection 10 mg    [DISCONTINUED] protamine injection    [DISCONTINUED] sodium chloride 0.9% flush 3 mL    [DISCONTINUED] visipaque 320 iodixanol     Current Outpatient Prescriptions on File Prior to Encounter   Medication Sig    amiodarone (PACERONE) 200 MG Tab Take 2 tabs by mouth twice a day for two weeks, then 2 tabs by mouth once a day for two weeks, then one tab by mouth each day.    aspirin 81 MG Chew Take 1 tablet (81 mg total) by mouth once daily. (Patient taking differently: Take 81 mg by mouth every morning. )    atorvastatin (LIPITOR) 40 MG tablet Take 1 tablet (40 mg total) by mouth once daily. (Patient taking differently: Take 40 mg by mouth every morning. )    ELIQUIS 2.5 mg Tab TAKE 1 BY MOUTH TWO TIMES  DAILY    epoetin syed (PROCRIT) 10,000 unit/mL injection Inject 0.28 mLs (2,800 Units total) into the skin every Tues, Thurs, Sat.    isosorbide-hydrALAZINE 20-37.5 mg (BIDIL) 20-37.5 mg Tab Take 1 tablet by mouth 3 (three) times daily.    multivitamin (ONE DAILY MULTIVITAMIN) per tablet Take 1 tablet by mouth every morning.    nitroGLYCERIN (NITROSTAT) 0.4 MG SL tablet Place 1 tablet  (0.4 mg total) under the tongue every 5 (five) minutes as needed for Chest pain.    PROTONIX 40 mg tablet TAKE 1 BY MOUTH DAILY (Patient taking differently: TAKE 1 TABLET  BY MOUTH  DAILY)    RENVELA 0.8 gram PwPk     SYNTHROID 50 mcg tablet TAKE 1 BY MOUTH DAILY      BEFORE BREAKFAST       Review of Systems:    Patient denies constitutional symptoms, cardiac symptoms, respiratory symptoms, GI symptoms, psychiatric symptoms, endocrine related symptoms.  The remainder of the musculoskeletal ROS is included in the HPI.      Physical Exam:    Temp:  [97 °F (36.1 °C)-98 °F (36.7 °C)] 98 °F (36.7 °C)  Pulse:  [] 44  Resp:  [14-18] 18  SpO2:  [9 %-98 %] 95 %  BP: (140-179)/(36-78) 149/67    Gen:  No acute distress  Head:  Normocephalic.  Atraumatic.  Neuro / Spine:  Intact / No CTL Spine TTP  Lungs:  Normal respiratory effort.  Abdomen:  Soft, non-tender, non-distended  Pelvis: Stable to direct anterior pressure, G1 decubitus ulcer present  MSK:    RLE:  Dressing at the right groin - C/D/I  Minimal shortening with ER.  Chronic vascular trophic changes about the distal leg and foot with GT amputation.  TTP about the hip  +ve log roll  Motor intact distal  SILT intact  Non-palpable pulses distal pulses    LLE:  2+ pitting edema from knee distal  Minimal ulceration about the heel and lateral small toe  Non-palpable pulses  SILT intact  Diagnostic Results:      XR of the pelvis demonstrates displaced transcervical femoral neck fracture with shortening and varus. Old pubic rami fracture visualized. Prior films obtained in 2015 - further disruption of the the right hemipelvis noted.      A/P:  85 y.o. female with right femoral neck fracture    --Admit to medicine hip fracture service for pre-operative clearance and medical evaluation  --To OR today versus tomorrow for right hemiarthroplasty  --Consents obtained from patient  --Pre-operative labs and imaging obtained in ED (UA, Type and Cross, PT-INR, CBC, BMP,  PreAlbumin, CXR, EKG)   --Bed rest, hollins, NPO pending discussion with medicine and vascular surgery regarding clearance for 4/4 procedure    --Will obtain dialysis prior to surgical intervention - will discuss with nephrology in AM (scheduled Tuesday)  --CT scan of pelvis for further evaluation of pelvic ring given interval change from prior XR in 2015      Risks and complications were discussed including but not limited to the risks of anesthetic complications, infection, wound healing complications, non-union, mal-union, hardware failure, pain, stiffness, DVT, pulmonary embolism, perioperative medical risks (cardiac, pulmonary, renal, neurologic), and death among others were discussed. No guarantees were made and the patient and family elect to proceed. They fully understand the reported 30% mortality risk within the first year of surgery.       Samy Pinzon MD  Orthopaedic Surgery Resident

## 2017-04-04 NOTE — ASSESSMENT & PLAN NOTE
S/p balloon angioplasty yesterday with apparent improvement in distal perfusion.  Continue local wound care for heel ulcer.

## 2017-04-04 NOTE — PROGRESS NOTES
Orthopaedic Surgery  Progress Note      Subjective:  Patient seen at bedside  Pain Controlled  Denies N/V  Denies focal motor / neuro deficits      Objective:  Temp:  [97 °F (36.1 °C)-98 °F (36.7 °C)] 98 °F (36.7 °C)  Pulse:  [] 45  Resp:  [14-18] 18  SpO2:  [9 %-98 %] 91 %  BP: (140-179)/(36-78) 162/75    PE:    AA&O x 4.  NAD  HEENT:  NCAT, sclera nonicteric  Lungs:  Respirations are equal and unlabored.  CV:  2+ bilateral upper and lower extremity pulses.  Skin:  Intact throughout.    MS -    Motor intact to RLE  SILT throughout  Distal pulses 2+      A/P: 85 y.o. female with right femoral neck fracture    Pelvic Ring injury healed on CT scan  Discussed case with vascular surgery who have recommended deferring operative intervention in until tomorrow - Have agreed to follow and update recs as needed.    Ok for diet this AM  Bed Rest  Ny    Dispo: Plan for hemiarthroplasty tomorrow. Continue medical optimization in interim.     Attg Note:  I agree with the above assessment and plan.    Jeff Barrios MD

## 2017-04-04 NOTE — ANESTHESIA PREPROCEDURE EVALUATION
04/04/2017  Pre-operative evaluation for Procedure(s) (LRB):  ARTHROPLASTY-HIP-RAFFAELE-RIGHT-ASAEL-PEG BOARD (Right)    Padmini Miranda is a 85 y.o. female hx of ESRD (HD TTS has not had any issues, CHF (EF 50%, diastolic dysfunction), PASP 43, moderate AS (JUANJO 1.04), pafib, CAD, asymptomatic bradycardia who presents for above surgery after a fall.      Last dose of Eliquis was Friday    20g PIV x2    Patient Active Problem List   Diagnosis    Acquired hypothyroidism    Chronic allergic rhinitis    Hyperlipidemia    Cataract    Anemia in chronic renal disease    TIMOTEO (renal artery stenosis)    Renovascular hypertension    Non-ST elevation MI (NSTEMI)    Systolic and diastolic CHF, chronic    ESRD on hemodialysis    Ischemic cardiomyopathy    Pulmonary edema    Dyspnea    Persistent atrial fibrillation    Atherosclerotic PVD with ulceration    Peripheral vascular disease    Toe ulcer, right    Pulmonary HTN    Pleural effusion, right    Recurrent left pleural effusion    Cramp of both lower extremities    PAF (paroxysmal atrial fibrillation)    Stenosis of arteriovenous dialysis fistula    PAD (peripheral artery disease)    Critical lower limb ischemia    Closed displaced fracture of right femoral neck    3-vessel CAD    Preoperative cardiovascular examination    Bradycardia, drug induced       Review of patient's allergies indicates:   Allergen Reactions    Ativan [lorazepam] Hallucinations    Crab Other (See Comments)     Causes Gout    Crayfish Other (See Comments)     Causes Gout    Promethazine Other (See Comments)     Hallucinations        No current facility-administered medications on file prior to encounter.      Current Outpatient Prescriptions on File Prior to Encounter   Medication Sig Dispense Refill    amiodarone (PACERONE) 200 MG Tab Take 2 tabs by mouth twice  a day for two weeks, then 2 tabs by mouth once a day for two weeks, then one tab by mouth each day. 180 tablet 3    aspirin 81 MG Chew Take 1 tablet (81 mg total) by mouth once daily. (Patient taking differently: Take 81 mg by mouth every morning. ) 30 tablet 3    atorvastatin (LIPITOR) 40 MG tablet Take 1 tablet (40 mg total) by mouth once daily. (Patient taking differently: Take 40 mg by mouth every morning. ) 30 tablet 11    ELIQUIS 2.5 mg Tab TAKE 1 BY MOUTH TWO TIMES  DAILY 180 tablet 3    epoetin syed (PROCRIT) 10,000 unit/mL injection Inject 0.28 mLs (2,800 Units total) into the skin every es, Th, Sat. 0.28 mL 30    isosorbide-hydrALAZINE 20-37.5 mg (BIDIL) 20-37.5 mg Tab Take 1 tablet by mouth 3 (three) times daily. 90 tablet 2    multivitamin (ONE DAILY MULTIVITAMIN) per tablet Take 1 tablet by mouth every morning.      nitroGLYCERIN (NITROSTAT) 0.4 MG SL tablet Place 1 tablet (0.4 mg total) under the tongue every 5 (five) minutes as needed for Chest pain. 25 tablet 5    PROTONIX 40 mg tablet TAKE 1 BY MOUTH DAILY (Patient taking differently: TAKE 1 TABLET  BY MOUTH  DAILY) 30 tablet 5    RENVELA 0.8 gram PwPk       SYNTHROID 50 mcg tablet TAKE 1 BY MOUTH DAILY      BEFORE BREAKFAST 90 tablet 2       Past Surgical History:   Procedure Laterality Date    ANGIOPLASTY      Renal PTA    CARDIAC CATHETERIZATION       SECTION      HYSTERECTOMY      ovaries intact    RENAL ARTERY STENT      TONSILLECTOMY      VASCULAR SURGERY         Social History     Social History    Marital status:      Spouse name: N/A    Number of children: N/A    Years of education: N/A     Occupational History    Not on file.     Social History Main Topics    Smoking status: Never Smoker    Smokeless tobacco: Never Used    Alcohol use Yes      Comment: occasional wine use    Drug use: No    Sexual activity: No     Other Topics Concern    Not on file     Social History Narrative         Vital  Signs Range (Last 24H):  Temp:  [36.1 °C (97 °F)-36.7 °C (98 °F)]   Pulse:  []   Resp:  [14-18]   BP: (140-179)/(36-78)   SpO2:  [9 %-98 %]       CBC:   Recent Labs      04/04/17   0002   WBC  6.73   RBC  4.15   HGB  11.2*   HCT  33.7*   PLT  209   MCV  81*   MCH  27.0   MCHC  33.2       CMP:   Recent Labs      04/04/17   0002   NA  133*   K  5.9*   CL  98   CO2  23   BUN  57*   CREATININE  5.7*   GLU  122*   CALCIUM  8.5*   ALBUMIN  2.6*   PROT  7.0   ALKPHOS  62   ALT  21   AST  43*   BILITOT  0.7       INR  Recent Labs      04/04/17   0002   INR  1.2   APTT  26.3           Diagnostic Studies:  CXR: Implanted recorder device projects over the cardiomediastinal silhouette below the aortic arch.    The cardiomediastinal silhouette is enlarged with pulmonary vessel congestion. Dolichoectatic aorta with atherosclerotic calcification, similar to prior. There are small bibasilar effusions.  The trachea is shifted slightly to the right adjacent to the ectatic aortic arch, unchanged.  The lungs are symmetrically expanded bilaterally with bilateral edema.  There is no pneumothorax.  The osseous structures appear unchanged.    EKG:  Atrial fibrillation with premature ventricular or aberrantly conducted  complexes  Minimal voltage criteria for LVH, may be normal variant  ST and T wave abnormality, consider inferior ischemia  ST and T wave abnormality, consider anterolateral ischemia  Abnormal ECG  When compared with ECG of 28-DEC-2016 18:33,  No significant change was found  Confirmed by Thiago Baker MD ([id number]) on 12/29/2016 1:45:45 PM    2D Echo:   1 - Eccentric hypertrophy.     2 - Low normal to mildly depressed left ventricular systolic function (EF 50-55%).     3 - Right ventricular enlargement with hypertrophy, with normal systolic function.     4 - Left ventricular diastolic dysfunction.     5 - Biatrial enlargement.     6 - Mild mitral regurgitation.     7 - Mild tricuspid regurgitation.     8 - Moderate aortic  stenosis, JUANJO = 1.04 cm2, peak velocity = 3.0 m/s, mean gradient = 22 mmHg.     9 - Pulmonary hypertension. The estimated PA systolic pressure is 43 mmHg.     10 - Mildly elevated central venous pressure.       [facility] Anesthesia Evaluation    I have reviewed the Patient Summary Reports.    I have reviewed the Nursing Notes.   I have reviewed the Medications.     Review of Systems  Anesthesia Hx:  No problems with previous Anesthesia  History of prior surgery of interest to airway management or planning: Denies Family Hx of Anesthesia complications.   Denies Personal Hx of Anesthesia complications.   Social:  Non-Smoker, No Alcohol Use    Cardiovascular:   Hypertension CHF Echo done 3 months ago shows normal RV and LV function, moderate AS and moderate pulmonary HTN   Pulmonary:   Denies COPD.  Denies Asthma. Shortness of breath  Denies Sleep Apnea.    Renal/:   Chronic Renal Disease, Dialysis, ESRD    Hepatic/GI:   Denies GERD. Denies Liver Disease.    Neurological:  Neurology Normal    Endocrine:   Denies Diabetes. Hypothyroidism        Physical Exam  General:  Well nourished    Airway/Jaw/Neck:  Airway Findings: Mouth Opening: Normal Tongue: Normal  General Airway Assessment: Good  Mallampati: I  TM Distance: Normal, at least 6 cm  Jaw/Neck Findings:  Neck ROM: Normal ROM      Dental:  Dental Findings: Edentulous   Chest/Lungs:  Chest/Lungs Findings: Clear to auscultation, Normal Respiratory Rate     Heart/Vascular:  Heart Findings: Rate: Normal  Rhythm: Regular Rhythm  Heart Murmur  Systolic, Diastolic  Systolic Heart Murmur Description: L Upper Sternal Border  Systolic Heart Murmur Grade: Grade III  Diastolic Heart Murmur Grade II        Mental Status:  Mental Status Findings:  Cooperative, Alert and Oriented         Anesthesia Plan  Type of Anesthesia, risks & benefits discussed:  Anesthesia Type:  MAC, general, regional  Patient's Preference:   Intra-op Monitoring Plan: standard ASA monitors and arterial  line  Intra-op Monitoring Plan Comments:   Post Op Pain Control Plan:   Post Op Pain Control Plan Comments:   Induction:   IV  Beta Blocker:  Patient is on a Beta-Blocker and has received one dose within the past 24 hours (No further documentation required).       Informed Consent: Patient understands risks and agrees with Anesthesia plan.  Questions answered. Anesthesia consent signed with patient.  ASA Score: 3     Day of Surgery Review of History & Physical:    H&P update referred to the surgeon.         Ready For Surgery From Anesthesia Perspective.

## 2017-04-04 NOTE — ASSESSMENT & PLAN NOTE
Counseled patient and family that proceeding with surgery only option to try to preserve mobility despite elevated risk of cardiovascular complications.  Will likely require hemiarthroplasty for repair.  Per Orthopedic Surgery resident will likely aim for OR in afternoon.  Keep NPO.  SCDs only for pre-op DVT prophylaxis unless further postponement of surgery anticipated, in which case can start sq heparin.  Patient and family counseled on expected post-op hospital LOS and likely discharge to a SNF for 1-2 more weeks of PT until being able to go home.

## 2017-04-04 NOTE — ED PROVIDER NOTES
Encounter Date: 4/3/2017       History     Chief Complaint   Patient presents with    right hip pain     pt presents to the ed with right hip pain after a fall      Review of patient's allergies indicates:   Allergen Reactions    Ativan [lorazepam] Hallucinations    Crab Other (See Comments)     Causes Gout    Crayfish Other (See Comments)     Causes Gout    Promethazine Other (See Comments)     Hallucinations      HPI Comments: 86y/o F w/ hx PVD s/p LLE cannulization procedure earlier today w/o complication presents w/ fall after going home. Patient normally uses walker but wasn't using it; she says her legs gave out and she fell on her R side. Denies head injury or LOC; denies other injuries or pain except R hip. No distal new numbness or weakness. No prodrome, palpitations, CP/SOB, or other symptoms before fall today.    During procedure today it was noticed that pt is persistently bradycardic but asymptomatic. Cardiology was consulted and advised holding bblocker and outpatient eval for tachy-brea syndrome and pacemaker placement.    On eliquis but held 4 days ago, has not taken it today.    Has had another pelvic fracture 2 years ago unknown side but per family was able to recover w/ PT and w/o surgery. Last meal approx 6pm.    The history is provided by the patient.     Past Medical History:   Diagnosis Date    ALLERGIC RHINITIS     Anemia     Anticoagulant long-term use     Blood transfusion     Cardiomyopathy 10/20/2015    Cataract     CHF exacerbation 2015    Chronic kidney disease     Cramp of both lower extremities 2016    Hyperlipidemia     Hypertension     Hypothyroidism     Persistent atrial fibrillation 3/28/2016     Past Surgical History:   Procedure Laterality Date    ANGIOPLASTY      Renal PTA    CARDIAC CATHETERIZATION       SECTION      HYSTERECTOMY      ovaries intact    RENAL ARTERY STENT      TONSILLECTOMY      VASCULAR SURGERY       Family History    Problem Relation Age of Onset    Adopted: Yes    Heart disease Father      Social History   Substance Use Topics    Smoking status: Never Smoker    Smokeless tobacco: Never Used    Alcohol use Yes      Comment: occasional wine use     Review of Systems   Constitutional: Negative for chills and fever.   HENT: Negative for trouble swallowing.    Eyes: Negative for pain.   Respiratory: Negative for shortness of breath.    Cardiovascular: Negative for chest pain and palpitations.   Gastrointestinal: Negative for abdominal pain, blood in stool, nausea and vomiting.   Genitourinary: Negative for flank pain and hematuria.   Musculoskeletal: Positive for arthralgias and myalgias. Negative for neck pain.   Skin: Negative for wound.   Neurological: Negative for dizziness, syncope, weakness, light-headedness, numbness and headaches.       Physical Exam   Initial Vitals   BP Pulse Resp Temp SpO2   04/03/17 2019 04/03/17 2019 04/03/17 2019 04/03/17 2019 04/03/17 2019   146/78 52 18 98 °F (36.7 °C) 98 %     Physical Exam    Nursing note and vitals reviewed.  Constitutional: She appears well-developed. She is not diaphoretic. No distress.   Thin and elderly   HENT:   Head: Normocephalic and atraumatic.   Eyes: Conjunctivae and EOM are normal. Pupils are equal, round, and reactive to light.   Neck: Normal range of motion. Neck supple.   Cardiovascular: Regular rhythm, normal heart sounds and intact distal pulses.   bradycardic   Pulmonary/Chest: Breath sounds normal. No respiratory distress.   Abdominal: Soft. She exhibits no distension. There is no tenderness. There is no rebound and no guarding.   Musculoskeletal: She exhibits tenderness.   R hip not swollen and no overlying skin changes or clear deformity. Pain w/ movement passive and active ROM.    Neurological: She is alert.   Skin: Skin is warm and dry.         ED Course   Procedures  Labs Reviewed   CBC W/ AUTO DIFFERENTIAL - Abnormal; Notable for the following:         Result Value    Hemoglobin 11.2 (*)     Hematocrit 33.7 (*)     MCV 81 (*)     RDW 17.4 (*)     Lymph # 0.5 (*)     Gran% 87.3 (*)     Lymph% 7.0 (*)     All other components within normal limits   COMPREHENSIVE METABOLIC PANEL - Abnormal; Notable for the following:     Sodium 133 (*)     Potassium 5.9 (*)     Glucose 122 (*)     BUN, Bld 57 (*)     Creatinine 5.7 (*)     Calcium 8.5 (*)     Albumin 2.6 (*)     AST 43 (*)     eGFR if  7.2 (*)     eGFR if non  6.3 (*)     All other components within normal limits   PROTIME-INR - Abnormal; Notable for the following:     Prothrombin Time 12.7 (*)     All other components within normal limits   CULTURE, URINE   APTT   URINALYSIS   TYPE & SCREEN   PREPARE RBC SOFT     EKG Readings: (Independently Interpreted)   Sinus brea, 1st deg block, no acute st elevation       X-Rays:   Independently Interpreted Readings:   Other Readings:  R hip/ap pelvis:  Acute subcapital femur fx, old pubic rami fractures, no dislocation  R femur:  Hip fx, no other fx/dislocation          APC / Resident Notes:   PGY-3 MDM: Elderly pt s/p fall, will eval for fracture/dislocation. Bradycardic here but otherwise stable, given recent evaluation by cardiology and patient history unlikely to be arrhythmia induced syncope. Will pend xray results then plan disposition.    Arti Vidales MD  PGY-3 EM 10:54 PM 4/3/2017      PGY-3 Update: Xray shows R femoral neck fracture. Orthopedics and medicine consulted for admission.    Arti Vidales MD  PGY-3 EM 11:59 PM 4/3/2017             Attending Attestation:   Physician Attestation Statement for Resident:  As the supervising MD   Physician Attestation Statement: I have personally seen and examined this patient.   I agree with the above history. -: No head injury or LOC.  No neuro complaint.  No fevers  No weakness or acute sensory changes distal to injury  Dressing in place R groin, no bleeding/hematoma  2+ pt, 1+ dp R  foot  aox3  Neck nontender   As the supervising MD I agree with the above PE.    As the supervising MD I agree with the above treatment, course, plan, and disposition.   -: Fall, isolated injury R hip, r/o fx, dislocation, contusion.  Had vasc stent placed earlier, is well-perfused distally.    xr shows fx R hip - consult ortho, admit to medicine.  Pt/fam updated.    I have reviewed and agree with the residents interpretation of the following: lab data, x-rays and EKG.  I have reviewed the following: old records at this facility, old x-rays and x-ray reports.                    ED Course     Clinical Impression:   The primary encounter diagnosis was Closed fracture of neck of right femur, initial encounter. Diagnoses of Right hip pain, Closed right hip fracture, initial encounter, ESRD on dialysis, PAD (peripheral artery disease), and PAF (paroxysmal atrial fibrillation) were also pertinent to this visit.          Karsten Lewis MD  04/04/17 0056

## 2017-04-05 PROBLEM — S72.001A CLOSED FRACTURE OF NECK OF RIGHT FEMUR: Status: ACTIVE | Noted: 2017-01-01

## 2017-04-05 NOTE — PLAN OF CARE
Problem: Patient Care Overview  Goal: Plan of Care Review  Outcome: Ongoing (interventions implemented as appropriate)  Pt transferred to [facility] d/t bradycardia with need for dialysis. Tolerated HD. Went to OR for hip fracture surgery.

## 2017-04-05 NOTE — PROGRESS NOTES
Hemodialysis x 3 hours complete. 1 liter net fluid removal. Blood returned without difficulty. Needles removed and pressure held x 20 minutes until hemostasis achieved. gauze pressure dressing applied and secured with tape. Positive bruit/thrill noted.

## 2017-04-05 NOTE — PROGRESS NOTES
Pt arrived from OR on vent with settings charted. Pt tolerating well and resting comfortably at this time. Will continue to monitor,

## 2017-04-05 NOTE — H&P
[facility]  Critical Care Medicine  History & Physical    Patient Name: Padmini Miranda  MRN: [id number]  Admission Date: 4/3/2017  Hospital Length of Stay: 2 days  Code Status: Full Code  Attending Physician: Fabio Cook MD   Primary Care Provider: Randy Lyles MD   Principal Problem: Closed displaced fracture of right femoral neck    Subjective:     HPI:  Ms. Miranda is an 84 y/o female who presented to ED on 04/04/17 after a mechanical fall at home.  She reports that she was stepping down a step into the den and missed the step, falling onto her right side.  She denied any dizziness or lightheadedness prior to falling.  She normally ambulates unassisted in the house but uses a walker when going out, such as to [employer].  She fell not long after getting home from angioplasty on her left leg done at [facility] for treatment of chronic lower extremity ischemia resulting in a heel ulcer.  When she presented for that scheduled procedure she was noted to have a heart rate in the 30s without any symptoms.  She underwent the procedure as planned after evaluation by a cardiology fellow with plan to hold her carvedilol and to see Dr. Antoine (EP) in clinic 4/5.  Dr. Antoine started her on amiodarone 3/10 after an implantable loop recorder captured frequent episodes of a-fib with RVR with HR up to the 160s.  She is anticoagulated with Eliquis, and her last dose was on Friday 3/31 in anticipation of the angiogram yesterday (4/3).  She states that prior to starting the amiodarone her ambulation was limited by dyspnea on exertion but that has since improved.  She endorses an episode of chest pressure during a recent dialysis session.  She is on dialysis T/Th/Sat with an access in her right femoral region.  She has combined systolic and diastolic heart failure with EF recently improved to 50%, but was as low as 20% 2 years ago.  She has known 3-vessel CAD found on [facility] in 2015 not amenable to PCI, which was  discovered after an abnormal dobutamine stress echo showing inducible global hypokinesis.    Hospital/ICU Course:  Ms. Miranda was admitted to Hospital Medicine and seen by EP for her bradycardia, which has been attributed to amiodarone and carvedilol (held since admission). She is scheduled for  hemiarthroplasty 17, however Nephrology became concerned about her bradycardia and ability to tolerate IHD. They have requested she be transferred to the ICU for CRRT and then proceed to the OR today. She is asymptomatic.      Past Medical History:   Diagnosis Date    ALLERGIC RHINITIS     Anemia     Anticoagulant long-term use     Blood transfusion     Cardiomyopathy 10/20/2015    Cataract     CHF exacerbation 2015    Chronic kidney disease     Cramp of both lower extremities 2016    Hyperlipidemia     Hypertension     Hypothyroidism     Persistent atrial fibrillation 3/28/2016       Past Surgical History:   Procedure Laterality Date    ANGIOPLASTY      Renal PTA    CARDIAC CATHETERIZATION       SECTION      HYSTERECTOMY      ovaries intact    RENAL ARTERY STENT      TONSILLECTOMY      VASCULAR SURGERY         Review of patient's allergies indicates:   Allergen Reactions    Ativan [lorazepam] Hallucinations    Crab Other (See Comments)     Causes Gout    Crayfish Other (See Comments)     Causes Gout    Promethazine Other (See Comments)     Hallucinations        Family History     Problem Relation (Age of Onset)    Heart disease Father        Social History Main Topics    Smoking status: Never Smoker    Smokeless tobacco: Never Used    Alcohol use Yes      Comment: occasional wine use    Drug use: No    Sexual activity: No      Review of Systems   Constitutional: Negative for chills and diaphoresis.   HENT: Negative for postnasal drip.    Eyes: Negative for visual disturbance.   Respiratory: Negative for choking and shortness of breath.    Cardiovascular: Negative for  chest pain.   Gastrointestinal: Negative for abdominal pain and constipation.   Genitourinary: Negative for hematuria.   Musculoskeletal: Negative for myalgias.   Skin: Negative for rash.   Neurological: Negative for headaches.   Hematological: Negative for adenopathy.     Objective:     Vital Signs (Most Recent):  Temp: 97.5 °F (36.4 °C) (04/05/17 0515)  Pulse: (!) 33 (04/05/17 0700)  Resp: 18 (04/05/17 0515)  BP: (!) 117/53 (04/05/17 0515)  SpO2: 99 % (04/05/17 0515) Vital Signs (24h Range):  Temp:  [96.4 °F (35.8 °C)-97.5 °F (36.4 °C)] 97.5 °F (36.4 °C)  Pulse:  [31-39] 33  Resp:  [18] 18  SpO2:  [97 %-99 %] 99 %  BP: (106-118)/(52-55) 117/53   Weight: 54.4 kg (120 lb)  Body mass index is 21.95 kg/(m^2).      Intake/Output Summary (Last 24 hours) at 04/05/17 0840  Last data filed at 04/05/17 0500   Gross per 24 hour   Intake             1850 ml   Output               50 ml   Net             1800 ml       Physical Exam   Constitutional: She appears well-developed and well-nourished.   HENT:   Head: Normocephalic and atraumatic.   Mouth/Throat: Oropharynx is clear and moist.   Eyes: Conjunctivae are normal. Pupils are equal, round, and reactive to light. Right eye exhibits no discharge. Left eye exhibits no discharge. No scleral icterus.   Neck: Trachea normal, normal range of motion and full passive range of motion without pain. Neck supple. No JVD present. No tracheal deviation present. No thyromegaly present.   Cardiovascular: Normal rate, regular rhythm, S1 normal, S2 normal, normal heart sounds and intact distal pulses.  Exam reveals no gallop and no friction rub.    No murmur heard.  Pulmonary/Chest: Effort normal and breath sounds normal. No respiratory distress. She has no wheezes. She has no rales. She exhibits no tenderness.   Abdominal: Soft. Bowel sounds are normal. She exhibits no distension and no mass. There is no tenderness. There is no rebound and no guarding.   Musculoskeletal: Normal range of  motion. She exhibits no deformity.        Right hip: She exhibits tenderness.   Lymphadenopathy:     She has no cervical adenopathy.   Neurological: She is alert. No cranial nerve deficit.   Skin: Skin is warm and dry. No abrasion and no bruising noted.   Psychiatric: She has a normal mood and affect.   Vitals reviewed.      Vents:     Lines/Drains/Airways     Drain                 Hemodialysis AV Fistula -- days         Urethral Catheter 04/04/17 0149 Double-lumen 1 day          Peripheral Intravenous Line                 Peripheral IV - Single Lumen 04/03/17 1045 Right Hand 1 day         Peripheral IV - Single Lumen 04/04/17 0011 Right Forearm 1 day              Significant Labs:    CBC/Anemia Profile:    Recent Labs  Lab 04/04/17  0002 04/05/17  0559   WBC 6.73 6.31   HGB 11.2* 11.0*   HCT 33.7* 34.1*    196   MCV 81* 83   RDW 17.4* 17.4*        Chemistries:    Recent Labs  Lab 04/04/17  0002 04/05/17  0559   * 131*   K 5.9* 6.4*   CL 98 98   CO2 23 21*   BUN 57* 63*   CREATININE 5.7* 6.8*   CALCIUM 8.5* 8.0*   ALBUMIN 2.6* 2.4*   PROT 7.0  --    BILITOT 0.7  --    ALKPHOS 62  --    ALT 21  --    AST 43*  --    PHOS  --  8.7*     Significant Imaging: I have reviewed and interpreted all pertinent imaging results/findings within the past 24 hours.    Assessment/Plan:     Other  * Closed displaced fracture of right femoral neck  --OR today for hemiarthroplasty.     Acquired hypothyroidism  --Continue levothyroxine.     Renovascular hypertension  --Continue isosorbide/hydralazine.     Systolic and diastolic CHF, chronic  --Hold beta blocker.   --Continue afterload reducers.     ESRD on hemodialysis  --Needs dialysis today.   --Transfer to ICU for CRRT per Nephrology request.     Atherosclerotic PVD with ulceration  --Continue aspirin.     PAF (paroxysmal atrial fibrillation)  --Holding anticoagulation 2/2 planned surgery.   --Currently bradycardic.     3-vessel CAD  --Continue aspirin.     Bradycardia,  drug induced  --Continue to monitor.   --Doubt glucagon would be helpful given amiodarone.      I spent >70 minutes reviewing patient records, examining, and counseling the patient with greater than 50% of the time spent with direct patient care and coordination.      Melecio Moore PA-C  Critical Care Medicine  [facility]

## 2017-04-05 NOTE — PROGRESS NOTES
Telephone report received from Nia MARTINEZ, sending nurse. Charge RN going to assist w/ pt transfer to ICU.

## 2017-04-05 NOTE — PROGRESS NOTES
Electrophysiology Progress Note  Attending Physician: Fabio Cook MD  Hospital Day: 3    Subjective:     Interval History: No events reported overnight. Transferred to the CMICU this AM for CRRT prior to OR this afternoon. Telemetry with bradycardia to the 30's, B/P stable. Improved to 50's this PM after starting dialysis. No other complaints at this time.    Medications:   Continuous Infusions:     Scheduled Meds:   aspirin  81 mg Oral Daily    atorvastatin  40 mg Oral QAM    epoetin syed  50 Units/kg Subcutaneous Every Tues, Thurs, Sat    isosorbide-hydrALAZINE 20-37.5 mg  1 tablet Oral TID    levothyroxine  50 mcg Oral Before breakfast    lidocaine HCL 10 mg/ml (1%)        pantoprazole  40 mg Oral Daily    sodium chloride 0.9%  3 mL Intravenous Q8H     PRN Meds:sodium chloride, morphine, morphine, ondansetron, oxycodone-acetaminophen, ramelteon  Objective:     Vitals:  Temp:  [96.4 °F (35.8 °C)-97.6 °F (36.4 °C)]   Pulse:  [31-54]   Resp:  [12-18]   BP: (108-172)/(52-75)   SpO2:  [97 %-100 %]  I/O's:    Intake/Output Summary (Last 24 hours) at 04/05/17 1434  Last data filed at 04/05/17 1100   Gross per 24 hour   Intake             3030 ml   Output               30 ml   Net             3000 ml        Constitutional: NAD, conversant  HEENT: Sclera anicteric, PERRLA, EOMI  Neck: No JVD, no carotid bruits  CV: Feroz, no murmur, normal S1/S2  Pulm: CTAB, no wheezes, rales, or ronchi  GI: Abdomen soft, NTND, +BS  Extremities: No LE edema, warm and well perfused  Skin: No ecchymosis, erythema, or ulcers  Psych: AOx3, appropriate affect  Neuro: CNII-XII intact, no focal deficits    Labs:       Recent Labs  Lab 04/04/17  0002 04/05/17  0559   * 131*   K 5.9* 6.4*   CL 98 98   CO2 23 21*   BUN 57* 63*   CREATININE 5.7* 6.8*   * 80   ANIONGAP 12 12       Recent Labs  Lab 04/04/17  0002 04/05/17  0559   AST 43*  --    ALT 21  --    ALKPHOS 62  --    BILITOT 0.7  --    ALBUMIN 2.6* 2.4*         Recent Labs  Lab 04/04/17  0002 04/05/17  0559   WBC 6.73 6.31   HGB 11.2* 11.0*   HCT 33.7* 34.1*    196   GRAN 87.3*  5.9 72.5  4.6       Recent Labs  Lab 04/04/17  0002   INR 1.2     No results for input(s): TROPONINI, BNP in the last 168 hours.   Micro:   Blood Cultures  No results found for: LABBLOO  Urine Cultures  Lab Results   Component Value Date    LABURIN No growth 04/04/2017    LABURIN No significant growth 04/28/2014    LABURIN  11/18/2013     Multiple organisms isolated. None in predominance.  Repeat if    LABURIN clinically necessary. 11/18/2013    LABURIN No significant growth 09/27/2013       Imaging:     EF   Date Value Ref Range Status   01/11/2017 50 55 - 65    12/04/2016 38 (A) 55 - 65    04/22/2016 43 (A) 55 - 65    11/09/2015 25 (A) 55 - 65    10/14/2015 25 (A) 55 - 65      Telemetry: Bradycardia to the 30's but NSR, improved to 50's this PM after starting dialysis    Assessment & Plan:   Patient is an85 yo F with a history of 3v CAD not amenable to PCI, ICM (EF 50%), moderate AS (JUANJO 1.04, PV 3, MG 22), PAF on apixaban and amiodarone, who presented with mechanical fall and found to have right femoral neck fracture. EP consulted for sinus bradycardia with rates in the 40s.     #Sinus bradycardia: Profound sinus bradycardia with rates in the 30s-40s. Pt is asymptomatic. She had been on amiodarone and carvedilol as an outpatient. Carvedilol stopped on 4/3/2017 after patient was noted to be bradycardic during fistula procedure.     - Continue to hold Coreg, improvement in rates with initiation of HD this AM  - OK to proceed with surgery this afternoon from an EP standpoint  - Will determine need for PPM after procedure and prior to discharge    Patient seen and examined this morning. Thank you for the opportunity to participate in the care of this interesting patient. Discussed with Dr. Antoine - staff attestation to follow.    Signed:  Wilman Quijano MD  Cardiology Fellow -  PGY4  Pager: [phone]  4/5/2017 2:34 PM

## 2017-04-05 NOTE — PROGRESS NOTES
Not clear which 0.9% NS gtt running at 100mL/hr was active on MAR prior to pt's transfer to CMICU, upon admit Adriel DENG gave verbal order to RN to stop the infusion. Per Nia RN on 5th floor, infusion was started yesterday @ 1600 and ongoing, until stopped in ICU today @ 0945. With the discovery of the aforementioned IV fluid infusion, it appears pt received 1680mL in total. Dialysis RN made aware and notifying Dionicio AMBRIZ Informed Dr. Moscoso/MELL as well.

## 2017-04-05 NOTE — ANESTHESIA POSTPROCEDURE EVALUATION
"Anesthesia Post Evaluation    Patient: Padmini Miranda    Procedure(s) Performed: Procedure(s) (LRB):  ARTHROPLASTY-HIP-RAFFAELE-RIGHT-ASAEL-PEG BOARD (Right)    Final Anesthesia Type: general  Patient location during evaluation: ICU  Patient participation: No - Unable to Participate, Intubation  Level of consciousness: awake  Post-procedure vital signs: reviewed and stable  Pain management: adequate  Airway patency: patent  PONV status at discharge: No PONV  Anesthetic complications: no      Cardiovascular status: blood pressure returned to baseline, bradycardic and hemodynamically stable  Respiratory status: ETT, intubated and ventilator  Hydration status: euvolemic  Follow-up not needed.        Visit Vitals    BP (!) 157/66 (BP Location: Right arm, Patient Position: Lying, BP Method: Automatic)    Pulse (!) 47    Temp 36.4 °C (97.6 °F) (Oral)    Resp 13    Ht 5' 2" (1.575 m)    Wt 56.7 kg (125 lb)    LMP  (LMP Unknown)    SpO2 100%    Breastfeeding No    BMI 22.86 kg/m2       Pain/Jose Guadalupe Score: Pain Assessment Performed: Yes (4/5/2017  1:00 PM)  Presence of Pain: denies (4/5/2017  1:00 PM)  Pain Rating Prior to Med Admin: 5 (4/5/2017  9:17 AM)  Jose Guadalupe Score: 10 (4/5/2017  9:17 AM)      "

## 2017-04-05 NOTE — TRANSFER OF CARE
"Anesthesia Transfer of Care Note    Patient: Padmini Miranda    Procedure(s) Performed: Procedure(s) (LRB):  ARTHROPLASTY-HIP-RAFFAELE-RIGHT-ASAEL-PEG BOARD (Right)    Patient location: ICU    Anesthesia Type: general    Transport from OR: Transported from OR intubated on 100% O2 by AMBU with adequate controlled ventilation. Upon arrival to PACU/ICU, patient attached to ventilator and auscultated to confirm bilateral breath sounds and adequate TV. Continuous ECG monitoring in transport. Continuous SpO2 monitoring in transport. Continuos invasive BP monitoring in transport    Post pain: adequate analgesia    Post assessment: no apparent anesthetic complications and tolerated procedure well    Post vital signs: stable    Level of consciousness: sedated and responds to stimulation    Nausea/Vomiting: no nausea/vomiting    Complications: none          Last vitals:   Visit Vitals    BP (!) 157/66 (BP Location: Right arm, Patient Position: Lying, BP Method: Automatic)    Pulse (!) 47    Temp 36.4 °C (97.6 °F) (Oral)    Resp 13    Ht 5' 2" (1.575 m)    Wt 56.7 kg (125 lb)    LMP  (LMP Unknown)    SpO2 100%    Breastfeeding No    BMI 22.86 kg/m2     "

## 2017-04-05 NOTE — PROGRESS NOTES
Pt admitted to SICU RM 6077 from OR. Pt transported by Anesthesia and MD. Pt transported via ICU bed. Connected to ICU monitor, IV pole. Pt intubated connected. Pt opens eyes, following commands, nods/gestures appropriately, moving all extremities freely and spontaneously. Denies pain. Dr. Barrios at bedside. Notified of pts current vs and status.

## 2017-04-05 NOTE — ANESTHESIA PROCEDURE NOTES
Femoral Single Shot Block    Patient location during procedure: OR   Block not for primary anesthetic.  Reason for block: at surgeon's request and post-op pain management   Post-op Pain Location: Right hip pain  Start time: 4/5/2017 3:41 PM  Timeout: 4/5/2017 3:40 PM   End time: 4/5/2017 3:43 PM  Staffing  Anesthesiologist: SEJAL NOONAN  Performed by: anesthesiologist   Preanesthetic Checklist  Completed: patient identified, site marked, surgical consent, pre-op evaluation, timeout performed, IV checked, risks and benefits discussed and monitors and equipment checked  Peripheral Block  Patient position: supine  Prep: ChloraPrep  Patient monitoring: heart rate, cardiac monitor, continuous pulse ox, continuous capnometry and frequent blood pressure checks  Block type: femoral  Laterality: right  Injection technique: single shot  Needle  Needle type: Stimuplex   Needle gauge: 21 G  Needle length: 4 in  Needle localization: anatomical landmarks and ultrasound guidance     Assessment  Injection assessment: negative aspiration, negative parasthesia and local visualized surrounding nerve  Paresthesia pain: none  Heart rate change: no  Slow fractionated injection: yes  Medications:  Bolus administered: 20 mL of 0.25 bupivacaine  Additional Notes  VSS.  DOS[facility] RN monitoring vitals throughout procedure.  Patient tolerated procedure well.

## 2017-04-05 NOTE — PLAN OF CARE
Problem: Patient Care Overview  Goal: Plan of Care Review  Patient progressing towards goals. Pain controlled  With  medsNeurovascularly  Intact. Family at bedside. Bed in low position and call bell in reach.

## 2017-04-05 NOTE — NURSING TRANSFER
Nursing Transfer Note      4/5/2017     Transfer To: OR    Transfer via bed    Transfer with n/c to O2, cardiac monitoring    Transported by anesthesia/MD    Medicines sent: yes      Chart send with patient: Yes    Notified: daughter & family member at bedside, going to surgery waiting area. All belongings accounted for per pt.

## 2017-04-05 NOTE — SUBJECTIVE & OBJECTIVE
Past Medical History:   Diagnosis Date    ALLERGIC RHINITIS     Anemia     Anticoagulant long-term use     Blood transfusion     Cardiomyopathy 10/20/2015    Cataract     CHF exacerbation 2015    Chronic kidney disease     Cramp of both lower extremities 2016    Hyperlipidemia     Hypertension     Hypothyroidism     Persistent atrial fibrillation 3/28/2016       Past Surgical History:   Procedure Laterality Date    ANGIOPLASTY      Renal PTA    CARDIAC CATHETERIZATION       SECTION      HYSTERECTOMY      ovaries intact    RENAL ARTERY STENT      TONSILLECTOMY      VASCULAR SURGERY         Review of patient's allergies indicates:   Allergen Reactions    Ativan [lorazepam] Hallucinations    Crab Other (See Comments)     Causes Gout    Crayfish Other (See Comments)     Causes Gout    Promethazine Other (See Comments)     Hallucinations        Family History     Problem Relation (Age of Onset)    Heart disease Father        Social History Main Topics    Smoking status: Never Smoker    Smokeless tobacco: Never Used    Alcohol use Yes      Comment: occasional wine use    Drug use: No    Sexual activity: No      Review of Systems   Constitutional: Negative for chills and diaphoresis.   HENT: Negative for postnasal drip.    Eyes: Negative for visual disturbance.   Respiratory: Negative for choking and shortness of breath.    Cardiovascular: Negative for chest pain.   Gastrointestinal: Negative for abdominal pain and constipation.   Genitourinary: Negative for hematuria.   Musculoskeletal: Negative for myalgias.   Skin: Negative for rash.   Neurological: Negative for headaches.   Hematological: Negative for adenopathy.     Objective:     Vital Signs (Most Recent):  Temp: 97.5 °F (36.4 °C) (17 0515)  Pulse: (!) 33 (17 0700)  Resp: 18 (17 0515)  BP: (!) 117/53 (17 0515)  SpO2: 99 % (17) Vital Signs (24h Range):  Temp:  [96.4 °F (35.8 °C)-97.5 °F  (36.4 °C)] 97.5 °F (36.4 °C)  Pulse:  [31-39] 33  Resp:  [18] 18  SpO2:  [97 %-99 %] 99 %  BP: (106-118)/(52-55) 117/53   Weight: 54.4 kg (120 lb)  Body mass index is 21.95 kg/(m^2).      Intake/Output Summary (Last 24 hours) at 04/05/17 0840  Last data filed at 04/05/17 0500   Gross per 24 hour   Intake             1850 ml   Output               50 ml   Net             1800 ml       Physical Exam   Constitutional: She appears well-developed and well-nourished.   HENT:   Head: Normocephalic and atraumatic.   Mouth/Throat: Oropharynx is clear and moist.   Eyes: Conjunctivae are normal. Pupils are equal, round, and reactive to light. Right eye exhibits no discharge. Left eye exhibits no discharge. No scleral icterus.   Neck: Trachea normal, normal range of motion and full passive range of motion without pain. Neck supple. No JVD present. No tracheal deviation present. No thyromegaly present.   Cardiovascular: Normal rate, regular rhythm, S1 normal, S2 normal, normal heart sounds and intact distal pulses.  Exam reveals no gallop and no friction rub.    No murmur heard.  Pulmonary/Chest: Effort normal and breath sounds normal. No respiratory distress. She has no wheezes. She has no rales. She exhibits no tenderness.   Abdominal: Soft. Bowel sounds are normal. She exhibits no distension and no mass. There is no tenderness. There is no rebound and no guarding.   Musculoskeletal: Normal range of motion. She exhibits no deformity.        Right hip: She exhibits tenderness.   Lymphadenopathy:     She has no cervical adenopathy.   Neurological: She is alert. No cranial nerve deficit.   Skin: Skin is warm and dry. No abrasion and no bruising noted.   Psychiatric: She has a normal mood and affect.   Vitals reviewed.      Vents:     Lines/Drains/Airways     Drain                 Hemodialysis AV Fistula -- days         Urethral Catheter 04/04/17 0149 Double-lumen 1 day          Peripheral Intravenous Line                  Peripheral IV - Single Lumen 04/03/17 1045 Right Hand 1 day         Peripheral IV - Single Lumen 04/04/17 0011 Right Forearm 1 day              Significant Labs:    CBC/Anemia Profile:    Recent Labs  Lab 04/04/17  0002 04/05/17  0559   WBC 6.73 6.31   HGB 11.2* 11.0*   HCT 33.7* 34.1*    196   MCV 81* 83   RDW 17.4* 17.4*        Chemistries:    Recent Labs  Lab 04/04/17  0002 04/05/17  0559   * 131*   K 5.9* 6.4*   CL 98 98   CO2 23 21*   BUN 57* 63*   CREATININE 5.7* 6.8*   CALCIUM 8.5* 8.0*   ALBUMIN 2.6* 2.4*   PROT 7.0  --    BILITOT 0.7  --    ALKPHOS 62  --    ALT 21  --    AST 43*  --    PHOS  --  8.7*     Significant Imaging: I have reviewed and interpreted all pertinent imaging results/findings within the past 24 hours.

## 2017-04-05 NOTE — PROGRESS NOTES
Progress Note  Nephrology    Admit Date: 4/3/2017   LOS: 2 days     SUBJECTIVE:     Follow-up For:  ESRD TTS  Interval Hx:  Transferred to ICU this morning as FERNANDO would not take patient down with bradycardia.    Original plan was to place temporary dialysis line and run SLED for 6 hours for metabolic clearance, but unable to place line by team.  Patient asymptomatic with stable blood pressures.  She denies any chest pain/SOB/or dizziness.  Cardiology evaluated patient and believe her bradycardia is 2/2 to beta blockade and amiodarone.  She has not had dialysis since Saturday and hyperkalemic this morning, which could also be adding to the problem.  Plan to go to OR this afternoon for hemiarthroplasty.    Scheduled Meds:   aspirin  81 mg Oral Daily    atorvastatin  40 mg Oral QAM    epoetin syed  50 Units/kg Subcutaneous Every Tues, Thurs, Sat    isosorbide-hydrALAZINE 20-37.5 mg  1 tablet Oral TID    levothyroxine  50 mcg Oral Before breakfast    lidocaine HCL 10 mg/ml (1%)        pantoprazole  40 mg Oral Daily    sodium chloride 0.9%  3 mL Intravenous Q8H     Continuous Infusions:   PRN Meds:sodium chloride, morphine, morphine, ondansetron, oxycodone-acetaminophen, ramelteon    Review of patient's allergies indicates:   Allergen Reactions    Ativan [lorazepam] Hallucinations    Crab Other (See Comments)     Causes Gout    Crayfish Other (See Comments)     Causes Gout    Promethazine Other (See Comments)     Hallucinations        Review of Systems  Constitutional: Negative for fever, appetite change, + fatigue.  Respiratory: Negative for cough, chest tightness, shortness of breath and wheezing.  Cardiovascular: Negative for chest pain, palpitations and leg swelling.  Gastrointestinal: Negative for nausea, vomiting, abdominal pain, diarrhea, constipation, blood in stool and abdominal distention.  Musculoskeletal: negative for myalgias.  Neurological: Negative for dizziness, tremors, syncope, weakness,  light-headedness and headaches.  Psychiatric/Behavioral: Negative for confusion, sleep disturbance and dysphoric mood. The patient is not nervous/anxious.        OBJECTIVE:     Vital Signs (Most Recent)  Temp: 97.6 °F (36.4 °C) (04/05/17 0945)  Pulse: (!) 34 (04/05/17 1100)  Resp: 17 (04/05/17 1100)  BP: (!) 144/67 (04/05/17 1100)  SpO2: 100 % (04/05/17 1100)    Vital Signs Range (Last 24H):  Temp:  [96.4 °F (35.8 °C)-97.6 °F (36.4 °C)]   Pulse:  [31-39]   Resp:  [14-18]   BP: (108-172)/(52-75)   SpO2:  [97 %-100 %]     I & O (Last 24H):  Intake/Output Summary (Last 24 hours) at 04/05/17 1223  Last data filed at 04/05/17 1100   Gross per 24 hour   Intake             1350 ml   Output               80 ml   Net             1270 ml     Physical Exam:  General: AAF in NAD. AOx4.  Well developed well nourished.  Respiratory: Clear to auscultation bilaterally, respirations unlabored, no rales/rhonchi/wheezing  Cardiovacular: Bradycardia, S1, S2 normal, no murmurs, rubs or gallops  Gastrointestinal: Soft, non-tender, bowel sounds normal, no   Musculoskeletal: R hip tenderness  Skin: warm and dry; no rash on exposed skin      Laboratory:  CBC:   Recent Labs  Lab 04/05/17  0559   WBC 6.31   RBC 4.09   HGB 11.0*   HCT 34.1*      MCV 83   MCH 26.9*   MCHC 32.3     BMP:   Recent Labs  Lab 04/05/17  0559   GLU 80   *   K 6.4*   CL 98   CO2 21*   BUN 63*   CREATININE 6.8*   CALCIUM 8.0*           ASSESSMENT/PLAN:      Padmini Miranda is a 85 y.o. female, with HTN, ESRD on iHD, Anemia of chronic disease, Chronic anticoagulation, HLD, Afib, HFrEF, AVS, hypothyroidism and Right hip fracture. Nephrology consulted for ESRD management.      Plan:  ESRD TTS  -Last dialysis on Saturday, patient reports she tolerated well.  -dialysis planned for yesterday canceled 2/2 bradycardia  -HR has been in the upper 30's-40's for past few days, cardiology on board and monitoring.  -Renal fellow and FERNANDO deemed patient to unstable for  HD, sent to ICU for SLED.  Difficulty placing line.  -Patient is HDS and asymptomatic.    -will provide 3 hour HD treatment today in the -ICU.  -200 BFR to decrease cardiac demand with bradycardia.  -2K bath   -no UF.  -recommend rechecking K level last 30 minutes of HD treatment to K correction for surgery this afternoon.      MILLA Osman, [facility]  Nephrology  Pager:  [phone]    ATTENDING PHYSICIAN ATTESTATION  I have personally interviewed and examined the patient. I thoroughly reviewed the demographic, clinical, laboratorial and imaging information available in medical records. I agree with the assessment and recommendations provided by the AYAZ Zapata who was under my supervision.

## 2017-04-05 NOTE — BRIEF OP NOTE
Preop Dx: Displaced right femoral neck fracture    Postop Dx: Displaced right femoral neck fracture    Procedure: Right hip hemiarthroplasty for femoral neck fracture - 65120    Surgeon: Jeff Barrios M.D.    Asst:  Ceferino Richards M.D    Anesthesia: GETA    EBL:  250cc    IVF:  1400cc crystalloid    Implants: [place] Uzair cement 6 stem, regular offset, 45mm Unitrax head, +5    Specimens: Femoral head    Findings: Stable    Dispo:  To SICU stable/intubated    Dict#  [id number]

## 2017-04-05 NOTE — PROCEDURES
"Padmini Miranda is a 85 y.o. female patient.    Temp: 97.6 °F (36.4 °C) (17 1130)  Pulse: (!) 54 (17 1415)  Resp: 16 (17 1415)  BP: (!) 146/67 (17 1415)  SpO2: 99 % (17 1415)  Weight: 56.7 kg (125 lb) (17 0951)  Height: 5' 2" (157.5 cm) (17 0330)       Arterial Line  Date/Time: 2017 2:15 PM  Location procedure was performed: [facility] CARDIAC MEDICAL ICU ([facility])  Performed by: ELLA LANCASTER  Authorized by: ELLA LANCASTER   Pre-op Diagnosis: ESRD  Post-operative diagnosis: ESRD  Consent Done: Yes  Consent: Written consent obtained.  Risks and benefits: risks, benefits and alternatives were discussed  Consent given by: patient  Patient understanding: patient states understanding of the procedure being performed  Patient consent: the patient's understanding of the procedure matches consent given  Procedure consent: procedure consent matches procedure scheduled  Relevant documents: relevant documents present and verified  Test results: test results available and properly labeled  Site marked: the operative site was marked  Imaging studies: imaging studies available  Patient identity confirmed: , MRN and name  Time out: Immediately prior to procedure a "time out" was called to verify the correct patient, procedure, equipment, support staff and site/side marked as required.  Preparation: Patient was prepped and draped in the usual sterile fashion.  Indications: multiple ABGs and hemodynamic monitoring  Location: right radial  Anesthesia: local infiltration    Anesthesia:  Anesthesia: local infiltration  Local Anesthetic: lidocaine 1% with epinephrine   Patient sedated: no  Norman's test normal: yes  Needle gauge: 20  Number of attempts: 1  Complications: No  Specimens: No  Implants: No  Post-procedure: line sutured and dressing applied  Post-procedure CMS: normal  Patient tolerance: Patient tolerated the procedure well with no immediate complications          Ang" Denia  4/5/2017

## 2017-04-05 NOTE — H&P
History & Physical  Surgical Intensive Care    SUBJECTIVE:     Chief Complaint/Reason for Admission: Closed displaced fracture of right femoral neck    History of Present Illness:  Patient is a 85 y.o. female with PMH significant for  3-vessel CAD found on [facility] in 2015 not amenable to PCI, ESRD (HD T-Th-Sat), combined systolic and diastolic CHF, HTN, Hypothyroidism, A-fib on Eliquis, PVD (recent angioplasty to left leg) who initially presented on 4/4 after a mechanical fall at home. She is also being worked up by cardiology/EP for bradycardia (they believed it to be 2/2 amiodarone and carvedilol which is now being held). She was initially transferred to ICU for CRRT as nephrology was concerned about her bradycardia and not being able to tolerate HD. She is now s/p a R hemiarthroplasty today and being transferred to SICU for continuous monitoring of her bradycardia as well as pt unable to extubate in OR.    PTA Medications   Medication Sig    amiodarone (PACERONE) 200 MG Tab Take 2 tabs by mouth twice a day for two weeks, then 2 tabs by mouth once a day for two weeks, then one tab by mouth each day.    aspirin 81 MG Chew Take 1 tablet (81 mg total) by mouth once daily. (Patient taking differently: Take 81 mg by mouth every morning. )    atorvastatin (LIPITOR) 40 MG tablet Take 1 tablet (40 mg total) by mouth once daily. (Patient taking differently: Take 40 mg by mouth every morning. )    ELIQUIS 2.5 mg Tab TAKE 1 BY MOUTH TWO TIMES  DAILY    epoetin syed (PROCRIT) 10,000 unit/mL injection Inject 0.28 mLs (2,800 Units total) into the skin every Tues, Thurs, Sat.    isosorbide-hydrALAZINE 20-37.5 mg (BIDIL) 20-37.5 mg Tab Take 1 tablet by mouth 3 (three) times daily.    multivitamin (ONE DAILY MULTIVITAMIN) per tablet Take 1 tablet by mouth every morning.    nitroGLYCERIN (NITROSTAT) 0.4 MG SL tablet Place 1 tablet (0.4 mg total) under the tongue every 5 (five) minutes as needed for Chest pain.    PROTONIX 40  mg tablet TAKE 1 BY MOUTH DAILY (Patient taking differently: TAKE 1 TABLET  BY MOUTH  DAILY)    RENVELA 0.8 gram PwPk     SYNTHROID 50 mcg tablet TAKE 1 BY MOUTH DAILY      BEFORE BREAKFAST       Review of patient's allergies indicates:   Allergen Reactions    Ativan [lorazepam] Hallucinations    Crab Other (See Comments)     Causes Gout    Crayfish Other (See Comments)     Causes Gout    Promethazine Other (See Comments)     Hallucinations        Past Medical History:   Diagnosis Date    ALLERGIC RHINITIS     Anemia     Anticoagulant long-term use     Blood transfusion     Cardiomyopathy 10/20/2015    Cataract     CHF exacerbation 2015    Chronic kidney disease     Cramp of both lower extremities 2016    Hyperlipidemia     Hypertension     Hypothyroidism     Persistent atrial fibrillation 3/28/2016     Past Surgical History:   Procedure Laterality Date    ANGIOPLASTY      Renal PTA    CARDIAC CATHETERIZATION       SECTION      HYSTERECTOMY      ovaries intact    RENAL ARTERY STENT      TONSILLECTOMY      VASCULAR SURGERY       Family History   Problem Relation Age of Onset    Adopted: Yes    Heart disease Father      Social History   Substance Use Topics    Smoking status: Never Smoker    Smokeless tobacco: Never Used    Alcohol use Yes      Comment: occasional wine use        Review of Systems:  Review of systems not obtained due to patient factors pt intubated.    OBJECTIVE:     Vital Signs (Most Recent)  Temp: 97.6 °F (36.4 °C) (17 1440)  Pulse: (!) 47 (17 1827)  Resp: 13 (17 182)  BP: (!) 157/66 (17 1440)  SpO2: 100 % (17)  Ventilator Data (Last 24H):     Vent Mode: A/C  Oxygen Concentration (%):  [100] 100  Resp Rate Total:  [15 br/min] 15 br/min  Vt Set:  [550 mL] 550 mL  PEEP/CPAP:  [5 cmH20] 5 cmH20  Mean Airway Pressure:  [14 cmH20] 14 cmH20    Hemodynamic Parameters (Last 24H):       Physical Exam:  General: sedated,  intubated  Head: normocephalic, atraumatic  Lungs:  clear to auscultation bilaterally and normal respiratory effort  Chest Wall: no tenderness  Heart: regular rate and rhythm, S1, S2 normal, no murmur  Abdomen: soft, non-tender non-distented; bowel sounds normal; no masses,  no organomegaly  Extremities: no cyanosis or edema, or clubbing  Pulses: 2+  Neurologic: Sedated    Lines/Drains:       Peripheral IV - Single Lumen 04/04/17 0011 Right Forearm (Active)   Site Assessment Clean;Dry;Intact 4/5/2017 10:00 AM   Line Status Infusing 4/5/2017 10:00 AM   Dressing Status Intact;Dry;Clean 4/5/2017 10:00 AM   Dressing Change Due 04/08/17 4/5/2017 10:00 AM   Reason Not Rotated Not due 4/5/2017 10:00 AM   Number of days:1            Peripheral IV - Single Lumen 04/05/17 1535 Right Other (Active)   Number of days:0            Arterial Line 04/05/17 1815 (Active)   Number of days:0            Urethral Catheter 04/04/17 0149 Double-lumen (Active)   Output (mL) 30 mL 4/5/2017 11:00 AM   Number of days:1            Hemodialysis AV Fistula (Active)   Site Assessment Clean;Dry;Intact 4/5/2017 11:30 AM   Patency Present;Thrill;Bruit 4/5/2017 11:30 AM   Status Deaccessed 4/5/2017  2:40 PM   Flows Good 4/5/2017 11:40 AM   Dressing Status Clean;Dry;Intact 4/5/2017 10:00 AM   Site Condition No complications 4/5/2017 11:30 AM   Dressing Open to air (None) 4/5/2017 11:30 AM   Number of days:       Laboratory  CBC:   Recent Labs  Lab 04/05/17  1828   WBC 7.12   RBC 3.47*   HGB 9.5*   HCT 28.4*   *   MCV 82   MCH 27.4   MCHC 33.5     BMP:   Recent Labs  Lab 04/05/17  1437   GLU 76   *   K 3.9      CO2 24   BUN 28*   CREATININE 3.4*   CALCIUM 8.2*   MG 1.9     CMP:   Recent Labs  Lab 04/05/17  1437   GLU 76   CALCIUM 8.2*   ALBUMIN 2.6*   PROT 6.7   *   K 3.9   CO2 24      BUN 28*   CREATININE 3.4*   ALKPHOS 63   ALT 7*   AST 39   BILITOT 0.8     LFTs:   Recent Labs  Lab 04/05/17  1437   ALT 7*   AST 39    ALKPHOS 63   BILITOT 0.8   PROT 6.7   ALBUMIN 2.6*     Coagulation:   Recent Labs  Lab 04/04/17  0002   INR 1.2   APTT 26.3     Cardiac markers: No results for input(s): CKMB, CPKMB, TROPONINT, TROPONINI, MYOGLOBIN in the last 168 hours.  ABGs:   Recent Labs  Lab 04/05/17  1452   PH 7.323*   PCO2 50.4*   PO2 103*   HCO3 26.1   POCSATURATED 97   BE 0     Microbiology Results (last 7 days)     Procedure Component Value Units Date/Time    Urine culture [[id number]] Collected:  04/04/17 0148    Order Status:  Completed Specimen:  Urine from Urine, Catheterized Updated:  04/05/17 0842     Urine Culture, Routine No growth        Specimen (12h ago through future)    Start     Ordered    04/05/17 1713  Specimen to Pathology - Surgery  Once     Comments:  1.   Right Femoral Head - permanent    04/05/17 1713          Recent Labs  Lab 04/04/17  0149   COLORU Yellow   SPECGRAV 1.020   PHUR 7.0   PROTEINUA 2+*   BACTERIA None   NITRITE Negative   LEUKOCYTESUR Negative   UROBILINOGEN Negative   HYALINECASTS 0       Chest X-Ray: I personally reviewed the films and findings are:   CHF with bilateral edema and bibasilar effusions.    Diagnostic Results:  Echo  CONCLUSIONS     1 - Eccentric hypertrophy.     2 - Low normal to mildly depressed left ventricular systolic function (EF 50-55%).     3 - Right ventricular enlargement with hypertrophy, with normal systolic function.     4 - Left ventricular diastolic dysfunction.     5 - Biatrial enlargement.     6 - Mild mitral regurgitation.     7 - Mild tricuspid regurgitation.     8 - Moderate aortic stenosis, JUANJO = 1.04 cm2, peak velocity = 3.0 m/s, mean gradient = 22 mmHg.     9 - Pulmonary hypertension. The estimated PA systolic pressure is 43 mmHg.     10 - Mildly elevated central venous pressure.       This document has been electronically    SIGNED BY: Karsten Torres MD On: 01/11/2017 11:11    ASSESSMENT/PLAN:     Plan:  Neuro:   -lethargic  -no sedation  -ramelteon  prn      Pulmonary:   -intubated  -wean to extuabte  Vent Mode: A/C  Oxygen Concentration (%):  [100] 100  Resp Rate Total:  [15 br/min] 15 br/min  Vt Set:  [550 mL] 550 mL  PEEP/CPAP:  [5 cmH20] 5 cmH20  Mean Airway Pressure:  [13 anZ05-96 cmH20] 13 cmH20        Cardiac:CHF, A fib, HTN  -EF 50%  -hydralazine prn  -will start eliquis tomorrow  -ASA  -atorvastatin  -isosorbide-hydralazine TID    Renal: ESRD on HD  -s/p 3 hours of HD this afternoon  -BUN/Cr 28/3.4    Infectious Disease:   -no leukocytosis, afebrile  -ancef    Hematology/Oncology:   -f/u H/H and monitoring for post-surgical bleeding    Endocrine:hypothyroidism  - BG 76  -levothyroxine      Fluids/Electrolytes/Nutrition/GI:   -NPO, ADAT to renal diet once extubated  -miralax, docusate  -protonix      Pain Management:   -oxy and morphine prn      Dispo:continue ICU care, wean to extubate    Denia Fry, PGY-3  7:19 PM

## 2017-04-05 NOTE — RESIDENT HANDOFF
ICU Transfer of Care Note  Critical Care Medicine    Admit Date: 4/3/2017  LOS: 2    CC: Closed displaced fracture of right femoral neck    Code Status: Full Code     Transfer to Hospital Medicine discussed with Ly at 16:40.     HPI and Hospital Course:     HPI:  Ms. Miranda is an 86 y/o female who presented to ED on 04/04/17 after a mechanical fall at home.  She reports that she was stepping down a step into the den and missed the step, falling onto her right side.  She denied any dizziness or lightheadedness prior to falling.  She normally ambulates unassisted in the house but uses a walker when going out, such as to [employer].  She fell not long after getting home from angioplasty on her left leg done at [facility] for treatment of chronic lower extremity ischemia resulting in a heel ulcer.  When she presented for that scheduled procedure she was noted to have a heart rate in the 30s without any symptoms.  She underwent the procedure as planned after evaluation by a cardiology fellow with plan to hold her carvedilol and to see Dr. Antoine (EP) in clinic 4/5.  Dr. Antoine started her on amiodarone 3/10 after an implantable loop recorder captured frequent episodes of a-fib with RVR with HR up to the 160s.  She is anticoagulated with Eliquis, and her last dose was on Friday 3/31 in anticipation of the angiogram yesterday (4/3).  She states that prior to starting the amiodarone her ambulation was limited by dyspnea on exertion but that has since improved.  She endorses an episode of chest pressure during a recent dialysis session.  She is on dialysis T/Th/Sat with an access in her right femoral region.  She has combined systolic and diastolic heart failure with EF recently improved to 50%, but was as low as 20% 2 years ago.  She has known 3-vessel CAD found on [facility] in 2015 not amenable to PCI, which was discovered after an abnormal dobutamine stress echo showing inducible global hypokinesis.    Hospital/ICU Course:  Ms.  Ruth was admitted to Hospital Medicine and seen by EP for her bradycardia, which has been attributed to amiodarone and carvedilol (held since admission). She is scheduled for  hemiarthroplasty 04/05/17, however Nephrology became concerned about her bradycardia and ability to tolerate IHD. They have requested she be transferred to the ICU for CRRT and then proceed to the OR today. She is asymptomatic.     After further consultations between the ICU and Nephrology staff it was decided to pursue conventional HD despite her bradycardia, which she tolerated quite well.     To Follow Up:     PT/OT      Discharge Plan:     Likely SNF vs Rehab    DECLAN Moore PA-C  Critical Care Medicine  [phone]

## 2017-04-05 NOTE — PROGRESS NOTES
After discussion and evaluation of patient's situation by critical care, nephrology and orthopedic surgery providers, it was decided that pt will have HD run in unit per dialysis RN. Prior to the arrival of that decision, CCS team did attempt x1 IJ stick to right side. Gauze dsg applied. Dr. Barrios, orthopedic surgeon, was updated via RN in OR that HD tx starting shortly and expected to run for 2-3 hours. At the present time, not known if patient can still go to OR this afternoon following HD. Nephrology NP, Dionicio, said can check K+ towards end of tx. Pt's daughter at bedside. Pt and daughter verbalize understanding of the plan of care and also understanding that there are sometimes variances in medical approaches to treatment, especially with complex medical needs.

## 2017-04-06 PROBLEM — S72.001A CLOSED FRACTURE OF NECK OF RIGHT FEMUR: Status: ACTIVE | Noted: 2017-01-01

## 2017-04-06 PROBLEM — N18.9 ANEMIA OF CHRONIC RENAL FAILURE: Status: ACTIVE | Noted: 2017-01-01

## 2017-04-06 PROBLEM — I49.5 SINUS BRADY-TACHY SYNDROME: Status: ACTIVE | Noted: 2017-01-01

## 2017-04-06 PROBLEM — D63.1 ANEMIA OF CHRONIC RENAL FAILURE: Status: ACTIVE | Noted: 2017-01-01

## 2017-04-06 PROBLEM — N18.6 ESRD (END STAGE RENAL DISEASE): Status: ACTIVE | Noted: 2017-01-01

## 2017-04-06 PROBLEM — R00.1 BRADYCARDIA: Status: ACTIVE | Noted: 2017-01-01

## 2017-04-06 NOTE — PROGRESS NOTES
Dr. BOBY Davila at bedside. MD notified of pts procedure, reason for placement in ICU, current vs, gtts, rates, labs. Plan of care reviewed with pt and daughter. See flowsheet for details.

## 2017-04-06 NOTE — PROGRESS NOTES
Progress Note  Surgical Intensive Care    Admit Date: 4/3/2017  Post-operative Day: 1 Day Post-Op  Hospital Day: 4    SUBJECTIVE:     Follow-up For:  Procedure(s) (LRB):  ARTHROPLASTY-HIP-RAFFAELE-RIGHT-ASAEL-PEG BOARD (Right)    HPI:  Patient is a 85 y.o. female with PMH significant for  3-vessel CAD found on [facility] in 2015 not amenable to PCI, ESRD (HD T-Th-Sat), combined systolic and diastolic CHF, HTN, Hypothyroidism, A-fib on Eliquis, PVD (recent angioplasty to left leg) who initially presented on 4/4 after a mechanical fall at home. She is also being worked up by cardiology/EP for bradycardia (they believed it to be 2/2 amiodarone and carvedilol which is now being held). She was initially transferred to ICU for CRRT as nephrology was concerned about her bradycardia and not being able to tolerate HD. She is now s/p a R hemiarthroplasty today and being transferred to SICU for continuous monitoring of her bradycardia as well as pt unable to extubate in OR.    Interval history:  Pt extubated successfully overnight. She passed bedside swallow and diet advanced slowly. HR still bradycardic.    Continuous Infusions:   Scheduled Meds:   apixaban  2.5 mg Oral BID    aspirin  81 mg Oral Daily    atorvastatin  40 mg Oral QAM    docusate sodium  100 mg Oral TID    epoetin syed  50 Units/kg Subcutaneous Every Tues, Thurs, Sat    isosorbide-hydrALAZINE 20-37.5 mg  1 tablet Oral TID    levothyroxine  50 mcg Oral Before breakfast    pantoprazole  40 mg Oral Daily    polyethylene glycol  17 g Oral Daily    sodium chloride 0.9%  3 mL Intravenous Q8H    tranexamic acid (CYKLOKAPRON) 3,000 mg in sodium chloride 0.9% 100 mL  3,000 mg Irrigation Once     PRN Meds:sodium chloride, dextrose 50%, glucagon (human recombinant), hydrALAZINE, insulin aspart, morphine, morphine, ondansetron, oxycodone-acetaminophen, ramelteon    Review of patient's allergies indicates:   Allergen Reactions    Ativan [lorazepam] Hallucinations     Crab Other (See Comments)     Causes Gout    Crayfish Other (See Comments)     Causes Gout    Promethazine Other (See Comments)     Hallucinations        OBJECTIVE:     Vital Signs (Most Recent)  Temp: 98.6 °F (37 °C) (04/05/17 2300)  Pulse: (!) 44 (04/06/17 0600)  Resp: 10 (04/06/17 0600)  BP: (!) 118/52 (04/06/17 0600)  SpO2: 100 % (04/06/17 0600)    Vital Signs Range (Last 24H):  Temp:  [95 °F (35 °C)-98.6 °F (37 °C)]   Pulse:  [33-56]   Resp:  [8-23]   BP: (103-184)/(49-75)   SpO2:  [96 %-100 %]   Arterial Line BP: (105-164)/(27-52)     I & O (Last 24H):  Intake/Output Summary (Last 24 hours) at 04/06/17 0632  Last data filed at 04/06/17 0500   Gross per 24 hour   Intake             3711 ml   Output             1805 ml   Net             1906 ml     Ventilator Data (Last 24H):     Vent Mode: Spont  Oxygen Concentration (%):  [] 40  Resp Rate Total:  [14 br/min-20 br/min] 17 br/min  Vt Set:  [550 mL] 550 mL  PEEP/CPAP:  [5 cmH20] 5 cmH20  Pressure Support:  [10 cmH20] 10 cmH20  Mean Airway Pressure:  [11 cxA30-82 cmH20] 11 cmH20    Hemodynamic Parameters (Last 24H):       Physical Exam:  General: well developed, well nourished, no distress  Head: normocephalic, atraumatic  Neck: supple, symmetrical, trachea midline, no JVD and thyroid not enlarged, symmetric, no tenderness/mass/nodules  Lungs:  clear to auscultation bilaterally and normal respiratory effort  Chest Wall: no tenderness  Heart: bradycardic  Abdomen: soft, non-tender non-distented; bowel sounds normal; no masses,  no organomegaly  Extremities: no cyanosis or edema, or clubbing  Pulses: 2+  Neurologic: Mental status: Alert, oriented, thought content appropriate    Wound/Incision:  clean, dry, intact    Lines/Drains:       Peripheral IV - Single Lumen 04/04/17 0011 Right Forearm (Active)   Site Assessment Clean;Dry;Intact;No redness;No swelling 4/6/2017  3:05 AM   Line Status Saline locked 4/6/2017  3:05 AM   Dressing Status Clean;Dry;Intact  4/6/2017  3:05 AM   Dressing Change Due 04/08/17 4/5/2017 10:00 AM   Reason Not Rotated Not due 4/5/2017 10:00 AM   Number of days:2            Peripheral IV - Single Lumen 04/05/17 1535 Right Other (Active)   Site Assessment Clean;Dry;Intact;No redness;No swelling 4/6/2017  3:05 AM   Line Status Saline locked 4/6/2017  3:05 AM   Site Change Due 04/09/17 4/5/2017  7:05 PM   Number of days:0            Arterial Line 04/05/17 1815 (Active)   Site Assessment Clean;Dry;No redness;Intact;No swelling 4/6/2017  3:05 AM   Line Status Pulsatile blood flow 4/6/2017  3:05 AM   Art Line Waveform Appropriate 4/6/2017  3:05 AM   Arterial Line Interventions Zeroed and calibrated;Leveled;Connections checked and tightened;Flushed per protocol 4/6/2017  3:05 AM   Color/Movement/Sensation Capillary refill less than 3 sec 4/6/2017  3:05 AM   Dressing Type Transparent 4/6/2017  3:05 AM   Dressing Status Clean;Dry;Intact 4/6/2017  3:05 AM   Number of days:0            Urethral Catheter 04/04/17 0149 Double-lumen (Active)   Site Assessment Clean;Intact 4/6/2017  3:05 AM   Collection Container Urimeter 4/6/2017  3:05 AM   Securement Method secured to top of thigh w/ adhesive device 4/5/2017 10:00 AM   Reason for Continuing Urinary Catheterization Critically ill in ICU requiring intensive monitoring 4/5/2017 10:00 AM   Output (mL) 15 mL 4/6/2017  5:00 AM   Number of days:2            Hemodialysis AV Fistula (Active)   Site Assessment Clean;Dry;Intact;No swelling 4/6/2017  3:05 AM   Patency Thrill;Bruit;Present 4/6/2017  3:05 AM   Status Deaccessed 4/6/2017  3:05 AM   Flows Good 4/5/2017 11:40 AM   Dressing Status Clean;Dry;Intact 4/6/2017  3:05 AM   Site Condition No complications 4/6/2017  3:05 AM   Dressing Open to air (None) 4/5/2017 11:30 AM   Number of days:       Laboratory (Last 24H):  CBC:    Recent Labs  Lab 04/06/17  0413   WBC 7.52   HGB 8.7*   HCT 26.3*        CMP:    Recent Labs  Lab 04/06/17  0413   CALCIUM 7.6*    ALBUMIN 1.9*   PROT 5.1*      K 4.6   CO2 24      BUN 36*   CREATININE 4.8*   ALKPHOS 51*   ALT <5*   AST 36   BILITOT 0.6     BMP:   Recent Labs  Lab 04/06/17  0413   GLU 69*      K 4.6      CO2 24   BUN 36*   CREATININE 4.8*   CALCIUM 7.6*   MG 1.6     Coagulation:   Recent Labs  Lab 04/06/17  0413   INR 1.3*   APTT 31.5     Cardiac Markers: No results for input(s): CKMB, TROPONINT, MYOGLOBIN in the last 168 hours.  ABGs:   Recent Labs  Lab 04/06/17  0114   PH 7.320*   PCO2 47.2*   HCO3 24.3   POCSATURATED 99   BE -2     Prealbumin: No results for input(s): PREALBUMIN in the last 168 hours.  Pathology Results  (Last 10 years)               04/06/17 0413 (A) CBC auto differential (Abnormal) Final result    04/05/17 2350 (A) CBC auto differential (Abnormal) Final result    04/05/17 1828 (A) CBC auto differential (Abnormal) Final result    Narrative:  Receive In Basket/Push notification of result?->Yes       04/05/17 0559 (A) CBC auto differential (Abnormal) Final result    04/04/17 0002 (A) CBC auto differential (Abnormal) Final result    03/17/17 1200 (A) CBC auto differential (Abnormal) Final result    12/30/16 0345 (A) CBC with Automated Differential (Abnormal) Final result    12/29/16 0009 (A) CBC auto differential (Abnormal) Final result    12/06/16 0434 (A) CBC auto differential (Abnormal) Final result    12/05/16 0410 (A) CBC auto differential (Abnormal) Final result    12/04/16 0411 (A) CBC auto differential (Abnormal) Final result    12/03/16 0408 (A) CBC auto differential (Abnormal) Final result    12/02/16 0415 (A) CBC auto differential (Abnormal) Final result    12/01/16 1121 (A) CBC auto differential (Abnormal) Final result    11/28/16 0753 (A) CBC auto differential (Abnormal) Final result    11/27/16 0607 (A) CBC auto differential (Abnormal) Final result    11/26/16 1955 (A) CBC auto differential (Abnormal) Final result    09/18/16 0436 (A) CBC auto differential (Abnormal)  Final result    09/17/16 0534 (A) CBC auto differential (Abnormal) Final result    09/16/16 0450 (A) CBC auto differential (Abnormal) Final result    09/15/16 1244 (A) CBC auto differential (Abnormal) Final result    07/15/16 1138 (A) CBC auto differential (Abnormal) Final result    05/20/16 1335 (A) CBC auto differential (Abnormal) Final result    05/03/16 0840 (A) CBC auto differential (Abnormal) Final result    05/02/16 2333 (A) CBC auto differential (Abnormal) Final result    03/28/16 1102 (A) CBC auto differential (Abnormal) Final result    03/19/16 0548 (A) CBC auto differential (Abnormal) Final result    03/18/16 0519 (A) CBC with Automated Differential (Abnormal) Final result    03/17/16 2132 (A) CBC auto differential (Abnormal) Final result    12/15/15 1749 (A) CBC auto differential (Abnormal) Final result    12/14/15 1124 (A) CBC auto differential (Abnormal) Final result    10/27/15 0515 (A) CBC auto differential (Abnormal) Final result    09/01/15 1423 (A) CBC auto differential (Abnormal) Final result    07/25/15 0838 (A) CBC auto differential (Abnormal) Final result    07/24/15 0447 (A) CBC auto differential (Abnormal) Final result    Narrative:  Called to [facility] INTERNAL MEDICINE TELEMETRY, 07/24/2015 6:48, by KAK   hct critical result(s) called and verbal readback obtained from    Mirtha Chopra RN, 07/24/2015 06:48       07/23/15 0658 (A) CBC auto differential (Abnormal) Final result    07/22/15 1547 (A) CBC auto differential (Abnormal) Final result    07/01/15 0534 (A) CBC auto differential (Abnormal) Final result    06/30/15 0453 (A) CBC auto differential (Abnormal) Final result    06/29/15 0702 (A) CBC auto differential (Abnormal) Final result    06/28/15 0455 (A) CBC auto differential (Abnormal) Final result    05/20/15 0834 (A) CBC auto differential (Abnormal) Final result    05/19/15 0515 (A) CBC with Automated Differential (Abnormal) Final result    02/27/15 1155 (A) CBC auto differential  (Abnormal) Final result    01/26/15 0423 (A) CBC auto differential (Abnormal) Final result    01/25/15 0446 (A) CBC auto differential (Abnormal) Final result    01/24/15 0425 (A) CBC auto differential (Abnormal) Final result    07/18/14 0740 (A) CBC auto differential (Abnormal) Final result    04/28/14 1115 (A) CBC auto differential (Abnormal) Final result    07/15/13 0948 (A) CBC auto differential (Abnormal) Final result    06/13/13 0750 (A) CBC auto differential (Abnormal) Final result    05/18/12 0919 (A) CBC auto differential (Abnormal) Final result    02/10/12 0921 (A) CBC auto differential (Abnormal) Final result    07/15/11 0859 (A) CBC auto differential (Abnormal) Final result    09/11/08 1015 (A) CBC auto differential (Abnormal) Final result    08/26/08 0430 (A) CBC auto differential (Abnormal) Final result    08/25/08 0435 (A) CBC auto differential (Abnormal) Final result    08/24/08 0502 (A) CBC auto differential (Abnormal) Final result    08/19/08 0530 (A) CBC auto differential (Abnormal) Final result    08/18/08 1645 (A) CBC auto differential (Abnormal) Final result    08/18/08 0525 (A) CBC auto differential (Abnormal) Final result    08/17/08 1645 (A) CBC auto differential (Abnormal) Final result    08/17/08 0515 (A) CBC auto differential (Abnormal) Final result    08/16/08 1420 (A) CBC auto differential (Abnormal) Final result    08/16/08 0505 (A) CBC auto differential (Abnormal) Final result    08/15/08 2305 (A) CBC auto differential (Abnormal) Final result    08/07/08 0727 (A) CBC auto differential (Abnormal) Final result    02/29/08 1010 (A) CBC auto differential (Abnormal) Final result    06/22/07 1021 (A) CBC auto differential (Abnormal) Final result        Microbiology Results (last 7 days)     Procedure Component Value Units Date/Time    Urine culture [[id number]] Collected:  04/04/17 0148    Order Status:  Completed Specimen:  Urine from Urine, Catheterized Updated:  04/05/17 0842     Urine  Culture, Routine No growth        Labs within the past 24 hours have been reviewed.    Chest X-Ray: none today    Diagnostic Results:  No Further    ASSESSMENT/PLAN:       Plan:  Neuro:   -lethargic  -no sedation  -ramelteon prn        Pulmonary:   -intubated  -wean to extuabte  Vent Mode: A/C  Oxygen Concentration (%): [100] 100  Resp Rate Total: [15 br/min] 15 br/min  Vt Set: [550 mL] 550 mL  PEEP/CPAP: [5 cmH20] 5 cmH20  Mean Airway Pressure: [13 aiP58-01 cmH20] 13 cmH20        Cardiac:CHF, A fib, HTN  -EF 50%  -hydralazine prn  -will start eliquis today per primary  -ASA  -atorvastatin  -isosorbide-hydralazine TID     Renal: ESRD on HD  -s/p 3 hours of HD this yesterday  -BUN/Cr 36/4.8     Infectious Disease:   -no leukocytosis, afebrile  -ancef     Hematology/Oncology:  -f/u H/H and monitoring for post-surgical bleeding  -appears stable, likely hemodilutional from 1.5 L given intra-op     Endocrine:hypothyroidism  - BG 69  -levothyroxine        Fluids/Electrolytes/Nutrition/GI:   -renal diet  -miralax, docusate  -protonix        Pain Management:   -oxy and morphine prn        Dispo:can stepdown today per primary team    Denia Fry, PGY-3  6:32 AM

## 2017-04-06 NOTE — OP NOTE
DATE OF PROCEDURE:  04/05/2017.    PREOPERATIVE DIAGNOSIS:  Displaced right femoral neck fracture.    POSTOPERATIVE DIAGNOSIS:  Displaced right femoral neck fracture.    PROCEDURE PERFORMED:  Right hip hemiarthroplasty for femoral neck fracture,   35888.    SURGEON:  Jeff Barrios M.D.    ASSISTANT:  Ceferino Guardado M.D. (RES).    ANESTHESIA:  General endotracheal.    ESTIMATED BLOOD LOSS:  250 mL.    IV FLUIDS:  1400 mL crystalloid.    IMPLANTS:  Vina KADEN cement 6 stem regular offset, 45 mm Unitrax head +5.    SPECIMENS:  Femoral head.    INDICATIONS FOR PROCEDURE:  The patient is an 85-year-old female who fell from a   standing height resulting in displaced femoral neck fracture.  The patient is   on dialysis and was having issues with the bradycardia and was placed in the ICU   and then dialyzed there in order to get her labs in a better order.  The   patient was evaluated by Cardiology as well as Electrophysiology, did not feel   at that point that she needed to be paced and she is going to the Operating Room   for hemiarthroplasty.  The risks, benefits, and alternatives of surgery were   discussed with the patient prior to going to the Operating Room.  Informed   consent was obtained.    PROCEDURE IN DETAIL:  The patient was identified in the ICU and brought down in   the Operating Room.  General endotracheal anesthesia was induced in the   patient's hospital bed.  Preoperative antibiotics were administered.  The   patient was then placed on the Operating Room table in lateral decubitus   position with the right hip up.  Right hip and lower extremity were prepped and   draped in sterile fashion.  A timeout was undertaken to confirm patient, site,   surgery, surgeon and administration of preoperative antibiotics.  All agreed and   we proceeded.    A standard posterior approach was made through the skin and subcutaneous   tissues.  The iliotibial band and gluteal fascia were incised in line with the   skin  incision.  Gluteus dinesh was split bluntly and a Charnley retractor was   placed.  I excised some bursa.  At this point, I incised the piriformis tendon   at its insertion and tagged this with #2 Ethibond suture.  I incised the   conjoined tendon and tagged this with #2 Ethibond suture.  I did trapezoidal   capsulotomy and tagged the corners primarily with a #1 Vicryl suture.  At this   point, I elected to make the femoral neck cut first given the patient had a   subcapital fracture.  I marked up the hip for a femoral neck cut and made this.    I then removed the femoral head, sized it and tried a 44 and 45 mm head and the   45 mm head was more stable.  I then began preparation of the femur.    Retractors were placed and the box osteotome was used followed by the canal   finder, followed by sequential canal reaming up to a cement 6.  The lateralizer   was used as well as the curette and then broaching was undertaken and a cement 6   was good fit.  I trialed with this and she seemed a bit short, so I elected   using KADEN stem with a 5 mm longer neck.  I placed a cement plug and then the   cement and placed the cement 6 stem holding this in appropriate amount of   anteversion until the cement was dry.  I again retrialed and she seemed best   with stability and leg lengths with +5 sleeve.  I placed the final head and +5   sleeve, re-reduced the hip, got good extension and very good stability with   flexion, abduction and internal rotation.    A Betadine soak was used, 17 mL in 500 mL.  I then again pulse lavaged, placed 1   g of vancomycin powder deep given this is high-risk patient for infection on   hemodialysis.  I repaired the short external rotators and posterior capsule to   the greater trochanter through two drill holes.  I then repaired the gluteal   fascia and iliotibial band with #1 Vicryl suture in interrupted figure-of-eight   fashion.  I then injected 3000 mg tranexamic acid underneath that layer.  I  then   closed the next layer with 0 Vicryl suture, subcutaneous tissue with 2-0 Vicryl   suture and then ran the subcuticular layer with Stratafix barbed and Monocryl.    I then Dermabond.  A sterile dressing was applied in the form of Aquacel.  The   patient was placed in a knee immobilizer, turned to supine position on the   hospital bed.  At this point, she was not having great total volumes and we   elected to bring her back to the ICU, intubated for slow extubation.    All instrument and sponge counts were correct at the end of the case.  There   were no complications.    PLAN:  When she is extubated for, immediate physical and occupational therapy,   weightbearing as tolerated.      TUCKER  dd: 04/05/2017 18:39:25 (CDT)  td: 04/05/2017 22:12:00 (CDT)  Doc ID   #[id number]  Job ID #[id number]    CC:

## 2017-04-06 NOTE — PROGRESS NOTES
Dr. Barrios at bedside. Notified of pts current vs, labs, medications. Plan of care reviewed with pt. Questions answered by MD.

## 2017-04-06 NOTE — PT/OT/SLP EVAL
Physical Therapy  Evaluation    Padmini Miranda   MRN: [id number]   Admitting Diagnosis: Closed displaced fracture of right femoral neck    PT Received On: 17  PT Start Time: 08     PT Stop Time: 09    PT Total Time (min): 20 min       Billable Minutes:  Evaluation 20    Diagnosis: Closed displaced fracture of right femoral neck  s/p right hip hemiarthroplasty on 2017    Past Medical History:   Diagnosis Date    ALLERGIC RHINITIS     Anemia     Anticoagulant long-term use     Blood transfusion     Cardiomyopathy 10/20/2015    Cataract     CHF exacerbation 2015    Chronic kidney disease     Cramp of both lower extremities 2016    Hyperlipidemia     Hypertension     Hypothyroidism     Persistent atrial fibrillation 3/28/2016      Past Surgical History:   Procedure Laterality Date    ANGIOPLASTY      Renal PTA    CARDIAC CATHETERIZATION       SECTION      HYSTERECTOMY      ovaries intact    RENAL ARTERY STENT      TONSILLECTOMY      VASCULAR SURGERY         Referring physician: Dr. Richards  Date referred to PT: 17    General Precautions: Standard, fall  Orthopedic Precautions: RLE weight bearing as tolerated (RLE posterior hip precautions)   Braces: Knee immobilizer       Do you have any cultural, spiritual, Faith conflicts, given your current situation?: no    Patient History:  Lives With: child(tania), adult  Living Arrangements: house  Transportation Available: family or friend will provide  Living Environment Comment: Pt lives with daughter in a SSH with no BENNY. Pt lives with grandson, family will be able to assist   Equipment Currently Used at Home: none  DME owned (not currently used): shower chair, wheelchair and rollator    Previous Level of Function:  Ambulation Skills: independent  Transfer Skills: independent  ADL Skills: independent  Work/Leisure Activity: independent    Subjective:  Communicated with RN prior to session.  Pt agreeable to working  with PT.   Chief Complaint: none stated   Patient goals: return home and feel better    Pain Ratin/10                    Objective:   Patient found with: telemetry, pulse ox (continuous), peripheral IV, central line, chest tube, arterial line, oxygen, hollins catheter, blood pressure cuff     Cognitive Exam:  Oriented to: Person, Place, Time and Situation    Follows Commands/attention: Follows multistep  commands  Communication: clear/fluent  Safety awareness/insight to disability: intact    Physical Exam:  Postural examination/scapula alignment: Rounded shoulder and Head forward    Skin integrity: Visible skin intact  Edema: None noted     Sensation:   Intact    Lower Extremity Range of Motion:  Right Lower Extremity: Deficits: ZARINA 2/2 to brace and RLE posterior hip precautions   Left Lower Extremity: WFL    Lower Extremity Strength:  Right Lower Extremity: Deficits: ZARINA see above  Left Lower Extremity: WFL     Fine motor coordination:  Intact    Gross motor coordination: WFL    Functional Mobility:  Bed Mobility:  Rolling/Turning to Left: Maximum assistance  Rolling/Turning Right: Maximum assistance  Scooting/Bridging: Maximum Assistance  Supine to Sit: Maximum Assistance    Transfers:  Sit <> Stand Assistance: Maximum Assistance  Sit <> Stand Assistive Device: No Assistive Device    Gait:   Gait Distance: NT 2/2 to pt max a with transfer    Balance:   Static Sit: FAIR-: Maintains without assist but inconsistent   Dynamic Sit: FAIR: Cannot move trunk without losing balance  Static Stand: POOR+: Needs MINIMAL assist to maintain  Dynamic stand: POOR: N/A    Therapeutic Activities and Exercises:  Patient educated on role of PT and safety with mobility. Patient verbalized and demonstrated understanding of safety precautions with mobility. Pt educated on RLE posterior hip precautions, pt able to adhere to precautions throughout treatment with requiring vc throughout. Pt performed static sitting EOB with min a  initially and progressed to SBA once pt positioned properly. Pt performed SPT from EOB to chair with max a for support.     AM-PAC 6 CLICK MOBILITY  How much help from another person does this patient currently need?   1 = Unable, Total/Dependent Assistance  2 = A lot, Maximum/Moderate Assistance  3 = A little, Minimum/Contact Guard/Supervision  4 = None, Modified [place]/Independent    Turning over in bed (including adjusting bedclothes, sheets and blankets)?: 2  Sitting down on and standing up from a chair with arms (e.g., wheelchair, bedside commode, etc.): 2  Moving from lying on back to sitting on the side of the bed?: 2  Moving to and from a bed to a chair (including a wheelchair)?: 2  Need to walk in hospital room?: 2  Climbing 3-5 steps with a railing?: 1  Total Score: 11     AM-PAC Raw Score CMS G-Code Modifier Level of Impairment Assistance   6 % Total / Unable   7 - 9 CM 80 - 100% Maximal Assist   10 - 14 CL 60 - 80% Moderate Assist   15 - 19 CK 40 - 60% Moderate Assist   20 - 22 CJ 20 - 40% Minimal Assist   23 CI 1-20% SBA / CGA   24 CH 0% Independent/ Mod I     Patient left up in chair with all lines intact, call button in reach, RN notified and daughter present.    Assessment:   Padmini Miranda is a 85 y.o. female with a medical diagnosis of Closed displaced fracture of right femoral neck,/p right hip hemiarthroplasty on 4/5/2017,  and presents with decreased functional mobility due to impaired endurance and weakness throughout.  Patient would benefit from skilled PT in the acute care setting to improve the limitations listed below. Patient would benefit from HHPT upon discharge.    .    Rehab identified problem list/impairments: Rehab identified problem list/impairments: weakness, impaired endurance, impaired functional mobilty, gait instability, decreased lower extremity function    Rehab potential is good.    Activity tolerance: Fair    Discharge recommendations: Discharge  Facility/Level Of Care Needs: home health PT     Barriers to discharge: Barriers to Discharge: None    Equipment recommendations: Equipment Needed After Discharge: none     GOALS:   Physical Therapy Goals        Problem: Physical Therapy Goal    Goal Priority Disciplines Outcome Goal Variances Interventions   Physical Therapy Goal     PT/OT, PT Ongoing (interventions implemented as appropriate)     Description:  Goals to be met by: 17     Patient will increase functional independence with mobility by performin. Supine to sit with [place]  2. Sit to stand transfer with mod [place] using RW.   3. Gait  x 250 feet with Modified [place] using Rolling Walker.   4. Lower extremity exercise program x15 reps per handout, with assistance as needed                PLAN:    Patient to be seen 5 x/week to address the above listed problems via gait training, therapeutic activities, therapeutic exercises, neuromuscular re-education  Plan of Care expires: 17  Plan of Care reviewed with: patient, daughter          Tc Hensley, PT  2017

## 2017-04-06 NOTE — PROGRESS NOTES
The patient was weaned back to CPAP. An ABG and weaning parameters were obtained and reported to Dr. Fry. The patient was extubated to a 40% venti mask per Dr. Fry

## 2017-04-06 NOTE — PROGRESS NOTES
Progress Note  Nephrology    Admit Date: 4/3/2017   LOS: 3 days     SUBJECTIVE:     Follow-up For:  ESRD TTS  Interval Hx:  Transferred to ICU with bradycardia, s/p hip hemiarthroplasty yesterday.  No events over night, patient up in chair feeling great. Systolic BP is dropping.    Scheduled Meds:   apixaban  2.5 mg Oral BID    aspirin  81 mg Oral Daily    atorvastatin  40 mg Oral QAM    docusate sodium  100 mg Oral TID    epoetin syed  50 Units/kg Subcutaneous Every Tues, Thurs, Sat    isosorbide-hydrALAZINE 20-37.5 mg  1 tablet Oral TID    levothyroxine  50 mcg Oral Before breakfast    pantoprazole  40 mg Oral Daily    polyethylene glycol  17 g Oral Daily    sevelamer carbonate  800 mg Oral TID WM    sodium chloride 0.9%  3 mL Intravenous Q8H    tranexamic acid (CYKLOKAPRON) 3,000 mg in sodium chloride 0.9% 100 mL  3,000 mg Irrigation Once     Continuous Infusions:   PRN Meds:sodium chloride, dextrose 50%, glucagon (human recombinant), hydrALAZINE, insulin aspart, morphine, ondansetron, oxycodone-acetaminophen, ramelteon    Review of patient's allergies indicates:   Allergen Reactions    Ativan [lorazepam] Hallucinations    Crab Other (See Comments)     Causes Gout    Crayfish Other (See Comments)     Causes Gout    Promethazine Other (See Comments)     Hallucinations        Review of Systems  Constitutional: Negative for fever, appetite change, + fatigue.  Respiratory: Negative for cough, chest tightness, shortness of breath and wheezing.  Cardiovascular: Negative for chest pain and leg swelling.  Gastrointestinal: Negative for nausea, vomiting, abdominal pain, diarrhea, constipation, blood in stool and abdominal distention.  Musculoskeletal: negative for myalgias.  Neurological: Negative for dizziness, tremors, syncope, weakness, light-headedness and headaches.  Psychiatric/Behavioral: Negative  For confusion        OBJECTIVE:     Vital Signs (Most Recent)  Temp: 97.7 °F (36.5 °C) (04/06/17  0700)  Pulse: (!) 47 (04/06/17 0915)  Resp: 12 (04/06/17 0915)  BP: (!) 98/49 (04/06/17 0830)  SpO2: 100 % (04/06/17 0915)    Vital Signs Range (Last 24H):  Temp:  [95 °F (35 °C)-98.6 °F (37 °C)]   Pulse:  [33-56]   Resp:  [8-23]   BP: ()/(49-75)   SpO2:  [96 %-100 %]   Arterial Line BP: ()/(9-52)     I & O (Last 24H):    Intake/Output Summary (Last 24 hours) at 04/06/17 0927  Last data filed at 04/06/17 0900   Gross per 24 hour   Intake             4311 ml   Output             1810 ml   Net             2501 ml     Physical Exam:  General: AAF in NAD. AOx4.  Well developed well nourished. Sitting up in a chair  Respiratory: Clear to auscultation bilaterally but some mild wheezing  Cardiovacular: Bradycardia, S1, S2 normal, no murmurs, rubs or gallops  No edema  Gastrointestinal: Soft, non-tender, bowel sounds normal, no   Musculoskeletal: R hip tenderness  Skin: warm and dry; no rash on exposed skin      Laboratory:  CBC:     Recent Labs  Lab 04/06/17  0413   WBC 7.52   RBC 3.23*   HGB 8.7*   HCT 26.3*      MCV 81*   MCH 26.9*   MCHC 33.1     BMP:     Recent Labs  Lab 04/06/17  0413   GLU 69*      K 4.6      CO2 24   BUN 36*   CREATININE 4.8*   CALCIUM 7.6*   MG 1.6           ASSESSMENT/PLAN:      Padmini Miranda is a 85 y.o. female, with HTN, ESRD on iHD, Anemia of chronic disease, Chronic anticoagulation, HLD, Afib, HFrEF, AVS, hypothyroidism and Right hip fracture. Admitted with a fall. Nephrology consulted for ESRD management.      Plan:  1. ESRD TTS  -Last dialysis yesterday (UF 1.5 liter), patient reports she tolerated well. She is on a T/T/S schedule and needs to be back on that before discharge  - electrolytes within desired limits  - minimal urine output: dc'd hollins cath  - no RT today  2. Bradycardia and hypotension -HR has been in the upper 30's-40's for past few days, cardiology on board and monitoring.  Low BP this am

## 2017-04-06 NOTE — PLAN OF CARE
Problem: Patient Care Overview  Goal: Plan of Care Review  Outcome: Ongoing (interventions implemented as appropriate)  Reviewed plan of care with Pt and daughter. Pt extubated overnight, maintaining > 98% on 4L NC. Pt reporting mild pain to hip and leg, repositioned for comfort throughout the night. Dressing to hip CDI, no SS of bleeding or hematoma. UO scant. Pt hypoglycemic overnight, BG monitored, PRN dextrose administered per MD ordered. Pt passed BSS, tolerating clears, diet advanced. Pt remains bradycardic, BP emmett not correlating with cuff pressures, VSS overnight. Daughter updated on plan of care throughout the night. Will continue to monitor.

## 2017-04-06 NOTE — NURSING TRANSFER
Nursing Transfer Note      4/6/2017     Transfer TO: POST OP MED/SURG RM 554U   FROM: SICU RM [id number]    Transfer via WHEELCHAIR    Transfer with PERSONAL BELONGINGS, TELEMETRY, OXYGEN     Transported by ICU RN    Medicines sent: YES    Chart send with patient: YES    Notified: MD, PT, PT FAMILY, CHARGE ICU RN    Patient reassessed at: 1447 4/6/17    Upon arrival to floor: NELLY RN NOTIFIED

## 2017-04-06 NOTE — PROGRESS NOTES
Notified Dr. Fry of MAP dropping to 50's, diastolics very low in 30's. Cuff not correlating, MAPs in 80s. OK to use SBP> 100.

## 2017-04-06 NOTE — PROGRESS NOTES
Updated Dr. Fry on VS trends. BG obtained, 69. Obtained PRN for dextrose, repeat 149. Pt placed back on Rate to rest. Family updated on plan of care, wishing to speak to MD. MD notified. Will continue to monitor.

## 2017-04-06 NOTE — PLAN OF CARE
Problem: Occupational Therapy Goal  Goal: Occupational Therapy Goal  Goals to be met by: 4/16/17     Patient will increase functional independence with ADLs by performing:    Feeding with [place].  UE Dressing with Supervision.  LE Dressing with Minimal A using AD as needed.  Grooming while standing at sink with Supervision.  Toileting from bedside commode with SBA for hygiene and clothing management.   Toilet transfer to bedside commode with SBA.  Outcome: Ongoing (interventions implemented as appropriate)  OT eval completed, and above goals established. SARAY Meeks  4/6/2017

## 2017-04-06 NOTE — PROGRESS NOTES
Pt following commands, opens eyes spontaneously, moving all extremities equally and spontaneously. Denies pain. Pt on 3L NC. Plan of care reviewed with pt. Questions answered by ICU RN. See flowsheet for details.

## 2017-04-06 NOTE — PROGRESS NOTES
ORTHO PROGRESS NOTE:    Padmini Miranda is a 85 y.o. female admitted on 4/3/2017  Hospital Day: 3  Post Op Day: 1 Day Post-Op      The patient was seen and examined this morning at the bedside. Pt to SICU after lyudmila yesterday.  Extubated overnight without issue.  Awake, pain controlled this AM.    PHYSICAL EXAM:  Awake/alert/oriented x 3  No acute distress  AFVSS  Good inspiratory effort with unlabored breathing  Dressings c/d/i, aquacel  NVI distally    Vitals:    04/06/17 0600 04/06/17 0700 04/06/17 0715 04/06/17 0730   BP: (!) 118/52 125/63  135/60   BP Location:  Right arm     Patient Position:  Lying     BP Method:  Automatic     Pulse: (!) 44 (!) 45 (!) 47 (!) 46   Resp: 10 11 17 11   Temp:  97.7 °F (36.5 °C)     TempSrc:  Oral     SpO2: 100% 100% 100% 100%   Weight:       Height:         I/O last 3 completed shifts:  In: 5061 [P.O.:150; I.V.:4411; Other:500]  Out: 1805 [Urine:55; Other:1500; Blood:250]    Recent Labs  Lab 04/05/17  1437 04/05/17 1828 04/05/17 2350 04/06/17  0413   CALCIUM 8.2* 7.5* 7.5* 7.6*   PROT 6.7  --  5.1* 5.1*   * 135* 134* 136   K 3.9 4.4 4.3 4.6   CO2 24 21* 21* 24    105 103 104   BUN 28* 31* 35* 36*   CREATININE 3.4* 4.1* 4.5* 4.8*       Recent Labs  Lab 04/05/17 1828 04/05/17  2350 04/06/17  0413   WBC 7.12 6.41 7.52   RBC 3.47* 3.25* 3.23*   HGB 9.5* 8.7* 8.7*   HCT 28.4* 25.9* 26.3*   * 141* 151       Recent Labs  Lab 04/04/17  0002 04/06/17 0413   INR 1.2 1.3*   APTT 26.3 31.5       A/P: 85 y.o. female 1 Day Post-Op s/p right hip hemiarthroplasty  -- Pain control  -- PT/OT: WBAT RLE, posterior hip precautions  -- DVT prophylaxis: home ASA 81 daily, restart home eliquis 2.5 BID this AM  -- Antibiotics: 1 dose Ancef 1g complete    -- Dispo: step down to Medicine; f/u PT recs      Attg Note:  I agree with the above assessment and plan.  Extubated overnight.  Feeling great.  HR in the 40s.  Stepping down to floor today.    Jeff Barrios MD

## 2017-04-06 NOTE — PROGRESS NOTES
Family at bedside, updated on plan of care. Family not aware Pt left intubated, quite upset. Pt awake, following commands, indicating pain. Dr. Fry at bedside to update family.

## 2017-04-06 NOTE — PROGRESS NOTES
Pt extubated to 40% venti without difficulty. Dr. Fry at bedside for extubation. Pt AAO, pleasant reporting some hip and throat discomfort. Obtained throat spray and repositioned for comfort. Pt instructed on pulmonary toilet. Daughter at bedside, helpful and attentive to needs of Pt. VSS at this time, will continue to monitor.

## 2017-04-06 NOTE — PT/OT/SLP EVAL
Occupational Therapy  Evaluation    Padmini Miranda   MRN: [id number]   Admitting Diagnosis: Closed displaced fracture of right femoral neck    OT Date of Treatment: 17   OT Start Time: 0840  OT Stop Time: 902  OT Total Time (min): 22 min    Billable Minutes:  Evaluation 12  Self Care/Home Management 10    Diagnosis: Closed displaced fracture of right femoral neck   S/p R hemiarthroplasty 17    Past Medical History:   Diagnosis Date    ALLERGIC RHINITIS     Anemia     Anticoagulant long-term use     Blood transfusion     Cardiomyopathy 10/20/2015    Cataract     CHF exacerbation 2015    Chronic kidney disease     Cramp of both lower extremities 2016    Hyperlipidemia     Hypertension     Hypothyroidism     Persistent atrial fibrillation 3/28/2016      Past Surgical History:   Procedure Laterality Date    ANGIOPLASTY      Renal PTA    CARDIAC CATHETERIZATION       SECTION      HYSTERECTOMY      ovaries intact    RENAL ARTERY STENT      TONSILLECTOMY      VASCULAR SURGERY         Referring physician: Geo  Date referred to OT: 17    General Precautions: Standard, fall  Orthopedic Precautions: RLE weight bearing as tolerated, RLE posterior precautions  Braces:      Do you have any cultural, spiritual, Congregational conflicts, given your current situation?: None reported     Patient History:  Living Environment  Lives With: child(tania), adult  Living Arrangements: house (no concerns)  Transportation Available: family or friend will provide  Living Environment Comment: Pt lives with grandson who works during the day. Pt's dtr will be able to assist at d/c  Equipment Currently Used at Home: wheelchair, rollator (walk-in shower with seat)    Prior level of function:   Bed Mobility/Transfers: independent (pt has a rollator, but does not want to use it)  Grooming: independent  Bathing: independent  Upper Body Dressing: independent  Lower Body Dressing:  "independent  Toileting: independent  Driving License: No  Occupation: Retired     Dominant hand: right    Subjective:  Communicated with RN prior to session.    Chief Complaint: "I'm ok. I don't really have any pain."  Patient/Family stated goals: to go home    Pain Ratin/10              Pain Rating Post-Intervention: 0/10    Objective:  Patient found with: arterial line, blood pressure cuff, central line, telemetry, pulse ox (continuous), oxygen, hollins catheter    Cognitive Exam:  Oriented to: Person, Place, Time and Situation  Follows Commands/attention: Follows multistep  commands  Communication: clear/fluent  Memory:  No Deficits noted  Safety awareness/insight to disability: intact  Coping skills/emotional control: Appropriate to situation    Visual/perceptual:  Intact    Physical Exam:  Postural examination/scapula alignment: Head forward  Skin integrity: Visible skin intact  Edema:     Sensation:   Intact    Upper Extremity Range of Motion:  Right Upper Extremity: WFL  Left Upper Extremity: WFL    Upper Extremity Strength:  Right Upper Extremity: WFL  Left Upper Extremity: WFL   Strength: Good    Fine motor coordination:   Intact    Gross motor coordination: WFL    Functional Mobility:  Bed Mobility:  Rolling/Turning to Left: Maximum assistance  Scooting/Bridging: Maximum Assistance  Supine to Sit: Maximum Assistance    Transfers:  Sit <> Stand Assistance: Maximum Assistance from EOB and from b/s chair   Sit <> Stand Assistive Device: No Assistive Device  Bed <> Chair Technique: Stand Pivot  Bed <> Chair Transfer Assistance: Maximum Assistance  Bed <> Chair Assistive Device: No Assistive Device    Functional Ambulation: Max A x 2 steps to chair    Activities of Daily Living:  Feeding Level of Assistance: Set-up Assistance  Feeding adaptive equipment:   UE Dressing Level of Assistance: Minimum assistance  UE adaptive equipment:   LE Dressing Level of Assistance: Total assistance  LE adaptive equipment: " "  Grooming Position: Seated  Grooming Level of Assistance: Stand by assistance                Therapeutic Activities and Exercises:  Pt ed on WB'g precautions and Posterior Hip precautions during ADL. Pt will need continued education.     AM-PAC 6 CLICK ADL  How much help from another person does this patient currently need?  1 = Unable, Total/Dependent Assistance  2 = A lot, Maximum/Moderate Assistance  3 = A little, Minimum/Contact Guard/Supervision  4 = None, Modified [place]/Independent    Putting on and taking off regular lower body clothing? : 1  Bathing (including washing, rinsing, drying)?: 2  Toileting, which includes using toilet, bedpan, or urinal? : 2  Putting on and taking off regular upper body clothing?: 3  Taking care of personal grooming such as brushing teeth?: 3  Eating meals?: 4  Total Score: 15    AM-PAC Raw Score CMS "G-Code Modifier Level of Impairment Assistance   6 % Total / Unable   7 - 9 CM 80 - 100% Maximal Assist   10 - 14 CL 60 - 80% Moderate Assist   15 - 19 CK 40 - 60% Moderate Assist   20 - 22 CJ 20 - 40% Minimal Assist   23 CI 1-20% SBA / CGA   24 CH 0% Independent/ Mod I       Patient left up in chair with all lines intact, call button in reach, RN notified and dtr present    Assessment:  Padmini Miranda is a 85 y.o. female with a medical diagnosis of Closed displaced fracture of right femoral neck and presents s/p fall now with posterior hip precautions and RBAT to RLE.    Rehab identified problem list/impairments: Rehab identified problem list/impairments: weakness, impaired endurance, impaired self care skills, impaired functional mobilty, impaired balance, gait instability, decreased lower extremity function, orthopedic precautions    Rehab potential is good.    Activity tolerance: Good    Discharge recommendations: Discharge Facility/Level Of Care Needs: home with home health     Barriers to discharge: Barriers to Discharge: None    Equipment recommendations:  " (TBD closer to d/c)     GOALS:   Occupational Therapy Goals        Problem: Occupational Therapy Goal    Goal Priority Disciplines Outcome Interventions   Occupational Therapy Goal     OT, PT/OT Ongoing (interventions implemented as appropriate)    Description:  Goals to be met by: 4/16/17     Patient will increase functional independence with ADLs by performing:    Feeding with [place].  UE Dressing with Supervision.  LE Dressing with Minimal A using AD as needed.  Grooming while standing at sink with Supervision.  Toileting from bedside commode with SBA for hygiene and clothing management.   Toilet transfer to bedside commode with SBA.                PLAN:  Patient to be seen 5 x/week to address the above listed problems via self-care/home management, therapeutic activities, therapeutic exercises  Plan of Care expires: 05/06/17  Plan of Care reviewed with: patient, daughter         Carlota BETANCOURT SARAY Braxton  04/06/2017

## 2017-04-06 NOTE — PLAN OF CARE
Problem: Physical Therapy Goal  Goal: Physical Therapy Goal  Goals to be met by: 17     Patient will increase functional independence with mobility by performin. Supine to sit with [place]  2. Sit to stand transfer with mod [place] using RW.   3. Gait x 250 feet with Modified [place] using Rolling Walker.   4. Lower extremity exercise program x15 reps per handout, with assistance as needed  Outcome: Ongoing (interventions implemented as appropriate)  Pt evaluated and goals appropriate.      Tc Hensley PT, DPT  [phone]

## 2017-04-06 NOTE — PROGRESS NOTES
Pt opens eyes spontaneously, following commands, moving all extremities equally and spontaneously. Pt denies pain. Plan of care reviewed with pt. Daughter at bedside. Questions answered by ICU RN. See flowsheet for details.

## 2017-04-06 NOTE — PROGRESS NOTES
Electrophysiology Progress Note  Attending Physician: Deo Soliz II, MD  Hospital Day: 4    Subjective:     Interval History: No events reported overnight. Had right hip hemiarthroplasty for femoral neck fracture yesterday, now extubated overnight, transferred to SICU. Telemetry with bradycardia now in the 50's. No other complaints at this time, felling better    Medications:   Continuous Infusions:     Scheduled Meds:   apixaban  2.5 mg Oral BID    aspirin  81 mg Oral Daily    atorvastatin  40 mg Oral QAM    docusate sodium  100 mg Oral TID    epoetin syed  50 Units/kg Subcutaneous Every Tues, Thurs, Sat    isosorbide-hydrALAZINE 20-37.5 mg  1 tablet Oral TID    levothyroxine  50 mcg Oral Before breakfast    pantoprazole  40 mg Oral Daily    polyethylene glycol  17 g Oral Daily    sevelamer carbonate  800 mg Oral TID WM    sodium chloride 0.9%  3 mL Intravenous Q8H    tranexamic acid (CYKLOKAPRON) 3,000 mg in sodium chloride 0.9% 100 mL  3,000 mg Irrigation Once     PRN Meds:sodium chloride, dextrose 50%, glucagon (human recombinant), hydrALAZINE, insulin aspart, morphine, ondansetron, oxycodone-acetaminophen, ramelteon  Objective:     Vitals:  Temp:  [95 °F (35 °C)-98.6 °F (37 °C)]   Pulse:  [33-56]   Resp:  [8-23]   BP: ()/(49-75)   SpO2:  [96 %-100 %]   Arterial Line BP: ()/(9-52)  I/O's:    Intake/Output Summary (Last 24 hours) at 04/06/17 0951  Last data filed at 04/06/17 0900   Gross per 24 hour   Intake             2631 ml   Output             1810 ml   Net              821 ml        Constitutional: NAD, conversant  HEENT: Sclera anicteric, PERRLA, EOMI  Neck: No JVD, no carotid bruits  CV: Feroz, no murmur, normal S1/S2  Pulm: CTAB, no wheezes, rales, or ronchi  GI: Abdomen soft, NTND, +BS  Extremities: No LE edema, warm and well perfused  Skin: No ecchymosis, erythema, or ulcers  Psych: AOx3, appropriate affect  Neuro: CNII-XII intact, no focal deficits    Labs:        Recent Labs  Lab 04/05/17  1828 04/05/17  2350 04/06/17  0413   * 134* 136   K 4.4 4.3 4.6    103 104   CO2 21* 21* 24   BUN 31* 35* 36*   CREATININE 4.1* 4.5* 4.8*   GLU 78 69* 69*   ANIONGAP 9 10 8       Recent Labs  Lab 04/05/17  1437 04/05/17  2350 04/06/17  0413   AST 39 38 36   ALT 7* 5* <5*   ALKPHOS 63 49* 51*   BILITOT 0.8 0.7 0.6   ALBUMIN 2.6* 1.9* 1.9*        Recent Labs  Lab 04/05/17  1828 04/05/17  2350 04/06/17 0413   WBC 7.12 6.41 7.52   HGB 9.5* 8.7* 8.7*   HCT 28.4* 25.9* 26.3*   * 141* 151   GRAN 84.3*  6.0 81.5*  5.2 82.5*  6.2       Recent Labs  Lab 04/04/17  0002 04/06/17 0413   INR 1.2 1.3*     No results for input(s): TROPONINI, BNP in the last 168 hours.   Micro:   Blood Cultures  No results found for: LABBLOO  Urine Cultures  Lab Results   Component Value Date    LABURIN No growth 04/04/2017    LABURIN No significant growth 04/28/2014    LABURIN  11/18/2013     Multiple organisms isolated. None in predominance.  Repeat if    LABURIN clinically necessary. 11/18/2013    LABURIN No significant growth 09/27/2013       Imaging:     EF   Date Value Ref Range Status   01/11/2017 50 55 - 65    12/04/2016 38 (A) 55 - 65    04/22/2016 43 (A) 55 - 65    11/09/2015 25 (A) 55 - 65    10/14/2015 25 (A) 55 - 65      Telemetry: Bradycardia to the 50's, NSR this AM    Assessment & Plan:   Patient is an85 yo F with a history of 3v CAD not amenable to PCI, ICM (EF 50%), moderate AS (JUANJO 1.04, PV 3, MG 22), PAF on apixaban and amiodarone, who presented with mechanical fall and found to have right femoral neck fracture, now s/p right hip hemiarthroplasty on 4/5/2017. EP consulted for sinus bradycardia with rates as low as the 30's.     #Sinus bradycardia: Profound sinus bradycardia with rates in the 30s-40s. Pt is asymptomatic. She had been on amiodarone and carvedilol as an outpatient. Carvedilol stopped on 4/3/2017 after patient was noted to be bradycardic during fistula  procedure.     - Continue to hold Coreg, improvement in rates post-op and since HD  - Will determine need for PPM prior to discharge, will continue to follow for now    Patient seen and examined this morning. Thank you for the opportunity to participate in the care of this interesting patient. Discussed with Dr. Antoine - staff attestation to follow.    Signed:  Wilman Quijano MD  Cardiology Fellow - PGY4  Pager: [phone]  4/6/2017 9:52 AM

## 2017-04-07 NOTE — PROGRESS NOTES
[facility]  Consult Note Nephrology    Admit Date: 4/3/2017  Consult Requested By: Tahira Tomlinson MD   LOS: 4 days       SUBJECTIVE:     Follow-up For:  ESRD on iHD    Interval History:  Transferred out of ICU, NAEON, feeling better. Seen in [place] while on HD. She became Hypotensive shortly after starting her HD, she remained asymptomatic. Albumin 35 gms  x1 given with SBP > 90 but had Wide Pulse Pressure. 200 C NS given no UF ofr total HD.     Review of Systems:  Constitutional: no fever or chills  Respiratory: no cough or shortness of breath  Cardiovascular: no chest pain or palpitations  Gastrointestinal: no nausea or vomiting, no abdominal pain or change in bowel habits  Hematologic/Lymphatic: no easy bruising or lymphadenopathy  Musculoskeletal: no arthralgias or myalgias  Neurological: no seizures or tremors      OBJECTIVE:     Vital Signs (Most Recent)  Temp: 97.4 °F (36.3 °C) (04/07/17 1439)  Pulse: (!) 55 (04/07/17 1545)  Resp: 16 (04/07/17 1439)  BP: (!) 79/35 (04/07/17 1545)  SpO2: (!) 92 % (04/07/17 0800)    Vital Signs Range (Last 24H):  Temp:  [97.4 °F (36.3 °C)-97.5 °F (36.4 °C)]   Pulse:  [48-56]   Resp:  [14-16]   BP: ()/(32-60)   SpO2:  [90 %-95 %]     No intake or output data in the 24 hours ending 04/07/17 1628      Physical Exam:   General: Well developed, well nourished in NAD  HEENT: Conjunctiva clear; Oropharynx clear  Neck: No JVD noted, Supple  CV- Normal S1, S2 with no murmurs,gallops,rubs  Resp- Lungs decreased BS Bilaterally, rales at bases bilaterally, Unlabored  Abdomen- NTND, BS normoactive x4 quads, soft  Extrem- No cyanosis, clubbing,  Sacral edema.  Skin- No rashes, lesions, ulcers  Neuro: awake, Oriented x3, no FND      Laboratory:  CBC:   Recent Labs  Lab 04/07/17  0402   WBC 7.80   RBC 3.02*   HGB 8.3*   HCT 24.8*      MCV 82   MCH 27.5   MCHC 33.5     BMP:   Recent Labs  Lab 04/06/17  0413 04/07/17  0402   GLU 69* 72  68*    100  101    CO2 24 20*  19*   BUN 36* 41*  41*   CREATININE 4.8* 5.5*  5.6*   CALCIUM 7.6* 7.9*  7.8*   MG 1.6  --      CMP:   Recent Labs  Lab 04/06/17  0413 04/07/17  0402   GLU 69* 72  68*   CALCIUM 7.6* 7.9*  7.8*   ALBUMIN 1.9* 2.0*  1.9*   PROT 5.1*  --     131*  131*   K 4.6 5.4*  5.4*   CO2 24 20*  19*    100  101   BUN 36* 41*  41*   CREATININE 4.8* 5.5*  5.6*   ALKPHOS 51*  --    ALT <5*  --    AST 36  --    BILITOT 0.6  --      PTH: No results for input(s): PTH in the last 168 hours.  Coagulation:   Recent Labs  Lab 04/06/17  0413   INR 1.3*   APTT 31.5     Cardiac Markers: No results for input(s): CKMB, TROPONINT, MYOGLOBIN in the last 168 hours.  ABGs:   Recent Labs  Lab 04/06/17  0114   PH 7.320*   PCO2 47.2*   HCO3 24.3   POCSATURATED 99   BE -2       Labs reviewed  Diagnostic Results:  X-Ray: Reviewed  US: Reviewed  Echo: Reviewed    ASSESSMENT/PLAN:     Active Hospital Problems    Diagnosis  POA    *Closed displaced fracture of right femoral neck [S72.001A]  Yes    Sinus brea-tachy syndrome [I49.5]  Yes    ESRD (end stage renal disease) [N18.6]  Yes    Bradycardia [R00.1]  Yes    Anemia of chronic renal failure [N18.9, D63.1]  Yes    Closed fracture of neck of right femur [S72.001A]  Yes    3-vessel CAD [I25.10]  Yes    Preoperative cardiovascular examination [Z01.810]  Not Applicable    Bradycardia, drug induced [R00.1, T50.905A]  Yes    PAF (paroxysmal atrial fibrillation) [I48.0]  Yes    Atherosclerotic PVD with ulceration [I73.9, L98.499]  Yes    ESRD on hemodialysis [N18.6, Z99.2]  Not Applicable     Chronic    Systolic and diastolic CHF, 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       Padmini Miranda is a 85 y.o. female, with HTN, ESRD on iHD, Anemia of chronic disease, Chronic  anticoagulation, HLD, Afib, HFrEF, AVS, hypothyroidism and Right hip fracture. Nephrology consulted for ESRD management.     ESRD on IHD TTS   [facility] patient [facility] - [facility]/ Dr. Kaminski  On HD for: ~ 1 year  Duration of outpatient dialysis session - 3.75 hrs  EDW - 54 kg  Residual Mary Ann Function - minimal  - Will provide dialysis for metabolic clearance and volume management x 3 hrs  - Sinus bradycardia amiodarone and BB stopped, improved now ~ 52.  - Target ultrafiltration ~ 1 lts as tolerated  - Dialysate will be adjusted to current labs   Aceess: EDILBERTO AVF with good thrill and bruit     Active problem in hospital  - Right Hip fracture  - Sinus Bradycardia  - Hypervolemia     Anemia of Chronic Kidney Disease   - Will resume CURTIS with next HD  - Hg 8.3   - Will request iron studies to assess further needs of supplementation      Mineral Bone Disease in CKD   - Renal Function Panel Daily for electrolytes monitoring  - Phos 7.7  - Already on binders as outpatient, will increased  Renvela 1.6 gms AC and scnacks  - CoCa 9.5     Nutrition   - Renal Diet     HTN   - Hypotensive BP, will continue to monitor. Goal for BP is <130 mmHg SBP and BDP <80 mmHg.   - decrease or dc Bidil 1 tab TID she was hypotensive during HD un able to do UF on her.  - Coreg on hold secondary to bradycardia     Case discuss with Staff further recs with attestation.     Octavio Gong MD  Nephrology Fellow PGY4  [phone]

## 2017-04-07 NOTE — ASSESSMENT & PLAN NOTE
Ms. Miranda was admitted to Hospital Medicine and was scheduled for  hemiarthroplasty 04/05/17, however Nephrology became concerned about her bradycardia and ability to tolerate HD. Therefore, pt was transferred to the ICU for CRRT and then proceeded to the OR later that day. She underwent right hip hemiarthroplasty on 4/5/2017 by Dr Jeff Barrios. She is now WBAT with posterior hip precautions to RLE. PT / OT have been consulted and are recommending Home Health, though family is very interested in SNF.

## 2017-04-07 NOTE — PT/OT/SLP PROGRESS
Physical Therapy  Treatment    Padmini Miranda   MRN: [id number]   Admitting Diagnosis: Closed displaced fracture of right femoral neck    PT Received On: 17  PT Start Time: 1110     PT Stop Time: 1149    PT Total Time (min): 39 min       Billable Minutes:  Gait Training8, Therapeutic Activity 20 and Therapeutic Exercise 11    Treatment Type: Treatment  PT/PTA: PT             General Precautions: Standard, fall  Orthopedic Precautions: RLE weight bearing as tolerated, RLE posterior precautions   Braces: Knee immobilizer    Do you have any cultural, spiritual, Rastafari conflicts, given your current situation?: no    Subjective:  Communicated with nsg prior to session.  Pt agreeable to PT session    Pain Ratin/10                   Objective:   Patient found with: knee immobilizer, FCD    Functional Mobility:  Bed Mobility:   Supine to Sit: Contact Guard Assistance    Transfers:  Sit <> Stand Assistance: Moderate Assistance  Sit <> Stand Assistive Device: Standard Walker    Gait:   Gait Distance: 10 ft  Assistance 1: Moderate assistance  Gait Assistive Device: Standard walker  Gait Pattern: 3-point gait  Gait Deviation(s): decreased rogelio, increased time in double stance, decreased velocity of limb motion, decreased step length, increased stride width, decreased swing-to-stance ratio, decreased toe-to-floor clearance, decreased weight-shifting ability, forward lean      Balance:   Static Sit: GOOD: Takes MODERATE challenges from all directions  Dynamic Sit: FAIR+: Maintains balance through MINIMAL excursions of active trunk motion  Static Stand: FAIR (-): Maintains without assist but unable to take challenges  Dynamic stand: POOR: N/A     Therapeutic Activities and Exercises:  White board updated    Supine therex per handout (Hip protocol), 20x each  · Ankle pumps  · Quad sets  · Glute squeezes    Pt/family educated on:  · Importance of early mobility for improving outcomes following hip  replacement  · Common signs and symptoms associated with hip replacement Sx  · DME management  · Incorporating hip precautions into functional movement  · Rationale behind hip precautions  · Importance of protecting surgical incision during functional tasks to reduce the risk of bleeding/infection  · HEP  · PT POC    Pt able to verbalize 2/3 posterior hip precautions at beginning of PT session  Pt able to verbalize 3/3 hip precautions at end of PT session       AM-PAC 6 CLICK MOBILITY  How much help from another person does this patient currently need?   1 = Unable, Total/Dependent Assistance  2 = A lot, Maximum/Moderate Assistance  3 = A little, Minimum/Contact Guard/Supervision  4 = None, Modified [place]/Independent    Turning over in bed (including adjusting bedclothes, sheets and blankets)?: 3  Sitting down on and standing up from a chair with arms (e.g., wheelchair, bedside commode, etc.): 2  Moving from lying on back to sitting on the side of the bed?: 3  Moving to and from a bed to a chair (including a wheelchair)?: 2  Need to walk in hospital room?: 2  Climbing 3-5 steps with a railing?: 1  Total Score: 13    AM-PAC Raw Score CMS G-Code Modifier Level of Impairment Assistance   6 % Total / Unable   7 - 9 CM 80 - 100% Maximal Assist   10 - 14 CL 60 - 80% Moderate Assist   15 - 19 CK 40 - 60% Moderate Assist   20 - 22 CJ 20 - 40% Minimal Assist   23 CI 1-20% SBA / CGA   24 CH 0% Independent/ Mod I     Patient left up in chair with all lines intact, call button in reach and nsg notified.    Assessment:  Padmini Miranda is a 85 y.o. female with a medical diagnosis of Closed displaced fracture of right femoral neck.  Pt tolerated tx session well and was able to complete all ex's.  Pt displayed significantly improved functional mobility on this date. However, SNF placement is indicated at this time to ensure safe return to the home environment.  Pt c/o TTP of her Right gastroc/soleus with a (+)  reed's sign on this date.  No erythema or edema noted to the Right LE.  Medicine team notified of PT findings.    Rehab identified problem list/impairments: Rehab identified problem list/impairments: weakness, impaired endurance, impaired self care skills, impaired functional mobilty, gait instability, impaired balance, orthopedic precautions, impaired skin, pain, edema, decreased ROM    Rehab potential is good.    Activity tolerance: Good    Discharge recommendations: Discharge Facility/Level Of Care Needs: nursing facility, skilled     Barriers to discharge: Barriers to Discharge: None    Equipment recommendations: Equipment Needed After Discharge:  (TBD )     GOALS:   Physical Therapy Goals        Problem: Physical Therapy Goal    Goal Priority Disciplines Outcome Goal Variances Interventions   Physical Therapy Goal     PT/OT, PT Ongoing (interventions implemented as appropriate)     Description:  Revised goals to be met by: 17     Patient will increase functional independence with mobility by performin. Supine to sit with SBA  2. Sit to Supine with SBA  3. Sit to stand transfer with CGA using appropriate AD.   4. Gait  x 50 feet with CGA using appropriate AD.   5. (I) with HEP        Discontinued goals:  Goals to be met by: 17     Patient will increase functional independence with mobility by performin. Supine to sit with [place]  2. Sit to stand transfer with mod [place] using RW.   3. Gait  x 250 feet with Modified [place] using Rolling Walker.   4. Lower extremity exercise program x15 reps per handout, with assistance as needed                 PLAN:    Patient to be seen daily  to address the above listed problems via gait training, therapeutic activities, therapeutic exercises, neuromuscular re-education  Plan of Care expires: 17  Plan of Care reviewed with: patient         Kaleb Wallace, PT  2017

## 2017-04-07 NOTE — PROGRESS NOTES
ORTHO PROGRESS NOTE:    Padmini Miranda is a 85 y.o. female admitted on 4/3/2017  Hospital Day: 4  Post Op Day: 2 Day Post-Op      The patient was seen and examined this morning at the bedside. NAEO.  Pain present but improving.  Max assist with transfers with PT yesteray.    PHYSICAL EXAM:  Awake/alert/oriented x 3  No acute distress  AFVSS  Good inspiratory effort with unlabored breathing  Dressings c/d/i, aquacel  NVI distally    Vitals:    04/06/17 2300 04/07/17 0010 04/07/17 0300 04/07/17 0410   BP:  (!) 116/58  (!) 117/54   BP Location:  Right arm  Right arm   Patient Position:  Lying  Lying   BP Method:  Automatic  Automatic   Pulse: (!) 52 (!) 53 (!) 48 (!) 50   Resp:  16  16   Temp:  97.4 °F (36.3 °C)  97.4 °F (36.3 °C)   TempSrc:  Oral  Oral   SpO2:  (!) 90%  (!) 92%   Weight:       Height:         I/O last 3 completed shifts:  In: 4896 [P.O.:1140; I.V.:3256; Other:500]  Out: 1810 [Urine:60; Other:1500; Blood:250]    Recent Labs  Lab 04/05/17  1437  04/05/17  2350 04/06/17  0413 04/07/17  0402   CALCIUM 8.2*  < > 7.5* 7.6* 7.9*  7.8*   PROT 6.7  --  5.1* 5.1*  --    *  < > 134* 136 131*  131*   K 3.9  < > 4.3 4.6 5.4*  5.4*   CO2 24  < > 21* 24 20*  19*     < > 103 104 100  101   BUN 28*  < > 35* 36* 41*  41*   CREATININE 3.4*  < > 4.5* 4.8* 5.5*  5.6*   < > = values in this interval not displayed.    Recent Labs  Lab 04/06/17  0413 04/06/17  0955 04/07/17  0402   WBC 7.52 8.26 7.80   RBC 3.23* 3.13* 3.02*   HGB 8.7* 8.3* 8.3*   HCT 26.3* 26.1* 24.8*    142* 164       Recent Labs  Lab 04/06/17  0413   INR 1.3*   APTT 31.5       A/P: 85 y.o. female 2 Day Post-Op s/p right hip hemiarthroplasty  -- Pain control  -- PT/OT: WBAT RLE, posterior hip precautions  -- DVT prophylaxis: home ASA 81 daily, home eliquis 2.5 BID    -- Dispo: step down to Medicine; HHPT per PT    Attg Note:  I agree with the above assessment and plan.  Will see how she progresses with PT.      Jeff Barrios,  MD

## 2017-04-07 NOTE — ASSESSMENT & PLAN NOTE
S/p balloon angioplasty 4/4 with apparent improvement in distal perfusion.  Continue local wound care for heel ulcer.

## 2017-04-07 NOTE — PLAN OF CARE
Problem: Physical Therapy Goal  Goal: Physical Therapy Goal  Revised goals to be met by: 17     Patient will increase functional independence with mobility by performin. Supine to sit with SBA  2. Sit to Supine with SBA  3. Sit to stand transfer with CGA using appropriate AD.   4. Gait x 50 feet with CGA using appropriate AD.   5. (I) with HEP        Discontinued goals:  Goals to be met by: 17     Patient will increase functional independence with mobility by performin. Supine to sit with [place]  2. Sit to stand transfer with mod [place] using RW.   3. Gait x 250 feet with Modified [place] using Rolling Walker.   4. Lower extremity exercise program x15 reps per handout, with assistance as needed   Outcome: Ongoing (interventions implemented as appropriate)  Goals revised, D/C rec's changed to SNF

## 2017-04-07 NOTE — ASSESSMENT & PLAN NOTE
Profound sinus bradycardia with rates in the 30s-40s. Pt is asymptomatic. She had been on amiodarone and carvedilol as an outpatient. Carvedilol stopped on 4/3/2017 after patient was noted to be bradycardic during fistula procedure.  Appreciate cardiology recs.  No pacemaker needed at this time.

## 2017-04-07 NOTE — PLAN OF CARE
Problem: Patient Care Overview  Goal: Plan of Care Review  Outcome: Ongoing (interventions implemented as appropriate)  No acute events throughout day, VS and assessment per flow sheet, patient progressing towards goals as tolerated, plan of care reviewed with Padmini Miarnda and family, all concerns addressed, will continue to monitor.

## 2017-04-07 NOTE — PROGRESS NOTES
[facility] Hospital Medicine  Progress Note    Patient Name: Padmini Miranda  MRN: [id number]  Patient Class: IP- Inpatient   Admission Date: 4/3/2017  Length of Stay: 3 days  Attending Physician: Tahira Tomlinson MD  Primary Care Provider: Randy Lyles MD    [facility] Medicine Team: [facility] MED  Tahira Tomlinson MD    Subjective:     Principal Problem:Closed displaced fracture of right femoral neck    HPI:  Ms. Miranda is an 86 y/o female who presented to ED on 04/04/17 after a mechanical fall at home.  She reports that she was stepping down a step into the den and missed the step, falling onto her right side.  She denied any dizziness or lightheadedness prior to falling.  She normally ambulates unassisted in the house but uses a walker when going out, such as to [employer].  She fell not long after getting home from angioplasty on her left leg done at [facility] for treatment of chronic lower extremity ischemia resulting in a heel ulcer.  When she presented for that scheduled procedure she was noted to have a heart rate in the 30s without any symptoms.  She underwent the procedure as planned after evaluation by a cardiology fellow with plan to hold her carvedilol and to see Dr. Antoine (EP) in clinic 4/5.  Dr. Antoine started her on amiodarone 3/10 after an implantable loop recorder captured frequent episodes of a-fib with RVR with HR up to the 160s.  She is anticoagulated with Eliquis, and her last dose was on Friday 3/31 in anticipation of the angiogram yesterday (4/3).  She states that prior to starting the amiodarone her ambulation was limited by dyspnea on exertion but that has since improved.  She endorses an episode of chest pressure during a recent dialysis session.  She is on dialysis T/Th/Sat with an access in her right femoral region.  She has combined systolic and diastolic heart failure with EF recently improved to 50%, but was as low as 20% 2 years ago.  She has known 3-vessel CAD  found on [facility] in 2015 not amenable to PCI, which was discovered after an abnormal dobutamine stress echo showing inducible global hypokinesis.    Hospital Course:  Ms. Miranda was admitted to Hospital Medicine and seen by EP for her bradycardia, which has been attributed to amiodarone and carvedilol (held since admission). She is scheduled for  hemiarthroplasty 04/05/17, however Nephrology became concerned about her bradycardia and ability to tolerate HD. Therefore, pt was transferred to the ICU for CRRT and then proceeded to the OR later that day. She underwent right hip hemiarthroplasty on 4/5/2017 by Dr Jeff Barrios. She is now WBAT with posterior hip precautions to RLE. PT / OT have been consulted and are recommending Home Health, though family is very interested in SNF. She was reevaluated by EP today for her bradycardia and no pacemaker placement needed at this time.     Interval History: Pt feeling much better today. HR improved, but still bradycardic. Pt is asymptomatic.     Review of Systems   Constitutional: Negative for chills and fever.   Respiratory: Negative for cough, shortness of breath and wheezing.    Cardiovascular: Negative for chest pain and palpitations.   Gastrointestinal: Negative for abdominal pain, constipation, diarrhea and nausea.   Genitourinary: Negative for difficulty urinating, dysuria and frequency.     Objective:     Vital Signs (Most Recent):  Temp: 98.2 °F (36.8 °C) (04/06/17 1525)  Pulse: (!) 52 (04/06/17 1900)  Resp: 16 (04/06/17 1525)  BP: (!) 124/59 (04/06/17 1525)  SpO2: 95 % (04/06/17 1525) Vital Signs (24h Range):  Temp:  [97.5 °F (36.4 °C)-98.6 °F (37 °C)] 98.2 °F (36.8 °C)  Pulse:  [44-56] 52  Resp:  [10-30] 16  SpO2:  [95 %-100 %] 95 %  BP: ()/(48-66) 124/59  Arterial Line BP: ()/(9-38) 77/9     Weight: 56.7 kg (125 lb)  Body mass index is 22.86 kg/(m^2).    Intake/Output Summary (Last 24 hours) at 04/06/17 2211  Last data filed at 04/06/17 1400   Gross  per 24 hour   Intake             1316 ml   Output               30 ml   Net             1286 ml      Physical Exam   Constitutional: She is oriented to person, place, and time. She appears well-developed and well-nourished. No distress.   HENT:   Head: Normocephalic and atraumatic.   Cardiovascular: Normal rate and regular rhythm.    No murmur heard.  Pulmonary/Chest: Effort normal and breath sounds normal. She has no wheezes.   Abdominal: Soft. Bowel sounds are normal. She exhibits no distension. There is no tenderness.   Musculoskeletal: She exhibits tenderness (right hip).   Neurological: She is alert and oriented to person, place, and time.   Skin: Skin is warm and dry. No rash noted.         Assessment/Plan:      * Closed displaced fracture of right femoral neck  Ms. Miranda was admitted to Hospital Medicine and was scheduled for  hemiarthroplasty 04/05/17, however Nephrology became concerned about her bradycardia and ability to tolerate HD. Therefore, pt was transferred to the ICU for CRRT and then proceeded to the OR later that day. She underwent right hip hemiarthroplasty on 4/5/2017 by Dr Jeff Barrios. She is now WBAT with posterior hip precautions to RLE. PT / OT have been consulted and are recommending Home Health, though family is very interested in SNF.     Acquired hypothyroidism  Continue levothyroxine      Renovascular hypertension  On bidil       Systolic and diastolic CHF, chronic  Recently improved EF to 50%. Presently without signs of acute decompensation.      ESRD on hemodialysis  On HD T/Th/Sat    Atherosclerotic PVD with ulceration  S/p balloon angioplasty 4/4 with apparent improvement in distal perfusion.  Continue local wound care for heel ulcer.        Bradycardia, drug induced  Profound sinus bradycardia with rates in the 30s-40s. Pt is asymptomatic. She had been on amiodarone and carvedilol as an outpatient. Carvedilol stopped on 4/3/2017 after patient was noted to be bradycardic  during fistula procedure.  Appreciate cardiology recs.  No pacemaker needed at this time.     PAF (paroxysmal atrial fibrillation)  Monitor on telemetry.  To resume anticoagulation after surgery    3-vessel CAD   Continue ASA     VTE Risk Mitigation         Ordered     apixaban tablet 2.5 mg  2 times daily     Route:  Oral        04/05/17 1839     Place sequential compression device  Until discontinued      04/04/17 0145     Reason for No Pharmacological VTE Prophylaxis  Once      04/04/17 0145     High Risk of VTE  Once      04/04/17 0145          Tahira Tomlinson MD  Department of Hospital Medicine   [facility]

## 2017-04-07 NOTE — SUBJECTIVE & OBJECTIVE
Interval History: Pt feeling much better today. HR improved, but still bradycardic. Pt is asymptomatic.     Review of Systems   Constitutional: Negative for chills and fever.   Respiratory: Negative for cough, shortness of breath and wheezing.    Cardiovascular: Negative for chest pain and palpitations.   Gastrointestinal: Negative for abdominal pain, constipation, diarrhea and nausea.   Genitourinary: Negative for difficulty urinating, dysuria and frequency.     Objective:     Vital Signs (Most Recent):  Temp: 98.2 °F (36.8 °C) (04/06/17 1525)  Pulse: (!) 52 (04/06/17 1900)  Resp: 16 (04/06/17 1525)  BP: (!) 124/59 (04/06/17 1525)  SpO2: 95 % (04/06/17 1525) Vital Signs (24h Range):  Temp:  [97.5 °F (36.4 °C)-98.6 °F (37 °C)] 98.2 °F (36.8 °C)  Pulse:  [44-56] 52  Resp:  [10-30] 16  SpO2:  [95 %-100 %] 95 %  BP: ()/(48-66) 124/59  Arterial Line BP: ()/(9-38) 77/9     Weight: 56.7 kg (125 lb)  Body mass index is 22.86 kg/(m^2).    Intake/Output Summary (Last 24 hours) at 04/06/17 2214  Last data filed at 04/06/17 1400   Gross per 24 hour   Intake             1316 ml   Output               30 ml   Net             1286 ml      Physical Exam   Constitutional: She is oriented to person, place, and time. She appears well-developed and well-nourished. No distress.   HENT:   Head: Normocephalic and atraumatic.   Cardiovascular: Normal rate and regular rhythm.    No murmur heard.  Pulmonary/Chest: Effort normal and breath sounds normal. She has no wheezes.   Abdominal: Soft. Bowel sounds are normal. She exhibits no distension. There is no tenderness.   Musculoskeletal: She exhibits tenderness (right hip).   Neurological: She is alert and oriented to person, place, and time.   Skin: Skin is warm and dry. No rash noted.

## 2017-04-08 PROBLEM — D62 ACUTE BLOOD LOSS ANEMIA: Status: ACTIVE | Noted: 2017-01-01

## 2017-04-08 NOTE — PLAN OF CARE
Problem: Patient Care Overview  Goal: Plan of Care Review  Outcome: Ongoing (interventions implemented as appropriate)  3.5 hour dialysis complete.  Blood rinsed back.  Needles pulled.  Pressure held x 5 minutes.  Hemostasis achieved.  Covered with gauze and paper tape.  +thrill +bruit.  No fluid removed due to hypotension.  +0.6L  Procrit given.

## 2017-04-08 NOTE — SUBJECTIVE & OBJECTIVE
Interval History: Pt seen on HD today, feeling well.  Working well with PT.  COmplianing of tenderness in her right calf.  BLE u/s odered  Review of Systems   Constitutional: Negative for chills and fever.   Respiratory: Negative for cough, shortness of breath and wheezing.    Cardiovascular: Negative for chest pain and palpitations.   Gastrointestinal: Negative for abdominal pain, constipation, diarrhea and nausea.   Genitourinary: Negative for difficulty urinating, dysuria and frequency.     Objective:     Vital Signs (Most Recent):  Temp: 97.6 °F (36.4 °C) (04/08/17 1518)  Pulse: 62 (04/08/17 1518)  Resp: 14 (04/08/17 1518)  BP: 128/63 (04/08/17 1518)  SpO2: (!) 92 % (04/08/17 1518) Vital Signs (24h Range):  Temp:  [97.5 °F (36.4 °C)-98.3 °F (36.8 °C)] 97.6 °F (36.4 °C)  Pulse:  [50-67] 62  Resp:  [14-19] 14  SpO2:  [90 %-93 %] 92 %  BP: ()/(32-63) 128/63     Weight: 56.7 kg (125 lb)  Body mass index is 22.86 kg/(m^2).    Intake/Output Summary (Last 24 hours) at 04/08/17 1603  Last data filed at 04/08/17 1200   Gross per 24 hour   Intake              590 ml   Output              437 ml   Net              153 ml      Physical Exam   Constitutional: She is oriented to person, place, and time. She appears well-developed and well-nourished. No distress.   HENT:   Head: Normocephalic and atraumatic.   Cardiovascular: Normal rate and regular rhythm.    No murmur heard.  Pulmonary/Chest: Effort normal and breath sounds normal. She has no wheezes.   Abdominal: Soft. Bowel sounds are normal. She exhibits no distension. There is no tenderness.   Musculoskeletal: She exhibits tenderness (right hip).   Neurological: She is alert and oriented to person, place, and time.   Skin: Skin is warm and dry. No rash noted.

## 2017-04-08 NOTE — SUBJECTIVE & OBJECTIVE
Interval History: Pt seen on HD today, feeling well.  Working well with PT.  COmplianing of tenderness in her right calf.  BLE u/s odered  Review of Systems   Constitutional: Negative for chills and fever.   Respiratory: Negative for cough, shortness of breath and wheezing.    Cardiovascular: Negative for chest pain and palpitations.   Gastrointestinal: Negative for abdominal pain, constipation, diarrhea and nausea.   Genitourinary: Negative for difficulty urinating, dysuria and frequency.     Objective:     Vital Signs (Most Recent):  Temp: 97.6 °F (36.4 °C) (04/07/17 1841)  Pulse: (!) 56 (04/07/17 1841)  Resp: 16 (04/07/17 1841)  BP: (!) 116/47 (04/07/17 1841)  SpO2: 95 % (04/07/17 1200) Vital Signs (24h Range):  Temp:  [97.4 °F (36.3 °C)-97.6 °F (36.4 °C)] 97.6 °F (36.4 °C)  Pulse:  [48-63] 56  Resp:  [14-16] 16  SpO2:  [90 %-95 %] 95 %  BP: ()/(32-60) 116/47     Weight: 56.7 kg (125 lb)  Body mass index is 22.86 kg/(m^2).    Intake/Output Summary (Last 24 hours) at 04/07/17 1918  Last data filed at 04/07/17 1841   Gross per 24 hour   Intake              350 ml   Output              437 ml   Net              -87 ml      Physical Exam   Constitutional: She is oriented to person, place, and time. She appears well-developed and well-nourished. No distress.   HENT:   Head: Normocephalic and atraumatic.   Cardiovascular: Normal rate and regular rhythm.    No murmur heard.  Pulmonary/Chest: Effort normal and breath sounds normal. She has no wheezes.   Abdominal: Soft. Bowel sounds are normal. She exhibits no distension. There is no tenderness.   Musculoskeletal: She exhibits tenderness (right hip).   Neurological: She is alert and oriented to person, place, and time.   Skin: Skin is warm and dry. No rash noted.

## 2017-04-08 NOTE — PLAN OF CARE
Problem: Patient Care Overview  Goal: Plan of Care Review  Outcome: Ongoing (interventions implemented as appropriate)  No acute events throughout day, VS and assessment per flow sheet, patient progressing towards goals as tolerated, plan of care reviewed with Padmini Miranda and family, all concerns addressed, will continue to monitor.

## 2017-04-08 NOTE — ASSESSMENT & PLAN NOTE
Ms. Miranda was admitted to [facility] Medicine and was scheduled for  hemiarthroplasty 04/05/17, however Nephrology became concerned about her bradycardia and ability to tolerate HD. Therefore, pt was transferred to the ICU for CRRT and then proceeded to the OR later that day. She underwent right hip hemiarthroplasty on 4/5/2017 by Dr Jeff Barrios. She is now WBAT with posterior hip precautions to RLE. PT / OT have been consulted and are recommending SNF.  She remains on Apixaban for Afib / DVT ppx

## 2017-04-08 NOTE — PLAN OF CARE
Problem: Patient Care Overview  Goal: Plan of Care Review  Outcome: Ongoing (interventions implemented as appropriate)  Pt aaox4, vs stable, no falls or injuries during shift. Fall precautions in place w/ personal items near by. Pain level being monitor and control by medication. SCDs/tele on. Hip precautions reinforced. No signs of infection or distress. Call light in reach; will continue to monitor pt.

## 2017-04-08 NOTE — PROGRESS NOTES
Pt stated two Pea sizes clots w/ blood  came out of rt nostril. Paged MD,  paged back and stated to continue to montior and notify if pt begins to active bleed.

## 2017-04-08 NOTE — PROGRESS NOTES
[facility] Hospital Medicine  Progress Note    Patient Name: Padmini Miranda  MRN: [id number]  Patient Class: IP- Inpatient   Admission Date: 4/3/2017  Length of Stay: 4 days  Attending Physician: Tahira Tomlinson MD  Primary Care Provider: Randy Lyles MD    [facility] Medicine Team: [facility] MED  Tahira Tomlinson MD    Subjective:     Principal Problem:Closed displaced fracture of right femoral neck    HPI:  Ms. Miranda is an 86 y/o female who presented to ED on 04/04/17 after a mechanical fall at home.  She reports that she was stepping down a step into the den and missed the step, falling onto her right side.  She denied any dizziness or lightheadedness prior to falling.  She normally ambulates unassisted in the house but uses a walker when going out, such as to [employer].  She fell not long after getting home from angioplasty on her left leg done at [facility] for treatment of chronic lower extremity ischemia resulting in a heel ulcer.  When she presented for that scheduled procedure she was noted to have a heart rate in the 30s without any symptoms.  She underwent the procedure as planned after evaluation by a cardiology fellow with plan to hold her carvedilol and to see Dr. Antoine (EP) in clinic 4/5.  Dr. Antoine started her on amiodarone 3/10 after an implantable loop recorder captured frequent episodes of a-fib with RVR with HR up to the 160s.  She is anticoagulated with Eliquis, and her last dose was on Friday 3/31 in anticipation of the angiogram yesterday (4/3).  She states that prior to starting the amiodarone her ambulation was limited by dyspnea on exertion but that has since improved.  She endorses an episode of chest pressure during a recent dialysis session.  She is on dialysis T/Th/Sat with an access in her right femoral region.  She has combined systolic and diastolic heart failure with EF recently improved to 50%, but was as low as 20% 2 years ago.  She has known 3-vessel CAD  found on [facility] in 2015 not amenable to PCI, which was discovered after an abnormal dobutamine stress echo showing inducible global hypokinesis.    Hospital Course:  Ms. Miranda was admitted to Hospital Medicine and seen by EP for her bradycardia, which has been attributed to amiodarone and carvedilol (held since admission). She is scheduled for  hemiarthroplasty 04/05/17, however Nephrology became concerned about her bradycardia and ability to tolerate HD. Therefore, pt was transferred to the ICU for CRRT and then proceeded to the OR later that day. She underwent right hip hemiarthroplasty on 4/5/2017 by Dr Jeff Barrios. She is now WBAT with posterior hip precautions to RLE. PT / OT have been consulted and are recommending SNF. She was reevaluated by EP 4/6 for her bradycardia and no pacemaker placement needed at this time.     Interval History: Pt seen on HD today, feeling well.  Working well with PT.  COmplianing of tenderness in her right calf.  BLE u/s odered  Review of Systems   Constitutional: Negative for chills and fever.   Respiratory: Negative for cough, shortness of breath and wheezing.    Cardiovascular: Negative for chest pain and palpitations.   Gastrointestinal: Negative for abdominal pain, constipation, diarrhea and nausea.   Genitourinary: Negative for difficulty urinating, dysuria and frequency.     Objective:     Vital Signs (Most Recent):  Temp: 97.6 °F (36.4 °C) (04/07/17 1841)  Pulse: (!) 56 (04/07/17 1841)  Resp: 16 (04/07/17 1841)  BP: (!) 116/47 (04/07/17 1841)  SpO2: 95 % (04/07/17 1200) Vital Signs (24h Range):  Temp:  [97.4 °F (36.3 °C)-97.6 °F (36.4 °C)] 97.6 °F (36.4 °C)  Pulse:  [48-63] 56  Resp:  [14-16] 16  SpO2:  [90 %-95 %] 95 %  BP: ()/(32-60) 116/47     Weight: 56.7 kg (125 lb)  Body mass index is 22.86 kg/(m^2).    Intake/Output Summary (Last 24 hours) at 04/07/17 1918  Last data filed at 04/07/17 1841   Gross per 24 hour   Intake              350 ml   Output               437 ml   Net              -87 ml      Physical Exam   Constitutional: She is oriented to person, place, and time. She appears well-developed and well-nourished. No distress.   HENT:   Head: Normocephalic and atraumatic.   Cardiovascular: Normal rate and regular rhythm.    No murmur heard.  Pulmonary/Chest: Effort normal and breath sounds normal. She has no wheezes.   Abdominal: Soft. Bowel sounds are normal. She exhibits no distension. There is no tenderness.   Musculoskeletal: She exhibits tenderness (right hip).   Neurological: She is alert and oriented to person, place, and time.   Skin: Skin is warm and dry. No rash noted.         Assessment/Plan:      * Closed displaced fracture of right femoral neck  Ms. Miranda was admitted to Hospital Medicine and was scheduled for  hemiarthroplasty 04/05/17, however Nephrology became concerned about her bradycardia and ability to tolerate HD. Therefore, pt was transferred to the ICU for CRRT and then proceeded to the OR later that day. She underwent right hip hemiarthroplasty on 4/5/2017 by Dr Jeff Barrios. She is now WBAT with posterior hip precautions to RLE. PT / OT have been consulted and are recommending SNF.  She remains on Apixaban for Afib / DVT ppx    Acquired hypothyroidism  Continue levothyroxine      Renovascular hypertension  On bidil       Systolic and diastolic CHF, chronic  Recently improved EF to 50%. Presently without signs of acute decompensation.      ESRD on hemodialysis  On HD T/Th/Sat    Atherosclerotic PVD with ulceration  S/p balloon angioplasty 4/4 with apparent improvement in distal perfusion.  Continue local wound care for heel ulcer.        Bradycardia, drug induced  Profound sinus bradycardia with rates in the 30s-40s. Pt is asymptomatic. She had been on amiodarone and carvedilol as an outpatient. Carvedilol stopped on 4/3/2017 after patient was noted to be bradycardic during fistula procedure.  Appreciate cardiology recs.  No pacemaker  needed at this time.     PAF (paroxysmal atrial fibrillation)  Monitor on telemetry.  On Apixiban    3-vessel CAD   Continue ASA     VTE Risk Mitigation         Ordered     apixaban tablet 2.5 mg  2 times daily     Route:  Oral        04/05/17 1839     Place sequential compression device  Until discontinued      04/04/17 0145     Reason for No Pharmacological VTE Prophylaxis  Once      04/04/17 0145     High Risk of VTE  Once      04/04/17 0145          Tahira Tomlinson MD  Department of Hospital Medicine   [facility]

## 2017-04-08 NOTE — PROGRESS NOTES
Pt schedule for dialysis Tues., Thurs., and sat. Paged Dr. Tomlinson; she sated to call dialysis. Paged dialysis; talked to Nick and she stated she will not be seen today, pt was seen yesterday and didn't have any fluids pulled off due to low B/p. Dialysis nurse will call back in regards to giving epoetin, will continue to monitor.

## 2017-04-08 NOTE — PT/OT/SLP PROGRESS
Occupational Therapy  Treatment    Padmini Miranda   MRN: [id number]   Admitting Diagnosis: Closed displaced fracture of right femoral neck    OT Date of Treatment: 17   OT Start Time: 1110  OT Stop Time: 1136  OT Total Time (min): 26 min    Billable Minutes:  Self Care/Home Management 26    General Precautions: Standard, fall  Orthopedic Precautions: RLE weight bearing as tolerated, RLE posterior precautions  Braces: Knee immobilizer    Do you have any cultural, spiritual, Latter-day conflicts, given your current situation?: None reported    Subjective:  Communicated with RN prior to session.    Pt agreeable to treatment    Pain Ratin/10  Location - Side: Right  Location: calf  Pain Addressed: Reposition, Distraction    Objective:  Patient found with: knee immobilizer, FCD     Functional Mobility:  Bed Mobility:  Scooting/Bridging: Minimum Assistance  Supine to Sit: Minimum Assistance    Transfers:   Sit <> Stand Assistance: Moderate Assistance  Sit <> Stand Assistive Device: Rolling Walker    Functional Ambulation: ModA with RW within room    Activities of Daily Living:  UE Dressing Level of Assistance: Moderate assistance    LE Dressing Level of Assistance: Total assistance    Grooming Position: Seated, EOB  Grooming Level of Assistance: Stand by assistance    Therapeutic Activities and Exercises:  Pt able to recall 3/3 posterior hip precautions    AM-PAC 6 CLICK ADL   How much help from another person does this patient currently need?   1 = Unable, Total/Dependent Assistance  2 = A lot, Maximum/Moderate Assistance  3 = A little, Minimum/Contact Guard/Supervision  4 = None, Modified [place]/Independent    Putting on and taking off regular lower body clothing? : 1  Bathing (including washing, rinsing, drying)?: 2  Toileting, which includes using toilet, bedpan, or urinal? : 2  Putting on and taking off regular upper body clothing?: 3  Taking care of personal grooming such as brushing teeth?:  3  Eating meals?: 4  Total Score: 15     AM-PAC Raw Score CMS G-Code Modifier Level of Impairment Assistance   6 % Total / Unable   7 - 9 CM 80 - 100% Maximal Assist   10 - 14 CL 60 - 80% Moderate Assist   15 - 19 CK 40 - 60% Moderate Assist   20 - 22 CJ 20 - 40% Minimal Assist   23 CI 1-20% SBA / CGA   24 CH 0% Independent/ Mod I     Patient left up in chair with all lines intact, call button in reach, RN notified and PT present    ASSESSMENT:  Padmini Miranda is a 85 y.o. female with a medical diagnosis of Closed displaced fracture of right femoral neck and tolerated session well with improvements with functional mobility noted. However, pt remains high fall risk and would benefit from SNF placement upon discharge and remains a candidate for skilled OT services at this time to maximize functional (I) and safety.    Rehab identified problem list/impairments: Rehab identified problem list/impairments: weakness, impaired endurance, impaired self care skills, impaired functional mobilty, gait instability, impaired balance, decreased lower extremity function, pain, impaired skin, edema, orthopedic precautions    Rehab potential is good.    Activity tolerance: Good    Discharge recommendations: Discharge Facility/Level Of Care Needs: nursing facility, skilled     Barriers to discharge: Barriers to Discharge: None    Equipment recommendations:  (TBD)     GOALS:   Occupational Therapy Goals        Problem: Occupational Therapy Goal    Goal Priority Disciplines Outcome Interventions   Occupational Therapy Goal     OT, PT/OT Ongoing (interventions implemented as appropriate)    Description:  Goals to be met by: 4/16/17     Patient will increase functional independence with ADLs by performing:    Feeding with [place].  UE Dressing with Supervision.  LE Dressing with Minimal A using AD as needed.  Grooming while standing at sink with Supervision.  Toileting from bedside commode with SBA for hygiene and  clothing management.   Toilet transfer to bedside commode with SBA.                Plan:  Patient to be seen 6 x/week to address the above listed problems via self-care/home management, therapeutic activities, therapeutic exercises  Plan of Care expires: 05/06/17  Plan of Care reviewed with: patient, daughter         Nila BlancooineSARAY  04/07/2017

## 2017-04-08 NOTE — PROGRESS NOTES
[facility] Hospital Medicine  Progress Note    Patient Name: Padmini Miranda  MRN: [id number]  Patient Class: IP- Inpatient   Admission Date: 4/3/2017  Length of Stay: 5 days  Attending Physician: Tahira Tomlinson MD  Primary Care Provider: Randy Lyles MD    [facility] Medicine Team: [facility] MED  Tahira Tomlinson MD    Subjective:     Principal Problem:Closed displaced fracture of right femoral neck    HPI:  Ms. Miranda is an 86 y/o female who presented to ED on 04/04/17 after a mechanical fall at home.  She reports that she was stepping down a step into the den and missed the step, falling onto her right side.  She denied any dizziness or lightheadedness prior to falling.  She normally ambulates unassisted in the house but uses a walker when going out, such as to [employer].  She fell not long after getting home from angioplasty on her left leg done at [facility] for treatment of chronic lower extremity ischemia resulting in a heel ulcer.  When she presented for that scheduled procedure she was noted to have a heart rate in the 30s without any symptoms.  She underwent the procedure as planned after evaluation by a cardiology fellow with plan to hold her carvedilol and to see Dr. Antoine (EP) in clinic 4/5.  Dr. Antoine started her on amiodarone 3/10 after an implantable loop recorder captured frequent episodes of a-fib with RVR with HR up to the 160s.  She is anticoagulated with Eliquis, and her last dose was on Friday 3/31 in anticipation of the angiogram yesterday (4/3).  She states that prior to starting the amiodarone her ambulation was limited by dyspnea on exertion but that has since improved.  She endorses an episode of chest pressure during a recent dialysis session.  She is on dialysis T/Th/Sat with an access in her right femoral region.  She has combined systolic and diastolic heart failure with EF recently improved to 50%, but was as low as 20% 2 years ago.  She has known 3-vessel CAD  found on [facility] in 2015 not amenable to PCI, which was discovered after an abnormal dobutamine stress echo showing inducible global hypokinesis.    Hospital Course:  Ms. Miranda was admitted to Hospital Medicine and seen by EP for her bradycardia, which has been attributed to amiodarone and carvedilol (held since admission). She is scheduled for  hemiarthroplasty 04/05/17, however Nephrology became concerned about her bradycardia and ability to tolerate HD. Therefore, pt was transferred to the ICU for CRRT and then proceeded to the OR later that day. She underwent right hip hemiarthroplasty on 4/5/2017 by Dr Jeff Barrios. She is now WBAT with posterior hip precautions to RLE. PT / OT have been consulted and are recommending SNF. She was reevaluated by EP 4/6 for her bradycardia and no pacemaker placement needed at this time.     Interval History: Pt seen on HD today, feeling well.  Working well with PT.  COmplianing of tenderness in her right calf.  BLE u/s odered  Review of Systems   Constitutional: Negative for chills and fever.   Respiratory: Negative for cough, shortness of breath and wheezing.    Cardiovascular: Negative for chest pain and palpitations.   Gastrointestinal: Negative for abdominal pain, constipation, diarrhea and nausea.   Genitourinary: Negative for difficulty urinating, dysuria and frequency.     Objective:     Vital Signs (Most Recent):  Temp: 97.6 °F (36.4 °C) (04/08/17 1518)  Pulse: 62 (04/08/17 1518)  Resp: 14 (04/08/17 1518)  BP: 128/63 (04/08/17 1518)  SpO2: (!) 92 % (04/08/17 1518) Vital Signs (24h Range):  Temp:  [97.5 °F (36.4 °C)-98.3 °F (36.8 °C)] 97.6 °F (36.4 °C)  Pulse:  [50-67] 62  Resp:  [14-19] 14  SpO2:  [90 %-93 %] 92 %  BP: ()/(32-63) 128/63     Weight: 56.7 kg (125 lb)  Body mass index is 22.86 kg/(m^2).    Intake/Output Summary (Last 24 hours) at 04/08/17 1603  Last data filed at 04/08/17 1200   Gross per 24 hour   Intake              590 ml   Output               437 ml   Net              153 ml      Physical Exam   Constitutional: She is oriented to person, place, and time. She appears well-developed and well-nourished. No distress.   HENT:   Head: Normocephalic and atraumatic.   Cardiovascular: Normal rate and regular rhythm.    No murmur heard.  Pulmonary/Chest: Effort normal and breath sounds normal. She has no wheezes.   Abdominal: Soft. Bowel sounds are normal. She exhibits no distension. There is no tenderness.   Musculoskeletal: She exhibits tenderness (right hip).   Neurological: She is alert and oriented to person, place, and time.   Skin: Skin is warm and dry. No rash noted.         Assessment/Plan:      * Closed displaced fracture of right femoral neck  Ms. Miranda was admitted to Hospital Medicine and was scheduled for  hemiarthroplasty 04/05/17, however Nephrology became concerned about her bradycardia and ability to tolerate HD. Therefore, pt was transferred to the ICU for CRRT and then proceeded to the OR later that day. She underwent right hip hemiarthroplasty on 4/5/2017 by Dr Jeff Barrios. She is now WBAT with posterior hip precautions to RLE. PT / OT have been consulted and are recommending SNF.  She remains on Apixaban for Afib / DVT ppx    Acquired hypothyroidism  Continue levothyroxine      Acute blood loss anemia  Expected after surgery.  COntinue to monitor.  Does not require transfusion at this time.       Renovascular hypertension  On bidil       Systolic and diastolic CHF, chronic  Recently improved EF to 50%. Presently without signs of acute decompensation.      ESRD on hemodialysis  On HD T/Th/Sat    Atherosclerotic PVD with ulceration  S/p balloon angioplasty 4/4 with apparent improvement in distal perfusion.  Continue local wound care for heel ulcer.        Bradycardia, drug induced  Profound sinus bradycardia with rates in the 30s-40s. Pt is asymptomatic. She had been on amiodarone and carvedilol as an outpatient. Carvedilol stopped on  4/3/2017 after patient was noted to be bradycardic during fistula procedure.  Appreciate cardiology recs.  No pacemaker needed at this time.     PAF (paroxysmal atrial fibrillation)  Monitor on telemetry.  On Apixiban    3-vessel CAD   Continue ASA     VTE Risk Mitigation         Ordered     apixaban tablet 2.5 mg  2 times daily     Route:  Oral        04/05/17 1839     Place sequential compression device  Until discontinued      04/04/17 0145     Reason for No Pharmacological VTE Prophylaxis  Once      04/04/17 0145     High Risk of VTE  Once      04/04/17 0145          Tahira Tomlinson MD  Department of Hospital Medicine   [facility]

## 2017-04-08 NOTE — ASSESSMENT & PLAN NOTE
Ms. Miranda was admitted to [facility] and was scheduled for  hemiarthroplasty 04/05/17, however Nephrology became concerned about her bradycardia and ability to tolerate HD. Therefore, pt was transferred to the ICU for CRRT and then proceeded to the OR later that day. She underwent right hip hemiarthroplasty on 4/5/2017 by Dr Jeff Barrios. She is now WBAT with posterior hip precautions to RLE. PT / OT have been consulted and are recommending SNF.  She remains on Apixaban for Afib / DVT ppx

## 2017-04-09 PROBLEM — M79.605 LEFT LEG PAIN: Status: ACTIVE | Noted: 2017-01-01

## 2017-04-09 NOTE — NURSING
MD notified of Pt BG of 68 from am lab draw and 66 accu check reading with glucometer at 0920. Pt asymptomatic at this time. Pt encouraged to eat breakfast and BG to be rechecked after.

## 2017-04-09 NOTE — PROGRESS NOTES
[facility] Hospital Medicine  Progress Note    Patient Name: Padmini Miranda  MRN: [id number]  Patient Class: IP- Inpatient   Admission Date: 4/3/2017  Length of Stay: 6 days  Attending Physician: Tahira Tomlinson MD  Primary Care Provider: Randy Lyles MD    [facility] Medicine Team: [facility] MED  Tahira Tomlinson MD    Subjective:     Principal Problem:Closed displaced fracture of right femoral neck    HPI:  Ms. Miranda is an 86 y/o female who presented to ED on 04/04/17 after a mechanical fall at home.  She reports that she was stepping down a step into the den and missed the step, falling onto her right side.  She denied any dizziness or lightheadedness prior to falling.  She normally ambulates unassisted in the house but uses a walker when going out, such as to [employer].  She fell not long after getting home from angioplasty on her left leg done at [facility] for treatment of chronic lower extremity ischemia resulting in a heel ulcer.  When she presented for that scheduled procedure she was noted to have a heart rate in the 30s without any symptoms.  She underwent the procedure as planned after evaluation by a cardiology fellow with plan to hold her carvedilol and to see Dr. Antoine (EP) in clinic 4/5.  Dr. Antoine started her on amiodarone 3/10 after an implantable loop recorder captured frequent episodes of a-fib with RVR with HR up to the 160s.  She is anticoagulated with Eliquis, and her last dose was on Friday 3/31 in anticipation of the angiogram yesterday (4/3).  She states that prior to starting the amiodarone her ambulation was limited by dyspnea on exertion but that has since improved.  She endorses an episode of chest pressure during a recent dialysis session.  She is on dialysis T/Th/Sat with an access in her right femoral region.  She has combined systolic and diastolic heart failure with EF recently improved to 50%, but was as low as 20% 2 years ago.  She has known 3-vessel CAD  found on [facility] in 2015 not amenable to PCI, which was discovered after an abnormal dobutamine stress echo showing inducible global hypokinesis.    Hospital Course:  Ms. Miranda was admitted to Hospital Medicine and seen by EP for her bradycardia, which has been attributed to amiodarone and carvedilol (held since admission). She is scheduled for  hemiarthroplasty 04/05/17, however Nephrology became concerned about her bradycardia and ability to tolerate HD. Therefore, pt was transferred to the ICU for CRRT and then proceeded to the OR later that day. She underwent right hip hemiarthroplasty on 4/5/2017 by Dr Jeff Barrios. She is now WBAT with posterior hip precautions to RLE. PT / OT have been consulted and are recommending SNF. She was reevaluated by EP 4/6 for her bradycardia and no pacemaker placement needed at this time.     Interval History: Pt now complaining of severe pain in her left shin area.  Pt grimaces with pain to palpation anywhere on the anterior left lower leg.     Review of Systems   Constitutional: Negative for chills and fever.   Respiratory: Negative for cough, shortness of breath and wheezing.    Cardiovascular: Negative for chest pain and palpitations.   Gastrointestinal: Negative for abdominal pain, constipation, diarrhea and nausea.   Genitourinary: Negative for difficulty urinating, dysuria and frequency.     Objective:     Vital Signs (Most Recent):  Temp: 96.8 °F (36 °C) (04/09/17 0800)  Pulse: 66 (04/09/17 1053)  Resp: 18 (04/09/17 0800)  BP: 128/63 (04/09/17 0800)  SpO2: (!) 93 % (04/09/17 0938) Vital Signs (24h Range):  Temp:  [96.8 °F (36 °C)-97.8 °F (36.6 °C)] 96.8 °F (36 °C)  Pulse:  [53-66] 66  Resp:  [14-18] 18  SpO2:  [90 %-93 %] 93 %  BP: (104-128)/(53-63) 128/63     Weight: 56.7 kg (125 lb)  Body mass index is 22.86 kg/(m^2).  No intake or output data in the 24 hours ending 04/09/17 1356   Physical Exam   Constitutional: She is oriented to person, place, and time. She appears  well-developed and well-nourished. No distress.   HENT:   Head: Normocephalic and atraumatic.   Cardiovascular: Normal rate and regular rhythm.    No murmur heard.  Pulmonary/Chest: Effort normal and breath sounds normal. She has no wheezes.   Abdominal: Soft. Bowel sounds are normal. She exhibits no distension. There is no tenderness.   Musculoskeletal: She exhibits tenderness (right hip and left lower leg).   Neurological: She is alert and oriented to person, place, and time.   Skin: Skin is warm and dry. No rash noted.         Assessment/Plan:      * Closed displaced fracture of right femoral neck  Ms. Miranda was admitted to [facility] Medicine and was scheduled for  hemiarthroplasty 04/05/17, however Nephrology became concerned about her bradycardia and ability to tolerate HD. Therefore, pt was transferred to the ICU for CRRT and then proceeded to the OR later that day. She underwent right hip hemiarthroplasty on 4/5/2017 by Dr Jeff Barrios. She is now WBAT with posterior hip precautions to RLE. PT / OT have been consulted and are recommending SNF.  She remains on Apixaban for Afib / DVT ppx    Acquired hypothyroidism  Continue levothyroxine      Acute blood loss anemia  Expected after surgery.  COntinue to monitor.  Does not require transfusion at this time.       Left leg pain  Vascular surgery  Was consulted as pt was revascularized on this side a few days ago.  No signs of acute ischemia and they would like to obtain ABIs for baseline. Will also get X rays. Will start gabapentin as family states she has had this pain before and it usually responds well to gabapentin.       Renovascular hypertension  On bidil       Systolic and diastolic CHF, chronic  Recently improved EF to 50%. Presently without signs of acute decompensation.      ESRD on hemodialysis  On HD T/Th/Sat    Atherosclerotic PVD with ulceration  S/p balloon angioplasty 4/4 with apparent improvement in distal perfusion.  Continue local wound  care for heel ulcer.        Bradycardia, drug induced  Profound sinus bradycardia with rates in the 30s-40s. Pt is asymptomatic. She had been on amiodarone and carvedilol as an outpatient. Carvedilol stopped on 4/3/2017 after patient was noted to be bradycardic during fistula procedure.  Appreciate cardiology recs.  No pacemaker needed at this time.     PAF (paroxysmal atrial fibrillation)  Monitor on telemetry.  On Apixiban    3-vessel CAD   Continue ASA     VTE Risk Mitigation         Ordered     apixaban tablet 2.5 mg  2 times daily     Route:  Oral        04/05/17 1839     Place sequential compression device  Until discontinued      04/04/17 0145     Reason for No Pharmacological VTE Prophylaxis  Once      04/04/17 0145     High Risk of VTE  Once      04/04/17 0145          Tahira Tomlinson MD  Department of Hospital Medicine   [facility]

## 2017-04-09 NOTE — PLAN OF CARE
Problem: Physical Therapy Goal  Goal: Physical Therapy Goal  Revised goals to be met by: 17     Patient will increase functional independence with mobility by performin. Supine to sit with SBA  2. Sit to Supine with SBA  3. Sit to stand transfer with CGA using appropriate AD.   4. Gait x 50 feet with CGA using appropriate AD.   5. (I) with HEP        Discontinued goals:  Goals to be met by: 17     Patient will increase functional independence with mobility by performin. Supine to sit with Cool  2. Sit to stand transfer with mod [place] using RW.   3. Gait x 250 feet with Modified Cool using Rolling Walker.   4. Lower extremity exercise program x15 reps per handout, with assistance as needed   Outcome: Ongoing (interventions implemented as appropriate)  Goals in progress

## 2017-04-09 NOTE — PT/OT/SLP PROGRESS
Occupational Therapy  Treatment    Padmini Miranda   MRN: [id number]   Admitting Diagnosis: Closed displaced fracture of right femoral neck    OT Date of Treatment: 17   OT Start Time: 804  OT Stop Time: 832  OT Total Time (min): 28 min    Billable Minutes:  Self Care/Home Management 28    General Precautions: Standard, fall  Orthopedic Precautions: RLE weight bearing as tolerated, RLE posterior precautions  Braces:      Do you have any cultural, spiritual, Nondenominational conflicts, given your current situation?: None reported    Subjective:  Communicated with RN prior to session.    Pt agreeable to treatment    Pain Ratin/10  Location - Side: Right  Location: leg  Pain Addressed: Reposition, Distraction    Objective:  Patient found with: FCD     Functional Mobility:  Bed Mobility:  Scooting/Bridging: Minimum Assistance  Supine to Sit: Minimum Assistance    Transfers:   Sit <> Stand Assistance: Moderate Assistance  Sit <> Stand Assistive Device: Rolling Walker    Functional Ambulation: [place] with RW 10 feet    Activities of Daily Living:  UE Dressing Level of Assistance: Moderate assistance    LE Dressing Level of Assistance: Total assistance    Grooming Position: Seated, EOB  Grooming Level of Assistance: Stand by assistance    Therapeutic Activities and Exercises:  Pt able to recall 3/3 posterior hip precautions    AM-PAC 6 CLICK ADL   How much help from another person does this patient currently need?   1 = Unable, Total/Dependent Assistance  2 = A lot, Maximum/Moderate Assistance  3 = A little, Minimum/Contact Guard/Supervision  4 = None, Modified [place]/Independent    Putting on and taking off regular lower body clothing? : 1  Bathing (including washing, rinsing, drying)?: 2  Toileting, which includes using toilet, bedpan, or urinal? : 2  Putting on and taking off regular upper body clothing?: 3  Taking care of personal grooming such as brushing teeth?: 3  Eating meals?: 4  Total Score: 15      AM-PAC Raw Score CMS G-Code Modifier Level of Impairment Assistance   6 % Total / Unable   7 - 9 CM 80 - 100% Maximal Assist   10 - 14 CL 60 - 80% Moderate Assist   15 - 19 CK 40 - 60% Moderate Assist   20 - 22 CJ 20 - 40% Minimal Assist   23 CI 1-20% SBA / CGA   24 CH 0% Independent/ Mod I     Patient left up in chair with all lines intact, call button in reach and RN notified    ASSESSMENT:  Padmini Miranda is a 85 y.o. female with a medical diagnosis of Closed displaced fracture of right femoral neck and tolerated session well with improvements with functional mobility. Pt would continue to benefit from OT services to maximize functional (I) and safety.    Rehab identified problem list/impairments: Rehab identified problem list/impairments: weakness, impaired endurance, impaired self care skills, impaired functional mobilty, gait instability, impaired balance, decreased lower extremity function, pain, impaired skin, edema, decreased ROM    Rehab potential is good.    Activity tolerance: Good    Discharge recommendations: Discharge Facility/Level Of Care Needs: nursing facility, skilled     Barriers to discharge: Barriers to Discharge: None    Equipment recommendations:  (TBD)     GOALS:   Occupational Therapy Goals        Problem: Occupational Therapy Goal    Goal Priority Disciplines Outcome Interventions   Occupational Therapy Goal     OT, PT/OT Ongoing (interventions implemented as appropriate)    Description:  Goals to be met by: 4/16/17     Patient will increase functional independence with ADLs by performing:    Feeding with [place].  UE Dressing with Supervision.  LE Dressing with Minimal A using AD as needed.  Grooming while standing at sink with Supervision.  Toileting from bedside commode with SBA for hygiene and clothing management.   Toilet transfer to bedside commode with SBA.                Plan:  Patient to be seen 6 x/week to address the above listed problems via self-care/home  management, therapeutic activities, therapeutic exercises  Plan of Care expires: 05/06/17  Plan of Care reviewed with: patient         SARAY Castillo  04/08/2017

## 2017-04-09 NOTE — PROGRESS NOTES
ORTHO PROGRESS NOTE:    Padmini Miranda is a 85 y.o. female admitted on 4/3/2017  Hospital Day: 6  Post Op Day: 4 Day Post-Op      The patient was seen and examined this morning at the bedside. NAEO.  Pain present but improving. Slow progress with PT - Not seen yesterday    PHYSICAL EXAM:  Awake/alert/oriented x 3  No acute distress  AFVSS  Good inspiratory effort with unlabored breathing  Dressings c/d/i, aquacel  NVI distally    Vitals:    04/08/17 2010 04/08/17 2300 04/08/17 2357 04/09/17 0500   BP: 127/60  (!) 110/54    BP Location: Right arm  Right arm    Patient Position: Lying  Lying    BP Method: Automatic  Automatic    Pulse: (!) 58 60 61 (!) 53   Resp: 16  16    Temp: 97.8 °F (36.6 °C)  97.6 °F (36.4 °C)    TempSrc:   Oral    SpO2: (!) 90%  (!) 90%    Weight:       Height:         I/O last 3 completed shifts:  In: 590 [P.O.:240; Other:350]  Out: 437 [Other:437]    Recent Labs  Lab 04/07/17  0402 04/08/17  0430 04/09/17  0506   CALCIUM 7.9*  7.8* 8.1* 8.4*   *  131* 132* 129*   K 5.4*  5.4* 4.4 4.6   CO2 20*  19* 24 22*     101 98 95   BUN 41*  41* 17 24*   CREATININE 5.5*  5.6* 3.1* 4.0*       Recent Labs  Lab 04/07/17  0402 04/08/17  0430 04/08/17  1137 04/09/17  0506   WBC 7.80 5.28  --  4.92   RBC 3.02* 2.76*  --  2.70*   HGB 8.3* 7.5* 7.7* 7.4*   HCT 24.8* 23.4* 22.9* 21.8*    152  --  181     No results for input(s): INR, APTT in the last 72 hours.    Invalid input(s): PT    A/P: 85 y.o. female 4 Day Post-Op s/p right hip hemiarthroplasty  -- Pain control  -- PT/OT: WBAT RLE, posterior hip precautions  -- DVT prophylaxis: home ASA 81 daily, home eliquis 2.5 BID    -- Dispo: Per Primary. Stable from orthopaedic management perspective    Samy Pinzon MD  Orthopaedic Surgery Resident  [facility]

## 2017-04-09 NOTE — SUBJECTIVE & OBJECTIVE
Interval History: Pt now complaining of severe pain in her left shin area.  Pt grimaces with pain to palpation anywhere on the anterior left lower leg.     Review of Systems   Constitutional: Negative for chills and fever.   Respiratory: Negative for cough, shortness of breath and wheezing.    Cardiovascular: Negative for chest pain and palpitations.   Gastrointestinal: Negative for abdominal pain, constipation, diarrhea and nausea.   Genitourinary: Negative for difficulty urinating, dysuria and frequency.     Objective:     Vital Signs (Most Recent):  Temp: 96.8 °F (36 °C) (04/09/17 0800)  Pulse: 66 (04/09/17 1053)  Resp: 18 (04/09/17 0800)  BP: 128/63 (04/09/17 0800)  SpO2: (!) 93 % (04/09/17 0938) Vital Signs (24h Range):  Temp:  [96.8 °F (36 °C)-97.8 °F (36.6 °C)] 96.8 °F (36 °C)  Pulse:  [53-66] 66  Resp:  [14-18] 18  SpO2:  [90 %-93 %] 93 %  BP: (104-128)/(53-63) 128/63     Weight: 56.7 kg (125 lb)  Body mass index is 22.86 kg/(m^2).  No intake or output data in the 24 hours ending 04/09/17 1356   Physical Exam   Constitutional: She is oriented to person, place, and time. She appears well-developed and well-nourished. No distress.   HENT:   Head: Normocephalic and atraumatic.   Cardiovascular: Normal rate and regular rhythm.    No murmur heard.  Pulmonary/Chest: Effort normal and breath sounds normal. She has no wheezes.   Abdominal: Soft. Bowel sounds are normal. She exhibits no distension. There is no tenderness.   Musculoskeletal: She exhibits tenderness (right hip and left lower leg).   Neurological: She is alert and oriented to person, place, and time.   Skin: Skin is warm and dry. No rash noted.

## 2017-04-09 NOTE — PT/OT/SLP PROGRESS
Physical Therapy  Treatment    Padmini Miranda   MRN: [id number]   Admitting Diagnosis: Closed displaced fracture of right femoral neck    PT Received On: 17  PT Start Time: 08     PT Stop Time: 832    PT Total Time (min): 28 min       Billable Minutes:  Gait Training5, Therapeutic Activity 13 and Therapeutic Exercise 10    Treatment Type: Treatment  PT/PTA: PT             General Precautions: Standard, fall  Orthopedic Precautions: RLE weight bearing as tolerated, RLE posterior precautions   Braces:      Do you have any cultural, spiritual, Methodist conflicts, given your current situation?: no    Subjective:  Communicated with nsg prior to session.  Pt agreeable to PT session    Pain Ratin/10  Location - Side: Right     Location: leg          Objective:   Patient found with: FCD    Functional Mobility:  Bed Mobility:   Supine to Sit: Minimum Assistance    Transfers:  Sit <> Stand Assistance: Minimum Assistance  Sit <> Stand Assistive Device: Standard Walker    Gait:   Gait Distance: 15 ft  Assistance 1: Minimum assistance  Gait Assistive Device: Standard walker  Gait Pattern: 3-point gait  Gait Deviation(s): decreased rogelio, increased time in double stance, decreased velocity of limb motion, decreased step length, decreased stride length, increased stride width, decreased swing-to-stance ratio, decreased toe-to-floor clearance      Balance:   Static Sit: GOOD-: Takes MODERATE challenges from all directions but inconsistently  Dynamic Sit: GOOD-: Maintains balance through MODERATE excursions of active trunk movement,     Static Stand: FAIR: Maintains without assist but unable to take challenges  Dynamic stand: FAIR: Needs CONTACT GUARD during gait     Therapeutic Activities and Exercises:  White board updated     Pt sat EOB x ~10 mins with SBA 2/2 dizziness upon sitting.  Pt's vitals WFL.    Supine therex per handout (Hip protocol), 20x each  · Ankle pumps  · Quad sets  · Glute squeezes    Pt able to  verbalize 3/3 posterior hip precautions without cueing     AM-PAC 6 CLICK MOBILITY  How much help from another person does this patient currently need?   1 = Unable, Total/Dependent Assistance  2 = A lot, Maximum/Moderate Assistance  3 = A little, Minimum/Contact Guard/Supervision  4 = None, Modified [place]/Independent    Turning over in bed (including adjusting bedclothes, sheets and blankets)?: 3  Sitting down on and standing up from a chair with arms (e.g., wheelchair, bedside commode, etc.): 3  Moving from lying on back to sitting on the side of the bed?: 3  Moving to and from a bed to a chair (including a wheelchair)?: 3  Need to walk in hospital room?: 3  Climbing 3-5 steps with a railing?: 1  Total Score: 16    AM-PAC Raw Score CMS G-Code Modifier Level of Impairment Assistance   6 % Total / Unable   7 - 9 CM 80 - 100% Maximal Assist   10 - 14 CL 60 - 80% Moderate Assist   15 - 19 CK 40 - 60% Moderate Assist   20 - 22 CJ 20 - 40% Minimal Assist   23 CI 1-20% SBA / CGA   24 CH 0% Independent/ Mod I     Patient left up in chair with all lines intact, call button in reach and nsg notified.    Assessment:  Padmini Miranda is a 85 y.o. female with a medical diagnosis of Closed displaced fracture of right femoral neck.  Pt tolerated tx session well.  Pt is progressing according to plan at this time.    Rehab identified problem list/impairments: Rehab identified problem list/impairments: weakness, impaired endurance, impaired self care skills, impaired functional mobilty, gait instability, impaired balance, pain, impaired skin, edema, orthopedic precautions    Rehab potential is good.    Activity tolerance: Good    Discharge recommendations: Discharge Facility/Level Of Care Needs: nursing facility, skilled     Barriers to discharge: Barriers to Discharge: None    Equipment recommendations: Equipment Needed After Discharge:  (TBD)     GOALS:   Physical Therapy Goals        Problem: Physical Therapy  Goal    Goal Priority Disciplines Outcome Goal Variances Interventions   Physical Therapy Goal     PT/OT, PT Ongoing (interventions implemented as appropriate)     Description:  Revised goals to be met by: 17     Patient will increase functional independence with mobility by performin. Supine to sit with SBA  2. Sit to Supine with SBA  3. Sit to stand transfer with CGA using appropriate AD.   4. Gait  x 50 feet with CGA using appropriate AD.   5. (I) with HEP        Discontinued goals:  Goals to be met by: 17     Patient will increase functional independence with mobility by performin. Supine to sit with [place]  2. Sit to stand transfer with mod [place] using RW.   3. Gait  x 250 feet with Modified [place] using Rolling Walker.   4. Lower extremity exercise program x15 reps per handout, with assistance as needed                 PLAN:    Patient to be seen daily  to address the above listed problems via gait training, therapeutic activities, therapeutic exercises, neuromuscular re-education  Plan of Care expires: 17  Plan of Care reviewed with: patient         Kaleb Wallace, PT  2017

## 2017-04-09 NOTE — CONSULTS
Consult Note  Vascular Surgery    Consult Requested By: IM  Reason for Consult: left anterior shin tenderness    SUBJECTIVE:     History of Present Illness:  Patient is a 85 y.o. female with h/o atrial fibrillation (on eliquis),3vCAD not amenable to PCI, ischemic cardiomyopathy (EF 50%), HTN, HLD, ESRD on HD via (L BC AVF) presented to hospital on 4/4/17 after mechanical fall resulting in right closed displaced fracture of her femoral neck.  Patient's hospitalization has been complicated by sinus bradycardia, s/p evaluation by EP thought to be secondary to coreg, currently held.  Patient underwent right hip hemiarthroplasty on 4/5/17.  Vascular surgery consulted today for left leg pain that occurred overnight.  Patient reports that she has had pain in left heel, point tenderness over area of skin breakdown. Last night she reported pain started in her left anterior tibia; intermittent pain that ached.  She denies complaints of pain in her left calf; denies complaints of pain in her left foot (has pain at ulcer site on left heel only).  Denies recent trauma to left leg.  Reports that pain is worsened with pressure but occurs without as well.  Prior to this she has been working with physical therapy, with recommendations for SNF.   Patient is s/p Left lower extremity angiogram with PTA left SFA with Dr. Aranda on 4/3/17 for left lower extremity rest pain and ulceration.    Scheduled Meds:   apixaban  2.5 mg Oral BID    aspirin  81 mg Oral Daily    atorvastatin  40 mg Oral QAM    docusate sodium  100 mg Oral TID    epoetin syed  3,000 Units Subcutaneous Every Tues, Thurs, Sat    isosorbide-hydrALAZINE 20-37.5 mg  1 tablet Oral TID    levothyroxine  50 mcg Oral Before breakfast    pantoprazole  40 mg Oral Daily    polyethylene glycol  17 g Oral Daily    sevelamer carbonate  1.6 g Oral TID WM    sodium chloride 0.9%  3 mL Intravenous Q8H    tranexamic acid (CYKLOKAPRON) 3,000 mg in sodium chloride 0.9% 100  mL  3,000 mg Irrigation Once     Continuous Infusions:   PRN Meds:sodium chloride, sodium chloride 0.9%, albumin human 25%, dextrose 50%, glucagon (human recombinant), insulin aspart, morphine, ondansetron, oxycodone-acetaminophen, ramelteon    Review of patient's allergies indicates:   Allergen Reactions    Ativan [lorazepam] Hallucinations    Crab Other (See Comments)     Causes Gout    Crayfish Other (See Comments)     Causes Gout    Promethazine Other (See Comments)     Hallucinations        Past Medical History:   Diagnosis Date    ALLERGIC RHINITIS     Anemia     Anticoagulant long-term use     Blood transfusion     Cardiomyopathy 10/20/2015    Cataract     CHF exacerbation 2015    Chronic kidney disease     Cramp of both lower extremities 2016    Hyperlipidemia     Hypertension     Hypothyroidism     Persistent atrial fibrillation 3/28/2016     Past Surgical History:   Procedure Laterality Date    ANGIOPLASTY      Renal PTA    CARDIAC CATHETERIZATION       SECTION      HYSTERECTOMY      ovaries intact    RENAL ARTERY STENT      TONSILLECTOMY      VASCULAR SURGERY       Family History   Problem Relation Age of Onset    Adopted: Yes    Heart disease Father      Social History   Substance Use Topics    Smoking status: Never Smoker    Smokeless tobacco: Never Used    Alcohol use Yes      Comment: occasional wine use        Review of Systems:  Peripheral Vascular: Ischemic Rest Pain: left foot  Constitutional: no fever or chills  Eyes: no visual changes  Respiratory: no cough or shortness of breath  Cardiovascular: no chest pain or palpitations  Gastrointestinal: no nausea or vomiting, tolerating diet  Hematologic/Lymphatic: no easy bruising or lymphadenopathy  Musculoskeletal: no arthralgias or myalgias  Neurological: no seizures or tremors    OBJECTIVE:     Vital Signs (Most Recent)  Temp: 96.8 °F (36 °C) (17 0800)  Pulse: 62 (17 0800)  Resp: 18  (04/09/17 0800)  BP: 128/63 (04/09/17 0800)  SpO2: (!) 93 % (04/09/17 0938)    Vital Signs Range (Last 24H):  Temp:  [96.8 °F (36 °C)-97.8 °F (36.6 °C)]   Pulse:  [53-62]   Resp:  [14-18]   BP: (104-128)/(53-63)   SpO2:  [90 %-93 %]     Physical Exam:  General: well developed, well nourished, appears stated age, no distress  Head: normocephalic, atraumatic  Eyes:  conjunctivae/corneas clear.  Lungs:  clear to auscultation bilaterally and normal respiratory effort  Heart: irregularly irregular rhythm  Abdomen: soft, non-tender non-distented; bowel sounds normal; no masses,  no organomegaly  Extremities: left foot with small heel ulcer stage II; no pitting edema bilateral lower extremities; patient with reproducible point tenderness mid anterior tibia to distal anterior tibia  Pulses: 1+ bilateral femoral pulses; right leg biphasic DP and monophasic Pt; left leg biphasic popliteal, monophasic DP and PT; left upper arm with BC AVF with good thrill  Neurologic:  No focal numbness or weakness, no motor deficit left leg; no sensory deficit  Alert and oriented. Thought content appropriate    Laboratory:  CBC:   Recent Labs  Lab 04/09/17  0506   WBC 4.92   RBC 2.70*   HGB 7.4*   HCT 21.8*        CMP:   Recent Labs  Lab 04/06/17  0413  04/09/17  0506   GLU 69*  < > 68*   CALCIUM 7.6*  < > 8.4*   ALBUMIN 1.9*  < > 2.5*   PROT 5.1*  --   --      < > 129*   K 4.6  < > 4.6   CO2 24  < > 22*     < > 95   BUN 36*  < > 24*   CREATININE 4.8*  < > 4.0*   ALKPHOS 51*  --   --    ALT <5*  --   --    AST 36  --   --    BILITOT 0.6  --   --    < > = values in this interval not displayed.  Coagulation:   Recent Labs  Lab 04/06/17  0413   INR 1.3*   APTT 31.5       Diagnostic Results:      ASSESSMENT/PLAN:     Patient is a 85 y.o. female with h/o atrial fibrillation (on eliquis),3vCAD not amenable to PCI, ischemic cardiomyopathy (EF 50%), HTN, HLD, ESRD on HD via (L BC AVF) presented to hospital on 4/4/17 after  mechanical fall resulting in right closed displaced fracture of her femoral neck, h/o recent LLE angiogram with PTA L SFA for left limb ischemia.    Current pain presentation is point tenderness on left tibia; with no complaints of acute left foot pain or calf pain  Signals present distally with no motor or sensory deficit  Does not present as acute limb ischemia; patient does have known distal popliteal occlusion with collateral distally  Will obtain TL for baseline    Yun Wright DO  PGY-6, Vascular fellow   [phone]

## 2017-04-10 NOTE — PLAN OF CARE
Bear hunya removed after HD at 3 PM today.  Temperatures have been stable.  Will continue to monitor.

## 2017-04-10 NOTE — PLAN OF CARE
Problem: Patient Care Overview  Goal: Plan of Care Review  Outcome: Ongoing (interventions implemented as appropriate)  3 hour and 45 minute dialysis complete.  Blood rinsed back.  Needles pulled.  Pressure held x 5 minutes.  Hemostasis achieved.  Covered with gauze and paper tape.  +thrill +bruit  No fluid removed due to hypotension.  Net even.  Albumin x 2 given for BP support.

## 2017-04-10 NOTE — ASSESSMENT & PLAN NOTE
On HD T/Th/Sat as outpatient.  Was dialyzed today.  Spoke with nephrology fellow Dr Gong who informed me that they were unable to remove fluid during HD due to low blood pressures.  Pt is developing some LE edema and is 5 liters positive for her stay.  Will stop Bidil and start midodrine 10 TID so that we can attempt HD in the AM (to get her back on schedule and attempt fluid removal).

## 2017-04-10 NOTE — ASSESSMENT & PLAN NOTE
Today pt became hypothermic and a bear hugger was placed. No concern for infection, as clinically, the patient appears very comfortable, normal WBC, and no obvious source. TSH (normal on 3/10) was checked again today and found to be severely elevated at 21.6, with low normal Free T4 of 0.88. Synthroid was adjusted from 50 mcg to 75 mcg daily.

## 2017-04-10 NOTE — PROGRESS NOTES
CM spoke with patient's family at the bedside; family requesting an update on SNF placement- patient off floor in dialysis. ALICIA notified by Dr. Tomlinson that patient and patient's family requested [facility]. CM already spoke with Sue at [facility] and [facility] consult placed; awaiting acceptance/denial from [facility]. Patient's family stated patient goes to dialysis at [facility] on [address]. CM explained to patient's family that we are currently waiting on [facility] decision to accept patient; patient medically ready to transfer per MD. CM will update patient and patient's family accordingly once new information is obtained.    Marlen Talbert RN, CM [facility]  [phone] -[phone]

## 2017-04-10 NOTE — PLAN OF CARE
Problem: Physical Therapy Goal  Goal: Physical Therapy Goal  Revised goals to be met by: 17     Patient will increase functional independence with mobility by performin. Supine to sit with SBA  2. Sit to Supine with SBA  3. Sit to stand transfer with CGA using appropriate AD.   4. Gait x 50 feet with CGA using appropriate AD.   5. (I) with HEP        Discontinued goals:  Goals to be met by: 17     Patient will increase functional independence with mobility by performin. Supine to sit with [place]  2. Sit to stand transfer with mod [place] using RW.   3. Gait x 250 feet with Modified [place] using Rolling Walker.   4. Lower extremity exercise program x15 reps per handout, with assistance as needed   Outcome: Ongoing (interventions implemented as appropriate)  No goals met this day. Pt activity tolerance limited today 2/2 HD in morning. Pt with instability during ambulation and walked ~6' before needing a rest break. Pt able to perform supine therex with no complications. Pt progressing well and will cont to benefit from skilled therapy services.

## 2017-04-10 NOTE — PT/OT/SLP PROGRESS
"Physical Therapy  Treatment    Padmini Miranda   MRN: [id number]   Admitting Diagnosis: Closed displaced fracture of right femoral neck    PT Received On: 04/10/17  PT Start Time: 1600     PT Stop Time: 1624    PT Total Time (min): 24 min       Billable Minutes:  Therapeutic Activity 14 and Therapeutic Exercise 10    Treatment Type: Treatment  PT/PTA: PT             General Precautions: Standard, fall  Orthopedic Precautions: RLE weight bearing as tolerated, RLE posterior precautions   Braces:      Do you have any cultural, spiritual, Protestant conflicts, given your current situation?: no    Subjective:  Communicated with nsg prior to session.  -Pt agreeable to PT session    Pain Ratin/10  Location - Side: Right     Location: hip     Pain Rating Post-Intervention: 5/10    Objective:   Patient found with: FCD    Functional Mobility:  Bed Mobility:   Supine to Sit: Minimum Assistance  Sit to Supine: Minimum Assistance    Transfers:  Sit <> Stand Assistance: Minimum Assistance x3 trials from EOB  Sit <> Stand Assistive Device: Standard Walker    Gait:   Gait Distance: 6' x1 trial, 3 side steps x1 trial  Assistance 1: Minimum assistance  Gait Assistive Device: Standard walker  Gait Pattern: 3-point gait  Gait Deviation(s): decreased rogelio, increased time in double stance, decreased velocity of limb motion, decreased step length, decreased stride length, decreased toe-to-floor clearance, decreased weight-shifting ability    -On first trial, pt reached ~6' and began to demonstrate instability and (L) lateral lean. Pt returned to bedside and gait trial was terminated. Pt agreeable to stand again and perform side steps to move higher in bed. Pt states "I am just so worn out today, I feel weak"    Balance:   Static Sit: FAIR+: Able to take MINIMAL challenges from all directions  Dynamic Sit: FAIR+: Maintains balance through MINIMAL excursions of active trunk motion  Static Stand: FAIR+: Takes MINIMAL challenges from " all directions  Dynamic stand: FAIR: Needs CONTACT GUARD during gait     Therapeutic Activities and Exercises:  -Pt sat EOB for ~10 minutes today with SBA to maintain balance with (B) UE support. Pt assisted with donning new gown and tying in back.     -Pt performed supine therex x20-30 reps of:  A. AP  B. GS  C. QS      -Pt educated on:  A. Posterior hip precautions- pt able to recall 2/3 and was educated on not bending hip past 90 deg  B. DME mgmt  C. 3-point gait sequence  D. Performing HEP to reduce the risk of developing a blood clot  E. Importance of OOB activity to improve functional outcomes    AM-PAC 6 CLICK MOBILITY  How much help from another person does this patient currently need?   1 = Unable, Total/Dependent Assistance  2 = A lot, Maximum/Moderate Assistance  3 = A little, Minimum/Contact Guard/Supervision  4 = None, Modified [place]/Independent    Turning over in bed (including adjusting bedclothes, sheets and blankets)?: 3  Sitting down on and standing up from a chair with arms (e.g., wheelchair, bedside commode, etc.): 3  Moving from lying on back to sitting on the side of the bed?: 3  Moving to and from a bed to a chair (including a wheelchair)?: 3  Need to walk in hospital room?: 3  Climbing 3-5 steps with a railing?: 1  Total Score: 16    AM-PAC Raw Score CMS G-Code Modifier Level of Impairment Assistance   6 % Total / Unable   7 - 9 CM 80 - 100% Maximal Assist   10 - 14 CL 60 - 80% Moderate Assist   15 - 19 CK 40 - 60% Moderate Assist   20 - 22 CJ 20 - 40% Minimal Assist   23 CI 1-20% SBA / CGA   24 CH 0% Independent/ Mod I     Patient left supine with all lines intact, call button in reach and nsg notified.    Assessment:  Padmini Miranda is a 85 y.o. female with a medical diagnosis of Closed displaced fracture of right femoral neck and presents with decreased activity tolerance. No goals met this day. Pt activity tolerance limited today 2/2 HD in morning. Pt with instability  during ambulation and walked ~6' before needing a rest break. Pt able to perform supine therex with no complications. Pt progressing well and will cont to benefit from skilled therapy services.    Rehab identified problem list/impairments: Rehab identified problem list/impairments: weakness, impaired endurance, impaired self care skills, impaired functional mobilty, gait instability, impaired balance, impaired skin, pain, orthopedic precautions    Rehab potential is good.    Activity tolerance: Fair    Discharge recommendations: Discharge Facility/Level Of Care Needs: nursing facility, skilled     Barriers to discharge: Barriers to Discharge: None    Equipment recommendations: Equipment Needed After Discharge:  (TBD pending further ambulation)     GOALS:   Physical Therapy Goals        Problem: Physical Therapy Goal    Goal Priority Disciplines Outcome Goal Variances Interventions   Physical Therapy Goal     PT/OT, PT Ongoing (interventions implemented as appropriate)     Description:  Revised goals to be met by: 17     Patient will increase functional independence with mobility by performin. Supine to sit with SBA  2. Sit to Supine with SBA  3. Sit to stand transfer with CGA using appropriate AD.   4. Gait  x 50 feet with CGA using appropriate AD.   5. (I) with HEP        Discontinued goals:  Goals to be met by: 17     Patient will increase functional independence with mobility by performin. Supine to sit with [place]  2. Sit to stand transfer with mod [place] using RW.   3. Gait  x 250 feet with Modified [place] using Rolling Walker.   4. Lower extremity exercise program x15 reps per handout, with assistance as needed                 PLAN:    Patient to be seen daily  to address the above listed problems via gait training, therapeutic activities, therapeutic exercises, neuromuscular re-education  Plan of Care expires: 17  Plan of Care reviewed with: patient          Aguilar Rowell, PT  04/10/2017

## 2017-04-10 NOTE — PLAN OF CARE
POD 5 s/p R hip hemiarthroplasty for fem neck fx. PT/OT recommended SNF placement. SW and CM will continue to follow for any additional needs. Plans to discharge to SNF as soon as medically stable. SW to obtain SNF choices today.     04/10/17 0802   Right Care Assessment   Can the patient answer the patient profile reliably? Yes, cognitively intact   How often would a person be available to care for the patient? Often   Describe the patient's ability to walk at the present time. Major restrictions/daily assistance from another person   How does the patient rate their overall health at the present time? Fair   Number of comorbid conditions (as recorded on the chart) Five or more   During the past month, has the patient often been bothered by feeling down, depressed or hopeless? No   During the past month, has the patient often been bothered by little interest or pleasure in doing things? No

## 2017-04-10 NOTE — PT/OT/SLP PROGRESS
Physical Therapy  Treatment    Padmini Miranda   MRN: [id number]   Admitting Diagnosis: Closed displaced fracture of right femoral neck    PT Received On: 17  PT Start Time: 1032     PT Stop Time: 1055    PT Total Time (min): 23 min       Billable Minutes:  Therapeutic Activity 15 and Therapeutic Exercise 8    Treatment Type: Treatment  PT/PTA: PT             General Precautions: Standard, fall  Orthopedic Precautions: RLE weight bearing as tolerated, RLE posterior precautions   Braces:      Do you have any cultural, spiritual, Bahai conflicts, given your current situation?: no    Subjective:  Communicated with nsg prior to session.  Pt reports increased LLE pain    Pain Ratin/10  Location - Side: Left  Location - Orientation: distal  Location: leg (distal 1/3 of tibial shaft)          Therapeutic Activities and Exercises:  White board updated    Supine therex, 20x each  · Ankle pumps (RLE only)  · Quad sets (RLE only)  · Glute squeezes    Pt TTP at distal 1/3 of tibial shaft and anterior tib. Mm belly of her LLE.  Pt with increased tibia pain when gentle axial pressure applied through the Left foot.  Pt with palpable DP and post tib pulse in BLEs and good capillary refill.  PT notified Dr. Tomlinson with medicine team of findings, and she stated that she will order an x-ray.  PT will await results of x-ray to rule out possible tib fx prior to mobilizing pt and weight bearing through the LLE.     Pt repositioned in bed prior to end of PT session, and she reported improved overall comfort despite ongoing LLE pain.    AM-PAC 6 CLICK MOBILITY  How much help from another person does this patient currently need?   1 = Unable, Total/Dependent Assistance  2 = A lot, Maximum/Moderate Assistance  3 = A little, Minimum/Contact Guard/Supervision  4 = None, Modified [place]/Independent    Turning over in bed (including adjusting bedclothes, sheets and blankets)?: 3  Sitting down on and standing up from a chair  with arms (e.g., wheelchair, bedside commode, etc.): 3  Moving from lying on back to sitting on the side of the bed?: 3  Moving to and from a bed to a chair (including a wheelchair)?: 3  Need to walk in hospital room?: 3  Climbing 3-5 steps with a railing?: 1  Total Score: 16    AM-PAC Raw Score CMS G-Code Modifier Level of Impairment Assistance   6 % Total / Unable   7 - 9 CM 80 - 100% Maximal Assist   10 - 14 CL 60 - 80% Moderate Assist   15 - 19 CK 40 - 60% Moderate Assist   20 - 22 CJ 20 - 40% Minimal Assist   23 CI 1-20% SBA / CGA   24 CH 0% Independent/ Mod I     Patient left supine with all lines intact, call button in reach, MD notified.    Assessment:  Padmini Miranda is a 85 y.o. female with a medical diagnosis of Closed displaced fracture of right femoral neck.  Pt tolerated modified tx session well.  Pt remains a good candidate to benefit from skilled PT services pending x-ray results.    Rehab identified problem list/impairments: Rehab identified problem list/impairments: weakness, impaired endurance, impaired self care skills, impaired functional mobilty, gait instability, impaired balance, impaired skin, edema, pain, orthopedic precautions    Rehab potential is good.    Activity tolerance: Fair    Discharge recommendations: Discharge Facility/Level Of Care Needs: nursing facility, skilled     Barriers to discharge: Barriers to Discharge: None    Equipment recommendations: Equipment Needed After Discharge:  (TBD)     GOALS:   Physical Therapy Goals        Problem: Physical Therapy Goal    Goal Priority Disciplines Outcome Goal Variances Interventions   Physical Therapy Goal     PT/OT, PT Ongoing (interventions implemented as appropriate)     Description:  Revised goals to be met by: 17     Patient will increase functional independence with mobility by performin. Supine to sit with SBA  2. Sit to Supine with SBA  3. Sit to stand transfer with CGA using appropriate AD.   4. Gait  x  50 feet with CGA using appropriate AD.   5. (I) with HEP        Discontinued goals:  Goals to be met by: 17     Patient will increase functional independence with mobility by performin. Supine to sit with [place]  2. Sit to stand transfer with mod [place] using RW.   3. Gait  x 250 feet with Modified [place] using Rolling Walker.   4. Lower extremity exercise program x15 reps per handout, with assistance as needed                 PLAN:    Patient to be seen daily  to address the above listed problems via gait training, therapeutic activities, therapeutic exercises, neuromuscular re-education  Plan of Care expires: 17  Plan of Care reviewed with: patient         Kaleb Wallace, PT  2017

## 2017-04-10 NOTE — PROGRESS NOTES
[facility] Hospital Medicine  Progress Note    Patient Name: Padmini Miranda  MRN: [id number]  Patient Class: IP- Inpatient   Admission Date: 4/3/2017  Length of Stay: 7 days  Attending Physician: Tahira Tomlinson MD  Primary Care Provider: Randy Lyles MD    [facility] Medicine Team: [facility] Tahira Tomlinson MD    Subjective:     Principal Problem:Closed displaced fracture of right femoral neck    HPI:  Ms. Miranda is an 84 y/o female who presented to ED on 04/04/17 after a mechanical fall at home.  She reports that she was stepping down a step into the den and missed the step, falling onto her right side.  She denied any dizziness or lightheadedness prior to falling.  She normally ambulates unassisted in the house but uses a walker when going out, such as to [employer].  She fell not long after getting home from angioplasty on her left leg done at [facility] for treatment of chronic lower extremity ischemia resulting in a heel ulcer.  When she presented for that scheduled procedure she was noted to have a heart rate in the 30s without any symptoms.  She underwent the procedure as planned after evaluation by a cardiology fellow with plan to hold her carvedilol and to see Dr. Antoine (EP) in clinic 4/5.  Dr. Antoine started her on amiodarone 3/10 after an implantable loop recorder captured frequent episodes of a-fib with RVR with HR up to the 160s.  She is anticoagulated with Eliquis, and her last dose was on Friday 3/31 in anticipation of the angiogram yesterday (4/3).  She states that prior to starting the amiodarone her ambulation was limited by dyspnea on exertion but that has since improved.  She endorses an episode of chest pressure during a recent dialysis session.  She is on dialysis T/Th/Sat with an access in her right femoral region.  She has combined systolic and diastolic heart failure with EF recently improved to 50%, but was as low as 20% 2 years ago.  She has known 3-vessel CAD  found on [facility] in 2015 not amenable to PCI, which was discovered after an abnormal dobutamine stress echo showing inducible global hypokinesis.    Hospital Course:  Ms. Miranda was admitted to Hospital Medicine and seen by EP for her bradycardia, which has been attributed to amiodarone and carvedilol (held since admission). She was scheduled for  hemiarthroplasty 04/05/17, however Nephrology became concerned about her bradycardia and ability to tolerate HD. Therefore, pt was transferred to the ICU for CRRT and then proceeded to the OR later that day. She underwent right hip hemiarthroplasty on 4/5/2017 by Dr Jeff Barrios. She is now WBAT with posterior hip precautions to RLE. PT / OT have been consulted and are recommending SNF. She was reevaluated by EP 4/6 for her bradycardia and no pacemaker placement needed at this time. The patient continues to have boughts of bradycardia and today became hypothermic and a bear hugger was placed. No concern for infection, as clinically, the patient appears very comfortable, normal WBC, and no obvious source. TSH (normal on 3/10) was checked again today and found to be severely elevated at 21.6, with low normal Free T4 of 0.88. Synthroid was adjusted from 50 mcg to 75 mcg daily. She is currently awaiting SNF placement and would like [facility], hopefully can go tomorrow if temperature remains stable off of bear hugger.     Respiratory: negative for cough, dyspnea on exertion and pleurisy/chest pain  Cardiovascular: negative for exertional chest pressure/discomfort, palpitations and orthopnea  Gastrointestinal: negative for abdominal pain, constipation and diarrhea  Genitourinary:negative for dysuria, frequency and hematuria  Hematologic/lymphatic: negative for bleeding and easy bruising    Vitals:    04/10/17 1400 04/10/17 1429 04/10/17 1510 04/10/17 1600   BP: (!) 97/44 (!) 113/42 129/62    BP Location:  Right arm Right arm    Patient Position:  Lying Lying    BP Method:   Automatic Automatic    Pulse: (!) 53 (!) 54 (!) 51 67   Resp:  16 18    Temp:  97.4 °F (36.3 °C) 97.5 °F (36.4 °C)    TempSrc:  Oral Oral    SpO2:   100%    Weight:       Height:             General appearance: alert, appears stated age and cooperative  Head: Normocephalic, without obvious abnormality, atraumatic  Neck: no adenopathy, no carotid bruit, no JVD, supple, symmetrical, trachea midline and thyroid not enlarged, symmetric, no tenderness/mass/nodules  Lungs: clear to auscultation bilaterally no wheezes or crackles  Heart: regular rate and rhythm, S1, S2 normal, no murmur  Abdomen: soft, non-tender; bowel sounds normal; no masses,  no organomegaly  Extremities: extremities normal, atraumatic, no cyanosis , 2+edema      Assessment/Plan:      * Closed displaced fracture of right femoral neck  Ms. Miranda was admitted to Hospital Medicine and was scheduled for  hemiarthroplasty 04/05/17, however Nephrology became concerned about her bradycardia and ability to tolerate HD. Therefore, pt was transferred to the ICU for CRRT and then proceeded to the OR later that day. She underwent right hip hemiarthroplasty on 4/5/2017 by Dr Jeff Barrios. She is now WBAT with posterior hip precautions to RLE. PT / OT have been consulted and are recommending SNF.  She remains on Apixaban for Afib / DVT ppx.  Pending [facility] approval.     Acquired hypothyroidism  Today pt became hypothermic and a bear hugger was placed. No concern for infection, as clinically, the patient appears very comfortable, normal WBC, and no obvious source. TSH (normal on 3/10) was checked again today and found to be severely elevated at 21.6, with low normal Free T4 of 0.88. Synthroid was adjusted from 50 mcg to 75 mcg daily.       Acute blood loss anemia  Expected after surgery.  Continue to monitor.  Does not require transfusion at this time.       Left leg pain  Pt developed left shin pain on 4/9. Vascular surgery  was consulted as pt was  revascularized on this side a few days prior to admit. No signs of acute ischemia and they would like to obtain ABIs for baseline. Will also get X rays. Will start gabapentin as family states she has had this pain before and it usually responds well to gabapentin.       Renovascular hypertension  On bidil (will hold due to lower blood pressures and inability to pull off fluid in HD.)      Systolic and diastolic CHF, chronic  Recently improved EF to 50%. Presently without signs of acute decompensation.      ESRD on hemodialysis  On HD T/Th/Sat as outpatient.  Was dialyzed today.  Spoke with nephrology fellow Dr Gong who informed me that they were unable to remove fluid during HD due to low blood pressures.  Pt is developing some LE edema and is 5 liters positive for her stay.  Will stop Bidil and start midodrine 10 TID so that we can attempt HD in the AM (to get her back on schedule and attempt fluid removal).     Atherosclerotic PVD with ulceration  S/p balloon angioplasty 4/4 with apparent improvement in distal perfusion.  Continue local wound care for heel ulcer.        Bradycardia, drug induced  Profound sinus bradycardia with rates in the 30s-40s. Pt is asymptomatic. She had been on amiodarone and carvedilol as an outpatient. Carvedilol stopped on 4/3/2017 after patient was noted to be bradycardic during fistula procedure.  Appreciate cardiology recs.  No pacemaker needed at this time.     PAF (paroxysmal atrial fibrillation)  Monitor on telemetry.  On Apixiban    3-vessel CAD   Continue ASA     VTE Risk Mitigation         Ordered     apixaban tablet 2.5 mg  2 times daily     Route:  Oral        04/05/17 1839     Place sequential compression device  Until discontinued      04/04/17 0145     Reason for No Pharmacological VTE Prophylaxis  Once      04/04/17 0145     High Risk of VTE  Once      04/04/17 0145          aThira Tomlinson MD  Department of Hospital Medicine   [facility]

## 2017-04-10 NOTE — PLAN OF CARE
Problem: Physical Therapy Goal  Goal: Physical Therapy Goal  Revised goals to be met by: 17     Patient will increase functional independence with mobility by performin. Supine to sit with SBA  2. Sit to Supine with SBA  3. Sit to stand transfer with CGA using appropriate AD.   4. Gait x 50 feet with CGA using appropriate AD.   5. (I) with HEP        Discontinued goals:  Goals to be met by: 17     Patient will increase functional independence with mobility by performin. Supine to sit with [place]  2. Sit to stand transfer with mod [place] using RW.   3. Gait x 250 feet with Modified [place] using Rolling Walker.   4. Lower extremity exercise program x15 reps per handout, with assistance as needed   Outcome: Ongoing (interventions implemented as appropriate)  Limited progress as pt did not perform OOB mobility 2/2 signs and symptoms consistent with possible Left tibial shaft pathology.  Awaiting results of x-ray prior to weight bearing.

## 2017-04-10 NOTE — PLAN OF CARE
Problem: Patient Care Overview  Goal: Plan of Care Review  Outcome: Ongoing (interventions implemented as appropriate)  MD Padilla and MD Woflf came to pt bedside when temp was 94.5, MD Padilla said to continue bear hugger, pt temp did come up to 97.5,  VS and assessment per flow sheet, patient progressing towards goals as tolerated, plan of care reviewed with Padmini Miranda, all concerns addressed, will continue to monitor.

## 2017-04-10 NOTE — CONSULTS
Wound care consulted for left heel ulcer:  The patient reports having a piece of glass removed from the heel 'I didn't feel it.'  The area is pink/ dry/intact with a mepilex foam dressing over the area to protect the heel. Recommend changing the mepilex foam dressing every Mon-Thurs until resolved.   Consent was received from the patient for the wound photograph.  Left heel.

## 2017-04-10 NOTE — NURSING
Pt heart rate sustained in the upper 30s with /53, 16 resp, 97.6 temp, and 90%. This nurse went wake the pt up from sleeping and the heart rate came up to 43. MD on call was paged and waiting for call back.

## 2017-04-10 NOTE — ASSESSMENT & PLAN NOTE
Pt developed left shin pain on 4/9. Vascular surgery  was consulted as pt was revascularized on this side a few days prior to admit. No signs of acute ischemia and they would like to obtain ABIs for baseline. Will also get X rays. Will start gabapentin as family states she has had this pain before and it usually responds well to gabapentin.

## 2017-04-10 NOTE — NURSING TRANSFER
Nursing Transfer Note      4/10/2017     Transfer To: dialysis     Transfer via stretcher     Transfer with O2, & cardiac monitoring    Transported by escort    Medicines sent: none    Chart send with patient: no    Notified: RN

## 2017-04-10 NOTE — PROGRESS NOTES
ORTHO PROGRESS NOTE:    Padmini Miranda is a 85 y.o. female admitted on 4/3/2017  Hospital Day: 7  Post Op Day: 5 Day Post-Op      The patient was seen and examined this morning at the bedside. Doing well, pain controlled. Did well with PT over weekend. Asymptomatic bradycardia this AM and temp 95. Placed on bear hugger, HR improved when stimulated.     PHYSICAL EXAM:  Awake/alert/oriented x 3  No acute distress  AFVSS  Good inspiratory effort with unlabored breathing  Dressings c/d/i, aquacel  NVI distally    Vitals:    04/09/17 2010 04/09/17 2300 04/10/17 0012 04/10/17 0300   BP: (!) 106/53  (!) 101/53    BP Location: Right arm  Right arm    Patient Position: Lying  Lying    BP Method: Automatic  Automatic    Pulse: (!) 46 (!) 41 (!) 44 (!) 39   Resp: 18  18    Temp: 97.5 °F (36.4 °C)  97.6 °F (36.4 °C)    TempSrc: Oral  Oral    SpO2: (!) 90%  (!) 90%    Weight:       Height:         I/O last 3 completed shifts:  In: 480 [P.O.:480]  Out: -     Recent Labs  Lab 04/08/17  0430 04/09/17  0506 04/10/17  0400   CALCIUM 8.1* 8.4* 8.2*   * 129* 126*   K 4.4 4.6 5.3*   CO2 24 22* 22*   CL 98 95 92*   BUN 17 24* 30*   CREATININE 3.1* 4.0* 4.7*       Recent Labs  Lab 04/08/17  0430 04/08/17  1137 04/09/17  0506 04/10/17  0400   WBC 5.28  --  4.92 4.72   RBC 2.76*  --  2.70* 2.75*   HGB 7.5* 7.7* 7.4* 7.5*   HCT 23.4* 22.9* 21.8* 22.1*     --  181 206     No results for input(s): INR, APTT in the last 72 hours.    Invalid input(s): PT    A/P: 85 y.o. female 5 Day Post-Op s/p right hip hemiarthroplasty  -- Pain control  -- PT/OT: WBAT RLE, posterior hip precautions  -- DVT prophylaxis: home ASA 81 daily, home eliquis 2.5 BID    -- Dispo: Per Primary. Stable from orthopaedic management perspective              Attg Note:  I agree with the above assessment and plan.    Jeff Barrios MD

## 2017-04-10 NOTE — PLAN OF CARE
Problem: Patient Care Overview  Goal: Plan of Care Review  Outcome: Ongoing (interventions implemented as appropriate)  Pt alert and oriented to name, , place and situation. Pt bed in lowest position with call light within reach. No falls observed or reported. Pt pain assessed and managed. IS done with encouragement. Pt SCDs in place. Pt encouraged to shift weight to maintain skin integrity.

## 2017-04-10 NOTE — PLAN OF CARE
Problem: Patient Care Overview  Goal: Plan of Care Review  Outcome: Ongoing (interventions implemented as appropriate)  No acute events throughout night, VS and assessment per flow sheet, patient progressing towards goals as tolerated, plan of care reviewed with Padmini Miranda and family, all concerns addressed, will continue to monitor.

## 2017-04-10 NOTE — PT/OT/SLP PROGRESS
Occupational Therapy      Padmini Miranda  MRN: [id number]    Patient not seen today secondary to Dialysis. Will follow-up on next available date.    Anita Gee OT  4/10/2017

## 2017-04-10 NOTE — PLAN OF CARE
11:04 AM LOCET complete.     1:31 PM SW and SW Intern met with pt's dtr at bedside. [facility] SNF list provided. Pt's family preference is [facility] and they will provide 2 more preferences.    3:11 PM Pt denied from [facility] due to bear hugger, facility does not accommodate.    3:47 PM Pt's dtr Pita provides [facility], [facility], and [facility] as backup choices. First choice is still [facility] if possible. Referrals requested.    Debbie Galarza LMSW, [facility]  [phone]

## 2017-04-10 NOTE — PLAN OF CARE
Problem: Patient Care Overview  Goal: Plan of Care Review  Outcome: Ongoing (interventions implemented as appropriate)  Pt aaox4, vs stable, no falls or injuries during shift. Fall precautions in place w/ personal items near by. Pain level being monitor and control by medication. Family at bedside, No signs of infection or distress, moon boots ordered. Non skid socks and O2 @1 L on.Call light in reach; will continue to monitor pt.

## 2017-04-10 NOTE — NURSING
MD Uli Wolff notified of HR in the upper 30s, MD told nurse to put her on 2L NC. Will continue to monitor.

## 2017-04-10 NOTE — ASSESSMENT & PLAN NOTE
Ms. Miranda was admitted to [facility] Medicine and was scheduled for  hemiarthroplasty 04/05/17, however Nephrology became concerned about her bradycardia and ability to tolerate HD. Therefore, pt was transferred to the ICU for CRRT and then proceeded to the OR later that day. She underwent right hip hemiarthroplasty on 4/5/2017 by Dr Jeff Barrios. She is now WBAT with posterior hip precautions to RLE. PT / OT have been consulted and are recommending SNF.  She remains on Apixaban for Afib / DVT ppx.  Pending [facility] approval.

## 2017-04-10 NOTE — PROGRESS NOTES
[facility]  Consult Note Nephrology    Admit Date: 4/3/2017  Consult Requested By: Tahira Tomlinson MD   LOS: 7 days       SUBJECTIVE:     Follow-up For:  ESRD on iHD    Interval History:  NAEON, Bradycardic and Hypothermic required bear hugger. . Seen in [place] while on HD. NS given no UF ofr total HD.     Review of Systems:  Constitutional: no fever or chills  Respiratory: no cough or shortness of breath  Cardiovascular: no chest pain or palpitations  Gastrointestinal: no nausea or vomiting, no abdominal pain or change in bowel habits  Hematologic/Lymphatic: no easy bruising or lymphadenopathy  Musculoskeletal: no arthralgias or myalgias  Neurological: no seizures or tremors      OBJECTIVE:     Vital Signs (Most Recent)  Temp: 97.3 °F (36.3 °C) (04/10/17 1024)  Pulse: (!) 48 (04/10/17 1145)  Resp: 16 (04/10/17 1024)  BP: (!) 93/41 (04/10/17 1145)  SpO2: 98 % (04/10/17 0732)    Vital Signs Range (Last 24H):  Temp:  [95 °F (35 °C)-97.6 °F (36.4 °C)]   Pulse:  [39-61]   Resp:  [13-18]   BP: ()/(38-60)   SpO2:  [90 %-98 %]       Intake/Output Summary (Last 24 hours) at 04/10/17 1157  Last data filed at 04/09/17 1330   Gross per 24 hour   Intake              120 ml   Output                0 ml   Net              120 ml         Physical Exam:   General: Well developed, well nourished in NAD  HEENT: Conjunctiva clear; Oropharynx clear  Neck: No JVD noted, Supple  CV- Normal S1, S2 with no murmurs,gallops,rubs  Resp- Lungs decreased BS Bilaterally, rales at bases bilaterally, Unlabored  Abdomen- NTND, BS normoactive x4 quads, soft  Extrem- No cyanosis, clubbing,  Sacral edema.  Skin- No rashes, lesions, ulcers  Neuro: awake, Oriented x3, no FND      Laboratory:  CBC:     Recent Labs  Lab 04/10/17  0400   WBC 4.72   RBC 2.75*   HGB 7.5*   HCT 22.1*      MCV 80*   MCH 27.3   MCHC 33.9     BMP:   Recent Labs  Lab 04/06/17  0413  04/10/17  0400   GLU 69*  < > 58*     < > 92*   CO2 24  < >  22*   BUN 36*  < > 30*   CREATININE 4.8*  < > 4.7*   CALCIUM 7.6*  < > 8.2*   MG 1.6  --   --    < > = values in this interval not displayed.  CMP:   Recent Labs  Lab 04/06/17  0413  04/10/17  0400   GLU 69*  < > 58*   CALCIUM 7.6*  < > 8.2*   ALBUMIN 1.9*  < > 2.4*   PROT 5.1*  --   --      < > 126*   K 4.6  < > 5.3*   CO2 24  < > 22*     < > 92*   BUN 36*  < > 30*   CREATININE 4.8*  < > 4.7*   ALKPHOS 51*  --   --    ALT <5*  --   --    AST 36  --   --    BILITOT 0.6  --   --    < > = values in this interval not displayed.  PTH: No results for input(s): PTH in the last 168 hours.  Coagulation:     Recent Labs  Lab 04/06/17 0413   INR 1.3*   APTT 31.5     Cardiac Markers: No results for input(s): CKMB, TROPONINT, MYOGLOBIN in the last 168 hours.  ABGs:     Recent Labs  Lab 04/06/17  0114   PH 7.320*   PCO2 47.2*   HCO3 24.3   POCSATURATED 99   BE -2       Labs reviewed  Diagnostic Results:  X-Ray: Reviewed  US: Reviewed  Echo: Reviewed    ASSESSMENT/PLAN:     Active Hospital Problems    Diagnosis  POA    *Closed displaced fracture of right femoral neck [S72.001A]  Yes    Left leg pain [M79.605]  No    Acute blood loss anemia [D62]  No    Sinus brea-tachy syndrome [I49.5]  Yes    ESRD (end stage renal disease) [N18.6]  Yes    Bradycardia [R00.1]  Yes    Anemia of chronic renal failure [N18.9, D63.1]  Yes    Closed fracture of neck of right femur [S72.001A]  Yes    3-vessel CAD [I25.10]  Yes    Preoperative cardiovascular examination [Z01.810]  Not Applicable    Bradycardia, drug induced [R00.1, T50.905A]  Yes    PAF (paroxysmal atrial fibrillation) [I48.0]  Yes    Atherosclerotic PVD with ulceration [I73.9, L98.499]  Yes    ESRD on hemodialysis [N18.6, Z99.2]  Not Applicable     Chronic    Systolic and diastolic CHF, chronic [I50.42]  Yes     Chronic    Renovascular hypertension [I15.0]  Yes     Chronic    Acquired hypothyroidism [E03.9]  Yes      Resolved Hospital Problems     Diagnosis Date Resolved POA   No resolved problems to display.       Padmini Miranda is a 85 y.o. female, with HTN, ESRD on iHD, Anemia of chronic disease, Chronic anticoagulation, HLD, Afib, HFrEF, AVS, hypothyroidism and Right hip fracture. Nephrology consulted for ESRD management.     ESRD on IHD TTS   Out patient HD [facility] - [facility]/ Dr. Kaminski  On HD for: ~ 1 year  Duration of outpatient dialysis session - 3.75 hrs  EDW - 54 kg  Residual Mary Ann Function - minimal  - Will provide dialysis for metabolic clearance and volume management x 4 hrs  - Sinus bradycardia amiodarone and BB stopped, improved now ~ 52.  - TSH very elevated and FT4 depresed.   - Target ultrafiltration ~ 1 lts as tolerated  - Albumin PRN for SBP < 90  - Dialysate adjusted to current labs   Aceess: EDILBERTO AVF with good thrill and bruit     Active problem in hospital  - Right Hip fracture  - Sinus Bradycardia  - Hypervolemia     Anemia of Chronic Kidney Disease   - Will resume CURTIS with next HD  - Hg 8.3   - Will request iron studies to assess further needs of supplementation      Mineral Bone Disease in CKD   - Renal Function Panel Daily for electrolytes monitoring  - Phos 7.7  - Already on binders as outpatient, will increased  Renvela 1.6 gms AC and scnacks  - CoCa 9.1     Nutrition   - Renal Diet     HTN   - on low side BP, will continue to monitor. Goal for BP is <130 mmHg SBP and BDP <80 mmHg.   - decrease or dc Bidil 1 tab TID she was hypotensive during HD un able to do UF on her.  - Coreg on hold secondary to bradycardia     Case discuss with Staff further recs with attestation.     Octavio Gong MD  Nephrology Fellow PGY4  [phone]

## 2017-04-10 NOTE — NURSING
MD Uli Wolff notified of 95.0 rectal temp. MD will put orders in for bear hugger and blood cultures.

## 2017-04-11 PROBLEM — M79.605 LEFT LEG PAIN: Status: RESOLVED | Noted: 2017-01-01 | Resolved: 2017-01-01

## 2017-04-11 PROBLEM — S72.001D ENCOUNTER FOR AFTERCARE FOR HEALING CLOSED TRAUMATIC FRACTURE OF RIGHT HIP: Status: ACTIVE | Noted: 2017-01-01

## 2017-04-11 PROBLEM — Z79.01 LONG-TERM (CURRENT) USE OF ANTICOAGULANTS: Status: ACTIVE | Noted: 2017-01-01

## 2017-04-11 PROBLEM — R13.10 ODYNOPHAGIA: Status: ACTIVE | Noted: 2017-01-01

## 2017-04-11 PROBLEM — K59.01 SLOW TRANSIT CONSTIPATION: Status: ACTIVE | Noted: 2017-01-01

## 2017-04-11 PROBLEM — I70.229 CRITICAL LOWER LIMB ISCHEMIA: Status: RESOLVED | Noted: 2017-01-01 | Resolved: 2017-01-01

## 2017-04-11 PROBLEM — N18.6 ANEMIA IN ESRD (END-STAGE RENAL DISEASE): Status: ACTIVE | Noted: 2017-01-01

## 2017-04-11 PROBLEM — Z01.810 PREOPERATIVE CARDIOVASCULAR EXAMINATION: Status: RESOLVED | Noted: 2017-01-01 | Resolved: 2017-01-01

## 2017-04-11 PROBLEM — T82.858A STENOSIS OF ARTERIOVENOUS DIALYSIS FISTULA: Status: RESOLVED | Noted: 2017-01-01 | Resolved: 2017-01-01

## 2017-04-11 NOTE — PLAN OF CARE
"Problem: SLP Goal  Goal: SLP Goal  Short Term Goals: (4/11-4/18)  1. Pt will participate in a Modified Barium Swallow Study to objectively r/o aspiration and determine the least restrictive and safest po diet with updated goals to follow pending results.   2. Pt will tolerate a clear NECTAR thick liquid diet with no overt s/s of aspiration.   Outcome: Ongoing (interventions implemented as appropriate)  Pt seen for a bedside swallow study. Pt presents with reported globus sensation in her throat, severe odynophagia, and difficulty swallowing all trials. Pt and RN reported emesis last PM when taking medications. Pt reported, "nothing will stay down." Pt presented with wet vocal quality, throat clears, and immediate coughing s/p thin liquid and puree trials. No overt s/s of aspiration noted with tsp sip and small self regulated cup sips of nectar thick liquid. Pt would benefit from a MBSS to objectively r/o aspiration and determine the least restrictive and safest po diet. Recommend: NECTAR THICK clear liquid diet, crushed essential meds, SMALL SIPS, slow rate, upright for all po intake and 30 minutes s/p po intake, monitor for signs of aspiration (coughing, throat clearing, wet vocal quality). Recs discussed with MD and RN.   REI Monroy., CCC-SLP  04/11/2017          "

## 2017-04-11 NOTE — PT/OT/SLP PROGRESS
Occupational Therapy      Padmini Miranda  MRN: [id number]    Patient not seen today secondary to Other (Comment) (Attempted to see pt x2.  Pt c max c/o dizziness upon first attempt and then was still dizzy when checked on in PM.  Pt was noted to be scheduled to receive blood today per RN.). Will follow-up tomorrow.    SARAY Bullard  4/11/2017

## 2017-04-11 NOTE — PROGRESS NOTES
I personally saw and examined the patient on hemodialysis, tolerating treatment, see hemodyalisis flowsheet. I also reviewed the chart and current medication. UF as tolerated. Will do HD again tomorrow.

## 2017-04-11 NOTE — PROGRESS NOTES
3 HR maintenance HD treatment (ultrafiltration only) initiated via Lt Upper Arm AVF without difficulty, good a/v flows obtained.

## 2017-04-11 NOTE — PLAN OF CARE
REFERRAL SENT TO     1. [facility]  2. [facility]  3. [place]  4. [facility]

## 2017-04-11 NOTE — PLAN OF CARE
8:20 AM Message sent via [employer] to [facility] regarding pt's bear pallavigger discontinued. SW will f/u on other SNF referrals this afternoon.    9:26 AM [facility] denied pt.    9:31 AM MARQUITA received call from Ivanna at [place] stating they are currently reviewing pt referral. Ivanna requests info on pt's dialysis. Pt's dialysis would need to be transferred to [place]. Pt is current at [facility]. Ivanna confirms there is a [facility].     9:44 AM SW spoke with [facility] ([phone]) to verify pt's dialysis time on TTS. Pt's chair time is at 6AM.     11:06 AM Pt accepted to [facility] submitted for auth. Pt also accepted to [facility] pending improvement in H&H.     11:14 AM MARQUITA met with pt and pt's granddaughter Ronna at bedside. MARQUITA informed them of both acceptances.     4:22 PM Pt denied [facility] because they are not accepting dialysis pts at this time.    Debbie Galarza LMSW, CCM  [phone]

## 2017-04-11 NOTE — PT/OT/SLP PROGRESS
Physical Therapy      Padmini Miranda  MRN: [id number]    Patient not seen today secondary to Other (Comment). PT treatment attempted x 2.  Pt dizzy upon attempting to sit EOB during first attempt (vitals WFL), pt away at HD during 2nd attempt.  Will follow-up tomorrow.    Kaleb Wallace, PT   4/11/2017

## 2017-04-11 NOTE — PT/OT/SLP EVAL
Speech Language Pathology  Bedside Swallow Study  Evaluation    Padmini Miranda   MRN: [id number]   557/557 A    Admitting Diagnosis: Closed displaced fracture of right femoral neck    Diet recommendations: Solid Diet Level: NPO  Liquid Diet Level: Clears, Nectar Thick   · No straws,   · HOB to 90 degrees,   · Small sips,   · 1 sip at a time,   · Remain upright 30 minutes post meal   · Assistance with thickening liquids  · MBSS to objectively r/o aspiration and determine the least restrictive and safest po diet    SLP Treatment Date: 04/11/17  Speech Start Time: 1118     Speech Stop Time: 1139     Speech Total (min): 21 min       TREATMENT BILLABLE MINUTES:  Eval Swallow and Oral Function 21    Diagnosis: Closed displaced fracture of right femoral neck    H&P: Patient is a 85 y.o. female with PMH significant for  3-vessel CAD found on [facility] in 2015 not amenable to PCI, ESRD (HD T-Th-Sat), combined systolic and diastolic CHF, HTN, Hypothyroidism, A-fib on Eliquis, PVD (recent angioplasty to left leg) who initially presented on 4/4 after a mechanical fall at home. She is also being worked up by cardiology/EP for bradycardia (they believed it to be 2/2 amiodarone and carvedilol which is now being held). She was initially transferred to ICU for CRRT as nephrology was concerned about her bradycardia and not being able to tolerate HD. She is now s/p a R hemiarthroplasty today and being transferred to SICU for continuous monitoring of her bradycardia as well as pt unable to extubate in OR.     CXR 4/10: Cardiomegaly with pulmonary edema and bilateral dependent pleural effusions.  Aneurysmal thoracic aorta.    Past Medical History:   Diagnosis Date    ALLERGIC RHINITIS     Anemia     Anticoagulant long-term use     Blood transfusion     Cardiomyopathy 10/20/2015    Cataract     CHF exacerbation 1/23/2015    Chronic kidney disease     Cramp of both lower extremities 12/30/2016    Hyperlipidemia     Hypertension      "Hypothyroidism     Persistent atrial fibrillation 3/28/2016     Past Surgical History:   Procedure Laterality Date    ANGIOPLASTY      Renal PTA    CARDIAC CATHETERIZATION       SECTION      HYSTERECTOMY      ovaries intact    RENAL ARTERY STENT      TONSILLECTOMY      VASCULAR SURGERY         Has the patient been evaluated by SLP for swallowing? : Yes  Keep patient NPO?: No   General Precautions: Standard, fall, nectar thick          Prior diet: pt reports a regular diet/thin liquid prior to hospital admission; pt independent with feeding; pt reports increased swallowing difficulty s/p surgery.    Subjective:  Pt awake and sitting upright. Communicated with RN prior to entry. Family present in room.   Patient goals: To be able to eat.    Objective:        Oral Musculature Evaluation  Oral Musculature: WFL  Dentition: upper and lower dentures, uses dentures to eat  Mucosal Quality: dry  Mandibular Strength and Mobility: WFL  Oral Labial Strength and Mobility: WFL  Lingual Strength and Mobility: WFL  Velar Elevation: WFL  Buccal Strength and Mobility: flaccid  Volitional Cough: productive  Volitional Swallow: decreased elevation/excursion upon palpation  Voice Prior to PO Intake: hoarse     Bedside Swallow Eval:  Consistencies Assessed: Thin liquids ice chip x1, tsp sip x1, cup sip x1, Nectar thick liquids tsp sip x2, cup sip x4 and Puree 1/2 tsp bite x1  Oral Phase: WFL  Pharyngeal Phase: coughing/choking, decreased hyolaryngeal excursion, globus sensation, regurgitation of food/liquids, throat clearing and wet vocal quality after swallow    Pt seen for a bedside swallow study. Pt reports difficulty swallowing s/p intubation for surgery. Pt extubated .  Pt presents with reported globus sensation in her throat, severe odynophagia, and difficulty swallowing all trials. Pt and RN reported emesis last PM when taking medications. Pt reported, "nothing will stay down." Pt presented with facial " grimacing, effortful swallows, wet vocal quality, throat clears, and immediate coughing s/p thin liquid and puree trials. No overt s/s of aspiration noted with tsp sip and small self regulated cup sips of nectar thick liquid. Pt would benefit from a MBSS to objectively r/o aspiration and determine the least restrictive and safest po diet. Recommend: NECTAR THICK clear liquid diet, crushed essential meds, SMALL SIPS, slow rate, upright for all po intake and 30 minutes s/p po intake, monitor for signs of aspiration (coughing, throat clearing, wet vocal quality). Recs discussed with MD and RN.      Pt may benefit from a RD consult 2/2 concern for adequate nutrition.     Assessment:  Padmini Miranda is a 85 y.o. female with a medical diagnosis of Closed displaced fracture of right femoral neck and presents with odynophagia, globus sensation, wet vocal quality, throat clearing, and coughing. No overt s/s of aspiration with small cup sips of nectar thick liquids. Pt would benefit from a MBSS to objectively r/o aspiration and determine the least restrictive and safest po diet. Pt may benefit from a RD consult 2/2 concern for adequate nutrition.           Discharge recommendations: Discharge Facility/Level Of Care Needs: nursing facility, skilled     Goals:   SLP Goals        Problem: SLP Goal    Goal Priority Disciplines Outcome   SLP Goal     SLP Ongoing (interventions implemented as appropriate)   Description:  Short Term Goals: (4/11-4/18)  1. Pt will participate in a Modified Barium Swallow Study to objectively r/o aspiration and determine the least restrictive and safest po diet with updated goals to follow pending results.   2. Pt will tolerate a clear NECTAR thick liquid diet with no overt s/s of aspiration.                Plan:   Patient to be seen Therapy Frequency: 5 x/week   Plan of Care expires: 05/10/17  Plan of Care reviewed with: patient, family  SLP Follow-up?: Yes               TRACY Monroy,  CCC-SLP  04/11/2017

## 2017-04-11 NOTE — PLAN OF CARE
Problem: Patient Care Overview  Goal: Plan of Care Review  Outcome: Ongoing (interventions implemented as appropriate)  3 hr hd completed, net fluid [id number],, target goal achieved, pt tolerated well. Pt received 1Unit PRBC with dialysis, tolerated well, Report given to Heather MARTINEZ.

## 2017-04-11 NOTE — PROGRESS NOTES
ORTHO PROGRESS NOTE:    Padmini Miranda is a 85 y.o. female admitted on 4/3/2017  Hospital Day: 8  Post Op Day: 6 Day Post-Op      The patient was seen and examined this morning at the bedside. Doing well, pain controlled. Still complains of some left ankle pain. Minimal pain on operative side. Using IS this am. Dialysis yesterday.     PHYSICAL EXAM:  Awake/alert/oriented x 3  No acute distress  AFVSS  Good inspiratory effort with unlabored breathing  Dressings c/d/i, aquacel  NVI distally  Tenderness left calcaneus and achilles tendon     Vitals:    04/10/17 2330 04/11/17 0300 04/11/17 0400 04/11/17 0700   BP: 110/60  (!) 156/71    BP Location: Right leg  Right arm    Patient Position: Lying  Lying    BP Method: Automatic  Automatic    Pulse: (!) 54 (!) 51 (!) 49 (!) 52   Resp: 18  18    Temp: 96.9 °F (36.1 °C)  97.8 °F (36.6 °C)    TempSrc: Oral  Oral    SpO2: 98%      Weight:       Height:         I/O last 3 completed shifts:  In: 1455 [P.O.:580; Other:875]  Out: 900 [Other:900]    Recent Labs  Lab 04/09/17  0506 04/10/17  0400 04/11/17  0544   CALCIUM 8.4* 8.2* 8.4*   * 126* 127*   K 4.6 5.3* 4.2   CO2 22* 22* 22*   CL 95 92* 93*   BUN 24* 30* 15   CREATININE 4.0* 4.7* 3.0*       Recent Labs  Lab 04/08/17  1137 04/09/17  0506 04/10/17  0400   WBC  --  4.92 4.72   RBC  --  2.70* 2.75*   HGB 7.7* 7.4* 7.5*   HCT 22.9* 21.8* 22.1*   PLT  --  181 206     No results for input(s): INR, APTT in the last 72 hours.    Invalid input(s): PT    A/P: 85 y.o. female 6 Day Post-Op s/p right hip hemiarthroplasty  -- Pain control  -- PT/OT: WBAT RLE, posterior hip precautions/ stretching left achilles tendon   -- DVT prophylaxis: home ASA 81 daily, home eliquis 2.5 BID    -- Dispo: Per Primary. Stable from orthopaedic management perspective          Inocencio Chen MD  Orthopedic Surgery, PGY3  [phone] (p)     Attg Note:  I agree with the above assessment and plan.    Jeff Barrios MD

## 2017-04-12 PROBLEM — E87.1 HYPONATREMIA: Status: ACTIVE | Noted: 2017-01-01

## 2017-04-12 NOTE — ASSESSMENT & PLAN NOTE
TSH (normal on 3/10) and rechecked on this admit and noted to be severely elevated at 21.6, with low normal Free T4 of 0.88. Synthroid was adjusted from 50 mcg to 75 mcg daily and will need repeat thyroid function tests in 4-6 weeks.

## 2017-04-12 NOTE — ASSESSMENT & PLAN NOTE
Encounter for aftercare for healing closed traumatic fracture of right hip  Patient is POD # 7. Patient reports no pain in right hip. Plan is to continue PT/OT for gait training and strengthening and restoration of ADL's. Patient is FWB/WBAT: right lower extremity, posterior hip precautions as per Orthopedic recommendations. Platient on Eliquis for long term anticoagulation for atrial fibrillation so no DVT prophylaxis needed post-op. Orthopedics is following and managing surgical site. Pain is well controlled with Oxy IR prn. PT/OT recommending skilled nursing facility placement and  working on [facility] for tomorrow if sodium improved.

## 2017-04-12 NOTE — ASSESSMENT & PLAN NOTE
Controlled. Patient on hemodialysis T/Th/Sat as outpatient. Nephrology consulted and managing patient's HD needs in hospital and patient dialyzed today, 4/11.

## 2017-04-12 NOTE — SUBJECTIVE & OBJECTIVE
Interval History: Patient had MBSS done today that patient passed and Speech recommending regular diet with nectar thickened liquids. Patient currently in hemodialysis for fluid removal today. Patient had 2.3 liters of fluid removed yesterday by Nephrology during HD. Patient reports very small BM with Doculax suppository.     Review of Systems   Constitutional: Negative for chills and fever.   HENT: Positive for sore throat and trouble swallowing. Negative for voice change.    Respiratory: Negative for cough and shortness of breath.    Cardiovascular: Negative for chest pain and leg swelling.   Gastrointestinal: Positive for constipation. Negative for abdominal pain and nausea.   Musculoskeletal: Negative for back pain and myalgias.   Skin: Negative for rash.   Neurological: Negative for dizziness and light-headedness.   Psychiatric/Behavioral: Negative for confusion and hallucinations.     Objective:     Vital Signs (Most Recent):  Temp: 97.5 °F (36.4 °C) (04/12/17 1353)  Pulse: (!) 50 (04/12/17 1700)  Resp: 18 (04/12/17 1645)  BP: (!) 117/54 (04/12/17 1700)  SpO2: (!) 93 % (04/12/17 1200) Vital Signs (24h Range):  Temp:  [96.1 °F (35.6 °C)-97.8 °F (36.6 °C)] 97.5 °F (36.4 °C)  Pulse:  [41-54] 50  Resp:  [15-18] 18  SpO2:  [92 %-93 %] 93 %  BP: ()/(35-64) 117/54     Weight: 56.7 kg (125 lb)  Body mass index is 22.86 kg/(m^2).    Intake/Output Summary (Last 24 hours) at 04/12/17 1713  Last data filed at 04/12/17 0600   Gross per 24 hour   Intake             1570 ml   Output             4200 ml   Net            -2630 ml      Physical Exam   Constitutional: She is oriented to person, place, and time. She appears well-developed and well-nourished. No distress.   HENT:   Mouth/Throat: Oropharynx is clear and moist. No oropharyngeal exudate.   Cardiovascular: Regular rhythm and normal heart sounds.  Bradycardia present.  Exam reveals no gallop and no friction rub.    No murmur heard.  Pulmonary/Chest: Effort normal  and breath sounds normal. She has no wheezes.   Abdominal: Soft. Bowel sounds are normal. She exhibits no distension. There is no tenderness.   Musculoskeletal: She exhibits no edema.   Neurological: She is alert and oriented to person, place, and time.   Skin: Skin is warm. No erythema.   Surgical bandage to right hip   Psychiatric: She has a normal mood and affect. Her behavior is normal. Thought content normal.       Significant Labs:   CBC:   Recent Labs  Lab 04/11/17  0544 04/12/17  0348   WBC 4.72 5.61   HGB 6.8* 8.3*   HCT 20.8* 25.7*    164     CMP:   Recent Labs  Lab 04/11/17  0544 04/12/17  0348   * 125*   K 4.2 4.0   CL 93* 92*   CO2 22* 24   GLU 62* 65*   BUN 15 22   CREATININE 3.0* 3.7*   CALCIUM 8.4* 8.3*   ALBUMIN 2.8* 2.6*   ANIONGAP 12 9   EGFRNONAA 13.6* 10.6*       Significant Imaging: I have reviewed all pertinent imaging results/findings within the past 24 hours.

## 2017-04-12 NOTE — ASSESSMENT & PLAN NOTE
Long-term (current) use of anticoagulants  Patient bradycardiac so not on beta blockers or other rate controlling medications. Patient off Amiodarone also due to bradycardia as per Cardiology recommendations. Plan to continue Eliquis for chronic anticoagulation for stroke prevention.

## 2017-04-12 NOTE — PROCEDURES
Modifed Barium Swallow Study  Speech Start Time: 0845  Speech Stop Time: 09  Speech Total (min): 20 min    SLP Treatment Date: 17    Reason for Referral  Patient was referred for a Modified Barium Swallow Study to assess the efficiency of his/her swallow function, rule out aspiration and make recommendations regarding safe dietary consistencies, effective compensatory strategies, and safe eating environment.     Diagnosis   Closed displaced fracture of right femoral neck    Past Medical History:   Diagnosis Date    3-vessel CAD     Acquired hypothyroidism     ALLERGIC RHINITIS     Anemia in ESRD (end-stage renal disease) 2017    Anticoagulant long-term use     Atherosclerosis of native artery 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 2016    Atherosclerotic PVD with ulceration 2016    Blood transfusion     Cataract     Chronic combined systolic and diastolic heart failure 2015    Closed displaced fracture of right femoral neck s/p hemiarthroplasty on 2017    Cramp of both lower extremities 2016    ESRD on hemodialysis 2015    Hyperlipidemia     Ischemic cardiomyopathy 10/20/2015    Non-ST elevation MI (NSTEMI) 2015    PAF (paroxysmal atrial fibrillation) 3/10/2017    Renovascular hypertension 2015    Sinus brea-tachy syndrome 2017    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     Past Surgical History:   Procedure Laterality Date    ANGIOPLASTY      Renal PTA    CARDIAC CATHETERIZATION       SECTION      HIP FRACTURE SURGERY Right 2017    Hemiarthroplasty for displaced femoral neck fracture    HYSTERECTOMY      ovaries intact    RENAL ARTERY STENT      TONSILLECTOMY      VASCULAR SURGERY          General Precautions: fall, nectar thick       Recommendations  Regular/Nectar thick liquids  · Small bites/sips  · Alternate bites/sips  · Straws okay  · Small bites/sips  · Upright for  meals  · Awake/alert for meals    Oral Peripheral Examination  Oral Musculature Evaluation  Oral Musculature: WFL  Dentition: upper and lower dentures, uses dentures to eat  Mucosal Quality: dry  Mandibular Strength and Mobility: WFL  Oral Labial Strength and Mobility: WFL  Lingual Strength and Mobility: WFL  Velar Elevation: WFL  Buccal Strength and Mobility: flaccid  Volitional Cough: productive  Volitional Swallow: decreased elevation/excursion upon palpation  Voice Prior to PO Intake: hoarse    Consistencies Assessed  Thin liquids x1 tsp, Nectar thick liquids x1 tsp, x2 cup, x2 straw, Puree xx2 and Solids x1    Oral Preparation / Oral Phase  · Delayed swallow initiation  · Adequate bolus formation  · Adequate base of tongue    Pharyngeal Phase  ** Pt coughing throughout MBS without evidence of penetration/aspiration**  · Adequate epiglottic inversion  · Adequate pharyngeal wall constriction  · Occasional vallecular stasis  · Thin liquids  · Penetration x1, pt able to clear with cued throat clear  · Premature spillage  · Nectar thick liquids  · No penetration/aspiration  · Spillage to vallecula  · Puree  · No penetration/aspiration  · Cracker  · Spillage to vallecula  · No penetration aspiraiton    Cervical Esophageal Phase  WFL    Impressions  Mild oropharyngeal dysphagia recommend [place] thick liquids/regular diet    Prognosis/Plan/Education  Good/Dysphagai exercises    Care Plan   SLP Goals        Problem: SLP Goal    Goal Priority Disciplines Outcome   SLP Goal     SLP Ongoing (interventions implemented as appropriate)   Description:  Short Term Goals: (4/11-4/18)  1. Pt will participate in a Modified Barium Swallow Study to objectively r/o aspiration and determine the least restrictive and safest po diet with updated goals to follow pending results. MET  2. Pt will tolerate a clear NECTAR thick liquid diet with no overt s/s of aspiration.    3. Pt will tolerate dental soft diet without overt s/s of  aspiration  4. Pt will participate in dysphagia exercises x5 reps              Jazzmine Hopkins CCC-SLP  Speech Language Pathologist  Pager [phone]  4/12/2017

## 2017-04-12 NOTE — ASSESSMENT & PLAN NOTE
Controlled. Patient s/p balloon angioplasty 4/4/2017 to left SFA with improvement in distal perfusion by Vascular surgery for critical limb ischemia. Patient ulceration to left heel and right lower limb on admit and ulcers are stable since admit. Patient with vascular boots in place to protect both heels in hospital and receiving local wound care to left heel and right calf ulcer. Patient to continue Aspirin and Lipitor to treat.

## 2017-04-12 NOTE — ASSESSMENT & PLAN NOTE
Slight improvement. Patient complaining of sore throat post-op and felt related to ET tube but having difficulty swallowing even liquids post-op so Speech therapy consulted and evaluated patient on 4/11 and recommended nectar thickened liquids and modified barium swallow evaluation. Patient underwent MBSS on 4/12 and patient passed without difficulty and Speech recommended regular diet with nectar thickened liquids.

## 2017-04-12 NOTE — ASSESSMENT & PLAN NOTE
Encounter for aftercare for healing closed traumatic fracture of right hip  Patient is POD # 6. Patient reports no pain in right hip. Plan is to continue PT/OT for gait training and strengthening and restoration of ADL's. Patient is FWB/WBAT: right lower extremity, posterior hip precautions as per Orthopedic recommendations. Platient on Eliquis for long term anticoagulation for atrial fibrillation so no DVT prophylaxis needed post-op. Orthopedics is following and managing surgical site. Pain is well controlled with Oxy IR prn. PT/OT recommending skilled nursing facility placement and  working on [facility].

## 2017-04-12 NOTE — ASSESSMENT & PLAN NOTE
Patient on Bidil on admit for but discontinued as patient was having hypotension in hemodialysis and Nephrology unable to remove fluid so stopped and patient placed on Midodrine 10 mg po TID and patient now normotensive so plan for now is to continue Midodrine and not restart Bidil.

## 2017-04-12 NOTE — ASSESSMENT & PLAN NOTE
Patient on Bidil on admit for but discontinued as patient was having hypotension in hemodialysis and Nephrology unable to remove fluid so stopped and patient placed on Midodrine 10 mg po TID and patient now normotensive so plan for now is to continue Midodrine and not restart Bidil as patient able to now tolerate HD and fluid removal.

## 2017-04-12 NOTE — ASSESSMENT & PLAN NOTE
Patient on daily Miralax and twice daily Colace post-op but no BM for 6 days. Doculax suppository x 1 given on 4/11 with little success so plan is to give Fleet's enema x 1 today.

## 2017-04-12 NOTE — ASSESSMENT & PLAN NOTE
Controlled. Patient on hemodialysis T/Th/Sat as outpatient. Nephrology consulted and managing patient's HD needs in hospital and patient currently being dialyzed.

## 2017-04-12 NOTE — ASSESSMENT & PLAN NOTE
Sinus brea-tachy syndrome  Unchanged as patient remains bradycardiac in sinus rhythm on telemetry. Patient had profound sinus bradycardia with rates in the 30-40 on admit. Patient is asymptomatic. She had been on amiodarone and carvedilol as an outpatient. Carvedilol stopped on 4/3/2017 after patient was noted to be bradycardic during fistula procedure and Amiodarone also discontinued due to bradycardia and despite stopping medications patient remains bradycardia but asymptomatic and hemodynamically stable. EP Cardiology consulted and does not recommend any pacemaker placement at this time.

## 2017-04-12 NOTE — ASSESSMENT & PLAN NOTE
Patient on daily Miralax and twice daily Colace post-op but no BM for 5 days. Doculax suppository x 1 ordered and given on 4/11 and will reassess in am and if no BM then plan on Fleet's enema on 4/12.

## 2017-04-12 NOTE — ASSESSMENT & PLAN NOTE
Sinus brea-tachy syndrome  Unchanged as patient remains bradycardiac in sinus rhythm on telemetry. Patient had profound sinus bradycardia with rates in the 30-40 on admit. Pt is asymptomatic. She had been on amiodarone and carvedilol as an outpatient. Carvedilol stopped on 4/3/2017 after patient was noted to be bradycardic during fistula procedure and Amiodarone also discontinued due to bradycardia and despite stopping medications patient remains bradycardia but asymptomatic and hemodynamically stable . EP Cardiology consulted and does not recommend any pacemaker placement at this time.

## 2017-04-12 NOTE — PROGRESS NOTES
Progress Note  [facility] Medicine Service: H  Admit Date: 4/3/2017  Encounter Date: 04/12/2017  SHAYNA: 4/13/2017    7 Days Post-Op from right hip hemiarthroplasty for fracture     Orthopedic Surgeon: Dr. Jeff Barrios  Weight Bearing Status: WBAT with posterior hip precautions right lower extremity    Follow-up Visit For: Closed displaced fracture of right femoral neck    HPI:  Ms. Miranda is an 84 y/o female who presented to ED on 04/04/17 after a mechanical fall at home.  She reports that she was stepping down a step into the den and missed the step, falling onto her right side.  She denied any dizziness or lightheadedness prior to falling.  She normally ambulates unassisted in the house but uses a walker when going out, such as to [place].  She fell not long after getting home from angioplasty on her left leg done at [facility] for treatment of chronic lower extremity ischemia resulting in a heel ulcer.  When she presented for that scheduled procedure she was noted to have a heart rate in the 30s without any symptoms.  She underwent the procedure as planned after evaluation by a cardiology fellow with plan to hold her carvedilol and to see Dr. Antoine (EP) in clinic 4/5.  Dr. Antoine started her on amiodarone 3/10 after an implantable loop recorder captured frequent episodes of a-fib with RVR with HR up to the 160s.  She is anticoagulated with Eliquis, and her last dose was on Friday 3/31 in anticipation of the angiogram yesterday (4/3).  She states that prior to starting the amiodarone her ambulation was limited by dyspnea on exertion but that has since improved.  She endorses an episode of chest pressure during a recent dialysis session.  She is on dialysis T/Th/Sat with an access in her right femoral region.  She has combined systolic and diastolic heart failure with EF recently improved to 50%, but was as low as 20% 2 years ago.  She has known 3-vessel CAD found on [facility] in 2015 not amenable to PCI,  which was discovered after an abnormal dobutamine stress echo showing inducible global hypokinesis.    Pain scale: Patient with 2/10 aching pain in right hip.    Interval History: Patient had MBSS done today that patient passed and Speech recommending regular diet with nectar thickened liquids. Patient currently in hemodialysis for fluid removal today. Patient had 2.3 liters of fluid removed yesterday by Nephrology during HD. Patient reports very small BM with Doculax suppository.     Review of Systems   Constitutional: Negative for chills and fever.   HENT: Positive for sore throat and trouble swallowing. Negative for voice change.    Respiratory: Negative for cough and shortness of breath.    Cardiovascular: Negative for chest pain and leg swelling.   Gastrointestinal: Positive for constipation. Negative for abdominal pain and nausea.   Musculoskeletal: Negative for back pain and myalgias.   Skin: Negative for rash.   Neurological: Negative for dizziness and light-headedness.   Psychiatric/Behavioral: Negative for confusion and hallucinations.     Objective:     Vital Signs (Most Recent):  Temp: 97.5 °F (36.4 °C) (04/12/17 1353)  Pulse: (!) 50 (04/12/17 1700)  Resp: 18 (04/12/17 1645)  BP: (!) 117/54 (04/12/17 1700)  SpO2: (!) 93 % (04/12/17 1200) Vital Signs (24h Range):  Temp:  [96.1 °F (35.6 °C)-97.8 °F (36.6 °C)] 97.5 °F (36.4 °C)  Pulse:  [41-54] 50  Resp:  [15-18] 18  SpO2:  [92 %-93 %] 93 %  BP: ()/(35-64) 117/54     Weight: 56.7 kg (125 lb)  Body mass index is 22.86 kg/(m^2).    Intake/Output Summary (Last 24 hours) at 04/12/17 1713  Last data filed at 04/12/17 0600   Gross per 24 hour   Intake             1570 ml   Output             4200 ml   Net            -2630 ml      Physical Exam   Constitutional: She is oriented to person, place, and time. She appears well-developed and well-nourished. No distress.   HENT:   Mouth/Throat: Oropharynx is clear and moist. No oropharyngeal exudate.    Cardiovascular: Regular rhythm and normal heart sounds.  Bradycardia present.  Exam reveals no gallop and no friction rub.    No murmur heard.  Pulmonary/Chest: Effort normal and breath sounds normal. She has no wheezes.   Abdominal: Soft. Bowel sounds are normal. She exhibits no distension. There is no tenderness.   Musculoskeletal: She exhibits no edema.   Neurological: She is alert and oriented to person, place, and time.   Skin: Skin is warm. No erythema.   Surgical bandage to right hip   Psychiatric: She has a normal mood and affect. Her behavior is normal. Thought content normal.       Significant Labs:   CBC:   Recent Labs  Lab 04/11/17 0544 04/12/17  0348   WBC 4.72 5.61   HGB 6.8* 8.3*   HCT 20.8* 25.7*    164     CMP:   Recent Labs  Lab 04/11/17 0544 04/12/17  0348   * 125*   K 4.2 4.0   CL 93* 92*   CO2 22* 24   GLU 62* 65*   BUN 15 22   CREATININE 3.0* 3.7*   CALCIUM 8.4* 8.3*   ALBUMIN 2.8* 2.6*   ANIONGAP 12 9   EGFRNONAA 13.6* 10.6*       Significant Imaging: I have reviewed all pertinent imaging results/findings within the past 24 hours.      ASSESSMENT/PLAN:     Bradycardia, drug induced  Sinus brea-tachy syndrome  Unchanged as patient remains bradycardiac in sinus rhythm on telemetry. Patient had profound sinus bradycardia with rates in the 30-40 on admit. Patient is asymptomatic. She had been on amiodarone and carvedilol as an outpatient. Carvedilol stopped on 4/3/2017 after patient was noted to be bradycardic during fistula procedure and Amiodarone also discontinued due to bradycardia and despite stopping medications patient remains bradycardia but asymptomatic and hemodynamically stable. EP Cardiology consulted and does not recommend any pacemaker placement at this time.     * Closed displaced fracture of right femoral neck s/p hemiarthroplasty on 4/5/2017  Encounter for aftercare for healing closed traumatic fracture of right hip  Patient is POD # 7. Patient reports no pain in  right hip. Plan is to continue PT/OT for gait training and strengthening and restoration of ADL's. Patient is FWB/WBAT: right lower extremity, posterior hip precautions as per Orthopedic recommendations. Platient on Eliquis for long term anticoagulation for atrial fibrillation so no DVT prophylaxis needed post-op. Orthopedics is following and managing surgical site. Pain is well controlled with Oxy IR prn. PT/OT recommending skilled nursing facility placement and  working on [facility] for tomorrow if sodium improved.     ESRD on hemodialysis  Controlled. Patient on hemodialysis T/Th/Sat as outpatient. Nephrology consulted and managing patient's HD needs in hospital and patient currently being dialyzed.    Acute blood loss anemia  Anemia in ESRD (end-stage renal disease)  Patient with chronic anemia at baseline due to anemia from ESRD. Patient with drop in Hgb post-op that was expected after hip fracture repair with Hgb 6.8 on 4/11. Patient transfused 1 unit of PRBCs on 4/11 with HD. Repeat Hgb 8.3 today, 4/12 and patient responded appropriately to blood transfusion. Goal is Hgb > 7.   Patient to continue Epogen injections with HD three times weekly to treat chronic anemia related to ESRD.       Odynophagia  Slight improvement. Patient complaining of sore throat post-op and felt related to ET tube but having difficulty swallowing even liquids post-op so Speech therapy consulted and evaluated patient on 4/11 and recommended nectar thickened liquids and modified barium swallow evaluation. Patient underwent MBSS on 4/12 and patient passed without difficulty and Speech recommended regular diet with nectar thickened liquids.       Hyponatremia  Likely related to hypervolemia as patient with volume overload on exam. Patient to be dialyzed for fluid removal today with repeat BMP in the am. Patient asymptomatic.       Slow transit constipation  Patient on daily Miralax and twice daily Colace post-op but no BM  for 6 days. Doculax suppository x 1 given on 4/11 with little success so plan is to give Fleet's enema x 1 today.      Renovascular hypertension  Patient on Bidil on admit for but discontinued as patient was having hypotension in hemodialysis and Nephrology unable to remove fluid so stopped and patient placed on Midodrine 10 mg po TID and patient now normotensive so plan for now is to continue Midodrine and not restart Bidil as patient able to now tolerate HD and fluid removal.     Acquired hypothyroidism  TSH (normal on 3/10) and rechecked on this admit and noted to be severely elevated at 21.6, with low normal Free T4 of 0.88. Synthroid was adjusted from 50 mcg to 75 mcg daily and will need repeat thyroid function tests in 4-6 weeks.       Chronic combined systolic and diastolic heart failure  Controlled. Patient with prior reduced EF but recent 2 D echo with improved EF to 50%. Presently without signs of acute decompensation. Bidil on hold due to hypotension during HD.       Atherosclerosis of native artery 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  Controlled. Patient s/p balloon angioplasty 4/4/2017 to left SFA with improvement in distal perfusion by Vascular surgery for critical limb ischemia. Patient ulceration to left heel and right lower limb on admit and ulcers are stable since admit. Patient with vascular boots in place to protect both heels in hospital and receiving local wound care to left heel and right calf ulcer. Patient to continue Aspirin and Lipitor to treat.      PAF (paroxysmal atrial fibrillation)  Long-term (current) use of anticoagulants  Patient bradycardiac so not on beta blockers or other rate controlling medications. Patient off Amiodarone also due to bradycardia as per Cardiology recommendations. Plan to continue Eliquis for chronic anticoagulation for stroke prevention.     3-vessel CAD  Controlled. Patient denies any angina. Patient to continue Aspirin and Lipitor to treat.        Anticipated Disposition: [facility]    Time spent in care of patient (Greater than 1/2 spent in direct face-to-face contact): 20 minutes      Jalyn Saldana MD  Department of Hospital Medicine

## 2017-04-12 NOTE — ASSESSMENT & PLAN NOTE
Patient s/p balloon angioplasty 4/4/2017 to left SFA with improvement in distal perfusion by Vascular surgery for critical limb ischemia. Patient ulceration to left heel and right lower limb on admit and ulcers are stable since admit. Patient with vascular boots in place to protect both heels in hospital and receiving local wound care to left heel and right calf ulcer. Patient to continue Aspirin and Lipitor to treat.

## 2017-04-12 NOTE — PT/OT/SLP PROGRESS
Physical Therapy      Padmini Miranda  MRN: [id number]    Patient not seen today secondary to off floor in radiology in AM; off floor for HD in PM. Will follow-up at next possible time.    Jose Alejandro Gant, PT   4/12/2017

## 2017-04-12 NOTE — ASSESSMENT & PLAN NOTE
Long-term (current) use of anticoagulants  Patient bradycardiac so not on beta blockers or other rate controlling medications. Patient off Amiodarone also due to bradycardia as per Cardiology recommendations. Plan to continue Eliquis for chronic anticoagulation for tsroke prevention.

## 2017-04-12 NOTE — SUBJECTIVE & OBJECTIVE
Interval History: Patient transfused 1 unit of PRBC's today during HD for Hgb 6.8. Patient complaining of pain on swallowing that occurred after hip surgery and having difficulty swallowing so speech therapy consulted and evaluated patient today and recommending MBSS. Patient denies any active pain in right hip. Patient reports no BM x 5 days and reports she feels constipated.     Review of Systems   Constitutional: Negative for chills and fever.   HENT: Positive for sore throat and trouble swallowing. Negative for voice change.    Respiratory: Negative for cough and shortness of breath.    Cardiovascular: Negative for chest pain and leg swelling.   Gastrointestinal: Positive for constipation. Negative for abdominal pain and nausea.   Musculoskeletal: Negative for back pain and myalgias.   Skin: Negative for rash.   Neurological: Negative for dizziness and light-headedness.   Psychiatric/Behavioral: Negative for confusion and hallucinations.     Objective:     Vital Signs (Most Recent):  Temp: 97.5 °F (36.4 °C) (04/11/17 1800)  Pulse: (!) 49 (04/11/17 1900)  Resp: 15 (04/11/17 1715)  BP: (!) 126/45 (04/11/17 1800)  SpO2: 96 % (04/11/17 1257) Vital Signs (24h Range):  Temp:  [96.5 °F (35.8 °C)-97.8 °F (36.6 °C)] 97.5 °F (36.4 °C)  Pulse:  [7-56] 49  Resp:  [15-18] 15  SpO2:  [95 %-98 %] 96 %  BP: (110-156)/(39-71) 126/45     Weight: 56.7 kg (125 lb)  Body mass index is 22.86 kg/(m^2).    Intake/Output Summary (Last 24 hours) at 04/11/17 2037  Last data filed at 04/11/17 1740   Gross per 24 hour   Intake             1890 ml   Output             4200 ml   Net            -2310 ml      Physical Exam   Constitutional: She is oriented to person, place, and time. She appears well-developed and well-nourished. No distress.   HENT:   Mouth/Throat: Oropharynx is clear and moist. No oropharyngeal exudate.   Cardiovascular: Regular rhythm and normal heart sounds.  Bradycardia present.  Exam reveals no gallop and no friction rub.     No murmur heard.  Pulmonary/Chest: Effort normal and breath sounds normal. She has no wheezes.   Abdominal: Soft. Bowel sounds are normal. She exhibits no distension. There is no tenderness.   Musculoskeletal: She exhibits no edema.   Neurological: She is alert and oriented to person, place, and time.   Skin: Skin is warm. No erythema.   Surgical bandage to right hip   Psychiatric: She has a normal mood and affect. Her behavior is normal. Thought content normal.       Significant Labs:   BMP:   Recent Labs  Lab 04/11/17  0544   GLU 62*   *   K 4.2   CL 93*   CO2 22*   BUN 15   CREATININE 3.0*   CALCIUM 8.4*     CBC:   Recent Labs  Lab 04/10/17  0400 04/11/17  0544   WBC 4.72 4.72   HGB 7.5* 6.8*   HCT 22.1* 20.8*    185     Significant Imaging: I have reviewed all pertinent imaging results/findings within the past 24 hours.

## 2017-04-12 NOTE — ASSESSMENT & PLAN NOTE
Anemia in ESRD (end-stage renal disease)  Patient with chronic anemia at baseline due to anemia from ESRD. Patient with drop in Hgb post-op that was expected after hip fracture repair with Hgb 6.8 on 4/11. Patient transfused 1 unit of PRBCs today with HD. Patient to have repeat H/H in the am with goal of Hgb >7.   Patient to continue Epogen injections with HD three times weekly to treat chronic anemia related to ESRD.

## 2017-04-12 NOTE — ASSESSMENT & PLAN NOTE
Anemia in ESRD (end-stage renal disease)  Patient with chronic anemia at baseline due to anemia from ESRD. Patient with drop in Hgb post-op that was expected after hip fracture repair with Hgb 6.8 on 4/11. Patient transfused 1 unit of PRBCs on 4/11 with HD. Repeat Hgb 8.3 today, 4/12 and patient responded appropriately to blood transfusion. Goal is Hgb > 7.   Patient to continue Epogen injections with HD three times weekly to treat chronic anemia related to ESRD.

## 2017-04-12 NOTE — PLAN OF CARE
8:07 AM Pt denied from [facility]. Pt accepted to both [facility] and [facility]. Pt's preference is [facility]. Insurance authorization received. Pt's d/c pending medical stability.    Debbie Galarza LMSW, [facility]  [phone]

## 2017-04-12 NOTE — PROGRESS NOTES
Progress Note  [facility] Medicine Service: H  Admit Date: 4/3/2017  Encounter Date: 04/11/2017  SHAYNA: 4/13/2017    6 Days Post-Op from right hip hemiarthroplasty for fracture     Orthopedic Surgeon: Dr. Jeff Barrios  Weight Bearing Status: WBAT with posterior hip precautions right lower extremity    Follow-up Visit For: Closed displaced fracture of right femoral neck    HPI:  Ms. Miranda is an 84 y/o female who presented to ED on 04/04/17 after a mechanical fall at home.  She reports that she was stepping down a step into the den and missed the step, falling onto her right side.  She denied any dizziness or lightheadedness prior to falling.  She normally ambulates unassisted in the house but uses a walker when going out, such as to [place].  She fell not long after getting home from angioplasty on her left leg done at [facility] for treatment of chronic lower extremity ischemia resulting in a heel ulcer.  When she presented for that scheduled procedure she was noted to have a heart rate in the 30s without any symptoms.  She underwent the procedure as planned after evaluation by a cardiology fellow with plan to hold her carvedilol and to see Dr. Antoine (EP) in clinic 4/5.  Dr. Antoine started her on amiodarone 3/10 after an implantable loop recorder captured frequent episodes of a-fib with RVR with HR up to the 160s.  She is anticoagulated with Eliquis, and her last dose was on Friday 3/31 in anticipation of the angiogram yesterday (4/3).  She states that prior to starting the amiodarone her ambulation was limited by dyspnea on exertion but that has since improved.  She endorses an episode of chest pressure during a recent dialysis session.  She is on dialysis T/Th/Sat with an access in her right femoral region.  She has combined systolic and diastolic heart failure with EF recently improved to 50%, but was as low as 20% 2 years ago.  She has known 3-vessel CAD found on [facility] in 2015 not amenable to PCI,  which was discovered after an abnormal dobutamine stress echo showing inducible global hypokinesis.    Pain scale: Patient with 0/10 pain to right hip.    Interval History: Patient transfused 1 unit of PRBC's today during HD for Hgb 6.8. Patient complaining of pain on swallowing that occurred after hip surgery and having difficulty swallowing so speech therapy consulted and evaluated patient today and recommending MBSS. Patient denies any active pain in right hip. Patient reports no BM x 5 days and reports she feels constipated.     Review of Systems   Constitutional: Negative for chills and fever.   HENT: Positive for sore throat and trouble swallowing. Negative for voice change.    Respiratory: Negative for cough and shortness of breath.    Cardiovascular: Negative for chest pain and leg swelling.   Gastrointestinal: Positive for constipation. Negative for abdominal pain and nausea.   Musculoskeletal: Negative for back pain and myalgias.   Skin: Negative for rash.   Neurological: Negative for dizziness and light-headedness.   Psychiatric/Behavioral: Negative for confusion and hallucinations.     Objective:     Vital Signs (Most Recent):  Temp: 97.5 °F (36.4 °C) (04/11/17 1800)  Pulse: (!) 49 (04/11/17 1900)  Resp: 15 (04/11/17 1715)  BP: (!) 126/45 (04/11/17 1800)  SpO2: 96 % (04/11/17 1257) Vital Signs (24h Range):  Temp:  [96.5 °F (35.8 °C)-97.8 °F (36.6 °C)] 97.5 °F (36.4 °C)  Pulse:  [7-56] 49  Resp:  [15-18] 15  SpO2:  [95 %-98 %] 96 %  BP: (110-156)/(39-71) 126/45     Weight: 56.7 kg (125 lb)  Body mass index is 22.86 kg/(m^2).    Intake/Output Summary (Last 24 hours) at 04/11/17 2037  Last data filed at 04/11/17 1740   Gross per 24 hour   Intake             1890 ml   Output             4200 ml   Net            -2310 ml      Physical Exam   Constitutional: She is oriented to person, place, and time. She appears well-developed and well-nourished. No distress.   HENT:   Mouth/Throat: Oropharynx is clear and  moist. No oropharyngeal exudate.   Cardiovascular: Regular rhythm and normal heart sounds.  Bradycardia present.  Exam reveals no gallop and no friction rub.    No murmur heard.  Pulmonary/Chest: Effort normal and breath sounds normal. She has no wheezes.   Abdominal: Soft. Bowel sounds are normal. She exhibits no distension. There is no tenderness.   Musculoskeletal: She exhibits no edema.   Neurological: She is alert and oriented to person, place, and time.   Skin: Skin is warm. No erythema.   Surgical bandage to right hip   Psychiatric: She has a normal mood and affect. Her behavior is normal. Thought content normal.       Significant Labs:   BMP:   Recent Labs  Lab 04/11/17  0544   GLU 62*   *   K 4.2   CL 93*   CO2 22*   BUN 15   CREATININE 3.0*   CALCIUM 8.4*     CBC:   Recent Labs  Lab 04/10/17  0400 04/11/17  0544   WBC 4.72 4.72   HGB 7.5* 6.8*   HCT 22.1* 20.8*    185     Significant Imaging: I have reviewed all pertinent imaging results/findings within the past 24 hours.      ASSESSMENT/PLAN:     Bradycardia, drug induced  Sinus brea-tachy syndrome  Unchanged as patient remains bradycardiac in sinus rhythm on telemetry. Patient had profound sinus bradycardia with rates in the 30-40 on admit. Pt is asymptomatic. She had been on amiodarone and carvedilol as an outpatient. Carvedilol stopped on 4/3/2017 after patient was noted to be bradycardic during fistula procedure and Amiodarone also discontinued due to bradycardia and despite stopping medications patient remains bradycardia but asymptomatic and hemodynamically stable . EP Cardiology consulted and does not recommend any pacemaker placement at this time.     * Closed displaced fracture of right femoral neck s/p hemiarthroplasty on 4/5/2017  Encounter for aftercare for healing closed traumatic fracture of right hip  Patient is POD # 6. Patient reports no pain in right hip. Plan is to continue PT/OT for gait training and strengthening and  restoration of ADL's. Patient is FWB/WBAT: right lower extremity, posterior hip precautions as per Orthopedic recommendations. Platient on Eliquis for long term anticoagulation for atrial fibrillation so no DVT prophylaxis needed post-op. Orthopedics is following and managing surgical site. Pain is well controlled with Oxy IR prn. PT/OT recommending skilled nursing facility placement and  working on [facility].     ESRD on hemodialysis  Controlled. Patient on hemodialysis T/Th/Sat as outpatient. Nephrology consulted and managing patient's HD needs in hospital and patient dialyzed today, 4/11.     Acute blood loss anemia  Anemia in ESRD (end-stage renal disease)  Patient with chronic anemia at baseline due to anemia from ESRD. Patient with drop in Hgb post-op that was expected after hip fracture repair with Hgb 6.8 on 4/11. Patient transfused 1 unit of PRBCs today with HD. Patient to have repeat H/H in the am with goal of Hgb >7.   Patient to continue Epogen injections with HD three times weekly to treat chronic anemia related to ESRD.       Odynophagia  Patient complaining of sore throat post-op and felt related to ET tube but having difficulty swallowing even liquids post-op so Speech therapy consulted and evaluated patient on 4/11 and recommended nectar thickened liquids and modified barium swallow evaluation and ordered for tomorrow, 4/12.       Slow transit constipation  Patient on daily Miralax and twice daily Colace post-op but no BM for 5 days. Doculax suppository x 1 ordered and given on 4/11 and will reassess in am and if no BM then plan on Fleet's enema on 4/12.       Acquired hypothyroidism  TSH (normal on 3/10) and rechecked on this admit and noted to be severely elevated at 21.6, with low normal Free T4 of 0.88. Synthroid was adjusted from 50 mcg to 75 mcg daily and will need repeat thyroid function tests in 4-6 weeks.       Renovascular hypertension  Patient on Bidil on admit for but  discontinued as patient was having hypotension in hemodialysis and Nephrology unable to remove fluid so stopped and patient placed on Midodrine 10 mg po TID and patient now normotensive so plan for now is to continue Midodrine and not restart Bidil.     Chronic combined systolic and diastolic heart failure  Patient with prior reduced EF but recent 2 D echo with improved EF to 50%. Presently without signs of acute decompensation. Bidil on hold due to hypotension during HD.       PAF (paroxysmal atrial fibrillation)  Long-term (current) use of anticoagulants  Patient bradycardiac so not on beta blockers or other rate controlling medications. Patient off Amiodarone also due to bradycardia as per Cardiology recommendations. Plan to continue Eliquis for chronic anticoagulation for tsroke prevention.     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 Patient ulceration to left heel and right lower limb on admit and ulcers are stable since admit. Patient with vascular boots in place to protect both heels in hospital and receiving local wound care to left heel and right calf ulcer. Patient to continue Aspirin and Lipitor to treat.    3-vessel CAD  Controlled. Patient denies any angina. Patient to continue Aspirin and Lipitor to treat.       Anticipated Disposition: [facility]    Time spent in care of patient (Greater than 1/2 spent in direct face-to-face contact): 20 minutes      Jalyn Saldana MD  Department of Hospital Medicine

## 2017-04-12 NOTE — ASSESSMENT & PLAN NOTE
Likely related to hypervolemia as patient with volume overload on exam. Patient to be dialyzed for fluid removal today with repeat BMP in the am. Patient asymptomatic.

## 2017-04-12 NOTE — PROGRESS NOTES
Maintenance HD treatment initiated via Left upper arm fistula using 15 g needles, Albumin 25g /100ml given, lines secured

## 2017-04-12 NOTE — ASSESSMENT & PLAN NOTE
Patient with prior reduced EF but recent 2 D echo with improved EF to 50%. Presently without signs of acute decompensation. Bidil on hold due to hypotension during HD.

## 2017-04-12 NOTE — ASSESSMENT & PLAN NOTE
Controlled. Patient with prior reduced EF but recent 2 D echo with improved EF to 50%. Presently without signs of acute decompensation. Bidil on hold due to hypotension during HD.

## 2017-04-12 NOTE — ASSESSMENT & PLAN NOTE
Patient complaining of sore throat post-op and felt related to ET tube but having difficulty swallowing even liquids post-op so Speech therapy consulted and evaluated patient on 4/11 and recommended nectar thickened liquids and modified barium swallow evaluation and ordered for tomorrow, 4/12.

## 2017-04-13 PROBLEM — S72.001A CLOSED FRACTURE OF NECK OF RIGHT FEMUR: Status: RESOLVED | Noted: 2017-01-01 | Resolved: 2017-01-01

## 2017-04-13 PROBLEM — N18.6 ESRD (END STAGE RENAL DISEASE): Status: RESOLVED | Noted: 2017-01-01 | Resolved: 2017-01-01

## 2017-04-13 PROBLEM — S72.001A CLOSED FRACTURE OF NECK OF RIGHT FEMUR: Status: ACTIVE | Noted: 2017-01-01

## 2017-04-13 NOTE — PLAN OF CARE
Problem: Patient Care Overview  Goal: Plan of Care Review  Outcome: Ongoing (interventions implemented as appropriate)  HD 4 hours, 3 Liters UF removed and tolerated fairly. Albumin given 25 g x 2 for BP Support.  Left upper arm de-accessed and pressure held for 10 minutes until hemostasis achieved.

## 2017-04-13 NOTE — PLAN OF CARE
Pt's [facility] orders in. Secure Patient Delivery ([phone]) placed on will call w/ oxygen.    Pt cleared for transfer to [facility]. Pt going to Rm [id number]. Nurse can call report to [phone].    SPD requested for 5PM pickup.     Debbie Galarza LMSW, [facility]  [phone]

## 2017-04-13 NOTE — ASSESSMENT & PLAN NOTE
Long-term (current) use of anticoagulants  Patient bradycardiac so not on beta blockers or other rate controlling medications. Patient off Amiodarone also due to bradycardia as per Cardiology recommendations. Plan to continue Eliquis for chronic anticoagulation for stroke prevention on discharge to [facility].

## 2017-04-13 NOTE — SUBJECTIVE & OBJECTIVE
Interval History: Patient reports over past 2 days has been feeling fatigued and tired. Patient dialyzed on 4/11 and yesterday 4/12 for fluid removal as patient with hypervolemia and volume status and sodium improved so cause of fatigue unclear. Patient reports she just does not have much energy. Patient recently had thyroid medication increased due to low FT4 and high TSH so doubt it is thyroid issue. Patient reports her sore throat is improving. Patient had BM yesterday with enema.     Review of Systems   Constitutional: Positive for fatigue. Negative for chills and fever.   HENT: Positive for sore throat and trouble swallowing. Negative for voice change.    Respiratory: Negative for cough and shortness of breath.    Cardiovascular: Negative for chest pain and leg swelling.   Gastrointestinal: Negative for abdominal pain and nausea.   Musculoskeletal: Negative for back pain and myalgias.   Skin: Negative for rash.   Neurological: Negative for dizziness and light-headedness.   Psychiatric/Behavioral: Negative for confusion and hallucinations.     Objective:     Vital Signs (Most Recent):  Temp: 97.3 °F (36.3 °C) (04/13/17 1100)  Pulse: (!) 37 (04/13/17 1500)  Resp: 18 (04/13/17 1100)  BP: (!) 109/56 (04/13/17 1100)  SpO2: 96 % (04/13/17 1100) Vital Signs (24h Range):  Temp:  [97.3 °F (36.3 °C)-98.2 °F (36.8 °C)] 97.3 °F (36.3 °C)  Pulse:  [37-54] 37  Resp:  [16-20] 18  SpO2:  [95 %-100 %] 96 %  BP: (100-144)/(36-65) 109/56     Weight: 56.7 kg (125 lb)  Body mass index is 22.86 kg/(m^2).    Intake/Output Summary (Last 24 hours) at 04/13/17 1519  Last data filed at 04/13/17 0200   Gross per 24 hour   Intake              580 ml   Output             3400 ml   Net            -2820 ml      Physical Exam   Constitutional: She is oriented to person, place, and time. She appears well-developed and well-nourished. No distress.   HENT:   Mouth/Throat: Oropharynx is clear and moist. No oropharyngeal exudate.   Cardiovascular:  Regular rhythm and normal heart sounds.  Bradycardia present.  Exam reveals no gallop and no friction rub.    No murmur heard.  Pulmonary/Chest: Effort normal. She has rales (Mild bibasilar crackles).   Abdominal: Soft. Bowel sounds are normal. She exhibits no distension. There is no tenderness.   Musculoskeletal: She exhibits edema (Trace to 1 + leg edema).   Neurological: She is alert and oriented to person, place, and time.   Skin: Skin is warm.   Surgical bandage to right hip   Psychiatric: She has a normal mood and affect. Her behavior is normal. Thought content normal.       Significant Labs:   BMP:   Recent Labs  Lab 04/13/17 0412   GLU 83   *   K 3.9   CL 95   CO2 24   BUN 10   CREATININE 2.4*   CALCIUM 8.5*     CBC:   Recent Labs  Lab 04/12/17 0348 04/13/17 0412   WBC 5.61 5.95   HGB 8.3* 8.4*   HCT 25.7* 26.3*    144*     CMP:   Recent Labs  Lab 04/12/17 0348 04/13/17 0412   * 128*   K 4.0 3.9   CL 92* 95   CO2 24 24   GLU 65* 83   BUN 22 10   CREATININE 3.7* 2.4*   CALCIUM 8.3* 8.5*   ALBUMIN 2.6* 3.2*   ANIONGAP 9 9   EGFRNONAA 10.6* 17.9*     POCT Glucose:   Recent Labs  Lab 04/12/17  1827 04/13/17  0828 04/13/17  1235   POCTGLUCOSE 155* 98 113*       Significant Imaging: I have reviewed all pertinent imaging results/findings within the past 24 hours.

## 2017-04-13 NOTE — PROGRESS NOTES
[facility]  Consult Note Nephrology    Admit Date: 4/3/2017  Consult Requested By: Jalyn Saldana MD   LOS: 9 days       SUBJECTIVE:     Follow-up For:  ESRD on iHD    Interval History:  NAEON, Bradycardia stable. HD been very difficult to UF Seen in [place] while on HD. NS given no UF ofr total HD.     Review of Systems:  Constitutional: no fever or chills  Respiratory: no cough or shortness of breath  Cardiovascular: no chest pain or palpitations  Gastrointestinal: no nausea or vomiting, no abdominal pain or change in bowel habits  Hematologic/Lymphatic: no easy bruising or lymphadenopathy  Musculoskeletal: no arthralgias or myalgias  Neurological: no seizures or tremors      OBJECTIVE:     Vital Signs (Most Recent)  Temp: 97.8 °F (36.6 °C) (04/12/17 1807)  Pulse: (!) 47 (04/12/17 1900)  Resp: 20 (04/12/17 1807)  BP: (!) 109/42 (04/12/17 1807)  SpO2: (!) 93 % (04/12/17 1200)    Vital Signs Range (Last 24H):  Temp:  [96.1 °F (35.6 °C)-97.8 °F (36.6 °C)]   Pulse:  [41-54]   Resp:  [16-20]   BP: ()/(35-64)   SpO2:  [92 %-93 %]       Intake/Output Summary (Last 24 hours) at 04/12/17 2235  Last data filed at 04/12/17 1807   Gross per 24 hour   Intake              450 ml   Output             3400 ml   Net            -2950 ml         Physical Exam:   General: Well developed, well nourished in NAD  HEENT: Conjunctiva clear; Oropharynx clear  Neck: No JVD noted, Supple  CV- Normal S1, S2 with no murmurs,gallops,rubs  Resp- Lungs decreased BS Bilaterally, rales at bases bilaterally, Unlabored  Abdomen- NTND, BS normoactive x4 quads, soft  Extrem- No cyanosis, clubbing,  Sacral edema.  Skin- No rashes, lesions, ulcers  Neuro: awake, Oriented x3, no FND      Laboratory:  CBC:     Recent Labs  Lab 04/12/17  0348   WBC 5.61   RBC 3.07*   HGB 8.3*   HCT 25.7*      MCV 84   MCH 27.0   MCHC 32.3     BMP:   Recent Labs  Lab 04/06/17  0413  04/12/17  0348   GLU 69*  < > 65*     < > 92*   CO2  24  < > 24   BUN 36*  < > 22   CREATININE 4.8*  < > 3.7*   CALCIUM 7.6*  < > 8.3*   MG 1.6  --   --    < > = values in this interval not displayed.  CMP:   Recent Labs  Lab 04/06/17  0413  04/12/17  0348   GLU 69*  < > 65*   CALCIUM 7.6*  < > 8.3*   ALBUMIN 1.9*  < > 2.6*   PROT 5.1*  --   --      < > 125*   K 4.6  < > 4.0   CO2 24  < > 24     < > 92*   BUN 36*  < > 22   CREATININE 4.8*  < > 3.7*   ALKPHOS 51*  --   --    ALT <5*  --   --    AST 36  --   --    BILITOT 0.6  --   --    < > = values in this interval not displayed.  PTH: No results for input(s): PTH in the last 168 hours.  Coagulation:     Recent Labs  Lab 04/06/17  0413   INR 1.3*   APTT 31.5     Cardiac Markers: No results for input(s): CKMB, TROPONINT, MYOGLOBIN in the last 168 hours.  ABGs:     Recent Labs  Lab 04/06/17  0114   PH 7.320*   PCO2 47.2*   HCO3 24.3   POCSATURATED 99   BE -2       Labs reviewed  Diagnostic Results:  X-Ray: Reviewed  US: Reviewed  Echo: Reviewed    ASSESSMENT/PLAN:     Active Hospital Problems    Diagnosis  POA    *Closed displaced fracture of right femoral neck s/p hemiarthroplasty on 4/5/2017 [S72.001A]  Yes    Hyponatremia [E87.1]  No    Odynophagia [R13.10]  No    Slow transit constipation [K59.01]  No    Encounter for aftercare for healing closed traumatic fracture of right hip [S72.001D]  Not Applicable    Long-term (current) use of anticoagulants [Z79.01]  Not Applicable    Acute blood loss anemia [D62]  No    Anemia in ESRD (end-stage renal disease) [N18.6, D63.1]  Yes    3-vessel CAD [I25.10]  Yes    Bradycardia, drug induced [R00.1, T50.905A]  Yes    PAF (paroxysmal atrial fibrillation) [I48.0]  Yes    Atherosclerosis of native artery 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       Padmini Miranda is a 85 y.o. female, with HTN, ESRD on iHD, Anemia of chronic disease, Chronic anticoagulation, HLD, Afib, HFrEF, AVS, hypothyroidism and Right hip fracture. Nephrology consulted for ESRD management.     ESRD on IHD TTS   Out patient HD [facility] - [facility]/ Dr. Kaminski  On HD for: ~ 1 year  Duration of outpatient dialysis session - 3.75 hrs  EDW - 54 kg  Residual Mary Ann Function - minimal  - Will provide dialysis for metabolic clearance and volume management x 4 hrs  - Sinus bradycardia amiodarone and BB stopped, stablenow ~ loq 40's.   - Target ultrafiltration ~ 3 lts as tolerated  - Albumin PRN for SBP < 90  - Dialysate adjusted to current labs   Aceess: EDILBERTO AVF with good thrill and bruit     Active problem in hospital  - Right Hip fracture  - Sinus Bradycardia  - Hypervolemia     Anemia of Chronic Kidney Disease   - Will resume CURTIS with next HD  - Hg 8.3   - Will request iron studies to assess further needs of supplementation      Mineral Bone Disease in CKD   - Renal Function Panel Daily for electrolytes monitoring  - Phos 7.7  - Already on binders as outpatient, will increased  Renvela 1.6 gms AC and scnacks  - CoCa 9.1     Nutrition   - Renal Diet     HTN   - on low side BP, will continue to monitor. Goal for BP is <130 mmHg SBP and BDP <80 mmHg.   - decrease or dc Bidil 1 tab TID she was hypotensive during HD un able to do UF on her.  - Coreg on hold secondary to bradycardia     Case discuss with Staff further recs with attestation.     Octavio Gong MD  Nephrology Fellow PGY4  [phone]

## 2017-04-13 NOTE — ASSESSMENT & PLAN NOTE
Resolved after Fleet's enema on 4/12. Patient to continue daily Miralax and twice daily Colace on discharge to [facility].

## 2017-04-13 NOTE — ASSESSMENT & PLAN NOTE
Controlled. Patient on hemodialysis T/Th/Sat as outpatient and patient has been dialyzed on 4/11 and 4/12 for fluid removal and patient's fluid status is improved over past 2 days but still needs continued dialysis for fluid removal. Patient's outpatient hemodialysis has been arranged on discharge to SNF.

## 2017-04-13 NOTE — ASSESSMENT & PLAN NOTE
Sinus brea-tachy syndrome  Unchanged as patient remains bradycardiac in sinus rhythm on telemetry. Patient had profound sinus bradycardia with rates in the 30-40 on admit. Patient is asymptomatic. She had been on Amiodarone and Carvedilol as an outpatient. Carvedilol stopped on 4/3/2017 after patient was noted to be bradycardic during fistula procedure and Amiodarone also discontinued due to bradycardia and despite stopping medications patient remains bradycardia but asymptomatic and hemodynamically stable. EP Cardiology consulted and does not recommend any pacemaker placement at this time. Patient has follow-up with Cardiology with Dr. Karsten Torres on 6/14/2017.

## 2017-04-13 NOTE — ASSESSMENT & PLAN NOTE
Likely related to hypervolemia as patient with volume overload on exam. Patient to be dialyzed for fluid removal on 4/11 and 4/12 and sodium level is improved to 128 from 125. Patient will need to have continued monitoring of BMP at SNF and should improve with continued fluid removal with HD.

## 2017-04-13 NOTE — PROGRESS NOTES
[facility]  Adult Nutrition  Progress Note    SUMMARY     Recommendations    Recommendation/Intervention:   1. Continue current diet order.   2. Recommend Novasource Renal TID.   3. Encourage good PO intake of meals. Will monitor.   Goals: PO intake >50%.   Nutrition Goal Status: new  Communication of RD Recs: reviewed with RN    Reason for Assessment    Reason for Assessment: length of stay  Diagnosis: other (see comments) (hip fracture s/p R hip hemiarthroplasty)  Relevent Medical History: CHF, HTN, HLD, ESRD on HD   Nutrition Discharge Planning: Adequate PO intake for optimal nutrition.     Nutrition Prescription Ordered    Current Diet Order: Renal with nectar thickened liquids    Nutrition Risk Screen     Nutrition Risk Screen: no indicators present    Nutrition/Diet History    Typical Food/Fluid Intake: Pt sleeping during visit. Noted pt with PO intake 25-50%. Breakfast tray in room, pt had consumed about 25%.   Factors Affecting Nutritional Intake: decreased appetite    Labs/Tests/Procedures/Meds    Pertinent Labs Reviewed: reviewed  Pertinent Labs Comments: Na 128, Ca 8.5, Alb 3.2  Pertinent Medications Reviewed: reviewed  Pertinent Medications Comments: docusate    Physical Findings    Overall Physical Appearance: generalized wasting  Oral/Mouth Cavity: dental applicance present (specify)  Skin: incision (in hip)    Anthropometrics    Height (inches): 62.01 in  Weight (kg): 56.7 kg  Ideal Body Weight (IBW), Female: 110.05 lb  % Ideal Body Weight, Female (lb): 113.58   BMI (kg/m2): 22.86  BMI Grade: 18.5-24.9 - normal    Estimated/Assessed Needs    Weight Used For Calorie Calculations: 56.7 kg (125 lb)   Height (cm): 157.5 cm  Energy Need Method: Highland-St Jeor (1.3 PAL: 1262kcal)  RMR (Highland-St. Jeor Equation): 971.61  Weight Used For Protein Calculations: 56.7 kg (125 lb)  Protein Requirements: 56-68g (1-1.2g/kg)  Fluid Need Method: RDA Method (1mL/kcal or per MD)    Assessment and  Plan    Inadequate energy intake r/t decreased appetite AEB PO intake 25-50% - new.     Monitor and Evaluation    Food and Nutrient Intake: energy intake, food and beverage intake  Food and Nutrient Adminstration: diet order  Anthropometric Measurements: weight, weight change  Biochemical Data, Medical Tests and Procedures: other (specify) (All labs)  Nutrition-Focused Physical Findings: overall appearance    Nutrition Risk    Level of Risk: other (see comments) (1X/week)    Nutrition Follow-Up    RD Follow-up?: Yes

## 2017-04-13 NOTE — NURSING
Cammy MARTINEZ to bedside, new dressing applied to AV Fistula, +thrill/Bruit, will continue to monitor

## 2017-04-13 NOTE — NURSING
Pt. Had two 2x2 gauze on A/V Fistula site which were saturated after approximately two hours. Spoke with Dr. Tejeda (dialysis RN), Gelfoam to be applied to AV Fistula, will continue to monitor.

## 2017-04-13 NOTE — PLAN OF CARE
Problem: Patient Care Overview  Goal: Plan of Care Review  Pt able to verbalized some of POC.  Pt. Continues to deny pain.  VSS except for continued bradycardia.  Pt with no difficulty swallowing medication with pudding.  Will continue to monitor.

## 2017-04-13 NOTE — ASSESSMENT & PLAN NOTE
Encounter for aftercare for healing closed traumatic fracture of right hip  Patient is POD # 8. Patient reports no pain in right hip. Plan is to continue PT/OT for gait training and strengthening and restoration of ADL's on discharge to [facility]. Patient is FWB/WBAT: right lower extremity, posterior hip precautions as per Orthopedic recommendations. Patient on Eliquis for long term anticoagulation for atrial fibrillation so no DVT prophylaxis needed post-op. Pain is well controlled with mainly with just Tylenol as needed and to continue at [facility]. Patient has follow-up with Orthopedic Clinic on 4/19/2017 for post-op follow-up and surgical bandage to right hip to remain in place until clinic follow-up.

## 2017-04-13 NOTE — PLAN OF CARE
POD 8 s/p R hip hemiarthroplasty for fem neck fx. PT/OT recommended SNF placement. Plans to transfer/discharge patient to [facility] today. SW and CM will continue to follow for any additional needs. Discharge and follow-up instructions to be completed by the bedside nurse.    Future Appointments  Date Time Provider Department Center   4/19/2017 1:30 PM Liset Sharpe NP [facility] ORTHO [facility]   4/21/2017 8:00 AM HOME MONITOR DEVICE CHECK, [facility] NOMC ARRHYTH [facility]   4/21/2017 9:00 AM VASCULAR, LAB [facility] VASCLAB [facility]   4/21/2017 9:30 AM RUBY Aranda III, MD [facility] VASCSUR [facility]   6/14/2017 3:00 PM Karsten Torres MD [facility] CARDIO [facility]      04/13/17 0573   Final Note   Assessment Type Final Discharge Note   Discharge Disposition SNF  ([facility])   Discharge planning education complete? Yes   What phone number can be called within the next 1-3 days to see how you are doing after discharge? ([phone])   Hospital Follow Up  Appt(s) scheduled? Yes   Discharge plans and expectations educations in teach back method with documentation complete? Yes   Offered [facility] Bedside Delivery? Yes   Discharge/Hospital Encounter Summary to ([facility]) PCP n/a   Referral to Outpatient Case Management complete? n/a   Referral to / orders for Home Health Complete? n/a   30 day supply of medicines given at discharge, if documented non-compliance / non-adherence? n/a   Any social issues identified prior to discharge? No   Did you assess the readiness or willingness of the family or caregiver to support self management of care? Yes

## 2017-04-13 NOTE — NURSING
Pt arrived to unit AAOx4. Family @ bedside.  VSS.  Pt bradycardic and on 4L02. Afebrile.  C/O of throat pain.  New diagnosis of dysphagia due to age (odynophagia). Tolerating soft diet/nectar thick liquids. Crushed pills.   Pt fell @ home and broke hip (closed fx of the neck of right femur> hemiarthroplasty performed on 4/5/17). Aquacel on (R) hip. Pt is POD #8.  States that she had a bowel movement yesterday from laxative. Will monitor for constipation while here.  PMH of ESRD, hemodialysis T/TH/SAT. 3 vessel CAD, AFIB (loop recorder in place). Will monitor BG, Oxygen saturation, pain, bradycardia, and skin.  Pt wears moonboots due to a (L) foot ulcer (mepilex in place; wound dressing changes due on Mondays and Thursdays per report). Family also states that patient has a new blister on her (R) calf.  Fall risk and preventions discussed with patient and family.  Verbalized understanding. Non skid socks and hourly roundings in place.

## 2017-04-13 NOTE — PROGRESS NOTES
Progress Note  [facility] Medicine Service: H  Admit Date: 4/3/2017  Encounter Date: 04/13/2017  SHAYNA: 4/13/2017    8 Days Post-Op from right hip hemiarthroplasty for fracture     Orthopedic Surgeon: Dr. Jeff Barrios  Weight Bearing Status: WBAT with posterior hip precautions right lower extremity    Follow-up Visit For: Closed displaced fracture of right femoral neck    HPI:  Ms. Miranda is an 86 y/o female who presented to ED on 04/04/17 after a mechanical fall at home.  She reports that she was stepping down a step into the den and missed the step, falling onto her right side.  She denied any dizziness or lightheadedness prior to falling.  She normally ambulates unassisted in the house but uses a walker when going out, such as to [employer].  She fell not long after getting home from angioplasty on her left leg done at [facility] for treatment of chronic lower extremity ischemia resulting in a heel ulcer.  When she presented for that scheduled procedure she was noted to have a heart rate in the 30s without any symptoms.  She underwent the procedure as planned after evaluation by a Cardiology fellow with plan to hold her Carvedilol and to see Dr. Antoine (EP) in clinic 4/5.  Dr. Antoine started her on amiodarone 3/10 after an implantable loop recorder captured frequent episodes of A-fib with RVR with HR up to the 160s.  She is anticoagulated with Eliquis, and her last dose was on Friday 3/31 in anticipation of the angiogram yesterday (4/3).  She states that prior to starting the Amiodarone her ambulation was limited by dyspnea on exertion but that has since improved.  She endorses an episode of chest pressure during a recent dialysis session.  She is on dialysis T/Th/Sat with an access in her right femoral region.  She has combined systolic and diastolic heart failure with EF recently improved to 50%, but was as low as 20% 2 years ago.  She has known 3-vessel CAD found on [facility] in 2015 not amenable to PCI,  which was discovered after an abnormal dobutamine stress echo showing inducible global hypokinesis.    Pain scale: Patient with 2/10 aching pain in right hip.    Interval History: Patient reports over past 2 days has been feeling fatigued and tired. Patient dialyzed on 4/11 and yesterday 4/12 for fluid removal as patient with hypervolemia and volume status and sodium improved so cause of fatigue unclear. Patient reports she just does not have much energy. Patient recently had thyroid medication increased due to low FT4 and high TSH so doubt it is thyroid issue. Patient reports her sore throat is improving. Patient had BM yesterday with enema.     Review of Systems   Constitutional: Positive for fatigue. Negative for chills and fever.   HENT: Positive for sore throat and trouble swallowing. Negative for voice change.    Respiratory: Negative for cough and shortness of breath.    Cardiovascular: Negative for chest pain and leg swelling.   Gastrointestinal: Negative for abdominal pain and nausea.   Musculoskeletal: Negative for back pain and myalgias.   Skin: Negative for rash.   Neurological: Negative for dizziness and light-headedness.   Psychiatric/Behavioral: Negative for confusion and hallucinations.     Objective:     Vital Signs (Most Recent):  Temp: 97.3 °F (36.3 °C) (04/13/17 1100)  Pulse: (!) 37 (04/13/17 1500)  Resp: 18 (04/13/17 1100)  BP: (!) 109/56 (04/13/17 1100)  SpO2: 96 % (04/13/17 1100) Vital Signs (24h Range):  Temp:  [97.3 °F (36.3 °C)-98.2 °F (36.8 °C)] 97.3 °F (36.3 °C)  Pulse:  [37-54] 37  Resp:  [16-20] 18  SpO2:  [95 %-100 %] 96 %  BP: (100-144)/(36-65) 109/56     Weight: 56.7 kg (125 lb)  Body mass index is 22.86 kg/(m^2).    Intake/Output Summary (Last 24 hours) at 04/13/17 0439  Last data filed at 04/13/17 0200   Gross per 24 hour   Intake              580 ml   Output             3400 ml   Net            -2820 ml      Physical Exam   Constitutional: She is oriented to person, place, and  time. She appears well-developed and well-nourished. No distress.   HENT:   Mouth/Throat: Oropharynx is clear and moist. No oropharyngeal exudate.   Cardiovascular: Regular rhythm and normal heart sounds.  Bradycardia present.  Exam reveals no gallop and no friction rub.    No murmur heard.  Pulmonary/Chest: Effort normal. She has rales (Mild bibasilar crackles).   Abdominal: Soft. Bowel sounds are normal. She exhibits no distension. There is no tenderness.   Musculoskeletal: She exhibits edema (Trace to 1 + leg edema).   Neurological: She is alert and oriented to person, place, and time.   Skin: Skin is warm.   Surgical bandage to right hip   Psychiatric: She has a normal mood and affect. Her behavior is normal. Thought content normal.       Significant Labs:   BMP:   Recent Labs  Lab 04/13/17  0412   GLU 83   *   K 3.9   CL 95   CO2 24   BUN 10   CREATININE 2.4*   CALCIUM 8.5*     CBC:   Recent Labs  Lab 04/12/17  0348 04/13/17  0412   WBC 5.61 5.95   HGB 8.3* 8.4*   HCT 25.7* 26.3*    144*     CMP:   Recent Labs  Lab 04/12/17  0348 04/13/17  0412   * 128*   K 4.0 3.9   CL 92* 95   CO2 24 24   GLU 65* 83   BUN 22 10   CREATININE 3.7* 2.4*   CALCIUM 8.3* 8.5*   ALBUMIN 2.6* 3.2*   ANIONGAP 9 9   EGFRNONAA 10.6* 17.9*     POCT Glucose:   Recent Labs  Lab 04/12/17  1827 04/13/17  0828 04/13/17  1235   POCTGLUCOSE 155* 98 113*       Significant Imaging: I have reviewed all pertinent imaging results/findings within the past 24 hours.      ASSESSMENT/PLAN:     Bradycardia, drug induced  Sinus brea-tachy syndrome  Unchanged as patient remains bradycardiac in sinus rhythm on telemetry. Patient had profound sinus bradycardia with rates in the 30-40 on admit. Patient is asymptomatic. She had been on Amiodarone and Carvedilol as an outpatient. Carvedilol stopped on 4/3/2017 after patient was noted to be bradycardic during fistula procedure and Amiodarone also discontinued due to bradycardia and despite  stopping medications patient remains bradycardia but asymptomatic and hemodynamically stable. EP Cardiology consulted and does not recommend any pacemaker placement at this time. Patient has follow-up with Cardiology with Dr. Karsten Torres on 6/14/2017.     * Closed displaced fracture of right femoral neck s/p hemiarthroplasty on 4/5/2017  Encounter for aftercare for healing closed traumatic fracture of right hip  Patient is POD # 8. Patient reports no pain in right hip. Plan is to continue PT/OT for gait training and strengthening and restoration of ADL's on discharge to [facility]. Patient is FWB/WBAT: right lower extremity, posterior hip precautions as per Orthopedic recommendations. Patient on Eliquis for long term anticoagulation for atrial fibrillation so no DVT prophylaxis needed post-op. Pain is well controlled with mainly with just Tylenol as needed and to continue at [facility]. Patient has follow-up with Orthopedic Clinic on 4/19/2017 for post-op follow-up and surgical bandage to right hip to remain in place until clinic follow-up.      ESRD on hemodialysis  Controlled. Patient on hemodialysis T/Th/Sat as outpatient and patient has been dialyzed on 4/11 and 4/12 for fluid removal and patient's fluid status is improved over past 2 days but still needs continued dialysis for fluid removal. Patient's outpatient hemodialysis has been arranged on discharge to [facility].     Acute blood loss anemia  Anemia in ESRD (end-stage renal disease)  Patient with chronic anemia at baseline due to anemia from ESRD. Patient with drop in Hgb post-op that was expected after hip fracture repair with Hgb 6.8 on 4/11. Patient transfused 1 unit of PRBCs on 4/11 with HD. Repeat Hgb 8.3 on 4/12 and Hgb 8.4 on day of discharge and patient responded appropriately to blood transfusion. Goal is Hgb > 7. Patient to continue Epogen injections with HD three times weekly to treat chronic anemia related to ESRD with hemodialysis on discharge.  Patient to have monitoring of H/H at [facility].       Odynophagia  Improving. Patient complaining of sore throat post-op and felt related to ET tube but having difficulty swallowing even liquids post-op so Speech therapy consulted and evaluated patient on 4/11 and recommended nectar thickened liquids and modified barium swallow evaluation. Patient underwent MBSS on 4/12 and patient passed without difficulty and Speech recommended regular diet with nectar thickened liquids. Patient doing better with intake but will need to monitor oral intake at [facility].       Hyponatremia  Likely related to hypervolemia as patient with volume overload on exam. Patient to be dialyzed for fluid removal on 4/11 and 4/12 and sodium level is improved to 128 from 125. Patient will need to have continued monitoring of BMP at [facility] and should improve with continued fluid removal with HD.       Slow transit constipation  Resolved after Fleet's enema on 4/12. Patient to continue daily Miralax and twice daily Colace on discharge to [facility].       Renovascular hypertension  Patient on Bidil on admit for but discontinued as patient was having hypotension in hemodialysis and Nephrology unable to remove fluid so stopped and patient placed on Midodrine 10 mg po TID and patient now normotensive so plan for now is to continue Midodrine and not restart Bidil as patient able to now tolerate HD and fluid removal.     Acquired hypothyroidism  TSH (normal on 3/10) and rechecked on this admit and noted to be severely elevated at 21.6, with low normal Free T4 of 0.88. Synthroid was adjusted from 50 mcg to 75 mcg daily and will need repeat thyroid function tests in 4-6 weeks.       Chronic combined systolic and diastolic heart failure  Controlled. Patient with prior reduced EF but recent 2 D echo with improved EF to 50%. Presently without signs of acute decompensation. Bidil on hold due to hypotension during HD.       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 Patient s/p balloon angioplasty 4/4/2017 to left SFA with improvement in distal perfusion by Vascular surgery for critical limb ischemia. Patient ulceration to left heel and right lower limb on admit and ulcers are stable since admit. Patient with vascular boots in place to protect both heels in hospital and receiving local wound care to left heel and right calf ulcer. Patient to continue Aspirin and Lipitor to treat. Vascular Surgery re-evaluated patient today and recommended follow-up in Vascular Surgery clinic in 4/21 with Dr. Aranda.           PAF (paroxysmal atrial fibrillation)  Long-term (current) use of anticoagulants  Patient bradycardiac so not on beta blockers or other rate controlling medications. Patient off Amiodarone also due to bradycardia as per Cardiology recommendations. Plan to continue Eliquis for chronic anticoagulation for stroke prevention on discharge to [facility].     3-vessel CAD  Controlled. Patient denies any angina. Patient to continue Aspirin and Lipitor to treat.     Acute respiratory failure with hypoxia  Patient on 4 liters of oxygen with oxygen sats of %. Plan to wean oxygen with goal to keep sats > 90%. Patient asymptomatic and denies any current SOB or FULTON when working with PT and likely related to pulmonary edema from volume overload from ESRD and needs continued ultrafiltration with HD upon discharge to [facility].         DISCHARGE PLANNING:  Future Appointments  Date Time Provider Department Center   4/19/2017 1:30 PM Liset Sharpe NP [facility] ORTHO [facility]   4/21/2017 8:00 AM HOME MONITOR DEVICE CHECK, [facility] ARRHYTH [address]   4/21/2017 9:00 AM VASCULAR, LAB [facility] VASCLAB [address]   4/21/2017 9:30 AM RUBY Aranda III, MD [facility] VASCSUR [facility]   6/14/2017 3:00 PM Karsten Torres MD [facility] CARDIO [facility]       Discharge Disposition: [facility]    TIME SPENT: I spent 35  minutes evaluating, treating, counseling, and coordinating care for the patient for discharge.     Jalyn Saldana MD  Department of Hospital Medicine

## 2017-04-13 NOTE — PROGRESS NOTES
Progress Note  Vascular Surgery    Consult Requested By: IM  Reason for Consult: left anterior shin tenderness    SUBJECTIVE:     History of Present Illness:  Patient is a 85 y.o. female with h/o atrial fibrillation (on eliquis),3vCAD not amenable to PCI, ischemic cardiomyopathy (EF 50%), HTN, HLD, ESRD on HD via (L BC AVF) presented to hospital on 4/4/17 after mechanical fall resulting in right closed displaced fracture of her femoral neck.  Patient's hospitalization has been complicated by sinus bradycardia, s/p evaluation by EP thought to be secondary to coreg, currently held.  Patient underwent right hip hemiarthroplasty on 4/5/17.  Vascular surgery consulted today for left leg pain that occurred overnight.  Patient reports that she has had pain in left heel, point tenderness over area of skin breakdown. Last night she reported pain started in her left anterior tibia; intermittent pain that ached.  She denies complaints of pain in her left calf; denies complaints of pain in her left foot (has pain at ulcer site on left heel only).  Denies recent trauma to left leg.  Reports that pain is worsened with pressure but occurs without as well.  Prior to this she has been working with physical therapy, with recommendations for SNF.   Patient is s/p Left lower extremity angiogram with PTA left SFA with Dr. Aranda on 4/3/17 for left lower extremity rest pain and ulceration.    Interval HPI:  No acute event overnight. TL completed. Pt is hemodynamically stable.     Scheduled Meds:   sodium chloride 0.9%   Intravenous Once    apixaban  2.5 mg Oral BID    aspirin  81 mg Oral Daily    atorvastatin  40 mg Oral QAM    docusate sodium  100 mg Oral TID    epoetin syed  3,000 Units Subcutaneous Every Tues, Thurs, Sat    gabapentin  100 mg Oral QHS    levothyroxine  75 mcg Oral Before breakfast    midodrine  10 mg Oral TID    pantoprazole  40 mg Oral Daily    polyethylene glycol  17 g Oral Daily    sevelamer  carbonate  1.6 g Oral TID WM    sodium chloride 0.9%  3 mL Intravenous Q8H    sodium phosphates  1 enema Rectal Once     Continuous Infusions:   PRN Meds:sodium chloride, sodium chloride, sodium chloride 0.9%, sodium chloride 0.9%, albumin human 25%, albumin human 25%, dextrose 50%, gelatin adsorbable 12-7 mm top sponge, glucagon (human recombinant), insulin aspart, morphine, ondansetron, oxycodone, oxycodone-acetaminophen, ramelteon    Review of patient's allergies indicates:   Allergen Reactions    Ativan [lorazepam] Hallucinations    Crab Other (See Comments)     Causes Gout    Crayfish Other (See Comments)     Causes Gout    Promethazine Other (See Comments)     Hallucinations        Past Medical History:   Diagnosis Date    3-vessel CAD     Acquired hypothyroidism     ALLERGIC RHINITIS     Anemia in ESRD (end-stage renal disease) 2017    Anticoagulant long-term use     Atherosclerosis of native artery 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 2016    Atherosclerotic PVD with ulceration 2016    Blood transfusion     Cataract     Chronic combined systolic and diastolic heart failure 2015    Closed displaced fracture of right femoral neck s/p hemiarthroplasty on 2017    Cramp of both lower extremities 2016    ESRD on hemodialysis 2015    Hyperlipidemia     Ischemic cardiomyopathy 10/20/2015    Non-ST elevation MI (NSTEMI) 2015    PAF (paroxysmal atrial fibrillation) 3/10/2017    Renovascular hypertension 2015    Sinus brea-tachy syndrome 2017    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     Past Surgical History:   Procedure Laterality Date    ANGIOPLASTY      Renal PTA    CARDIAC CATHETERIZATION       SECTION      HIP FRACTURE SURGERY Right 2017    Hemiarthroplasty for displaced femoral neck fracture    HYSTERECTOMY      ovaries intact    RENAL ARTERY STENT      TONSILLECTOMY      VASCULAR  SURGERY       Family History   Problem Relation Age of Onset    Adopted: Yes    Heart disease Father      Social History   Substance Use Topics    Smoking status: Never Smoker    Smokeless tobacco: Never Used    Alcohol use Yes      Comment: occasional wine use        Review of Systems:  Peripheral Vascular: Ischemic Rest Pain: left foot  Constitutional: no fever or chills  Eyes: no visual changes  Respiratory: no cough or shortness of breath  Cardiovascular: no chest pain or palpitations  Gastrointestinal: no nausea or vomiting, tolerating diet  Hematologic/Lymphatic: no easy bruising or lymphadenopathy  Musculoskeletal: no arthralgias or myalgias  Neurological: no seizures or tremors    OBJECTIVE:     Vital Signs (Most Recent)  Temp: 97.6 °F (36.4 °C) (04/13/17 0545)  Pulse: (!) 45 (04/13/17 0545)  Resp: 18 (04/13/17 0545)  BP: (!) 119/59 (04/13/17 0545)  SpO2: 100 % (04/13/17 0545)    Vital Signs Range (Last 24H):  Temp:  [96.5 °F (35.8 °C)-97.8 °F (36.6 °C)]   Pulse:  [41-54]   Resp:  [16-20]   BP: ()/(35-65)   SpO2:  [92 %-100 %]     Physical Exam:  General: well developed, well nourished, appears stated age, no distress  Head: normocephalic, atraumatic  Eyes:  conjunctivae/corneas clear.  Lungs:  clear to auscultation bilaterally and normal respiratory effort  Heart: irregularly irregular rhythm  Abdomen: soft, non-tender non-distented; bowel sounds normal; no masses,  no organomegaly  Extremities: left foot with small heel ulcer stage II; no pitting edema bilateral lower extremities; patient with reproducible point tenderness mid anterior tibia to distal anterior tibia  Pulses: 1+ bilateral femoral pulses; right leg biphasic DP and monophasic Pt; left leg biphasic popliteal, monophasic DP and PT; left upper arm with BC AVF with good thrill  Neurologic:  No focal numbness or weakness, no motor deficit left leg; no sensory deficit  Alert and oriented. Thought content appropriate    Laboratory:  CBC:      Recent Labs  Lab 04/13/17  0412   WBC 5.95   RBC 3.06*   HGB 8.4*   HCT 26.3*   *     CMP:     Recent Labs  Lab 04/13/17  0412   GLU 83   CALCIUM 8.5*   ALBUMIN 3.2*   *   K 3.9   CO2 24   CL 95   BUN 10   CREATININE 2.4*     Coagulation: No results for input(s): INR, APTT in the last 168 hours.    Invalid input(s): PT    Diagnostic Results:      ASSESSMENT/PLAN:     Patient is a 85 y.o. female with h/o atrial fibrillation (on eliquis),3vCAD not amenable to PCI, ischemic cardiomyopathy (EF 50%), HTN, HLD, ESRD on HD via (L BC AVF) presented to hospital on 4/4/17 after mechanical fall resulting in right closed displaced fracture of her femoral neck, h/o recent LLE angiogram with PTA L SFA for left limb ischemia.    --Will discuss TL with staff today to determine potential treatment strategy.  --We will continue to monitor closely, please contact us with any questions or concerns.       Addendum:  Reviewed ABIs  No acute intervention indicated at this time  Continue local wound care to the L foot   Patient to follow up with Vascular surgery in 2-3 weeks after DC with repeat ABIs     Ele Mehta DO   Vascular Surgery Fellow  04/13/2017

## 2017-04-13 NOTE — PROGRESS NOTES
Discharge note: pt discharged home alert and oriented x4, skin warm to touch,Right hip with aquacell,clean, dry and intact.daughter at bedside. Pt to  transferred per wheelchair .

## 2017-04-13 NOTE — ASSESSMENT & PLAN NOTE
Patient on 4 liters of oxygen with oxygen sats of %. Plan to wean oxygen with goal to keep sats > 90%. Patient asymptomatic and denies any current SOB or FULTON when working with PT and likely related to pulmonary edema from volume overload from ESRD and needs continued ultrafiltration with HD upon discharge to [facility].

## 2017-04-13 NOTE — ASSESSMENT & PLAN NOTE
Anemia in ESRD (end-stage renal disease)  Patient with chronic anemia at baseline due to anemia from ESRD. Patient with drop in Hgb post-op that was expected after hip fracture repair with Hgb 6.8 on 4/11. Patient transfused 1 unit of PRBCs on 4/11 with HD. Repeat Hgb 8.3 on 4/12 and Hgb 8.4 on day of discharge and patient responded appropriately to blood transfusion. Goal is Hgb > 7. Patient to continue Epogen injections with HD three times weekly to treat chronic anemia related to ESRD with hemodialysis on discharge. Patient to have monitoring of H/H at [facility].

## 2017-04-13 NOTE — DISCHARGE SUMMARY
[facility] Hospital Medicine  Discharge Summary      Patient Name: Padmini Miranda  MRN: [id number]  Admission Date: 4/3/2017  Hospital Length of Stay: 10 days  Discharge Date and Time: 4/13/2017  5:25 PM  Attending Physician: Jalyn Saldana MD   Discharging Provider: Jalyn Saldana MD  Primary Care Provider: Randy Lyles MD  [facility] Medicine Team: [facility] Jalyn Saldana MD    HPI:   Ms. Miranda is an 86 y/o female who presented to ED on 04/04/17 after a mechanical fall at home.  She reports that she was stepping down a step into the den and missed the step, falling onto her right side.  She denied any dizziness or lightheadedness prior to falling.  She normally ambulates unassisted in the house but uses a walker when going out, such as to [place].  She fell not long after getting home from angioplasty on her left leg done at [facility] for treatment of chronic lower extremity ischemia resulting in a heel ulcer.  When she presented for that scheduled procedure she was noted to have a heart rate in the 30s without any symptoms.  She underwent the procedure as planned after evaluation by a Cardiology fellow with plan to hold her Carvedilol and to see Dr. Antoine (EP) in clinic 4/5.  Dr. Antoine started her on amiodarone 3/10 after an implantable loop recorder captured frequent episodes of A-fib with RVR with HR up to the 160s.  She is anticoagulated with Eliquis, and her last dose was on Friday 3/31 in anticipation of the angiogram yesterday (4/3).  She states that prior to starting the Amiodarone her ambulation was limited by dyspnea on exertion but that has since improved.  She endorses an episode of chest pressure during a recent dialysis session.  She is on dialysis T/Th/Sat with an access in her right femoral region.  She has combined systolic and diastolic heart failure with EF recently improved to 50%, but was as low as 20% 2 years ago.  She has known 3-vessel CAD found on  [facility] in 2015 not amenable to PCI, which was discovered after an abnormal dobutamine stress echo showing inducible global hypokinesis.    OR date: 4/5/2017  Procedure(s) (LRB):  ARTHROPLASTY-HIP-RAFFAELE-RIGHT-ASAEL-PEG BOARD (Right)   Surgeon(s):  Jeff Barrios MD     Indwelling Lines/Drains at time of discharge:   Lines/Drains/Airways     Drain                 Hemodialysis AV Fistula -- days              Hospital Course:   Ms. Miranda was admitted to Hospital Medicine and seen by EP for her bradycardia with HR 39-45 on admit, which has been attributed to Amiodarone and Carvedilol that were held on admission. She was scheduled for hemiarthroplasty 04/05/17, however Nephrology became concerned about her bradycardia and ability to tolerate HD. Therefore, patient was transferred to the MICU for CRRT and then proceeded to the OR the same day on 4/5. She underwent right hip hemiarthroplasty on 4/5/2017 by Dr. Jeff Barrios. Post-op, patient WBAT with posterior hip precautions to RLE. PT/OT have been consulted and are recommended SNF. She was re-evaluated by EP 4/6 post-op for her bradycardia and no pacemaker placement needed according to EP Cardiology but Dr. Antoine stated he would reassess as outpatient for candidacy. The patient continued to have boughts of bradycardia and even became hypothermic post-op so bear hugger was placed with resolution of hypothermia but patient continued to have HR 40-50. Patient evaluated for possible infection but there was no concern for infection, as clinically, the patient had normal WBC and no clinical symptoms to suggest infection. TSH (normal on 3/10) was checked during this admit and found to be severely elevated at 21.6, with low normal Free T4 of 0.88. Synthroid was adjusted from 50 mcg to 75 mcg daily to treat hypothyroidism. She is currently awaiting SNF placement at [facility]. Patient did complain of odynophagia after surgery felt related to ET tube. Speech therapy consult who  recommended MBSS to further evaluate swallow and patient underwent MBSS on 4/12 and patient passed with recommendation by Speech for regular diet and nectar thickened liquids. Patient to be discharged to [facility] for continued PT/OT but needs outpatient follow-up with EP Cardiology to reassess need for pacemaker for sinus bradycardia. Patient has scheduled follow-up in Orthopedic surgery clinic for 4/19 and surgical bandage to right hip should remain in place until clinic visit.      Consults:   Consults         Status Ordering Provider     Inpatient consult to Cardiology  Once     Provider:  (Not yet assigned)    Completed SEJAL RAYMOND     Inpatient consult to Electrophysiology  Once     Provider:  (Not yet assigned)    Completed JACQUES VAZQUEZ     Inpatient consult to Nephrology  Once     Provider:  (Not yet assigned)    Completed SEJAL RAYMOND     Inpatient consult to [facility]  Once     Provider:  (Not yet assigned)    Acknowledged PAULA REEDER     Inpatient consult to [facility]  Once     Provider:  (Not yet assigned)    Acknowledged PAULA REEDER     Inpatient consult to Vascular Surgery  Once     Provider:  (Not yet assigned)    Completed PAULA REEDER          Significant Diagnostic Studies: Labs:   BMP:   Recent Labs  Lab 04/12/17 0348 04/13/17  0412   GLU 65* 83   * 128*   K 4.0 3.9   CL 92* 95   CO2 24 24   BUN 22 10   CREATININE 3.7* 2.4*   CALCIUM 8.3* 8.5*   , CMP   Recent Labs  Lab 04/12/17  0348 04/13/17  0412   * 128*   K 4.0 3.9   CL 92* 95   CO2 24 24   GLU 65* 83   BUN 22 10   CREATININE 3.7* 2.4*   CALCIUM 8.3* 8.5*   ALBUMIN 2.6* 3.2*   ANIONGAP 9 9   ESTGFRAFRICA 12.2* 20.6*   EGFRNONAA 10.6* 17.9*   , CBC   Recent Labs  Lab 04/12/17 0348 04/13/17  0412   WBC 5.61 5.95   HGB 8.3* 8.4*   HCT 25.7* 26.3*    144*     Microbiology:   Blood Culture   Lab Results   Component Value Date    LABBLOO No Growth to date 04/10/2017    LABBLOO No  Growth to date 04/10/2017    LABBLOO No Growth to date 04/10/2017    LABBLOO No Growth to date 04/10/2017     Urine Culture    Lab Results   Component Value Date    LABURIN No growth 04/04/2017       Final Active Diagnoses:    Diagnosis Date Noted POA    PRINCIPAL PROBLEM:  Closed displaced fracture of right femoral neck s/p hemiarthroplasty on 4/5/2017 [S72.001A] 04/04/2017 Yes    Bradycardia, drug induced [R00.1, T50.905A] 04/04/2017 Yes    ESRD on hemodialysis [N18.6, Z99.2] 09/18/2015 Not Applicable     Chronic    Acute blood loss anemia [D62] 04/08/2017 No    Odynophagia [R13.10] 04/11/2017 No    Hyponatremia [E87.1] 04/12/2017 No    Slow transit constipation [K59.01] 04/11/2017 No    Renovascular hypertension [I15.0] 05/19/2015 Yes     Chronic    Acquired hypothyroidism [E03.9]  Yes    Chronic combined systolic and diastolic heart failure [I50.42] 07/14/2015 Yes    Atherosclerosis of native artery 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 [I70.244] 04/29/2016 Yes    PAF (paroxysmal atrial fibrillation) [I48.0] 03/10/2017 Yes    3-vessel CAD [I25.10] 04/04/2017 Yes    Acute respiratory failure with hypoxia [J96.01] 12/15/2015 Yes    Encounter for aftercare for healing closed traumatic fracture of right hip [S72.001D] 04/11/2017 Not Applicable    Long-term (current) use of anticoagulants [Z79.01] 04/11/2017 Not Applicable    Anemia in ESRD (end-stage renal disease) [N18.6, D63.1] 04/06/2017 Yes      Problems Resolved During this Admission:    Diagnosis Date Noted Date Resolved POA      Bradycardia, drug induced  Sinus brea-tachy syndrome  Unchanged as patient remains bradycardiac in sinus rhythm on telemetry. Patient had profound sinus bradycardia with rates in the 30-40 on admit. Patient is asymptomatic. She had been on Amiodarone and Carvedilol as an outpatient. Carvedilol stopped on 4/3/2017 after patient was noted to be bradycardic during fistula procedure and Amiodarone also  discontinued due to bradycardia and despite stopping medications patient remains bradycardia but asymptomatic and hemodynamically stable. EP Cardiology consulted and does not recommend any pacemaker placement at this time but states will re-evaluate as outpatient. Patient has follow-up with Cardiology with Dr. Karsten Torres on 6/14/2017. Patient needs follow-up with Dr. Antoine in EP Cardiology clinic upon discharge from [facility] for re-evaluation for need for possible pacemaker.      ESRD on hemodialysis  Controlled. Nephrology consulted on admit and handled HD needs of patient while in hospital. Patient initially required CRRT due to hypotension pre-op but eventually after stopping all BP meds and starting Midodrine for BP able to tolerate HD with fluid removal. Patient on hemodialysis T/Th/Sat as outpatient and patient has been dialyzed on 4/11 and 4/12 for fluid removal and patient's fluid status is improved over past 2 days but still needs continued dialysis for fluid removal. Patient's outpatient hemodialysis has been arranged upon discharge to [facility].     Acute blood loss anemia  Anemia in ESRD (end-stage renal disease)  Patient with chronic anemia at baseline due to anemia from ESRD. Patient with drop in Hgb post-op that was expected after hip fracture repair with Hgb 6.8 on 4/11. Patient transfused 1 unit of PRBCs on 4/11 with HD. Repeat Hgb 8.3 on 4/12 and Hgb 8.4 on day of discharge and patient responded appropriately to blood transfusion. Goal is Hgb > 7. Patient to continue Epogen injections with HD three times weekly to treat chronic anemia related to ESRD with hemodialysis on discharge. Patient to have monitoring of H/H at [facility].       Odynophagia  Improving. Patient complaining of sore throat post-op and felt related to ET tube but having difficulty swallowing even liquids post-op so Speech therapy consulted and evaluated patient on 4/11 and recommended nectar thickened liquids and modified barium  swallow evaluation. Patient underwent MBSS on 4/12 and patient passed without difficulty and Speech recommended regular diet with nectar thickened liquids. Patient doing better with intake but will need to monitor oral intake at [facility].       Hyponatremia  Likely related to hypervolemia as patient with volume overload on exam. Patient to be dialyzed for fluid removal on 4/11 and 4/12 and sodium level is improved to 128 from 125. Patient will need to have continued monitoring of BMP at [facility] and should improve with continued fluid removal with HD.       Slow transit constipation  Resolved after Fleet's enema on 4/12. Patient to continue daily Miralax and twice daily Colace on discharge to [facility].       Renovascular hypertension  Patient on Bidil on admit for but discontinued as patient was having hypotension in hemodialysis and Nephrology unable to remove fluid so stopped and patient placed on Midodrine 10 mg po TID and patient now normotensive so plan for now is to continue Midodrine and not restart Bidil as patient able to now tolerate HD and fluid removal.     Acquired hypothyroidism  TSH (normal on 3/10) and rechecked on this admit and noted to be severely elevated at 21.6, with low normal Free T4 of 0.88. Synthroid was adjusted from 50 mcg to 75 mcg daily and will need repeat thyroid function tests in 4-6 weeks.       Chronic combined systolic and diastolic heart failure  Controlled. Patient with prior reduced EF but recent 2 D echo with improved EF to 50%. Presently without signs of acute decompensation. Bidil on hold due to hypotension during HD.       Atherosclerosis of native artery 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  Controlled. Patient s/p balloon angioplasty 4/4/2017 to left SFA with improvement in distal perfusion by Vascular surgery for critical limb ischemia. Patient ulceration to left heel and right lower limb on admit and ulcers are stable since admit. Patient with vascular boots  in place to protect both heels in hospital and receiving local wound care to left heel and right calf ulcer. Patient to continue Aspirin and Lipitor to treat. Vascular Surgery re-evaluated patient today and recommended follow-up in Vascular Surgery clinic in 4/21 with Dr. Aranda.         PAF (paroxysmal atrial fibrillation)  Long-term (current) use of anticoagulants  Patient bradycardiac so not on beta blockers or other rate controlling medications. Patient off Amiodarone also due to bradycardia as per Cardiology recommendations. Plan to continue Eliquis for chronic anticoagulation for stroke prevention on discharge to [facility].     3-vessel CAD  Controlled. Patient denies any angina. Patient to continue Aspirin and Lipitor to treat.       Acute respiratory failure with hypoxia  Patient on 4 liters of oxygen with oxygen sats of %. Plan to wean oxygen with goal to keep sats > 90%. Patient asymptomatic and denies any current SOB or FULTON when working with PT and likely related to pulmonary edema from volume overload from ESRD and needs continued ultrafiltration with HD upon discharge to [facility].       Discharged Condition: fair    Disposition: Skilled Nursing Facility ([facility])    Follow Up:  Follow-up Information     Follow up with RUBY Aranda Iii, MD. Go on 4/21/2017.    Specialty:  Vascular Surgery    Why:  For wound re-check & re-eval of ABis    Contact information:    [address] [id number]  [phone]          Follow up with Liset Sharpe NP. Go on 4/19/2017.    Specialty:  Orthopedic Surgery    Why:  Follow-Up Appointment    Contact information:    [address] [address]  [phone]          Follow up with Karsten Torres MD. Go on 6/14/2017.    Specialty:  Cardiology    Contact information:    [address] [address]  [phone]            Medications:  Transfer Medications (for Discharge Readmit only):   No current  facility-administered medications for this encounter.      No current outpatient prescriptions on file.     Facility-Administered Medications Ordered in Other Encounters   Medication Dose Route Frequency Provider Last Rate Last Dose    apixaban tablet 2.5 mg  2.5 mg Oral BID Jalyn Saldana MD        [START ON 4/14/2017] aspirin chewable tablet 81 mg  81 mg Oral Daily Jalyn Saldana MD        [START ON 4/14/2017] atorvastatin tablet 40 mg  40 mg Oral QAM Jalyn Saldana MD        dextrose 50% injection 12.5 g  12.5 g Intravenous PRN Jalyn Saldana MD        docusate sodium capsule 100 mg  100 mg Oral TID Jalyn Saldana MD        [START ON 4/15/2017] epoetin syed injection 3,000 Units  3,000 Units Subcutaneous Every Tues, Thurs, Sat Jalyn Saldana MD        gabapentin capsule 100 mg  100 mg Oral QHS Jalyn Saldana MD        gelatin adsorbable 12-7 mm top sponge sponge 1 applicator  1 each Topical PRN Jalyn Saldana MD        glucagon (human recombinant) injection 1 mg  1 mg Intramuscular PRN Jalyn Saldana MD        insulin aspart pen 0-5 Units  0-5 Units Subcutaneous Q6H PRN Jalyn Saldana MD        [START ON 4/14/2017] levothyroxine tablet 75 mcg  75 mcg Oral Before breakfast Jalyn Saldana MD        metoclopramide HCl 5 mg/5 mL solution 5 mg  5 mg Oral Q6H PRN Jalyn Saldana MD        midodrine tablet 10 mg  10 mg Oral TID Jalyn Saldana MD        oxycodone immediate release tablet 10 mg  10 mg Oral Q4H PRN Jalyn Saldana MD        oxycodone-acetaminophen 5-325 mg per tablet 1 tablet  1 tablet Oral Q4H PRN Jalyn Saldana MD        [START ON 4/14/2017] pantoprazole EC tablet 40 mg  40 mg Oral Daily Jalyn Saldana MD        [START ON 4/14/2017] polyethylene glycol packet 17 g  17 g Oral Daily Jalyn Saldana MD        ramelteon tablet 8 mg  8 mg Oral Nightly PRN Jalyn Saldana MD        [START ON 4/14/2017]  sevelamer carbonate pwpk 1.6 g  1.6 g Oral TID WM Jalyn Saldana MD         Time spent on the discharge of patient: 34 minutes    Jalyn Saldana MD  Department of Hospital Medicine  [facility]

## 2017-04-13 NOTE — ASSESSMENT & PLAN NOTE
Improving. Patient complaining of sore throat post-op and felt related to ET tube but having difficulty swallowing even liquids post-op so Speech therapy consulted and evaluated patient on 4/11 and recommended nectar thickened liquids and modified barium swallow evaluation. Patient underwent MBSS on 4/12 and patient passed without difficulty and Speech recommended regular diet with nectar thickened liquids. Patient doing better with intake but will need to monitor oral intake at [facility].

## 2017-04-13 NOTE — ASSESSMENT & PLAN NOTE
Controlled. Patient s/p balloon angioplasty 4/4/2017 to left SFA with improvement in distal perfusion by Vascular surgery for critical limb ischemia. Patient ulceration to left heel and right lower limb on admit and ulcers are stable since admit. Patient with vascular boots in place to protect both heels in hospital and receiving local wound care to left heel and right calf ulcer. Patient to continue Aspirin and Lipitor to treat. Vascular Surgery re-evaluated patient today and recommended follow-up in Vascular Surgery clinic in 4/21 with Dr. Aranda.

## 2017-04-13 NOTE — PLAN OF CARE
Medicare discharge appeals right discussed with patient. IMM signed and placed in patient's chart. Patient verbalized understanding of IMM rights.     04/13/17 1300   Medicare Message   Important Message from Medicare regarding Discharge Appeal Rights Given to patient/caregiver;Explained to patient/caregiver;Signed/date by patient/caregiver   Date IMM was signed 04/13/17   Time IMM was signed 1300

## 2017-04-14 PROBLEM — L97.421: Status: ACTIVE | Noted: 2017-01-01

## 2017-04-14 PROBLEM — R53.81 DEBILITY, UNSPECIFIED: Status: ACTIVE | Noted: 2017-01-01

## 2017-04-14 NOTE — PT/OT/SLP EVAL
Occupational Therapy  Evaluation/tx    Padmini Miranda   MRN: [id number]   Admitting Diagnosis: s/ p Right hip hemiarthroplasty     OT Date of Treatment: 17   OT Start Time: 1122  OT Stop Time: 1214 (and 0800 to 0818 for breakfast)  OT Total Time (min): 52 min + 16 minutes in am     Billable Minutes:  Evaluation 25  Self Care/Home Management 43    Diagnosis: s/ p Right hip hemiarthroplasty      Past Medical History:   Diagnosis Date    3-vessel CAD     Acquired hypothyroidism     ALLERGIC RHINITIS     Anemia in ESRD (end-stage renal disease) 2017    Anticoagulant long-term use     Atherosclerosis of native artery 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 2016    Atherosclerotic PVD with ulceration 2016    Blood transfusion     Cataract     Chronic combined systolic and diastolic heart failure 2015    Closed displaced fracture of right femoral neck s/p hemiarthroplasty on 2017    Cramp of both lower extremities 2016    ESRD on hemodialysis 2015    Hyperlipidemia     Ischemic cardiomyopathy 10/20/2015    Non-ST elevation MI (NSTEMI) 2015    PAF (paroxysmal atrial fibrillation) 3/10/2017    Renovascular hypertension 2015    Sinus brea-tachy syndrome 2017    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      Past Surgical History:   Procedure Laterality Date    ANGIOPLASTY      Renal PTA    CARDIAC CATHETERIZATION       SECTION      HIP FRACTURE SURGERY Right 2017    Hemiarthroplasty for displaced femoral neck fracture    HYSTERECTOMY      ovaries intact    RENAL ARTERY STENT      TONSILLECTOMY      VASCULAR SURGERY           General Precautions: Standard, aspiration, fall, nectar thick (cardiac)  Orthopedic Precautions: RLE weight bearing as tolerated, RLE posterior precautions  Braces:      Do you have any cultural, spiritual, Yazdanism conflicts, given your current situation?: none noted     Patient  History:  Lives With: child(tania), adult  Living Arrangements: house  Stair Railings at Home: none  Transportation Available: family or friend will provide  Equipment Currently Used at Home: rollator, shower chair    Prior level of function:   Bed Mobility/Transfers: independent  Grooming: independent  Bathing: needs device (has shower chair)  Upper Body Dressing: independent  Lower Body Dressing: independent  Toileting: independent (reports regular toilet)  Driving License: No  IADL Comments: Per pt. report she lives in a ss house with no steps to enter.  She used a rollator to ambulate in the community but did not use any thing to ambulate in the house.  She reports being independent with dressing and just heating up small meals for lunch     Dominant hand: right    Subjective:  Communicated with nurse and MD prior to session.  Chief Complaint:  I keep dropping things.  I am shaky.  Patient/Family stated goals: To feel better and move around better    Pain Rating:  (did not quantify)  Location - Side: Right     Location: calf (and left heel)  Pain Addressed: Reposition, Nurse notified  Pain Rating Post-Intervention:  (no increases in pain noted)    Objective:   Patient found with: FCD (FCD on left foot/positioning boots)    Cognitive Exam:  Oriented to: Person, Place, Time and Situation  Follows Commands/attention: Follows one-step commands  Communication: clear/fluent  Memory:  Questionable historian  Safety awareness/insight to disability: fair  Coping skills/emotional control: Appropriate to situation    Visual/perceptual:  Wears glasses;  Reported having difficulty reading things on this date; notified MD; does wear glasses    Physical Exam:  Postural examination/scapula alignment: Rounded shoulder and Head forward; leans posteriorly with standing  Skin integrity: sore noted on left heel covered with bandage;  Right calf also with mepilex dressing  Edema: Moderate LUE (HD side)    Sensation:   Intact in  "BUE    Upper Extremity Range of Motion:  Right Upper Extremity: shoulder motions and elbow WFL but shaky  Left Upper Extremity: WFL but shaky    Upper Extremity Strength:  Right Upper Extremity: 3+/5  Left Upper Extremity: 3+/5   Strength: fair    Fine motor coordination:   Impaired ; dropping things ; required assist to cut food and open containers    Gross motor coordination: shakiness noted with all movements    Functional Status:  MDS G  Bed Mobility Functional Status: Max(A) (with assist to manage RLE and trunk)  Transfer Functional Status: Max(A) (Max A from EOB with shakiness noted in RLE/  Max A SPT from bed to w/c and for sit to stand from w/c)  Dressing :  LBD with Total A/ UBD with Mod A  Eating Functional Status: Min (A) (with assist to cut food and thicken drink; reported dropping items  Toilet Use Functional Status: total(A) diaper soiled with urine   Personal Hygiene Functional Status: Min (A) for balance seated EOB when washing face  Bathing Functional Status: Max(A) sponge bath with assist to wash below knees and for cleaning buttocks /garry area; vc's for cleaning chest and BUE  Eval Only: Number of U/E limb <4/5 MMT: 2  Moving from seated to standing position: Not steady, only able to stabilize with staff assistance       AM-PAC 6 CLICK ADL  How much help from another person does this patient currently need?  1 = Unable, Total/Dependent Assistance  2 = A lot, Maximum/Moderate Assistance  3 = A little, Minimum/Contact Guard/Supervision  4 = None, Modified [place]/Independent     Putting on and taking off regular lower body clothing? : 1  Bathing (including washing, rinsing, drying)?: 2  Toileting, which includes using toilet, bedpan, or urinal? : 1  Putting on and taking off regular upper body clothing?: 2  Taking care of personal grooming such as brushing teeth?: 1  Eating meals?: 1  Total Score: 8     AM-PAC Raw Score CMS "G-Code Modifier Level of Impairment Assistance   6 % Total " / Unable   7 - 9 CM 80 - 100% Maximal Assist   10 - 14 CL 60 - 80% Moderate Assist   15 - 19 CK 40 - 60% Moderate Assist   20 - 22 CJ 20 - 40% Minimal Assist   23 CI 1-20% SBA / CGA   24 CH 0% Independent/ Mod I              Additional Treatment:  Pt. Educated on role of OT and POC; posterior hip precautions, use of AE to assist with ADLs, importance of OOB, safety with transfers;  BP taken on this date and noted to be 120/53.     Patient left up in chair with call button in reach, nurse notified and MD also notified of pt. performance    Assessment:  Padmini Miranda is a 85 y.o. female with a medical diagnosis of s/ p Right hip hemiarthroplasty; Pt. Presents with deficits in functional mobility, self-care skills, FMC and decreased endurance on this date.  Pt. Would benefit from continued OT services to maximize safety and independence with ADL tasks. Family training will be needed.    Rehab identified problem list/impairments: impaired endurance, impaired self care skills, impaired functional mobilty, decreased upper extremity function, decreased lower extremity function, impaired balance, gait instability, impaired fine motor, impaired skin, orthopedic precautions    Rehab potential is fair    Activity tolerance: Fair    Discharge recommendations: home health OT (with 24 hour physical assist)     Barriers to discharge:  (requiring excessive physical assist at this time)     Equipment recommendations: bedside commode, wheelchair     GOALS:   Occupational Therapy Goals        Problem: Occupational Therapy Goal    Goal Priority Disciplines Outcome Interventions   Occupational Therapy Goal     OT, PT/OT     Description:  Goals to be met by: 2 weeks     Patient will increase functional independence with ADLs by performing:    Feeding with Supervision.  UE Dressing with Supervision.  LE Dressing with Minimal Assistance with AE  Grooming while seated with Supervision.  Toileting from bedside commode with Minimal  Assistance for hygiene and clothing management.   Bathing from  edge of bed with Minimal Assistance.  Supine to sit with Minimal Assistance.  Stand pivot transfers with Minimal Assistance.  Toilet transfer to bedside commode with Minimal Assistance.  Pt. Able to recall 3/3 posterior hip precautions  Pt. To be independent with HEP to improve endurance as well as FMC skills                 PLAN: Patient to be seen 5 x/week to address the above listed problems via self-care/home management, therapeutic activities, therapeutic exercises  Plan of Care expires: 05/14/17  Plan of Care reviewed with: patient    SARAY Mckeon  04/14/2017

## 2017-04-14 NOTE — SUBJECTIVE & OBJECTIVE
Past Medical History:   Diagnosis Date    3-vessel CAD     Acquired hypothyroidism     ALLERGIC RHINITIS     Anemia in ESRD (end-stage renal disease) 2017    Anticoagulant long-term use     Atherosclerosis of native artery 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 2016    Atherosclerotic PVD with ulceration 2016    Blood transfusion     Cataract     Chronic combined systolic and diastolic heart failure 2015    Closed displaced fracture of right femoral neck s/p hemiarthroplasty on 2017    Cramp of both lower extremities 2016    ESRD on hemodialysis 2015    Hyperlipidemia     Ischemic cardiomyopathy 10/20/2015    Non-ST elevation MI (NSTEMI) 2015    PAF (paroxysmal atrial fibrillation) 3/10/2017    Renovascular hypertension 2015    Sinus brea-tachy syndrome 2017    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       Past Surgical History:   Procedure Laterality Date    ANGIOPLASTY      Renal PTA    CARDIAC CATHETERIZATION       SECTION      HIP FRACTURE SURGERY Right 2017    Hemiarthroplasty for displaced femoral neck fracture    HYSTERECTOMY      ovaries intact    RENAL ARTERY STENT      TONSILLECTOMY      VASCULAR SURGERY         Review of patient's allergies indicates:   Allergen Reactions    Ativan [lorazepam] Hallucinations    Crab Other (See Comments)     Causes Gout    Crayfish Other (See Comments)     Causes Gout    Promethazine Other (See Comments)     Hallucinations        Current Facility-Administered Medications on File Prior to Encounter   Medication    [DISCONTINUED] 0.9%  NaCl infusion (for blood administration)    [DISCONTINUED] 0.9%  NaCl infusion (for blood administration)    [DISCONTINUED] 0.9%  NaCl infusion    [DISCONTINUED] 0.9%  NaCl infusion    [DISCONTINUED] 0.9%  NaCl infusion    [DISCONTINUED] albumin human 25% bottle 25 g    [DISCONTINUED] albumin human 25% bottle 25  g    [DISCONTINUED] apixaban tablet 2.5 mg    [DISCONTINUED] aspirin chewable tablet 81 mg    [DISCONTINUED] atorvastatin tablet 40 mg    [DISCONTINUED] dextrose 50% injection 12.5 g    [DISCONTINUED] docusate sodium capsule 100 mg    [DISCONTINUED] epoetin syed injection 3,000 Units    [DISCONTINUED] gabapentin capsule 100 mg    [DISCONTINUED] gelatin adsorbable 12-7 mm top sponge sponge 1 applicator    [DISCONTINUED] glucagon (human recombinant) injection 1 mg    [DISCONTINUED] insulin aspart pen 0-5 Units    [DISCONTINUED] levothyroxine tablet 75 mcg    [DISCONTINUED] midodrine tablet 10 mg    [DISCONTINUED] morphine injection 2 mg    [DISCONTINUED] ondansetron injection 4 mg    [DISCONTINUED] oxycodone immediate release tablet 10 mg    [DISCONTINUED] oxycodone-acetaminophen 5-325 mg per tablet 1 tablet    [DISCONTINUED] pantoprazole EC tablet 40 mg    [DISCONTINUED] polyethylene glycol packet 17 g    [DISCONTINUED] ramelteon tablet 8 mg    [DISCONTINUED] sevelamer carbonate pwpk 1.6 g    [DISCONTINUED] sodium chloride 0.9% flush 3 mL    [DISCONTINUED] sodium phosphates 19-7 gram/118 mL enema 1 enema     Current Outpatient Prescriptions on File Prior to Encounter   Medication Sig    aspirin 81 MG Chew Take 1 tablet (81 mg total) by mouth once daily. (Patient taking differently: Take 81 mg by mouth every morning. )    atorvastatin (LIPITOR) 40 MG tablet Take 1 tablet (40 mg total) by mouth once daily. (Patient taking differently: Take 40 mg by mouth every morning. )    ELIQUIS 2.5 mg Tab TAKE 1 BY MOUTH TWO TIMES  DAILY    epoetin syed (PROCRIT) 10,000 unit/mL injection Inject 0.28 mLs (2,800 Units total) into the skin every Tues, Thurs, Sat.    multivitamin (ONE DAILY MULTIVITAMIN) per tablet Take 1 tablet by mouth every morning.    nitroGLYCERIN (NITROSTAT) 0.4 MG SL tablet Place 1 tablet (0.4 mg total) under the tongue every 5 (five) minutes as needed for Chest pain.    PROTONIX 40  mg tablet TAKE 1 BY MOUTH DAILY (Patient taking differently: TAKE 1 TABLET  BY MOUTH  DAILY)    RENVELA 0.8 gram PwPk     SYNTHROID 50 mcg tablet TAKE 1 BY MOUTH DAILY      BEFORE BREAKFAST     Family History     Problem Relation (Age of Onset)    Heart disease Father        Social History Main Topics    Smoking status: Never Smoker    Smokeless tobacco: Never Used    Alcohol use Yes      Comment: occasional wine use    Drug use: No    Sexual activity: No     Review of Systems   Constitutional: Positive for fatigue. Negative for chills and fever.   HENT: Negative for congestion, ear pain, rhinorrhea, sneezing, sore throat and tinnitus.    Eyes: Positive for visual disturbance. Negative for pain, discharge and itching.        Per HPI     Respiratory: Negative for cough, chest tightness, shortness of breath and wheezing.    Cardiovascular: Negative for chest pain, palpitations and leg swelling.   Gastrointestinal: Negative for abdominal pain, blood in stool, constipation, diarrhea, nausea and vomiting.   Endocrine: Negative for cold intolerance and heat intolerance.   Musculoskeletal: Negative for arthralgias, back pain and joint swelling.   Skin: Negative for rash.        Pain from left heel ulcer per HPI     Allergic/Immunologic: Negative for environmental allergies and food allergies.   Neurological: Negative for dizziness and numbness.   Hematological: Does not bruise/bleed easily.   Psychiatric/Behavioral: Negative for confusion and hallucinations.     Objective:     Vital Signs (Most Recent):  Temp: 97.2 °F (36.2 °C) (04/14/17 0740)  Pulse: 61 (04/14/17 0740)  Resp: 18 (04/14/17 0740)  BP: 116/84 (04/14/17 0740)  SpO2: 96 % (04/14/17 0740) Vital Signs (24h Range):  Temp:  [97.2 °F (36.2 °C)-97.3 °F (36.3 °C)] 97.2 °F (36.2 °C)  Pulse:  [37-61] 61  Resp:  [18] 18  SpO2:  [95 %-100 %] 96 %  BP: (116-134)/(56-84) 116/84     Weight: 62.6 kg (138 lb)  Body mass index is 25.24 kg/(m^2).    Physical Exam    Constitutional: She is oriented to person, place, and time. She appears well-developed and well-nourished.   HENT:   Head: Normocephalic and atraumatic.   Eyes: Conjunctivae are normal.   Neck: Normal range of motion. No edema present.   Cardiovascular: Normal rate and regular rhythm.    Murmur heard.  Pulmonary/Chest: Effort normal and breath sounds normal. No accessory muscle usage. No respiratory distress. She has no wheezes.   Abdominal: Soft. Bowel sounds are normal. She exhibits no distension. There is no tenderness.   Musculoskeletal:   Swelling of the LUE. Minimal LLE edema. Some mild swelling noted around left heel.    Neurological: She is alert and oriented to person, place, and time.   Right hand tremulous at times. Able to see out of both eyes.    Skin:   Stage two ulcer noted of the left heel with some warmth    Psychiatric: She has a normal mood and affect. Her mood appears not anxious. She is not agitated. She does not exhibit a depressed mood.        Significant Labs: All pertinent labs within the past 24 hours have been reviewed.    Significant Imaging: I have reviewed and interpreted all pertinent imaging results/findings within the past 24 hours.

## 2017-04-14 NOTE — PLAN OF CARE
Problem: Patient Care Overview  Goal: Plan of Care Review  Outcome: Ongoing (interventions implemented as appropriate)    04/14/17 0327   Coping/Psychosocial   Plan Of Care Reviewed With patient         Problem: Pressure Ulcer Risk (Amandeep Scale) (Adult,Obstetrics,Pediatric)  Intervention: Prevent/Minimize Sheer/Friction Injuries    04/14/17 0327   Positioning   Positioning/Transfer Devices pillows   Skin Interventions   Pressure Reduction Devices heel offloading device utilized;positioning supports utilized;pressure-redistributing mattress utilized   Pressure Reduction Techniques frequent weight shift encouraged;heels elevated off bed;positioned off wounds         Goal: Identify Related Risk Factors and Signs and Symptoms  Related risk factors and signs and symptoms are identified upon initiation of Human Response Clinical Practice Guideline (CPG)   Outcome: Ongoing (interventions implemented as appropriate)    04/14/17 0327   Pressure Ulcer Risk (Amandeep Scale)   Related Risk Factors (Pressure Ulcer Risk (Amandeep Scale)) age extremes;hospitalization prolonged;mobility impaired         Problem: Kidney Disease, Chronic/End Stage Renal Disease (Adult)  Intervention: Monitor/Assist with Self Care    04/14/17 0327   Activity   Activity Assistance Provided assistance, 1 person   Daily Care Interventions   Self-Care Promotion BADL personal objects within reach;BADL personal routines maintained       Intervention: Monitor for Signs of Coagulopathy/Bleeding/Anemia    04/14/17 0327   Safety Interventions   Bleeding Precautions monitored for signs of bleeding;foot protection facilitated;coagulation study results reviewed;blood pressure closely monitored         Goal: Signs and Symptoms of Listed Potential Problems Will be Absent, Minimized or Managed (Kidney Disease, Chronic/End Stage Renal Disease)  Signs and symptoms of listed potential problems will be absent, minimized or managed by discharge/transition of care (reference  Kidney Disease, Chronic/End Stage Renal Disease (Adult) CPG).  Outcome: Ongoing (interventions implemented as appropriate)    04/14/17 0327   Kidney Disease, Chronic/End Stage Renal Disease   Problems Assessed (Chronic Kidney Disease/ESRD) all   Problems Present (Chronic Kidney Disease/ESRD) cardiovascular disease

## 2017-04-14 NOTE — PLAN OF CARE
Problem: Occupational Therapy Goal  Goal: Occupational Therapy Goal  PtTiti Rico during session.  OT evaluation completed as well as education on posterior hip precautions and use of AE.

## 2017-04-14 NOTE — PLAN OF CARE
Problem: Physical Therapy Goal  Goal: Physical Therapy Goal  Goals to be met by: 14 days     Patient will increase functional independence with mobility by performin. Supine to sit with MInimal Assistance  2. Sit to supine with MInimal Assistance  3. Sit to stand transfer with Contact Guard Assistance  4. Bed to chair transfer with Contact Guard Assistance using Rolling Walker  5. Gait x 30 feet with Contact Guard Assistance using Rolling Walker.   6. Wheelchair propulsion x75 feet with Stand-by Assistance using bilateral uppper extremities  7. Stand for 2 minutes with Contact Guard Assistance using Rolling Walker  8. Lower extremity exercise program x20 reps per handout, with assistance as needed  PT eval completed. Pt will begin PT POC.     Nathalia Sorensen, PT  2017

## 2017-04-14 NOTE — ASSESSMENT & PLAN NOTE
- continue medical management with ASA, statin   - fluid removal with HD  - monitor daily weights

## 2017-04-14 NOTE — H&P
[facility] Medicine  History & Physical    Patient Name: Padmini Miranda  MRN: [id number]  Admission Date: 4/13/2017  Attending Physician: Olena Dailey MD   Primary Care Provider: Randy Lyles MD         Patient information was obtained from patient.     Subjective:     Principal Problem:Closed fracture of neck of right femur    Chief Complaint: No chief complaint on file.       HPI: The patient is an 84 y/o female with PMH of ESRD on HD, HTN, combined heart failure, atrial fibrillation on anticoagulation, and hypothyroidism who sustained a fall at home. She underwent LLE angiogram on 4/3 by vascular surgery for PAD and L heel ulcer then was found to be bradycardic. She was seen by cardiology and had been asymptomatic. It was recommended that she follow up on with EP as she had been followed by them for atrial fib. However shortly after returning home, she sustained a fall which brought her back to the ER. She underwent right hip hemiarthroplasty on 4/5 but had other complications during her hospital stay. She continued to be bradycardic into the 30s-40s and EP was consulted. Her amiodarone and carvedilol were held on admit. She underwent CRRT in the ICU due to her bradycardic episodes and was able to have her hemiarthroplasty performed on 4/5. She continued to have bradycardic episodes but then also became hypothermic with concern for sepsis but this work up was unrevealing for an underlying infection. Her TSH was found to be elevated in the low 20s and her levothyroxine was accordingly adjusted. She required a transfusion for acute blood loss anemia attributed to her surgery and also had hyponatremia due to suspected hypervolemia. Regarding her bradycardia, EP recommended outpatient follow up and holding off on pacemaker placement at this time but to be re-evaluated for this during her clinic appointment. She was seen by PTOT and recommended for SNF. She complains of shaky  hands that is making it difficult to grab and hold things. Additionally she reports some blurred vision in the right eye which started about one day ago and also some persistent pain of the left ankle down to her heel.           Past Medical History:   Diagnosis Date    3-vessel CAD     Acquired hypothyroidism     ALLERGIC RHINITIS     Anemia in ESRD (end-stage renal disease) 2017    Anticoagulant long-term use     Atherosclerosis of native artery 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 2016    Atherosclerotic PVD with ulceration 2016    Blood transfusion     Cataract     Chronic combined systolic and diastolic heart failure 2015    Closed displaced fracture of right femoral neck s/p hemiarthroplasty on 2017    Cramp of both lower extremities 2016    ESRD on hemodialysis 2015    Hyperlipidemia     Ischemic cardiomyopathy 10/20/2015    Non-ST elevation MI (NSTEMI) 2015    PAF (paroxysmal atrial fibrillation) 3/10/2017    Renovascular hypertension 2015    Sinus brea-tachy syndrome 2017    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       Past Surgical History:   Procedure Laterality Date    ANGIOPLASTY      Renal PTA    CARDIAC CATHETERIZATION       SECTION      HIP FRACTURE SURGERY Right 2017    Hemiarthroplasty for displaced femoral neck fracture    HYSTERECTOMY      ovaries intact    RENAL ARTERY STENT      TONSILLECTOMY      VASCULAR SURGERY         Review of patient's allergies indicates:   Allergen Reactions    Ativan [lorazepam] Hallucinations    Crab Other (See Comments)     Causes Gout    Crayfish Other (See Comments)     Causes Gout    Promethazine Other (See Comments)     Hallucinations        Current Facility-Administered Medications on File Prior to Encounter   Medication    [DISCONTINUED] 0.9%  NaCl infusion (for blood administration)    [DISCONTINUED] 0.9%  NaCl infusion (for blood  administration)    [DISCONTINUED] 0.9%  NaCl infusion    [DISCONTINUED] 0.9%  NaCl infusion    [DISCONTINUED] 0.9%  NaCl infusion    [DISCONTINUED] albumin human 25% bottle 25 g    [DISCONTINUED] albumin human 25% bottle 25 g    [DISCONTINUED] apixaban tablet 2.5 mg    [DISCONTINUED] aspirin chewable tablet 81 mg    [DISCONTINUED] atorvastatin tablet 40 mg    [DISCONTINUED] dextrose 50% injection 12.5 g    [DISCONTINUED] docusate sodium capsule 100 mg    [DISCONTINUED] epoetin syed injection 3,000 Units    [DISCONTINUED] gabapentin capsule 100 mg    [DISCONTINUED] gelatin adsorbable 12-7 mm top sponge sponge 1 applicator    [DISCONTINUED] glucagon (human recombinant) injection 1 mg    [DISCONTINUED] insulin aspart pen 0-5 Units    [DISCONTINUED] levothyroxine tablet 75 mcg    [DISCONTINUED] midodrine tablet 10 mg    [DISCONTINUED] morphine injection 2 mg    [DISCONTINUED] ondansetron injection 4 mg    [DISCONTINUED] oxycodone immediate release tablet 10 mg    [DISCONTINUED] oxycodone-acetaminophen 5-325 mg per tablet 1 tablet    [DISCONTINUED] pantoprazole EC tablet 40 mg    [DISCONTINUED] polyethylene glycol packet 17 g    [DISCONTINUED] ramelteon tablet 8 mg    [DISCONTINUED] sevelamer carbonate pwpk 1.6 g    [DISCONTINUED] sodium chloride 0.9% flush 3 mL    [DISCONTINUED] sodium phosphates 19-7 gram/118 mL enema 1 enema     Current Outpatient Prescriptions on File Prior to Encounter   Medication Sig    aspirin 81 MG Chew Take 1 tablet (81 mg total) by mouth once daily. (Patient taking differently: Take 81 mg by mouth every morning. )    atorvastatin (LIPITOR) 40 MG tablet Take 1 tablet (40 mg total) by mouth once daily. (Patient taking differently: Take 40 mg by mouth every morning. )    ELIQUIS 2.5 mg Tab TAKE 1 BY MOUTH TWO TIMES  DAILY    epoetin syed (PROCRIT) 10,000 unit/mL injection Inject 0.28 mLs (2,800 Units total) into the skin every Tues, Thurs, Sat.    multivitamin (ONE  DAILY MULTIVITAMIN) per tablet Take 1 tablet by mouth every morning.    nitroGLYCERIN (NITROSTAT) 0.4 MG SL tablet Place 1 tablet (0.4 mg total) under the tongue every 5 (five) minutes as needed for Chest pain.    PROTONIX 40 mg tablet TAKE 1 BY MOUTH DAILY (Patient taking differently: TAKE 1 TABLET  BY MOUTH  DAILY)    RENVELA 0.8 gram PwPk     SYNTHROID 50 mcg tablet TAKE 1 BY MOUTH DAILY      BEFORE BREAKFAST     Family History     Problem Relation (Age of Onset)    Heart disease Father        Social History Main Topics    Smoking status: Never Smoker    Smokeless tobacco: Never Used    Alcohol use Yes      Comment: occasional wine use    Drug use: No    Sexual activity: No     Review of Systems   Constitutional: Positive for fatigue. Negative for chills and fever.   HENT: Negative for congestion, ear pain, rhinorrhea, sneezing, sore throat and tinnitus.    Eyes: Positive for visual disturbance. Negative for pain, discharge and itching.        Per HPI     Respiratory: Negative for cough, chest tightness, shortness of breath and wheezing.    Cardiovascular: Negative for chest pain, palpitations and leg swelling.   Gastrointestinal: Negative for abdominal pain, blood in stool, constipation, diarrhea, nausea and vomiting.   Endocrine: Negative for cold intolerance and heat intolerance.   Musculoskeletal: Negative for arthralgias, back pain and joint swelling.   Skin: Negative for rash.        Pain from left heel ulcer per HPI     Allergic/Immunologic: Negative for environmental allergies and food allergies.   Neurological: Negative for dizziness and numbness.   Hematological: Does not bruise/bleed easily.   Psychiatric/Behavioral: Negative for confusion and hallucinations.     Objective:     Vital Signs (Most Recent):  Temp: 97.2 °F (36.2 °C) (04/14/17 0740)  Pulse: 61 (04/14/17 0740)  Resp: 18 (04/14/17 0740)  BP: 116/84 (04/14/17 0740)  SpO2: 96 % (04/14/17 0740) Vital Signs (24h Range):  Temp:  [97.2 °F  (36.2 °C)-97.3 °F (36.3 °C)] 97.2 °F (36.2 °C)  Pulse:  [37-61] 61  Resp:  [18] 18  SpO2:  [95 %-100 %] 96 %  BP: (116-134)/(56-84) 116/84     Weight: 62.6 kg (138 lb)  Body mass index is 25.24 kg/(m^2).    Physical Exam   Constitutional: She is oriented to person, place, and time. She appears well-developed and well-nourished.   HENT:   Head: Normocephalic and atraumatic.   Eyes: Conjunctivae are normal.   Neck: Normal range of motion. No edema present.   Cardiovascular: Normal rate and regular rhythm.    Murmur heard.  Pulmonary/Chest: Effort normal and breath sounds normal. No accessory muscle usage. No respiratory distress. She has no wheezes.   Abdominal: Soft. Bowel sounds are normal. She exhibits no distension. There is no tenderness.   Musculoskeletal:   Swelling of the LUE. Minimal LLE edema. Some mild swelling noted around left heel.    Neurological: She is alert and oriented to person, place, and time.   Right hand tremulous at times. Able to see out of both eyes.    Skin:   Stage two ulcer noted of the left heel with some warmth    Psychiatric: She has a normal mood and affect. Her mood appears not anxious. She is not agitated. She does not exhibit a depressed mood.        Significant Labs: All pertinent labs within the past 24 hours have been reviewed.    Significant Imaging: I have reviewed and interpreted all pertinent imaging results/findings within the past 24 hours.    Assessment/Plan:     * Closed fracture of neck of right femur  - WBAT; posterior hip precautions   - DVT prophylaxis: on apixaban  - PTOT as scheduled  - pain control with oxycodone       Acquired hypothyroidism  - continue levothyroxine       Hyperlipidemia  - continue atorvastatin       Anemia in chronic renal disease  - transfused blood post operatively due to acute blood loss anemia in the setting of anemia of chronic disease  - continue Epo as scheduled   - H&H stable; will continue to monitor       Renovascular hypertension  -  "BP control acceptable for now   - patient was started on midodrine due to hypotension prohibiting adequate HD      Chronic combined systolic and diastolic heart failure  - continue medical management with ASA, statin   - fluid removal with HD  - monitor daily weights      ESRD on hemodialysis  - continue HD as scheduled  - renal diet  - continue sevelamer        PAF (paroxysmal atrial fibrillation)  - patient off amiodarone and carvedilol due to bradycardia  - will monitor closely  - continue apixaban  - EP follow up as scheduled        PAD (peripheral artery disease)  - s/p LLE angiogram  - wound care for L heel ulcer  - continue ASA   - continue gabapentin for possible neuropathic pain component         Bradycardia  - see management per a-fib      Hyponatremia  - attributed to hypervolemia   - fluctuating in mid to high 120s  - will continue to monitor closely       Debility, unspecified  - PT reporting weakness this am with patient   - reports of "knees buckling"  - will monitor today and with therapy through the weekend  - suspect multifactorial etiology for her weakness including bradycardia, ? Hyponatremia component and medical co-morbidities        Ischemic ulcer of left heel, limited to breakdown of skin  - s/p LLE angiogram  - significant pain and some warmth noted today   - wound care consult  - will start a trial of doxy and monitor      Acute Hypoxemic Respiratory Failure  - continue to wean O2 as tolerated  - HD as scheduled      VTE Risk Mitigation         Ordered     apixaban tablet 2.5 mg  2 times daily     Route:  Oral        04/13/17 1821     High Risk of VTE  Once      04/13/17 1821        Olena Dailey MD  Department of Hospital Medicine   [facility]  "

## 2017-04-14 NOTE — ASSESSMENT & PLAN NOTE
- attributed to hypervolemia   - fluctuating in mid to high 120s  - will continue to monitor closely

## 2017-04-14 NOTE — ASSESSMENT & PLAN NOTE
- WBAT; posterior hip precautions   - DVT prophylaxis: on apixaban  - PTOT as scheduled  - pain control with oxycodone

## 2017-04-14 NOTE — ASSESSMENT & PLAN NOTE
- s/p LLE angiogram  - significant pain and some warmth noted today   - wound care consult  - will start a trial of doxy and monitor

## 2017-04-14 NOTE — ASSESSMENT & PLAN NOTE
"- PT reporting weakness this am with patient   - reports of "knees buckling"  - will monitor today and with therapy through the weekend  - suspect multifactorial etiology for her weakness including bradycardia, ? Hyponatremia component and medical co-morbidities      "

## 2017-04-14 NOTE — ASSESSMENT & PLAN NOTE
- patient off amiodarone and carvedilol due to bradycardia  - will monitor closely  - continue apixaban  - EP follow up as scheduled

## 2017-04-14 NOTE — PROGRESS NOTES
Patient complaining of nausea.  Patient given Reglan 5 mg po as ordered.  Dr. Dailey requested vital signs on patient.  Blood pressure 106/50 arnol right arm pulse 55 manual apical resp 18 temp 96.9 o 2 sat 93 %.  Dr. Dailey notified of vital signs and stated we will be monitoring patient's vital signs over the weekend.  Call  Light in reach.

## 2017-04-14 NOTE — PT/OT/SLP EVAL
PhysicalTherapy   Evaluation    Padmini Miranda   MRN: [id number]     PT Received On: 17  PT Start Time: 1325     PT Stop Time: 1350    PT Total Time (min): 25 min       Billable Minutes:  Evaluation 10, Therapeutic Activity 15 and Total Time 15    Diagnosis: Closed fracture of neck of right femur s/p (R) hemiarthroplasty  Past Medical History:   Diagnosis Date    3-vessel CAD     Acquired hypothyroidism     ALLERGIC RHINITIS     Anemia in ESRD (end-stage renal disease) 2017    Anticoagulant long-term use     Atherosclerosis of native artery 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 2016    Atherosclerotic PVD with ulceration 2016    Blood transfusion     Cataract     Chronic combined systolic and diastolic heart failure 2015    Closed displaced fracture of right femoral neck s/p hemiarthroplasty on 2017    Cramp of both lower extremities 2016    ESRD on hemodialysis 2015    Hyperlipidemia     Ischemic cardiomyopathy 10/20/2015    Non-ST elevation MI (NSTEMI) 2015    PAF (paroxysmal atrial fibrillation) 3/10/2017    Renovascular hypertension 2015    Sinus brea-tachy syndrome 2017    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      Past Surgical History:   Procedure Laterality Date    ANGIOPLASTY      Renal PTA    CARDIAC CATHETERIZATION       SECTION      HIP FRACTURE SURGERY Right 2017    Hemiarthroplasty for displaced femoral neck fracture    HYSTERECTOMY      ovaries intact    RENAL ARTERY STENT      TONSILLECTOMY      VASCULAR SURGERY           General Precautions: Standard, aspiration, nectar thick, fall  Orthopedic Precautions: RLE posterior precautions, RLE weight bearing as tolerated   Braces:      Do you have any cultural, spiritual, Shinto conflicts, given your current situation?: no    Patient History:  Lives With: child(tania), adult  Living Arrangements: house  Home Layout: Able to  live on 1st floor  Stair Railings at Home: none  Transportation Available: family or friend will provide  Living Environment Comment: Pt lives w/ her daughter in a 1SH w/ 0 BENNY. Pt has a tub/shower combo. Pt is alone during the day when her daughter is at work.  Equipment Currently Used at Home: rollator, shower chair  DME owned (not currently used): none    Previous Level of Function: Pt PTA was IND w/ all mobility and ADLs. She reports using her rollator as needed. Pt reports 1 other fall 2 years ago but no dizziness.  Ambulation Skills: needs device  Transfer Skills: independent  ADL Skills: independent    Subjective:  Communicated with OT prior to session.  Pt reported inc'd fatigue and nausea t/o session- MD and nursing notified. Pt able to recall 2/3 posterior hip precautions (unable to state no flexion beyond 90degrees)  Chief Complaint: fatigue  Patient goals: to get stronger    Pain Rating: 3/10  Location - Side: Right  Location - Orientation: generalized  Location: gluteal  Pain Addressed: Pre-medicate for activity, Reposition       Objective:  Patient found sitting in w/c  w/ 2L O2    Cognitive Exam:  Oriented to: Person, Place, Time and Situation  Follows Commands/attention: Follows one-step commands  Communication: clear/fluent  Safety awareness/insight to disability: intact    Physical Exam:  Postural examination/scapula alignment: Rounded shoulder and Head forward    Skin integrity: Visible skin intact  Edema: None noted     Sensation:   Intact    Lower Extremity Range of Motion:  Right Lower Extremity: WFL  Left Lower Extremity: WFL    Lower Extremity Strength:  Right Lower Extremity: HF not tested, knee 4+/5, ankle 5/5  Left Lower Extremity: HF 4-/5, knee 4/5, ankle 4+/5    Functional Status:  MDS G  Bed Mobility Functional Status: mod(A)-Max(A)  Transfer Functional Status: mod(A)-Max(A)  Walk in Room Functional Status: mod(A)-Max(A)  Walk in Corridor Functional Status: mod(A)-Max(A)  Locomotion on  Unit Functional Status: total(A)  Eval Only: Number of L/E limb <4/5 MMT: 1    Bed Mobility:  Sit>Supine:on bed w/ ModA for BLE    Transfers:  Sit<>Stand: to/from w/c w/ RW and MaxA to rise  Stand Pivot Transfer: w/c>EOB w/ RW and MaxA, pt began to suddenly sit before being close enough to EOB- had to be lowered w/ TotalA(x2) to bed for safety  vc's for forward scoot, lean, hand and foot placement    Gait:  Amb 6ft w/ RW and ModA for stability due to frequent (B) knee buckling that would happen suddenly     Balance:  Static stand w/ RW and Min/CGA for stability    AM-PAC 6 CLICK MOBILITY  How much help from another person does this patient currently need?   1 = Unable, Total/Dependent Assistance  2 = A lot, Maximum/Moderate Assistance  3 = A little, Minimum/Contact Guard/Supervision  4 = None, Modified [place]/Independent     Turning over in bed (including adjusting bedclothes, sheets and blankets)?: 2  Sitting down on and standing up from a chair with arms (e.g., wheelchair, bedside commode, etc.): 2  Moving from lying on back to sitting on the side of the bed?: 2  Moving to and from a bed to a chair (including a wheelchair)?: 2  Need to walk in hospital room?: 2  Climbing 3-5 steps with a railing?: 1  Total Score: 11     AM-PAC Raw Score CMS G-Code Modifier Level of Impairment Assistance   6 % Total / Unable   7 - 9 CM 80 - 100% Maximal Assist   10 - 14 CL 60 - 80% Moderate Assist   15 - 19 CK 40 - 60% Moderate Assist   20 - 22 CJ 20 - 40% Minimal Assist   23 CI 1-20% SBA / CGA   24 CH 0% Independent/ Mod I         Patient left supine with all lines intact, call button in reach and MD notified. MD notified that pt was very sleepy t/o session along w/ knee buckling and nausea. MD asked nurse to get vitals.     Assessment:  Padmini Miranda is a 85 y.o. female with a medical diagnosis of Closed fracture of neck of right femur.  Pt presents w/ previous pertinent co-morbidities and personal factors  including being alone during the day and fall history. Pt currently presents w/ the below mentioned deficits requiring MaxA for transfers w/ RW due to instability and knee buckling. Pt's clinical presentation is unpredictable due to variations in mobility levels along w/ newly impaired vision, dropping things, nausea, and severe fatigue. According to the [facility] pt requires ModA for functional mobility. These factors classify pt as a moderate complexity evaluation. Pt is appropriate for skilled PT and will begin PT POC.  .    Rehab identified problem list/impairments: weakness, impaired endurance, impaired functional mobilty, gait instability, impaired balance, decreased lower extremity function, decreased upper extremity function, decreased coordination, pain, orthopedic precautions    Rehab potential is fair.    Activity tolerance: Poor    Discharge recommendations:  (TBD based on progress) will need 24hour physical assistance available upon D/C    Barriers to discharge: Decreased caregiver support    Equipment recommendations: bedside commode, bath bench, walker, rolling, wheelchair     GOALS:   Physical Therapy Goals        Problem: Physical Therapy Goal    Goal Priority Disciplines Outcome Goal Variances Interventions   Physical Therapy Goal     PT/OT, PT      Description:  Goals to be met by: 14 days     Patient will increase functional independence with mobility by performin. Supine to sit with MInimal Assistance  2. Sit to supine with MInimal Assistance  3. Sit to stand transfer with Contact Guard Assistance  4. Bed to chair transfer with Contact Guard Assistance using Rolling Walker  5. Gait  x 30 feet with Contact Guard Assistance using Rolling Walker.   6. Wheelchair propulsion x75 feet with Stand-by Assistance using bilateral uppper extremities  7. Stand for 2 minutes with Contact Guard Assistance using Rolling Walker  8. Lower extremity exercise program x20 reps per handout, with assistance as  needed                PLAN:    Patient to be seen 5 x/week  to address the above listed problems via gait training, therapeutic activities, therapeutic exercises, wheelchair management/training  Plan of Care Expires: 05/14/17    Nathalia Sorensen, PT 4/14/2017

## 2017-04-14 NOTE — PLAN OF CARE
Problem: Patient Care Overview  Goal: Plan of Care Review  Outcome: Ongoing (interventions implemented as appropriate)    04/14/17 1533   Coping/Psychosocial   Plan Of Care Reviewed With patient         Problem: Pressure Ulcer Risk (Amandeep Scale) (Adult,Obstetrics,Pediatric)  Goal: Skin Integrity  Patient will demonstrate the desired outcomes by discharge/transition of care.   Outcome: Ongoing (interventions implemented as appropriate)    04/14/17 1533   Pressure Ulcer Risk (Amandeep Scale) (Adult,Obstetrics,Pediatric)   Skin Integrity making progress toward outcome         Problem: Fall Risk (Adult)  Goal: Absence of Falls  Patient will demonstrate the desired outcomes by discharge/transition of care.   Outcome: Ongoing (interventions implemented as appropriate)    04/14/17 1533   Fall Risk (Adult)   Absence of Falls making progress toward outcome         Problem: Pain, Acute (Adult)  Goal: Acceptable Pain Control/Comfort Level  Patient will demonstrate the desired outcomes by discharge/transition of care.   Outcome: Ongoing (interventions implemented as appropriate)    04/14/17 1533   Pain, Acute (Adult)   Acceptable Pain Control/Comfort Level making progress toward outcome         Comments:   Patient monitored every 1 to 2 hours for pain and safety.  Safety maintained.  Patient instructed to call for assistance.Call  Light and persoanl items in reach.

## 2017-04-14 NOTE — ASSESSMENT & PLAN NOTE
- transfused blood post operatively due to acute blood loss anemia in the setting of anemia of chronic disease  - continue Epo as scheduled   - H&H stable; will continue to monitor

## 2017-04-15 PROBLEM — G93.40 ENCEPHALOPATHY: Status: ACTIVE | Noted: 2017-01-01

## 2017-04-15 PROBLEM — R79.89 ABNORMAL LFTS: Status: ACTIVE | Noted: 2017-01-01

## 2017-04-15 PROBLEM — E87.1 HYPONATREMIA: Status: RESOLVED | Noted: 2017-01-01 | Resolved: 2017-01-01

## 2017-04-15 PROBLEM — B37.0 ORAL THRUSH: Status: ACTIVE | Noted: 2017-01-01

## 2017-04-15 PROBLEM — K59.01 SLOW TRANSIT CONSTIPATION: Status: RESOLVED | Noted: 2017-01-01 | Resolved: 2017-01-01

## 2017-04-15 NOTE — PROGRESS NOTES
Loop recorder interrogation:    Episodes: 4,548  Tachy: 34   Feroz: 89  AF: 4,425  % of time in AT/AF: 10.6%  Longest episode of AF ranged from 12-24 hrs (1 episode)  22 episodes of AF ranged from 4-12 hrs  91 episodes of AF ranged from 1-4 hrs  1,015 episodes of AF ranged from 10 min to 1 hr  3,296 episodes of AF ranged from 2-10 min    Most recent bradycardia episode shows sinus bradycardia with a rate of ~ 30 bpm on 4/15/17 at 14:28.    EKG in ED reveals sinus bradycardia with IVCD, rate of 49 bpm. No pauses, blocks, or dropped beats seen on telemetry.

## 2017-04-15 NOTE — SUBJECTIVE & OBJECTIVE
Past Medical History:   Diagnosis Date    3-vessel CAD     Acquired hypothyroidism     ALLERGIC RHINITIS     Anemia in ESRD (end-stage renal disease) 2017    Anticoagulant long-term use     Atherosclerosis of native artery 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 2016    Atherosclerotic PVD with ulceration 2016    Blood transfusion     Cataract     Chronic combined systolic and diastolic heart failure 2015    Closed displaced fracture of right femoral neck s/p hemiarthroplasty on 2017    Cramp of both lower extremities 2016    ESRD on hemodialysis 2015    Hyperlipidemia     Ischemic cardiomyopathy 10/20/2015    Non-ST elevation MI (NSTEMI) 2015    PAF (paroxysmal atrial fibrillation) 3/10/2017    Renovascular hypertension 2015    Sinus brea-tachy syndrome 2017    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       Past Surgical History:   Procedure Laterality Date    ANGIOPLASTY      Renal PTA    CARDIAC CATHETERIZATION       SECTION      HIP FRACTURE SURGERY Right 2017    Hemiarthroplasty for displaced femoral neck fracture    HYSTERECTOMY      ovaries intact    RENAL ARTERY STENT      TONSILLECTOMY      VASCULAR SURGERY         Review of patient's allergies indicates:   Allergen Reactions    Ativan [lorazepam] Hallucinations    Crab Other (See Comments)     Causes Gout    Crayfish Other (See Comments)     Causes Gout    Promethazine Other (See Comments)     Hallucinations        Current Facility-Administered Medications on File Prior to Encounter   Medication    [MAR Hold - Suspended Admission] acetaminophen tablet 650 mg    [MAR Hold - Suspended Admission] apixaban tablet 2.5 mg    [MAR Hold - Suspended Admission] aspirin chewable tablet 81 mg    [MAR Hold - Suspended Admission] atorvastatin tablet 40 mg    [MAR Hold - Suspended Admission] docusate sodium capsule 100 mg    [MAR Hold -  Suspended Admission] doxycycline tablet 100 mg    [MAR Hold - Suspended Admission] epoetin syed injection 3,000 Units    [MAR Hold - Suspended Admission] gabapentin capsule 100 mg    [MAR Hold - Suspended Admission] gelatin adsorbable 12-7 mm top sponge sponge 1 applicator    [MAR Hold - Suspended Admission] levothyroxine tablet 75 mcg    [MAR Hold - Suspended Admission] metoclopramide HCl 5 mg/5 mL solution 5 mg    [MAR Hold - Suspended Admission] midodrine tablet 10 mg    [MAR Hold - Suspended Admission] oxycodone immediate release tablet 10 mg    [MAR Hold - Suspended Admission] oxycodone immediate release tablet 5 mg    [MAR Hold - Suspended Admission] pantoprazole EC tablet 40 mg    [MAR Hold - Suspended Admission] polyethylene glycol packet 17 g    [MAR Hold - Suspended Admission] ramelteon tablet 8 mg    [MAR Hold - Suspended Admission] sevelamer carbonate pwpk 1.6 g     Current Outpatient Prescriptions on File Prior to Encounter   Medication Sig    aspirin 81 MG Chew Take 1 tablet (81 mg total) by mouth once daily. (Patient taking differently: Take 81 mg by mouth every morning. )    atorvastatin (LIPITOR) 40 MG tablet Take 1 tablet (40 mg total) by mouth once daily. (Patient taking differently: Take 40 mg by mouth every morning. )    ELIQUIS 2.5 mg Tab TAKE 1 BY MOUTH TWO TIMES  DAILY    PROTONIX 40 mg tablet TAKE 1 BY MOUTH DAILY (Patient taking differently: TAKE 1 TABLET  BY MOUTH  DAILY)    SYNTHROID 50 mcg tablet TAKE 1 BY MOUTH DAILY      BEFORE BREAKFAST    epoetin syed (PROCRIT) 10,000 unit/mL injection Inject 0.28 mLs (2,800 Units total) into the skin every Tues, Thurs, Sat.    multivitamin (ONE DAILY MULTIVITAMIN) per tablet Take 1 tablet by mouth every morning.    nitroGLYCERIN (NITROSTAT) 0.4 MG SL tablet Place 1 tablet (0.4 mg total) under the tongue every 5 (five) minutes as needed for Chest pain.    RENVELA 0.8 gram PwPk      Family History     Problem Relation (Age of  Onset)    Heart disease Father        Social History Main Topics    Smoking status: Never Smoker    Smokeless tobacco: Never Used    Alcohol use Yes      Comment: occasional wine use    Drug use: No    Sexual activity: No     Review of Systems   Constitutional: Positive for activity change (Decreased alertness x 1 week), appetite change (Decreased appetite x 1 week) and fatigue. Negative for chills and fever.   HENT: Positive for sore throat and tinnitus (Right ear). Negative for ear discharge and hearing loss.    Eyes: Positive for visual disturbance (Right eye droop).   Respiratory: Negative for cough and shortness of breath.    Cardiovascular: Positive for leg swelling. Negative for chest pain.   Gastrointestinal: Negative for abdominal pain, nausea and vomiting.   Musculoskeletal: Negative for arthralgias, back pain and myalgias.   Skin: Negative for rash.   Allergic/Immunologic: Negative for food allergies.   Neurological: Positive for weakness (Generalized). Negative for dizziness, light-headedness and headaches.   Hematological: Negative for adenopathy. Does not bruise/bleed easily.   Psychiatric/Behavioral: Positive for decreased concentration. Negative for hallucinations.     Objective:     Vital Signs (Most Recent):  Temp: 97.8 °F (36.6 °C) (04/15/17 1117)  Pulse: (!) 48 (04/15/17 1503)  Resp: 15 (04/15/17 1510)  BP: (!) 129/55 (04/15/17 1503)  SpO2: 100 % (04/15/17 1503) Vital Signs (24h Range):  Temp:  [97.4 °F (36.3 °C)-97.8 °F (36.6 °C)] 97.8 °F (36.6 °C)  Pulse:  [44-53] 48  Resp:  [11-18] 15  SpO2:  [93 %-100 %] 100 %  BP: (102-139)/(51-60) 129/55     Weight: 50.8 kg (112 lb)  Body mass index is 20.49 kg/(m^2).    Physical Exam   Constitutional: She appears lethargic. She appears cachectic. She is cooperative. No distress.   HENT:   Head: Normocephalic and atraumatic.   Mild thrush in oropharynx   Eyes: Conjunctivae are normal. Pupils are equal, round, and reactive to light.   Decreased  peripheral vision in both eyes   Neck: Neck supple. No JVD present. Carotid bruit is not present. No thyromegaly present.   Cardiovascular: Regular rhythm and normal heart sounds.  Bradycardia present.  Exam reveals no gallop and no friction rub.    No murmur heard.  Pulses:       Dorsalis pedis pulses are 1+ on the right side, and 1+ on the left side.        Posterior tibial pulses are 1+ on the right side, and 1+ on the left side.   Pulmonary/Chest: Effort normal and breath sounds normal. No accessory muscle usage. She has no wheezes. She has no rales.   Abdominal: Soft. Normal appearance and bowel sounds are normal. She exhibits no distension. There is no hepatosplenomegaly. There is no tenderness.   Musculoskeletal: She exhibits edema (1 + edema in biletral lower extremities and 1+ in left upper extremity). She exhibits no deformity.   Neurological: She appears lethargic. No cranial nerve deficit or sensory deficit.   Patient with generalized weakness in all extremities on testing and 3-4+ ion all extremities and mildly decreased strength in left upper extremity as compared to right upper extremity;  Patient lethargic and easily feels asleep on exam but arouses and will answer questions appropriately when stimulated    Skin: Skin is warm and dry. No erythema.   Psychiatric: Her affect is blunt. Her speech is slurred (Mild). She is slowed. She is inattentive.        Significant Labs:   CBC:   Recent Labs      04/15/17   1239   WBC  7.61   RBC  3.72*   HGB  10.2*   HCT  32.4*   PLT  188   MCV  87   MCH  27.4   MCHC  31.5*   GRAN  84.7*  6.4   LYMPH  7.2*  0.6*   MONO  7.6  0.6   EOS  0.0      CMP:   Recent Labs      04/13/17   0412  04/15/17   1239   GLU  83  72   NA  128*  135*   K  3.9  4.0   CL  95  97   CO2  24  26   BUN  10  9   CREATININE  2.4*  2.2*   ANIONGAP  9  12   BILITOT   --   2.3*   AST   --   102*   ALT   --   11   ALKPHOS   --   86   ALBUMIN  3.2*  3.2*   PROT   --   6.7   PHOS  2.9   --        Recent Labs      04/15/17   1239   LABPROT  14.5*   INR  1.4*       Lactic Acid:   Recent Labs  Lab 04/15/17  1239   LACTATE 2.2     TSH:   TSH   Date Value Ref Range Status   04/15/2017 15.558 (H) 0.400 - 4.000 uIU/mL Final     Free T4   Date Value Ref Range Status   04/15/2017 0.81 0.71 - 1.51 ng/dL Final      Significant Imaging: CXR: I have reviewed all pertinent results/findings within the past 24 hours and my personal findings are: Bilateral pleural effusions L > R but relatively unchanged from CXR on 4/10 when compared CXRs.     EKG: I have reviewed all pertinent results/findings within the past 24 hours and my personal findings are: Sinus bradycardia with rate 49 with non specific ST changes in lateral leads but present on previous EKG from 4/5.     CT scan of head as per radiology report: No evidence of acute hemorrhage or major vascular distribution infarct.

## 2017-04-15 NOTE — NURSING
Received to unit via stretcher accompanied by RN and fly, sleepy, responds to verbal stimuli, tele applied. Oriented to environment, verbvalize understanding. Bed low and locked call bell in reach. Will continue to monitor.

## 2017-04-15 NOTE — ASSESSMENT & PLAN NOTE
Patient's TSH is actually improved from recent tests and TSH improved from 22 to 15.5 after increasing Synthroid from 50 to 75 mcg daily during last admit in April 2017. Will check free T3 but I do not believe this is contributing to patient's lethargy as patient's TSH and Free T4 level is improving.

## 2017-04-15 NOTE — PROGRESS NOTES
Patient left with [facility] transport service via wheelchair for dialysis. VSS, snack sent with patient.

## 2017-04-15 NOTE — ASSESSMENT & PLAN NOTE
Patient has developed acute encephalopathy but unclear etiology. Patient afebrile with normal WBC and no bandemia so infectious etiology unlikely. Patient with no evidence of acute hypoxia as oxygen sats > 90 on 2.5 liters of oxygen. Medication list reviewed for new medications recently added and no new medications added recently that could be causing encephalopathy but will hold Neurontin as can be sedating even though patient on a very low dose.   · Will get MRI of brain to look for any signs of acute ischemia to brain as family does note slurred speech and lethargy and right eye droop.   · Will check cortisol level in the am to look for possible adrenal insufficiency as cause.  · Will check blood cultures to look for any infectious cause.Patient does not make much urine so unlikely to be able to get urine sample but will try.  · Will hold all narcotics/opiates for pain. Avoid any sleep aid agents at this time that could affect alertness level.  · Please avoid any benzodiazepines as can worsen delirium and only should use in delirium caused by benzodiazepine or alcohol withdrawal.   · Please avoid all anticholinergic medications as can worsen delirium.   · Please keep curtains and blinds open during the day and closed during the night.   · Please continue to have nursing and family reorient patient and encourage family to visit and stay at night if possible.   · Please avoid physical restraints if possible as likely to worsen and increase agitation in delirious patient.

## 2017-04-15 NOTE — ASSESSMENT & PLAN NOTE
Patient is POD # 10. Patient reports no pain in right hip currently in ER. Plan consult PT/OT for gait training and strengthening and restoration of ADL's on this admit to continue therapy. Patient is FWB/WBAT: right lower extremity, posterior hip precautions as per Orthopedic recommendations. Patient on Eliquis for long term anticoagulation for atrial fibrillation so no DVT prophylaxis needed. Pain is well controlled and will use just Tylenol for pain and avoid any sedating narcotics.

## 2017-04-15 NOTE — ED NOTES
APPEARANCE: awake and alert in NAD.  SKIN: warm, dry and intact. No breakdown or bruising.  MUSCULOSKELETAL: Patient moving all extremities spontaneously, no obvious swelling or deformities noted. Ambulates  With walker.   RESPIRATORY: no shortness of breath. All breath sounds CTA bilaterally.  CARDIAC: heart tones normal. Regular rate and rhythm; 2+ distal pulses; no peripheral edema  ABDOMEN: S/ND/NT, normoactive bowel sounds present in all four quadrants. Normal stool pattern.  : voids spontaneously without difficulty.  Neurologic: AAO x 4; follows commands equal strength in all extremities; denies numbness/tingling.

## 2017-04-15 NOTE — ED PROVIDER NOTES
Encounter Date: 4/15/2017       History     Chief Complaint   Patient presents with    Fatigue     lethargic and slurred speech x 2 days; from dialysis, did complete      Review of patient's allergies indicates:   Allergen Reactions    Ativan [lorazepam] Hallucinations    Crab Other (See Comments)     Causes Gout    Crayfish Other (See Comments)     Causes Gout    Promethazine Other (See Comments)     Hallucinations      HPI Comments: Pt is a 84 y/o female with PMH of ESRD on HD, HTN, combined heart failure, atrial fibrillation on anticoagulation, and hypothyroidism. Most recently she is s/p LLE angiogram on 4/3 by vascular surgery for PAD and L heel ulcer as well as right hip hemiarthroplasty on 4/5. She was incidentally found to be bradycardic into the 30s-40s and EP was consulted. Her amiodarone and carvedilol were held on admit. She underwent CRRT in the ICU due to her bradycardic episodes and was able to have her hemiarthroplasty performed on 4/5. Her TSH was found to be elevated in the low 20s and her levothyroxine was accordingly adjusted. She required a transfusion for acute blood loss anemia attributed to her surgery and also had hyponatremia due to suspected hypervolemia. Regarding her bradycardia, EP recommended outpatient follow up and holding off on pacemaker placement at this time but to be re-evaluated for this during her clinic appointment. She was seen by PTOT and recommended for SNF. She was just admitted to [facility] on 4/13. She now presents to the ED with complaints of lethargy for the past week as well as dysphagia with poor oral intake, and right eye drooping with blurry vision which started 2 days ago.    The history is provided by the patient and a relative.     Past Medical History:   Diagnosis Date    3-vessel CAD     Acquired hypothyroidism     ALLERGIC RHINITIS     Anemia in ESRD (end-stage renal disease) 4/6/2017    Anticoagulant long-term use     Atherosclerosis of native  artery 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 2016    Atherosclerotic PVD with ulceration 2016    Blood transfusion     Cataract     Chronic combined systolic and diastolic heart failure 2015    Closed displaced fracture of right femoral neck s/p hemiarthroplasty on 2017    Cramp of both lower extremities 2016    ESRD on hemodialysis 2015    Hyperlipidemia     Ischemic cardiomyopathy 10/20/2015    Non-ST elevation MI (NSTEMI) 2015    PAF (paroxysmal atrial fibrillation) 3/10/2017    Renovascular hypertension 2015    Sinus brea-tachy syndrome 2017    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     Past Surgical History:   Procedure Laterality Date    ANGIOPLASTY      Renal PTA    CARDIAC CATHETERIZATION       SECTION      HIP FRACTURE SURGERY Right 2017    Hemiarthroplasty for displaced femoral neck fracture    HYSTERECTOMY      ovaries intact    RENAL ARTERY STENT      TONSILLECTOMY      VASCULAR SURGERY       Family History   Problem Relation Age of Onset    Adopted: Yes    Heart disease Father      Social History   Substance Use Topics    Smoking status: Never Smoker    Smokeless tobacco: Never Used    Alcohol use Yes      Comment: occasional wine use     Review of Systems   Constitutional: Positive for appetite change and fatigue.   HENT: Positive for trouble swallowing.    Eyes: Positive for visual disturbance.   Respiratory: Negative.    Cardiovascular: Negative.    Gastrointestinal: Negative.    Endocrine: Negative.    Genitourinary: Negative.    Musculoskeletal: Negative.    Skin: Positive for wound.        Pain to left heel wound   Allergic/Immunologic: Negative.    Neurological: Negative.    Hematological: Negative.    Psychiatric/Behavioral: Negative.        Physical Exam   Initial Vitals   BP Pulse Resp Temp SpO2   04/15/17 1117 04/15/17 1117 04/15/17 1117 04/15/17 1117 04/15/17 1117   129/51  53 16 97.8 °F (36.6 °C) 100 %     Physical Exam    Nursing note and vitals reviewed.  Constitutional: She appears well-developed and well-nourished.   HENT:   Head: Normocephalic and atraumatic.   Mouth/Throat: Oropharyngeal exudate present.   Moderate amount of thrust seen on tongue   Eyes: Conjunctivae and EOM are normal. Pupils are equal, round, and reactive to light.   Right eyelid ptosis   Neck: Normal range of motion. Neck supple.   Cardiovascular: Regular rhythm and normal heart sounds.   bradycardic   Pulmonary/Chest: Breath sounds normal.   Abdominal: Soft. Bowel sounds are normal.   Musculoskeletal: Normal range of motion.   Neurological: She is alert and oriented to person, place, and time. She has normal strength.   Skin: Skin is warm and dry.   Psychiatric: She has a normal mood and affect.         ED Course   Procedures  Labs Reviewed - No data to display          Medical Decision Making:   Initial Assessment:   Pt is an 86 yo F with multiple co-morbidities presenting with complaints of lethargy, dysphagia, right eye drooping.  Differential Diagnosis:   Oral thrush, metabolic/electrolyte disturbance, deconditioning  Clinical Tests:   Lab Tests: Ordered  Radiological Study: Ordered  Medical Tests: Ordered  ED Management:  Basic labs as well as CT head, EKG ordered. Oral nystatin ordered for thrush.  Denia Fry, PGY-3  11:54 AM    Labs have returned. AST and Bilirubin have acutely risen. CXR and CT unremarkable. TSH still uncontrolled and patient becoming more lethargic. MRI stat ordered. Cardiology to come interrogate loop recorder prior to MRI. Pt to be admitted to Dr. Saldana.  Denia rFy, PGY-3  2:35 PM                [unfilled]     ED Course     Clinical Impression:   The primary encounter diagnosis was Encephalopathy. A diagnosis of Stroke was also pertinent to this visit.          Denia Fry MD  Resident  04/15/17 [id number]

## 2017-04-15 NOTE — ASSESSMENT & PLAN NOTE
Patient with mild increase in AST and total bilirubin but ALP normal and ALT unremarkable so cause unclear but will get RUQ ultrasound to evaluate. Will continue statin for now as patient has significant history of CAD and PAD but if LFT's worsen will need to hold Lipitor. Monitor daily CMP.

## 2017-04-15 NOTE — SIGNIFICANT EVENT
Addendum:  MRI of brain done and radiologist read as no acute infarct or bleed.    SONALI MCKINNEY MD  Attending Staff Physician   [facility]  Pager: [phone]  Spectralink: [phone]

## 2017-04-15 NOTE — ED TRIAGE NOTES
Pt brought in via [facility]. Pt complaining of feeling lethargic. Pt family reports pt having slurred speech and right eye drooping. Pt had left hip replacement last week. Pt completed her round of dialysis this morning.

## 2017-04-15 NOTE — ASSESSMENT & PLAN NOTE
Bidil discontinued on previous admit due to issues with hypotension during HD and will continue to hold and continue Midodrine to help with BP and volume removal during HD.

## 2017-04-15 NOTE — ASSESSMENT & PLAN NOTE
Likely related to pleural effusions and hypervolemia from chronic renal failure and heart failure. Continue oxygen at 3 liters with plan to wean to keep oxygen sats > 90%.

## 2017-04-15 NOTE — ASSESSMENT & PLAN NOTE
Patient with known heel ulcers to both legs but no signs to indicate infection. Patient recently evaluated by vascular Surgery on 4/13 and Vascular felt ulcers were improving and did not feel any further intervention needed at this time. Continue local wound care for heel ulcers on this admit.

## 2017-04-15 NOTE — ASSESSMENT & PLAN NOTE
HR unchanged from prior hospital stay with persistent sinus bradycardia. Will have Cardiology re-evaluate patient to see if they feel bradycardia could be contributing to patient's lethargy. Avoid any AV shikha blocking agents. Place on telemetry.

## 2017-04-15 NOTE — ASSESSMENT & PLAN NOTE
Hgb is stable and there are no signs of active bleeding. Anemia unlikely to be causing lethargy so no indication to transfuse blood in this patient. Nephrology to continue Epogen with HD to treat chronic anemia related to ESRD.

## 2017-04-15 NOTE — H&P
[facility] Medicine  History & Physical    Patient Name: Padmini Miranda  MRN: [id number]  Admission Date: 4/15/2017  Attending Physician: Jalyn Saldana MD  Primary Care Provider: Randy Lyles MD    [facility] Medicine Team: [facility] Jalyn Saldana MD     Patient information was obtained from patient, relative(s), past medical records and ER records.     Subjective:     Principal Problem:Acute encephalopathy    Chief Complaint:   Chief Complaint   Patient presents with    Fatigue     lethargic and slurred speech x 2 days; from dialysis, did complete         HPI: 84 y/o with past medical history of chronic combined systolic and diastolic heart failure, 3 vessel CAD, atherosclerosis of bilateral lower extremity with left heel ulcer with recent PTA of left SFA on 4/3 by Vascular surgery, essential HTN, hypothyroidism and ESRD on HD was sent from acute dialysis today due to concern for increasing lethargy. Family also noted that patient appeared to be slurring her words and noted right eye was drooping and patient was having difficulty staying awake and due to thus concerns was sent to [facility]. CTscan of head showed no acute findings but MRI of brain has been ordered to better evaluate. Patient's daughters at bedside and report for past 1 week patient has been sleeping more than normal and not nearly as alert as she usually is. I am familiar with patient as she was just discharged from my medical service on 4/13 and patient does appear to be more sleepy than normal but when aroused in the ER patient is responding to questions appropriately. Patient reports she generally feels weak but denies any focal weakness in ER. Family is very upset as they feel patient was not progressing with PT prior to discharge to SNF on 4/13 and feel patient has not been herself for over 1 week with increased lethargy.     As noted patient was just discharged from [facility] on 4/13  to [facility] [place] after prolonged hospitalization following surgical repair of a right hip fracture. Patient even prior to that surgery was seen and evaluated by Cardiology for sinus bradycardia with HR in 30-40's. Patient during admit was felt not to need pacemaker placement and felt bradycardia related to Amiodarone and Coreg that had been recently been discontinued. Patient eventually underwent right hip hemiarthroplasty on 4/5 posterior-op, patient noted to be hypothermic and persistently bradycardiac so patient was evaluated for possible infection but there was no concern for infection, as clinically, the patient had normal WBC and no clinical symptoms to suggest infection. TSH (normal on 3/10) was checked during recent admit and found to be severely elevated at 21.6, with low normal Free T4 of 0.88. Synthroid was adjusted from 50 mcg to 75 mcg daily to treat hypothyroidism. Patient did complain of odynophagia after surgery felt related to ET tube. Speech therapy consult who recommended MBSS and patient underwent MBSS on 4/12 and patient passed with recommendation by Speech for regular diet and nectar thickened liquids. Patient to be discharged to [facility] for continued PT/OT but needs outpatient follow-up with EP Cardiology to reassess need for pacemaker for sinus bradycardia on 4/13.       Past Medical History:   Diagnosis Date    3-vessel CAD     Acquired hypothyroidism     ALLERGIC RHINITIS     Anemia in ESRD (end-stage renal disease) 4/6/2017    Anticoagulant long-term use     Atherosclerosis of native artery 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 4/29/2016    Atherosclerotic PVD with ulceration 4/29/2016    Blood transfusion     Cataract     Chronic combined systolic and diastolic heart failure 7/14/2015    Closed displaced fracture of right femoral neck s/p hemiarthroplasty on 4/5/2017 4/4/2017    Cramp of both lower extremities 12/30/2016    ESRD on hemodialysis  2015    Hyperlipidemia     Ischemic cardiomyopathy 10/20/2015    Non-ST elevation MI (NSTEMI) 2015    PAF (paroxysmal atrial fibrillation) 3/10/2017    Renovascular hypertension 2015    Sinus brea-tachy syndrome 2017    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       Past Surgical History:   Procedure Laterality Date    ANGIOPLASTY      Renal PTA    CARDIAC CATHETERIZATION       SECTION      HIP FRACTURE SURGERY Right 2017    Hemiarthroplasty for displaced femoral neck fracture    HYSTERECTOMY      ovaries intact    RENAL ARTERY STENT      TONSILLECTOMY      VASCULAR SURGERY         Review of patient's allergies indicates:   Allergen Reactions    Ativan [lorazepam] Hallucinations    Crab Other (See Comments)     Causes Gout    Crayfish Other (See Comments)     Causes Gout    Promethazine Other (See Comments)     Hallucinations        Current Facility-Administered Medications on File Prior to Encounter   Medication    [MAR Hold - Suspended Admission] acetaminophen tablet 650 mg    [MAR Hold - Suspended Admission] apixaban tablet 2.5 mg    [MAR Hold - Suspended Admission] aspirin chewable tablet 81 mg    [MAR Hold - Suspended Admission] atorvastatin tablet 40 mg    [MAR Hold - Suspended Admission] docusate sodium capsule 100 mg    [MAR Hold - Suspended Admission] doxycycline tablet 100 mg    [MAR Hold - Suspended Admission] epoetin syed injection 3,000 Units    [MAR Hold - Suspended Admission] gabapentin capsule 100 mg    [MAR Hold - Suspended Admission] gelatin adsorbable 12-7 mm top sponge sponge 1 applicator    [MAR Hold - Suspended Admission] levothyroxine tablet 75 mcg    [MAR Hold - Suspended Admission] metoclopramide HCl 5 mg/5 mL solution 5 mg    [MAR Hold - Suspended Admission] midodrine tablet 10 mg    [MAR Hold - Suspended Admission] oxycodone immediate release tablet 10 mg    [MAR Hold - Suspended Admission] oxycodone immediate  release tablet 5 mg    [MAR Hold - Suspended Admission] pantoprazole EC tablet 40 mg    [MAR Hold - Suspended Admission] polyethylene glycol packet 17 g    [MAR Hold - Suspended Admission] ramelteon tablet 8 mg    [MAR Hold - Suspended Admission] sevelamer carbonate pwpk 1.6 g     Current Outpatient Prescriptions on File Prior to Encounter   Medication Sig    aspirin 81 MG Chew Take 1 tablet (81 mg total) by mouth once daily. (Patient taking differently: Take 81 mg by mouth every morning. )    atorvastatin (LIPITOR) 40 MG tablet Take 1 tablet (40 mg total) by mouth once daily. (Patient taking differently: Take 40 mg by mouth every morning. )    ELIQUIS 2.5 mg Tab TAKE 1 BY MOUTH TWO TIMES  DAILY    PROTONIX 40 mg tablet TAKE 1 BY MOUTH DAILY (Patient taking differently: TAKE 1 TABLET  BY MOUTH  DAILY)    SYNTHROID 50 mcg tablet TAKE 1 BY MOUTH DAILY      BEFORE BREAKFAST    epoetin syed (PROCRIT) 10,000 unit/mL injection Inject 0.28 mLs (2,800 Units total) into the skin every Tues, Thurs, Sat.    multivitamin (ONE DAILY MULTIVITAMIN) per tablet Take 1 tablet by mouth every morning.    nitroGLYCERIN (NITROSTAT) 0.4 MG SL tablet Place 1 tablet (0.4 mg total) under the tongue every 5 (five) minutes as needed for Chest pain.    RENVELA 0.8 gram PwPk      Family History     Problem Relation (Age of Onset)    Heart disease Father        Social History Main Topics    Smoking status: Never Smoker    Smokeless tobacco: Never Used    Alcohol use Yes      Comment: occasional wine use    Drug use: No    Sexual activity: No     Review of Systems   Constitutional: Positive for activity change (Decreased alertness x 1 week), appetite change (Decreased appetite x 1 week) and fatigue. Negative for chills and fever.   HENT: Positive for sore throat and tinnitus (Right ear). Negative for ear discharge and hearing loss.    Eyes: Positive for visual disturbance (Right eye droop).   Respiratory: Negative for cough and  shortness of breath.    Cardiovascular: Positive for leg swelling. Negative for chest pain.   Gastrointestinal: Negative for abdominal pain, nausea and vomiting.   Musculoskeletal: Negative for arthralgias, back pain and myalgias.   Skin: Negative for rash.   Allergic/Immunologic: Negative for food allergies.   Neurological: Positive for weakness (Generalized). Negative for dizziness, light-headedness and headaches.   Hematological: Negative for adenopathy. Does not bruise/bleed easily.   Psychiatric/Behavioral: Positive for decreased concentration. Negative for hallucinations.     Objective:     Vital Signs (Most Recent):  Temp: 97.8 °F (36.6 °C) (04/15/17 1117)  Pulse: (!) 48 (04/15/17 1503)  Resp: 15 (04/15/17 1510)  BP: (!) 129/55 (04/15/17 1503)  SpO2: 100 % (04/15/17 1503) Vital Signs (24h Range):  Temp:  [97.4 °F (36.3 °C)-97.8 °F (36.6 °C)] 97.8 °F (36.6 °C)  Pulse:  [44-53] 48  Resp:  [11-18] 15  SpO2:  [93 %-100 %] 100 %  BP: (102-139)/(51-60) 129/55     Weight: 50.8 kg (112 lb)  Body mass index is 20.49 kg/(m^2).    Physical Exam   Constitutional: She appears lethargic. She appears cachectic. She is cooperative. No distress.   HENT:   Head: Normocephalic and atraumatic.   Mild thrush in oropharynx   Eyes: Conjunctivae are normal. Pupils are equal, round, and reactive to light.   Decreased peripheral vision in both eyes   Neck: Neck supple. No JVD present. Carotid bruit is not present. No thyromegaly present.   Cardiovascular: Regular rhythm and normal heart sounds.  Bradycardia present.  Exam reveals no gallop and no friction rub.    No murmur heard.  Pulses:       Dorsalis pedis pulses are 1+ on the right side, and 1+ on the left side.        Posterior tibial pulses are 1+ on the right side, and 1+ on the left side.   Pulmonary/Chest: Effort normal and breath sounds normal. No accessory muscle usage. She has no wheezes. She has no rales.   Abdominal: Soft. Normal appearance and bowel sounds are normal.  She exhibits no distension. There is no hepatosplenomegaly. There is no tenderness.   Musculoskeletal: She exhibits edema (1 + edema in biletral lower extremities and 1+ in left upper extremity). She exhibits no deformity.   Neurological: She appears lethargic. No cranial nerve deficit or sensory deficit.   Patient with generalized weakness in all extremities on testing and 3-4+ ion all extremities and mildly decreased strength in left upper extremity as compared to right upper extremity;  Patient lethargic and easily feels asleep on exam but arouses and will answer questions appropriately when stimulated    Skin: Skin is warm and dry. No erythema.   Psychiatric: Her affect is blunt. Her speech is slurred (Mild). She is slowed. She is inattentive.        Significant Labs:   CBC:   Recent Labs      04/15/17   1239   WBC  7.61   RBC  3.72*   HGB  10.2*   HCT  32.4*   PLT  188   MCV  87   MCH  27.4   MCHC  31.5*   GRAN  84.7*  6.4   LYMPH  7.2*  0.6*   MONO  7.6  0.6   EOS  0.0      CMP:   Recent Labs      04/13/17   0412  04/15/17   1239   GLU  83  72   NA  128*  135*   K  3.9  4.0   CL  95  97   CO2  24  26   BUN  10  9   CREATININE  2.4*  2.2*   ANIONGAP  9  12   BILITOT   --   2.3*   AST   --   102*   ALT   --   11   ALKPHOS   --   86   ALBUMIN  3.2*  3.2*   PROT   --   6.7   PHOS  2.9   --       Recent Labs      04/15/17   1239   LABPROT  14.5*   INR  1.4*       Lactic Acid:   Recent Labs  Lab 04/15/17  1239   LACTATE 2.2     TSH:   TSH   Date Value Ref Range Status   04/15/2017 15.558 (H) 0.400 - 4.000 uIU/mL Final     Free T4   Date Value Ref Range Status   04/15/2017 0.81 0.71 - 1.51 ng/dL Final      Significant Imaging: CXR: I have reviewed all pertinent results/findings within the past 24 hours and my personal findings are: Bilateral pleural effusions L > R but relatively unchanged from CXR on 4/10 when compared CXRs.     EKG: I have reviewed all pertinent results/findings within the past 24 hours and my  personal findings are: Sinus bradycardia with rate 49 with non specific ST changes in lateral leads but present on previous EKG from 4/5.     CT scan of head as per radiology report: No evidence of acute hemorrhage or major vascular distribution infarct.    Assessment/Plan:     * Acute encephalopathy  Patient has developed acute encephalopathy but unclear etiology. Patient afebrile with normal WBC and no bandemia so infectious etiology unlikely. Patient with no evidence of acute hypoxia as oxygen sats > 90 on 2.5 liters of oxygen. Medication list reviewed for new medications recently added and no new medications added recently that could be causing encephalopathy but will hold Neurontin as can be sedating even though patient on a very low dose.   · Will get MRI of brain to look for any signs of acute ischemia to brain as family does note slurred speech and lethargy and right eye droop.   · Will check cortisol level in the am to look for possible adrenal insufficiency as cause.  · Will check blood cultures to look for any infectious cause.Patient does not make much urine so unlikely to be able to get urine sample but will try.  · Will hold all narcotics/opiates for pain. Avoid any sleep aid agents at this time that could affect alertness level.  · Please avoid any benzodiazepines as can worsen delirium and only should use in delirium caused by benzodiazepine or alcohol withdrawal.   · Please avoid all anticholinergic medications as can worsen delirium.   · Please keep curtains and blinds open during the day and closed during the night.   · Please continue to have nursing and family reorient patient and encourage family to visit and stay at night if possible.   · Please avoid physical restraints if possible as likely to worsen and increase agitation in delirious patient.       Oral thrush  Patient with very mild case and will treat with Nystatin swish and spit 4 times daily.       Abnormal LFTs  Patient with mild  increase in AST and total bilirubin but ALP normal and ALT unremarkable so cause unclear but will get RUQ ultrasound to evaluate. Will continue statin for now as patient has significant history of CAD and PAD but if LFT's worsen will need to hold Lipitor. Monitor daily CMP.       ESRD on hemodialysis  Will consult Nephrology to mange patient's HD needs in hospital. Patient dialyzed today, 4/15 so next regularly scheduled HD would be on 4/18.       Bradycardia, drug induced  HR unchanged from prior hospital stay with persistent sinus bradycardia. Will have Cardiology re-evaluate patient to see if they feel bradycardia could be contributing to patient's lethargy. Avoid any AV shikha blocking agents. Place on telemetry.       Closed displaced fracture of right femoral neck s/p hemiarthroplasty on 4/5/2017  Patient is POD # 10. Patient reports no pain in right hip currently in ER. Plan consult PT/OT for gait training and strengthening and restoration of ADL's on this admit to continue therapy. Patient is FWB/WBAT: right lower extremity, posterior hip precautions as per Orthopedic recommendations. Patient on Eliquis for long term anticoagulation for atrial fibrillation so no DVT prophylaxis needed. Pain is well controlled and will use just Tylenol for pain and avoid any sedating narcotics.       Renovascular hypertension  Bidil discontinued on previous admit due to issues with hypotension during HD and will continue to hold and continue Midodrine to help with BP and volume removal during HD.       Acquired hypothyroidism  Patient's TSH is actually improved from recent tests and TSH improved from 22 to 15.5 after increasing Synthroid from 50 to 75 mcg daily during last admit in April 2017. Will check free T3 but I do not believe this is contributing to patient's lethargy as patient's TSH and Free T4 level is improving.       Chronic combined systolic and diastolic heart failure  Patient with signs of volume overload on  exam and has pleural effusions on CXR but volume status appears largely unchanged from her recent discharge on 4/13. Nephrology consulted to continue HD for fluid removal.       Acute blood loss anemia  Hgb is stable and there are no signs of active bleeding. Anemia unlikely to be causing lethargy so no indication to transfuse blood in this patient. Nephrology to continue Epogen with HD to treat chronic anemia related to ESRD.      Atherosclerosis of native artery 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  Patient with known heel ulcers to both legs but no signs to indicate infection. Patient recently evaluated by vascular Surgery on 4/13 and Vascular felt ulcers were improving and did not feel any further intervention needed at this time. Continue local wound care for heel ulcers on this admit.      PAF (paroxysmal atrial fibrillation)  Cardiology consult placed. Patient is actually bradycardiac so no AV shikha blocking agents are required.Patient has an implanted loop recorder in place. Continue Eliquis for long term anticoagulation.      3-vessel CAD  Controlled. Plan to continue ASA and Lipitor for now to treat. No beta blocker due to bradycardia. Patient with no EKG changes to suggest acute ischemia.       Acute respiratory failure with hypoxia  Likely related to pleural effusions and hypervolemia from chronic renal failure and heart failure. Continue oxygen at 3 liters with plan to wean to keep oxygen sats > 90%.       VTE Risk Mitigation         Ordered     apixaban tablet 2.5 mg  2 times daily     Route:  Oral        04/15/17 1618     Medium Risk of VTE  Once      04/15/17 1431        Jalyn Saldana MD  Department of Hospital Medicine   [facility]

## 2017-04-15 NOTE — PROGRESS NOTES
Family called facility concerned about pt at dialysis. Family reports pt with dropping of right eye, slurred speech, and increased lethargy. Dr Dailey notified. Dr. Aldrich reports for pt to be sent to ED from dialysis. [facility] nurse Tatiana notified and agrees. Pt with be transported to [facility] from there facility. Reginald notified of transfer to ED.

## 2017-04-15 NOTE — ASSESSMENT & PLAN NOTE
Controlled. Plan to continue ASA and Lipitor for now to treat. No beta blocker due to bradycardia. Patient with no EKG changes to suggest acute ischemia.

## 2017-04-15 NOTE — PLAN OF CARE
Problem: Pressure Ulcer Risk (Amandeep Scale) (Adult,Obstetrics,Pediatric)  Goal: Identify Related Risk Factors and Signs and Symptoms  Related risk factors and signs and symptoms are identified upon initiation of Human Response Clinical Practice Guideline (CPG)   Outcome: Ongoing (interventions implemented as appropriate)    04/15/17 0335   Pressure Ulcer Risk (Amandeep Scale)   Related Risk Factors (Pressure Ulcer Risk (Amandeep Scale)) mobility impaired         Problem: Fall Risk (Adult)  Goal: Identify Related Risk Factors and Signs and Symptoms  Related risk factors and signs and symptoms are identified upon initiation of Human Response Clinical Practice Guideline (CPG)   Outcome: Ongoing (interventions implemented as appropriate)    04/15/17 0335   Fall Risk   Related Risk Factors (Fall Risk) gait/mobility problems;history of falls

## 2017-04-15 NOTE — ASSESSMENT & PLAN NOTE
Patient with signs of volume overload on exam and has pleural effusions on CXR but volume status appears largely unchanged from her recent discharge on 4/13. Nephrology consulted to continue HD for fluid removal.

## 2017-04-15 NOTE — ASSESSMENT & PLAN NOTE
Cardiology consult placed. Patient is actually bradycardiac so no AV shikha blocking agents are required.Patient has an implanted loop recorder in place. Continue Eliquis for long term anticoagulation.

## 2017-04-15 NOTE — ASSESSMENT & PLAN NOTE
Will consult Nephrology to mange patient's HD needs in hospital. Patient dialyzed today, 4/15 so next regularly scheduled HD would be on 4/18.

## 2017-04-16 NOTE — ASSESSMENT & PLAN NOTE
HR unchanged from prior hospital stay with persistent sinus bradycardia. Cardiology re-evaluated patient to see if they feel bradycardia could be contributing to patient's lethargy. EP to follow-up on patient in the am. Avoid any AV shikha blocking agents. Continue telemetry monitoring.

## 2017-04-16 NOTE — ASSESSMENT & PLAN NOTE
Patient's TSH is actually improved from recent tests and TSH improved from 22 to 15.5 after increasing Synthroid from 50 to 75 mcg daily during last admit in April 2017. Free T3 level is very low so will consult Endocrine to see if they feel patient's lethargy is being caused by hypothyroidism and to see if thyroid medication requires further adjustment.

## 2017-04-16 NOTE — PROGRESS NOTES
[facility] Hospital Medicine  Progress Note    Patient Name: Padmini Miranda  MRN: [id number]  Patient Class: IP- Inpatient   Admission Date: 4/15/2017  Length of Stay: 1 days  Attending Physician: Jalyn Saldana MD  Primary Care Provider: Randy Lyles MD    [facility] Medicine Team: [facility] HOSP MED  Jalyn Saldana MD    Subjective:     Principal Problem:Acute encephalopathy    HPI:  86 y/o with past medical history of chronic combined systolic and diastolic heart failure, 3 vessel CAD, atherosclerosis of bilateral lower extremity with left heel ulcer with recent PTA of left SFA on 4/3 by Vascular surgery, essential HTN, hypothyroidism and ESRD on HD was sent from acute dialysis today due to concern for increasing lethargy. Family also noted that patient appeared to be slurring her words and noted right eye was drooping and patient was having difficulty staying awake and due to thus concerns was sent to [facility]. CTscan of head showed no acute findings but MRI of brain has been ordered to better evaluate. Patient's daughters at bedside and report for past 1 week patient has been sleeping more than normal and not nearly as alert as she usually is. I am familiar with patient as she was just discharged from my medical service on 4/13 and patient does appear to be more sleepy than normal but when aroused in the ER patient is responding to questions appropriately. Patient reports she generally feels weak but denies any focal weakness in ER. Family is very upset as they feel patient was not progressing with PT prior to discharge to SNF on 4/13 and feel patient has not been herself for over 1 week with increased lethargy.     As noted patient was just discharged from [facility] on 4/13 to [facility] [place] after prolonged hospitalization following surgical repair of a right hip fracture. Patient even prior to that surgery was seen and evaluated by Cardiology for sinus  bradycardia with HR in 30-40's. Patient during admit was felt not to need pacemaker placement and felt bradycardia related to Amiodarone and Coreg that had been recently been discontinued. Patient eventually underwent right hip hemiarthroplasty on 4/5 posterior-op, patient noted to be hypothermic and persistently bradycardiac so patient was evaluated for possible infection but there was no concern for infection, as clinically, the patient had normal WBC and no clinical symptoms to suggest infection. TSH (normal on 3/10) was checked during recent admit and found to be severely elevated at 21.6, with low normal Free T4 of 0.88. Synthroid was adjusted from 50 mcg to 75 mcg daily to treat hypothyroidism. Patient did complain of odynophagia after surgery felt related to ET tube. Speech therapy consult who recommended MBSS and patient underwent MBSS on 4/12 and patient passed with recommendation by Speech for regular diet and nectar thickened liquids. Patient to be discharged to [facility] for continued PT/OT but needs outpatient follow-up with EP Cardiology to reassess need for pacemaker for sinus bradycardia on 4/13.       Hospital Course:  MRI of rafat unremarkable for any signs of an acute stroke or bleed to explain increased fatigue or lethargy. Patient afebrile with normal WBC so infectious etiology felt highly unlikely. Random am cortisol done was 14.7 so does not appear to be consistent with adrenal insufficiency. Cardiology re-consulted for patient's bradycardia to see if contributing to patient's increasing lethargy and fatigue and unclear if it is causing patient's symptoms. Neurontin and opiates for pain discontinued in case they were contributing to cause. Endocrine to be consulted as patient with recent diagnosis of hypothyroidism likely related to Amiodarone patient recently on that has been discontinued due to bradycardia to see if they feel hypothyroidism could be contributing to fatigue and if thyroid  medication needs adjustment.     Interval History: Patient remains easily fatigued and tired. Patient will arouse on questioning and answer questions appropriately when prompted but easily falls asleep during examination if not verbally stimulated.     Review of Systems   Constitutional: Positive for activity change (decreased activity) and fatigue. Negative for chills and fever.   HENT: Positive for sore throat.    Respiratory: Negative for cough and shortness of breath.    Cardiovascular: Negative for chest pain.   Gastrointestinal: Negative for abdominal pain and nausea.   Musculoskeletal: Positive for myalgias (Right hip pain). Negative for back pain.   Skin: Negative for rash.   Neurological: Positive for weakness (Generalized). Negative for dizziness.   Psychiatric/Behavioral: Positive for decreased concentration. Negative for agitation, confusion and hallucinations.     Objective:     Vital Signs (Most Recent):  Temp: 98.8 °F (37.1 °C) (04/16/17 0745)  Pulse: (!) 52 (04/16/17 1125)  Resp: 20 (04/16/17 1115)  BP: (!) 109/50 (04/16/17 1115)  SpO2: 99 % (04/16/17 1125) Vital Signs (24h Range):  Temp:  [97.9 °F (36.6 °C)-98.8 °F (37.1 °C)] 98.8 °F (37.1 °C)  Pulse:  [48-92] 52  Resp:  [11-21] 20  SpO2:  [96 %-100 %] 99 %  BP: (102-135)/(47-77) 109/50     Weight: 50.8 kg (112 lb)  Body mass index is 20.49 kg/(m^2).    Intake/Output Summary (Last 24 hours) at 04/16/17 1418  Last data filed at 04/15/17 2300   Gross per 24 hour   Intake              120 ml   Output                0 ml   Net              120 ml      Physical Exam   Constitutional: She appears listless.   HENT:   Mouth/Throat: Oropharynx is clear and moist.   Eyes: Conjunctivae are normal. No scleral icterus.   Neck: Neck supple. No JVD present.   Cardiovascular: Regular rhythm and normal heart sounds.  Bradycardia present.  Exam reveals no gallop and no friction rub.    No murmur heard.  Pulmonary/Chest: Effort normal. She has rales (Mild rales in  bases).   Abdominal: Soft. Bowel sounds are normal. She exhibits no distension. There is no tenderness.   Musculoskeletal: She exhibits edema (1-2 + edema in lower extremities; 1+ edema in left arm).   Neurological: She appears listless.   Skin: Skin is warm and dry.   Psychiatric: Her affect is blunt. Her speech is delayed. She is slowed.       Significant Labs:   CBC:   Recent Labs  Lab 04/15/17  1239 04/16/17  0701   WBC 7.61 6.87   HGB 10.2* 8.3*   HCT 32.4* 26.1*    174     CMP:   Recent Labs  Lab 04/15/17  1239 04/16/17  0701   * 133*   K 4.0 4.1   CL 97 98   CO2 26 26   GLU 72 72   BUN 9 15   CREATININE 2.2* 3.1*   CALCIUM 8.5* 7.8*   PROT 6.7 5.5*   ALBUMIN 3.2* 2.7*   BILITOT 2.3* 1.9*   ALKPHOS 86 69   * 93*   ALT 11 10   ANIONGAP 12 9   EGFRNONAA 19.9* 13.1*     TSH   Date Value Ref Range Status   04/15/2017 15.558 (H) 0.400 - 4.000 uIU/mL Final     Free T4   Date Value Ref Range Status   04/15/2017 0.81 0.71 - 1.51 ng/dL Final       Free T3 < 1.0    Cortisol level: 14.7    Significant Imaging:  RUQ ultrasound (4/16/2017): Radiology report. Multiple cysts within the right hepatic lobe measuring up to 4 cm.    Contracted gallbladder demonstrates no gross abnormalities.    MRI of brain (4/15/2017): Radiology report. No evidence of recent infarction or other acute intracranial pathology.  Mild chronic microvascular ischemic changes.  Empty sella configuration.  Loss of the expected T2 flow-void in the right vertebral artery, consistent with slow flow or occlusion.      Assessment/Plan:      * Acute encephalopathy  Unchanged. Etiology remains unclear. Patient afebrile with normal WBC and no bandemia so infectious etiology unlikely. Patient with no evidence of acute hypoxia as oxygen sats > 90 on 2 liters of oxygen. Medication list reviewed for new medications recently added and no new medications added recently that could be causing encephalopathy but will hold Neurontin as can be sedating  even though patient on a very low dose.   · MRI of brain unremarkable for acute stroke or bleed on 4/15.   · AM Cortisol level normal 14.7.  · Unable to get urine sample so far as patient makes scant urine to check for UTI but is ordered.  · Will hold all narcotics/opiates for pain. Avoid any sleep aid agents at this time that could affect alertness level.  · Consult Endocrine to see if hypothyroidism is causing fatigue and lethargy and see if thyroid medication needs adjustment.   · Cardiology consulted to see if bradycardia causing fatigue/weakness.   · Please avoid any benzodiazepines as can worsen delirium and only should use in delirium caused by benzodiazepine or alcohol withdrawal.   · Please avoid all anticholinergic medications as can worsen delirium.   · Please keep curtains and blinds open during the day and closed during the night.   · Please continue to have nursing and family reorient patient and encourage family to visit and stay at night if possible.   · Please avoid physical restraints if possible as likely to worsen and increase agitation in delirious patient.       Oral thrush  Improved. Patient with very mild case and will continue treatement with Nystatin swish and spit 4 times daily.       Abnormal LFTs  RUQ ultrasound unremarkable except for hepatic cysts and unlikely to be causing mild increase in LFT's but LFT's are improving so no further evaluation at this time. Monitor daily CMP.       ESRD on hemodialysis  Nephrology consulted to mange patient's HD needs in hospital. Patient dialyzed today, 4/15 so next regularly scheduled HD would be on 4/18. Patient hypervolemic on exam so may need HD on 4/17.       Bradycardia, drug induced  HR unchanged from prior hospital stay with persistent sinus bradycardia. Cardiology re-evaluated patient to see if they feel bradycardia could be contributing to patient's lethargy. EP to follow-up on patient in the am. Avoid any AV shikha blocking agents. Continue  telemetry monitoring.       Closed displaced fracture of right femoral neck s/p hemiarthroplasty on 4/5/2017  Patient is POD # 11. Patient reports no pain in right hip currently. Plan consult PT/OT for gait training and strengthening and restoration of ADL's on this admit to continue therapy. Patient is FWB/WBAT: right lower extremity, posterior hip precautions as per Orthopedic recommendations. Patient on Eliquis for long term anticoagulation for atrial fibrillation so no DVT prophylaxis needed. Pain is well controlled and will use just Tylenol for pain and avoid any sedating narcotics.       Renovascular hypertension  Bidil discontinued on previous admit due to issues with hypotension during HD and will continue to hold and continue Midodrine to help with BP and volume removal during HD.       Acquired hypothyroidism  Patient's TSH is actually improved from recent tests and TSH improved from 22 to 15.5 after increasing Synthroid from 50 to 75 mcg daily during last admit in April 2017. Free T3 level is very low so will consult Endocrine to see if they feel patient's lethargy is being caused by hypothyroidism and to see if thyroid medication requires further adjustment.       Chronic combined systolic and diastolic heart failure  Patient with signs of volume overload on exam and has pleural effusions on CXR but volume status appears largely unchanged from her recent discharge on 4/13. Nephrology consulted to continue HD for fluid removal.       Acute blood loss anemia  Hgb is stable and there are no signs of active bleeding. Anemia unlikely to be causing lethargy so no indication to transfuse blood in this patient. Nephrology to continue Epogen with HD to treat chronic anemia related to ESRD.      Atherosclerosis of native artery 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  Patient with known heel ulcers to both legs but no signs to indicate infection. Patient recently evaluated by vascular Surgery on 4/13 and  Vascular felt ulcers were improving and did not feel any further intervention needed at this time. Continue local wound care for heel ulcers on this admit.      PAF (paroxysmal atrial fibrillation)  Cardiology consult placed. Patient is actually bradycardiac so no AV shikha blocking agents are required.Patient has an implanted loop recorder in place. Continue Eliquis for long term anticoagulation.      3-vessel CAD  Controlled. Plan to continue ASA and Lipitor for now to treat. No beta blocker due to bradycardia. Patient with no EKG changes to suggest acute ischemia.       Acute respiratory failure with hypoxia  Likely related to pleural effusions and hypervolemia from chronic renal failure and heart failure. Continue oxygen at 2 liters with plan to wean to keep oxygen sats > 90%.       VTE Risk Mitigation         Ordered     apixaban tablet 2.5 mg  2 times daily     Route:  Oral        04/15/17 1618     Medium Risk of VTE  Once      04/15/17 1431          Jalyn Saldana MD  Department of Hospital Medicine   [facility]

## 2017-04-16 NOTE — PLAN OF CARE
Problem: Physical Therapy Goal  Goal: Physical Therapy Goal  Goals to be met by: 17     Patient will increase functional independence with mobility by performin. Supine to sit with MInimal Assistance  2. Sit to supine with MInimal Assistance  3. Rolling to Left and Right with Set-up Assistance.  4. Sit to stand transfer with Minimal Assistance  5. Bed to chair transfer with Moderate Assistance using Rolling Walker  6. Gait x 30 feet with Moderate Assistance using Rolling Walker.   7. Lower extremity exercise program x15 reps per handout, with assistance as needed  Outcome: Ongoing (interventions implemented as appropriate)  Goals established this session.

## 2017-04-16 NOTE — ASSESSMENT & PLAN NOTE
Nephrology consulted to mange patient's HD needs in hospital. Patient dialyzed today, 4/15 so next regularly scheduled HD would be on 4/18. Patient hypervolemic on exam so may need HD on 4/17.

## 2017-04-16 NOTE — CONSULTS
Cardiology Consult Note  Attending Physician: Jalyn Saldana MD  Reason for Consult: Persistent bradycardia    HPI:   Ms. Miranda is an 84 yo woman with PMHx of CHF, 3 vessel CAD, PAD (with recent PTA of left SFA on 4/3), HTN, hypothyroidism, and ESRD on HD that presents from HD today because of worsening lethargy, decreased concentration, and concern for a stroke. Family also noted that patient appeared to be slurring her words and noted right eye was drooping and patient was having difficulty staying awake and due to thus concerns was sent to [facility] ER. CT and MRI head and brain demonstrated no acute intracranial abnormalities. Patient's daughters at bedside reported that for past 1 week patient has been sleeping more than usual and has been less aware.     Pt was recently discharged after being admitted for L hip arthroplasty. Post surgery she was noted to have bradycardia and EP was consulted and recommended holding amiodarone and coreg. Despite not receiving these meds she has continued to have persistent bradycardia and has been fatigued while at SNF. Synthroid was adjusted from 50 mcg to 75 mcg daily to treat hypothyroidism.    ROS:    Constitution: negative for - fever, chills, weight loss or weight gain. Positive fatigue and lethargy  HENT: negative for - sore throat, rhinorrhea, or headache  Eyes: negative for - blurred or double vision  Cardiovascular: no chest pain or dyspnea on exertion  Pulmonary: no cough, shortness of breath, or wheezing  Gastrointestinal: negative for - abdominal pain, nausea, vomiting, or diarrhea  : negative for - dysuria  Neurological: negative for - focal weakness or sensory changes  PMH:     Past Medical History:   Diagnosis Date    3-vessel CAD     Acquired hypothyroidism     ALLERGIC RHINITIS     Anemia in ESRD (end-stage renal disease) 4/6/2017    Anticoagulant long-term use     Atherosclerosis of native artery of left lower extremity with ulceration  of heel 2016    Atherosclerotic PVD with ulceration 2016    Blood transfusion     Cataract     Chronic combined systolic and diastolic heart failure 2015    Closed displaced fracture of right femoral neck s/p hemiarthroplasty on 2017    Cramp of both lower extremities 2016    ESRD on hemodialysis 2015    Hyperlipidemia     Ischemic cardiomyopathy 10/20/2015    Non-ST elevation MI (NSTEMI) 2015    PAF (paroxysmal atrial fibrillation) 3/10/2017    Renovascular hypertension 2015    Sinus brea-tachy syndrome 2017    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     Past Surgical History:   Procedure Laterality Date    ANGIOPLASTY      Renal PTA    CARDIAC CATHETERIZATION       SECTION      HIP FRACTURE SURGERY Right 2017    Hemiarthroplasty for displaced femoral neck fracture    HYSTERECTOMY      ovaries intact    RENAL ARTERY STENT      TONSILLECTOMY      VASCULAR SURGERY       Allergies:     Review of patient's allergies indicates:   Allergen Reactions    Ativan [lorazepam] Hallucinations    Crab Other (See Comments)     Causes Gout    Crayfish Other (See Comments)     Causes Gout    Promethazine Other (See Comments)     Hallucinations      Medications:     Current Facility-Administered Medications on File Prior to Encounter   Medication Dose Route Frequency Provider Last Rate Last Dose    [DISCONTINUED] acetaminophen tablet 650 mg  650 mg Oral Q6H PRN Olena Dailey MD        [DISCONTINUED] apixaban tablet 2.5 mg  2.5 mg Oral BID Jalyn Saldana MD   2.5 mg at 17 2201    [DISCONTINUED] aspirin chewable tablet 81 mg  81 mg Oral Daily Jalyn Saldana MD   81 mg at 17 0940    [DISCONTINUED] atorvastatin tablet 40 mg  40 mg Oral QAM Jalyn Saldana MD   40 mg at 17 0610    [DISCONTINUED] docusate sodium capsule 100 mg  100 mg Oral TID Jalyn Saldana MD   100 mg at 17 7677     [DISCONTINUED] doxycycline tablet 100 mg  100 mg Oral Q12H Olena Dailey MD   100 mg at 04/14/17 2200    [DISCONTINUED] epoetin syed injection 3,000 Units  3,000 Units Subcutaneous Every Tues, Thurs, Sat Jalyn Saldana MD        [DISCONTINUED] gabapentin capsule 100 mg  100 mg Oral QHS Jalyn Saldana MD   100 mg at 04/14/17 2200    [DISCONTINUED] gelatin adsorbable 12-7 mm top sponge sponge 1 applicator  1 each Topical PRN Jalyn Saldana MD        [DISCONTINUED] levothyroxine tablet 75 mcg  75 mcg Oral Before breakfast Jalyn Saldana MD   75 mcg at 04/14/17 0609    [DISCONTINUED] metoclopramide HCl 5 mg/5 mL solution 5 mg  5 mg Oral Q6H PRN Jalyn Saldana MD   5 mg at 04/14/17 1401    [DISCONTINUED] midodrine tablet 10 mg  10 mg Oral TID Jalyn Saldana MD   10 mg at 04/15/17 0600    [DISCONTINUED] oxycodone immediate release tablet 10 mg  10 mg Oral Q4H PRN Jalyn Saldana MD   10 mg at 04/15/17 0213    [DISCONTINUED] oxycodone immediate release tablet 5 mg  5 mg Oral Q6H PRN Olena Dailey MD        [DISCONTINUED] pantoprazole EC tablet 40 mg  40 mg Oral Daily Jalyn Saldana MD   40 mg at 04/14/17 0941    [DISCONTINUED] polyethylene glycol packet 17 g  17 g Oral Daily Jalyn Saldana MD   17 g at 04/14/17 0941    [DISCONTINUED] ramelteon tablet 8 mg  8 mg Oral Nightly PRN Jalyn Saldana MD        [DISCONTINUED] sevelamer carbonate pwpk 1.6 g  1.6 g Oral TID WM Jalyn Saldana MD   1.6 g at 04/14/17 1748     Current Outpatient Prescriptions on File Prior to Encounter   Medication Sig Dispense Refill    aspirin 81 MG Chew Take 1 tablet (81 mg total) by mouth once daily. (Patient taking differently: Take 81 mg by mouth every morning. ) 30 tablet 3    atorvastatin (LIPITOR) 40 MG tablet Take 1 tablet (40 mg total) by mouth once daily. (Patient taking differently: Take 40 mg by mouth every morning. ) 30 tablet 11    ELIQUIS 2.5 mg Tab TAKE 1 BY  MOUTH TWO TIMES  DAILY 180 tablet 3    epoetin syed (PROCRIT) 10,000 unit/mL injection Inject 0.28 mLs (2,800 Units total) into the skin every Tues, Thurs, Sat. 0.28 mL 30    multivitamin (ONE DAILY MULTIVITAMIN) per tablet Take 1 tablet by mouth every morning.      PROTONIX 40 mg tablet TAKE 1 BY MOUTH DAILY (Patient taking differently: TAKE 1 TABLET  BY MOUTH  DAILY) 30 tablet 5    RENVELA 0.8 gram PwPk       SYNTHROID 50 mcg tablet TAKE 1 BY MOUTH DAILY      BEFORE BREAKFAST 90 tablet 2    nitroGLYCERIN (NITROSTAT) 0.4 MG SL tablet Place 1 tablet (0.4 mg total) under the tongue every 5 (five) minutes as needed for Chest pain. 25 tablet 5       Inpatient Medications   Continuous Infusions:   Scheduled Meds:   apixaban  2.5 mg Oral BID    aspirin  81 mg Oral Daily    atorvastatin  40 mg Oral QAM    epoetin syed  3,000 Units Subcutaneous Every Tues, Thurs, Sat    levothyroxine  75 mcg Oral Before breakfast    midodrine  10 mg Oral TID    nystatin  500,000 Units Oral QID    pantoprazole  40 mg Oral Daily    polyethylene glycol  17 g Oral Daily    sevelamer carbonate  1.6 g Oral TID WM     PRN Meds:acetaminophen, ondansetron     Social History:     Social History   Substance Use Topics    Smoking status: Never Smoker    Smokeless tobacco: Never Used    Alcohol use Yes      Comment: occasional wine use     Family History:     Family History   Problem Relation Age of Onset    Adopted: Yes    Heart disease Father      Physical Exam:     Vitals:  Temp:  [97.9 °F (36.6 °C)-98.8 °F (37.1 °C)]   Pulse:  [48-92]   Resp:  [11-21]   BP: (102-139)/(47-77)   SpO2:  [96 %-100 %]  on 2L NC I/O's:    Intake/Output Summary (Last 24 hours) at 04/16/17 1228  Last data filed at 04/15/17 2300   Gross per 24 hour   Intake              120 ml   Output                0 ml   Net              120 ml        Constitutional: NAD, conversant but decreased concentration.   HEENT: Sclera anicteric, PERRLA, EOMI  Neck: Unable to  visualize JVD, no carotid bruits  CV: RRR, + decrescendo diastolic murmur, obfuscated by systolic murmur most likely of aortic stenosis.   Pulm: CTAB, no wheezes, rales, or ronchi  GI: Abdomen soft, NTND, +BS  Extremities: No LE edema, warm and well perfused  Skin: No ecchymosis, erythema, or ulcers  Psych: AOx3, appropriate affect  Neuro: CNII-XII intact, no focal deficits    Labs:       Recent Labs  Lab 17  0412 04/15/17  1239 17  0701   * 135* 133*   K 3.9 4.0 4.1   CL 95 97 98   CO2 24 26 26   BUN 10 9 15   CREATININE 2.4* 2.2* 3.1*   GLU 83 72 72   ANIONGAP 9 12 9     No results for input(s): TROPONINI, BNP in the last 168 hours.   Recent Labs  Lab 17  0412 04/15/17  1239 17  0701   WBC 5.95 7.61 6.87   HGB 8.4* 10.2* 8.3*   HCT 26.3* 32.4* 26.1*   * 188 174       Recent Labs  Lab 04/15/17  1239 17  0701   * 93*   ALT 11 10   ALKPHOS 86 69   BILITOT 2.3* 1.9*   ALBUMIN 3.2* 2.7*        Imaging:     EF   Date Value Ref Range Status   2017 50 55 - 65    2016 38 (A) 55 - 65    2016 43 (A) 55 - 65    2015 25 (A) 55 - 65    10/14/2015 25 (A) 55 - 65          EK/15, Sinus bradycardia    Telemetry: Persistent bradycardia    Assessment:   84 yo woman with CAD and now persistent bradycardia and lethargy after recent hip arthroplasty and despite holding anti-hypertensive meds and amiodarone. EP consulted last time and recommended outpatient follow up and workup for possible PPM placement.     Plan/Recommendations:   Persistent Sinus Bradycardia  Fatigue  - Patient's TSH is high and free T3 is low, consider endocrine consult as it appears she is hypothyroid despite recent increase in synthroid. This may very well be cause of pt's symptoms and bradycardia  - Patient's bradycardia does currently meet criteria for PPM as Class I indication but only if all other causes have been ruled out first  - Rule out infectious etiology as well as pt is  elderly and all other causes of bradycardia and fatigue must be worked up before considering PPM placement.   - Consult EP cards tomorrow for further recs on PPM   - No acute intervention necessary at this time    - Patient currently appears volume overloaded. It will be important to know if patient finished her HD session on Saturday and if nephrology can resume HD while here    Plan has been discussed with Fellow and Staff.    Signed:  Trung Richardson MD PGY-1

## 2017-04-16 NOTE — PLAN OF CARE
Problem: Patient Care Overview  Goal: Plan of Care Review  Outcome: Ongoing (interventions implemented as appropriate)  Plan of care reviewed with pt. VS stable. Pt's neuro status is unchanged. She is oriented to all except situation, and keeps speaking to family that is no longer alive. No acute events at this time. Heel protectors placed on pt, and pt is turned with positioning wedge. Daughter at bedside. Pt remains free from falls or injury. Bed low and locked, call light and personal belongings within reach. Will continue to monitor. Yuli Schaefer RN

## 2017-04-16 NOTE — SUBJECTIVE & OBJECTIVE
Interval History: Patient remains easily fatigued and tired. Patient will arouse on questioning and answer questions appropriately when prompted but easily falls asleep during examination if not verbally stimulated.     Review of Systems   Constitutional: Positive for activity change (decreased activity) and fatigue. Negative for chills and fever.   HENT: Positive for sore throat.    Respiratory: Negative for cough and shortness of breath.    Cardiovascular: Negative for chest pain.   Gastrointestinal: Negative for abdominal pain and nausea.   Musculoskeletal: Positive for myalgias (Right hip pain). Negative for back pain.   Skin: Negative for rash.   Neurological: Positive for weakness (Generalized). Negative for dizziness.   Psychiatric/Behavioral: Positive for decreased concentration. Negative for agitation, confusion and hallucinations.     Objective:     Vital Signs (Most Recent):  Temp: 98.8 °F (37.1 °C) (04/16/17 0745)  Pulse: (!) 52 (04/16/17 1125)  Resp: 20 (04/16/17 1115)  BP: (!) 109/50 (04/16/17 1115)  SpO2: 99 % (04/16/17 1125) Vital Signs (24h Range):  Temp:  [97.9 °F (36.6 °C)-98.8 °F (37.1 °C)] 98.8 °F (37.1 °C)  Pulse:  [48-92] 52  Resp:  [11-21] 20  SpO2:  [96 %-100 %] 99 %  BP: (102-135)/(47-77) 109/50     Weight: 50.8 kg (112 lb)  Body mass index is 20.49 kg/(m^2).    Intake/Output Summary (Last 24 hours) at 04/16/17 1418  Last data filed at 04/15/17 2300   Gross per 24 hour   Intake              120 ml   Output                0 ml   Net              120 ml      Physical Exam   Constitutional: She appears listless.   HENT:   Mouth/Throat: Oropharynx is clear and moist.   Eyes: Conjunctivae are normal. No scleral icterus.   Neck: Neck supple. No JVD present.   Cardiovascular: Regular rhythm and normal heart sounds.  Bradycardia present.  Exam reveals no gallop and no friction rub.    No murmur heard.  Pulmonary/Chest: Effort normal. She has rales (Mild rales in bases).   Abdominal: Soft. Bowel  sounds are normal. She exhibits no distension. There is no tenderness.   Musculoskeletal: She exhibits edema (1-2 + edema in lower extremities; 1+ edema in left arm).   Neurological: She appears listless.   Skin: Skin is warm and dry.   Psychiatric: Her affect is blunt. Her speech is delayed. She is slowed.       Significant Labs:   CBC:   Recent Labs  Lab 04/15/17  1239 04/16/17  0701   WBC 7.61 6.87   HGB 10.2* 8.3*   HCT 32.4* 26.1*    174     CMP:   Recent Labs  Lab 04/15/17  1239 04/16/17  0701   * 133*   K 4.0 4.1   CL 97 98   CO2 26 26   GLU 72 72   BUN 9 15   CREATININE 2.2* 3.1*   CALCIUM 8.5* 7.8*   PROT 6.7 5.5*   ALBUMIN 3.2* 2.7*   BILITOT 2.3* 1.9*   ALKPHOS 86 69   * 93*   ALT 11 10   ANIONGAP 12 9   EGFRNONAA 19.9* 13.1*     TSH   Date Value Ref Range Status   04/15/2017 15.558 (H) 0.400 - 4.000 uIU/mL Final     Free T4   Date Value Ref Range Status   04/15/2017 0.81 0.71 - 1.51 ng/dL Final       Free T3 < 1.0    Cortisol level: 14.7    Significant Imaging:  RUQ ultrasound (4/16/2017): Radiology report. Multiple cysts within the right hepatic lobe measuring up to 4 cm.    Contracted gallbladder demonstrates no gross abnormalities.    MRI of brain (4/15/2017): Radiology report. No evidence of recent infarction or other acute intracranial pathology.  Mild chronic microvascular ischemic changes.  Empty sella configuration.  Loss of the expected T2 flow-void in the right vertebral artery, consistent with slow flow or occlusion.

## 2017-04-16 NOTE — ASSESSMENT & PLAN NOTE
Unchanged. Etiology remains unclear. Patient afebrile with normal WBC and no bandemia so infectious etiology unlikely. Patient with no evidence of acute hypoxia as oxygen sats > 90 on 2 liters of oxygen. Medication list reviewed for new medications recently added and no new medications added recently that could be causing encephalopathy but will hold Neurontin as can be sedating even though patient on a very low dose.   · MRI of brain unremarkable for acute stroke or bleed on 4/15.   · AM Cortisol level normal 14.7.  · Unable to get urine sample so far as patient makes scant urine to check for UTI but is ordered.  · Will hold all narcotics/opiates for pain. Avoid any sleep aid agents at this time that could affect alertness level.  · Consult Endocrine to see if hypothyroidism is causing fatigue and lethargy and see if thyroid medication needs adjustment.   · Cardiology consulted to see if bradycardia causing fatigue/weakness.   · Please avoid any benzodiazepines as can worsen delirium and only should use in delirium caused by benzodiazepine or alcohol withdrawal.   · Please avoid all anticholinergic medications as can worsen delirium.   · Please keep curtains and blinds open during the day and closed during the night.   · Please continue to have nursing and family reorient patient and encourage family to visit and stay at night if possible.   · Please avoid physical restraints if possible as likely to worsen and increase agitation in delirious patient.

## 2017-04-16 NOTE — ASSESSMENT & PLAN NOTE
Likely related to pleural effusions and hypervolemia from chronic renal failure and heart failure. Continue oxygen at 2 liters with plan to wean to keep oxygen sats > 90%.

## 2017-04-16 NOTE — ASSESSMENT & PLAN NOTE
Patient is POD # 11. Patient reports no pain in right hip currently. Plan consult PT/OT for gait training and strengthening and restoration of ADL's on this admit to continue therapy. Patient is FWB/WBAT: right lower extremity, posterior hip precautions as per Orthopedic recommendations. Patient on Eliquis for long term anticoagulation for atrial fibrillation so no DVT prophylaxis needed. Pain is well controlled and will use just Tylenol for pain and avoid any sedating narcotics.

## 2017-04-16 NOTE — PLAN OF CARE
Problem: Patient Care Overview  Goal: Plan of Care Review  Outcome: Ongoing (interventions implemented as appropriate)  Pt/ot consult complete, pt and daughter encouraged to keep pillow between legs to avoid internal rotation on right hip, turned frequently, positioned with body , heels off loaded. Neuro checks wdl.

## 2017-04-16 NOTE — ASSESSMENT & PLAN NOTE
RUQ ultrasound unremarkable except for hepatic cysts and unlikely to be causing mild increase in LFT's but LFT's are improving so no further evaluation at this time. Monitor daily CMP.

## 2017-04-16 NOTE — ASSESSMENT & PLAN NOTE
Improved. Patient with very mild case and will continue treatement with Nystatin swish and spit 4 times daily.

## 2017-04-16 NOTE — PT/OT/SLP EVAL
Physical Therapy  Evaluation    Padmini Miranda   MRN: [id number]   Admitting Diagnosis: Acute encephalopathy    PT Received On: 17  PT Start Time: 08     PT Stop Time: 09    PT Total Time (min): 24 min       Billable Minutes:  Evaluation 9 mins and Therapeutic Activity 15 mins    Diagnosis: Acute encephalopathy  Pt re-admitted on 4/15 after being discharged to [facility] on      Past Medical History:   Diagnosis Date    3-vessel CAD     Acquired hypothyroidism     ALLERGIC RHINITIS     Anemia in ESRD (end-stage renal disease) 2017    Anticoagulant long-term use     Atherosclerosis of native artery 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 2016    Atherosclerotic PVD with ulceration 2016    Blood transfusion     Cataract     Chronic combined systolic and diastolic heart failure 2015    Closed displaced fracture of right femoral neck s/p hemiarthroplasty on 2017    Cramp of both lower extremities 2016    ESRD on hemodialysis 2015    Hyperlipidemia     Ischemic cardiomyopathy 10/20/2015    Non-ST elevation MI (NSTEMI) 2015    PAF (paroxysmal atrial fibrillation) 3/10/2017    Renovascular hypertension 2015    Sinus brea-tachy syndrome 2017    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      Past Surgical History:   Procedure Laterality Date    ANGIOPLASTY      Renal PTA    CARDIAC CATHETERIZATION       SECTION      HIP FRACTURE SURGERY Right 2017    Hemiarthroplasty for displaced femoral neck fracture    HYSTERECTOMY      ovaries intact    RENAL ARTERY STENT      TONSILLECTOMY      VASCULAR SURGERY         Referring physician: Jalyn Saldana MD   Date referred to PT: 4/15/17    General Precautions: Standard, aspiration, nectar thick, fall  Orthopedic Precautions: RLE posterior precautions, RLE weight bearing as tolerated   Braces: N/A       Do you have any cultural, spiritual, Druze  conflicts, given your current situation?: none noted    Patient History:  Lives With: child(tania), adult  Living Arrangements: house  Home Layout: Able to live on 1st floor  Stair Railings at Home: none  Transportation Available: family or friend will provide  Living Environment Comment:  (Pt lives with her daughter in a 1 SH with 0 BENNY. Pt has tub/shower combo. Pt is alone during the day when her daughter is at work. )  Equipment Currently Used at Home: shower chair, rollator  DME owned (not currently used): none    Previous Level of Function:  Ambulation Skills: needs device  Transfer Skills: independent  ADL Skills: independent  Work/Leisure Activity: independent    Subjective:  Communicated with RN prior to session.  Pt agreeable to therapy.   Chief Complaint: Pain in posterior hip   Patient goals: to get better and improve mobility     Pain Rating:  (pt did not quantify )   Location - Side: Right  Location - Orientation: generalized  Location: gluteal  Pain Addressed: Pre-medicate for activity, Reposition  Pain Rating Post-Intervention:  (pt did not quantify )    Objective:   Patient found with: peripheral IV, telemetry     Cognitive Exam:  Oriented to: Person, Place and Situation    Follows Commands/attention: Follows one-step commands  Communication: clear/fluent  Safety awareness/insight to disability: impaired    Physical Exam:  Postural examination/scapula alignment: Rounded shoulder, Head forward, Posterior pelvic tilt and Abnormal trunk flexion    Skin integrity: Visible skin intact  Edema: None noted     Sensation:   NT grossly. Pt indicates point tenderness over posterior R ankle and heel     Lower Extremity Range of Motion:  Right Lower Extremity: WFL except unable to test R hip due to posterior precautions   Left Lower Extremity: WFL    Lower Extremity Strength:  Right Lower Extremity: grossly 2/5   Left Lower Extremity: grossly 2/5    Functional Mobility:  Bed Mobility:  Scooting/Bridging: Maximum  Assistance  Supine to Sit: Maximum Assistance (with assistance of 2)  Sit to Supine: Maximum Assistance (with assistance of 2 )    Transfers:  Sit <> Stand Assistance: Total Assistance (with assistance of 2)  Sit <> Stand Assistive Device: Rolling Walker    Balance:   Static Sit: FAIR: Maintains without assist, but unable to take any challenges   Dynamic Sit: FAIR+: Maintains balance through MINIMAL excursions of active trunk motion  Static Stand: 0: Needs MAXIMAL assist to maintain   Dynamic stand: 0: N/A    Therapeutic Activities and Exercises:  Pt performed 10x B ankle pumps, LAQ (with significant assistance from SPT) both performed sitting EOB. Pt performed 10x B quad set, and glut set long sitting in bed. Pt stood for 2x 20 sec with total A x2 with blocking of B knees to prevent buckling and R hip IR and facilitation at the hips for trunk extension. PT provided education to pt and daughter about parameters of posterior hip precautions, as well as use of pillow or bolster to prevent hip adduction and internal rotation. Daughter verbalized understanding.     AM-PAC 6 CLICK MOBILITY  How much help from another person does this patient currently need?   1 = Unable, Total/Dependent Assistance  2 = A lot, Maximum/Moderate Assistance  3 = A little, Minimum/Contact Guard/Supervision  4 = None, Modified [place]/Independent    Turning over in bed (including adjusting bedclothes, sheets and blankets)?: 3  Sitting down on and standing up from a chair with arms (e.g., wheelchair, bedside commode, etc.): 2  Moving from lying on back to sitting on the side of the bed?: 2  Moving to and from a bed to a chair (including a wheelchair)?: 1  Need to walk in hospital room?: 1  Climbing 3-5 steps with a railing?: 1  Total Score: 10     AM-PAC Raw Score CMS G-Code Modifier Level of Impairment Assistance   6 % Total / Unable   7 - 9 CM 80 - 100% Maximal Assist   10 - 14 CL 60 - 80% Moderate Assist   15 - 19 CK 40 - 60%  Moderate Assist   20 - 22 CJ 20 - 40% Minimal Assist   23 CI 1-20% SBA / CGA   24 CH 0% Independent/ Mod I     Patient left supine with all lines intact, call button in reach, RN notified and daughter  present.    Assessment:   Padmini Miranda is a 85 y.o. female with a medical diagnosis of Acute encephalopathy and presents with weakness and decreased activity tolerance. Pt was unable to maintain static standing, requiring total assist of two people with bilateral blocking of knees due to buckling. Pt demonstrated notable tightness and positioning of R hip in adduction and IR. Session limited by pt weakness and increase in pain in posterior hip with activity. Pt would benefit from continued skilled therapy intervention to address impairments and improve functional mobility.     Rehab identified problem list/impairments: Rehab identified problem list/impairments: weakness, impaired endurance, impaired self care skills, impaired functional mobilty, gait instability, impaired balance, decreased coordination, decreased lower extremity function, pain, impaired skin, orthopedic precautions    Rehab potential is good.    Activity tolerance: Fair    Discharge recommendations: Discharge Facility/Level Of Care Needs: nursing facility, skilled     Barriers to discharge: Barriers to Discharge: Decreased caregiver support    Equipment recommendations: Equipment Needed After Discharge: walker, rolling, wheelchair, bath bench, bedside commode     GOALS:   Physical Therapy Goals        Problem: Physical Therapy Goal    Goal Priority Disciplines Outcome Goal Variances Interventions   Physical Therapy Goal     PT/OT, PT Ongoing (interventions implemented as appropriate)     Description:  Goals to be met by: 17     Patient will increase functional independence with mobility by performin. Supine to sit with MInimal Assistance  2. Sit to supine with MInimal Assistance  3. Rolling to Left and Right with Set-up  Assistance.  4. Sit to stand transfer with Minimal Assistance  5. Bed to chair transfer with Moderate Assistance using Rolling Walker  6. Gait  x 30 feet with Moderate Assistance using Rolling Walker.   7. Lower extremity exercise program x15 reps per handout, with assistance as needed                PLAN:    Patient to be seen 6 x/week to address the above listed problems via gait training, therapeutic activities, therapeutic exercises, neuromuscular re-education  Plan of Care expires: 05/16/17  Plan of Care reviewed with: patient, family          Liset Anton, SPT  04/16/2017      I certify that I was present in the room directing the student in service delivery and guiding them using my skilled judgment. As the co-signing therapist I have reviewed the students documentation and am responsible for the treatment, assessment, and plan.     Brittney Fabian, PT DPT  4/16/2017

## 2017-04-17 PROBLEM — R09.89 BIBASILAR CRACKLES: Status: ACTIVE | Noted: 2017-01-01

## 2017-04-17 PROBLEM — R79.89 ABNORMAL LFTS: Chronic | Status: ACTIVE | Noted: 2017-01-01

## 2017-04-17 NOTE — CONSULTS
[facility]  Consult Note Nephrology    Consult Requested By: Jalyn Saldana MD  Reason for Consult: ESRd on iHD    SUBJECTIVE:     History of Present Illness:  Padmini Miranda is a 85 y.o. female  with PMHx chronic combined systolic and diastolic heart failure, 3 vessel CAD, atherosclerosis of bilateral lower extremity with left heel ulcer with recent PTA of left SFA on 4/3 by Vascular surgery, essential HTN, hypothyroidism and ESRD on HD was sent from acute dialysis today due to concern for increasing lethargy. Family also noted that patient appeared to be slurring her words and noted right eye was drooping and patient was having difficulty staying awake.      Past Medical History:   Diagnosis Date    3-vessel CAD     Acquired hypothyroidism     ALLERGIC RHINITIS     Anemia in ESRD (end-stage renal disease) 2017    Anticoagulant long-term use     Atherosclerosis of native artery 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 2016    Atherosclerotic PVD with ulceration 2016    Blood transfusion     Cataract     Chronic combined systolic and diastolic heart failure 2015    Closed displaced fracture of right femoral neck s/p hemiarthroplasty on 2017    Cramp of both lower extremities 2016    ESRD on hemodialysis 2015    Hyperlipidemia     Ischemic cardiomyopathy 10/20/2015    Non-ST elevation MI (NSTEMI) 2015    PAF (paroxysmal atrial fibrillation) 3/10/2017    Renovascular hypertension 2015    Sinus brea-tachy syndrome 2017    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     Past Surgical History:   Procedure Laterality Date    ANGIOPLASTY      Renal PTA    CARDIAC CATHETERIZATION       SECTION      HIP FRACTURE SURGERY Right 2017    Hemiarthroplasty for displaced femoral neck fracture    HYSTERECTOMY      ovaries intact    RENAL ARTERY STENT      TONSILLECTOMY      VASCULAR SURGERY        Family History   Problem Relation Age of Onset    Adopted: Yes    Heart disease Father      Social History   Substance Use Topics    Smoking status: Never Smoker    Smokeless tobacco: Never Used    Alcohol use Yes      Comment: occasional wine use     Review of patient's allergies indicates:   Allergen Reactions    Ativan [lorazepam] Hallucinations    Crab Other (See Comments)     Causes Gout    Crayfish Other (See Comments)     Causes Gout    Promethazine Other (See Comments)     Hallucinations        Current Facility-Administered Medications   Medication Dose Route Frequency Provider Last Rate Last Dose    acetaminophen tablet 650 mg  650 mg Oral Q6H PRN Jalyn Saldana MD   650 mg at 04/16/17 0521    apixaban tablet 2.5 mg  2.5 mg Oral BID Jalyn Saldana MD   2.5 mg at 04/16/17 2015    aspirin chewable tablet 81 mg  81 mg Oral Daily Jalyn Saldana MD   81 mg at 04/16/17 0917    atorvastatin tablet 40 mg  40 mg Oral QAM Jalyn Saldana MD   40 mg at 04/16/17 0613    epoetin syed injection 3,000 Units  3,000 Units Subcutaneous Every Tues, Thurs, Sat Jalyn Saldana MD   3,000 Units at 04/15/17 1900    levothyroxine tablet 75 mcg  75 mcg Oral Before breakfast Jalyn Saldana MD   75 mcg at 04/16/17 0521    midodrine tablet 10 mg  10 mg Oral TID Jalyn Saldana MD   10 mg at 04/16/17 2015    nystatin 100,000 unit/mL suspension 500,000 Units  500,000 Units Oral QID Denia Fry MD   500,000 Units at 04/16/17 1815    ondansetron disintegrating tablet 8 mg  8 mg Oral Q8H PRN Jalyn Saldana MD        pantoprazole EC tablet 40 mg  40 mg Oral Daily Jalyn Saldana MD   40 mg at 04/16/17 0917    polyethylene glycol packet 17 g  17 g Oral Daily Jalyn Saldana MD   17 g at 04/16/17 0917    sevelamer carbonate pwpk 1.6 g  1.6 g Oral TID WM Jalyn Saldana MD   1.6 g at 04/16/17 1434       No Known Allergies     Review of Systems:  Constitutional:  no fever or chills positive weakness  Respiratory: no cough or shortness of breath  Cardiovascular: no chest pain or palpitations  Gastrointestinal: no nausea or vomiting, no abdominal pain or change in bowel habits  Hematologic/Lymphatic: no easy bruising or lymphadenopathy  Musculoskeletal: no arthralgias or myalgias  Neurological: no seizures or tremors, slurred speech and MS changed/lethargic          OBJECTIVE:     Vital Signs (Most Recent)  Temp: 98.4 °F (36.9 °C) (04/16/17 1914)  Pulse: 62 (04/16/17 2312)  Resp: 14 (04/16/17 2312)  BP: (!) 108/56 (04/16/17 2312)  SpO2: 97 % (04/16/17 2312)    Vital Signs Range (Last 24H):  Temp:  [98.4 °F (36.9 °C)-98.8 °F (37.1 °C)]   Pulse:  [43-62]   Resp:  [14-21]   BP: (103-123)/(49-56)   SpO2:  [96 %-100 %]       Intake/Output Summary (Last 24 hours) at 04/16/17 2341  Last data filed at 04/16/17 2300   Gross per 24 hour   Intake              240 ml   Output                0 ml   Net              240 ml       Physical Exam:  General: Well developed, well nourished in NAD  HEENT: Conjunctiva clear; Oropharynx clear  Neck: No JVD noted, Supple  CV- Normal S1, S2 with no murmurs,gallops,rubs  Resp- Lungs decreased BS Bilaterally, rales at bases, Unlabored  Abdomen- NTND, BS normoactive x4 quads, soft  Extrem- No cyanosis, clubbing, edema.  Skin- No rashes, lesions, ulcers  Neuro: awake, pleasantly disoriented, no FND      Laboratory:  CBC:   Recent Labs  Lab 04/16/17 0701   WBC 6.87   RBC 3.05*   HGB 8.3*   HCT 26.1*      MCV 86   MCH 27.2   MCHC 31.8*     BMP:   Recent Labs  Lab 04/16/17  0701   GLU 72   CL 98   CO2 26   BUN 15   CREATININE 3.1*   CALCIUM 7.8*   MG 1.9     CMP:   Recent Labs  Lab 04/16/17  0701   GLU 72   CALCIUM 7.8*   ALBUMIN 2.7*   PROT 5.5*   *   K 4.1   CO2 26   CL 98   BUN 15   CREATININE 3.1*   ALKPHOS 69   ALT 10   AST 93*   BILITOT 1.9*       Diagnostic Results:  Labs: Reviewed  ECG: Reviewed  X-Ray: Reviewed    ASSESSMENT/PLAN:      Active Hospital Problems    Diagnosis  POA    *Acute encephalopathy [G93.40]  Yes    Oral thrush [B37.0]  Yes    Abnormal LFTs [R79.89]  Yes    Acute blood loss anemia [D62]  Yes    Bradycardia, drug induced [R00.1, T50.905A]  Yes    Closed displaced fracture of right femoral neck s/p hemiarthroplasty on 4/5/2017 [S72.001A]  Yes    3-vessel CAD [I25.10]  Yes    PAF (paroxysmal atrial fibrillation) [I48.0]  Yes    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       ESRD on IHD TTS   Out patient [facility] - [facility]/ Dr. Kaminski  On HD for: ~ 1 year  Duration of outpatient dialysis session - 3.75 hrs  EDW - 54 kg  Residual Mary Ann Function - minimal  - Will provide dialysis for metabolic clearance and volume management in am she has abuot 3-4 lts of fluid  - Sinus bradycardia amiodarone and BB stopped   - Target ultrafiltration 3-4 lts as tolerated  - Dialysate will be adjusted to current labs   Aceess: EDILBERTO AVF with good thrill and bruit     Active problem in hospital  - AMS     Anemia of Chronic Kidney Disease   - Will resume CURTIS with HD   - Hg 8.3 at goal   - Will request iron studies to assess further needs of supplementation      Mineral Bone Disease in CKD   - Renal Function Panel Daily for electrolytes monitoring  - Phos 3.0  - Already on binders as outpatient Please continue Renvela 0.8 gms AC and scnacks  - CoCa WNL     Nutrition   - Renal Diet     HTN   - Improving BP, will continue to monitor. Goal for BP is <130 mmHg SBP and BDP <80 mmHg.   - Cont Bidil 1 tab  TID  - Coreg on hold secondary to bradycardia     Case discuss with Dr. Kaminski further recs with attestation.     Octavio Gong MD  Nephrology Fellow PGY4  [phone]    ATTENDING PHYSICIAN ATTESTATION  I have personally interviewed and examined the patient. I thoroughly reviewed the demographic, clinical, laboratorial and imaging information available in medical records. I agree with the assessment and recommendations provided by the subspecialty resident. Dr. Gong was under my supervision.

## 2017-04-17 NOTE — PT/OT/SLP EVAL
Occupational Therapy  Evaluation    Padmini Miranda   MRN: [id number]   Admitting Diagnosis: Acute encephalopathy    OT Date of Treatment: 17   OT Start Time: 1433  OT Stop Time: 1458  OT Total Time (min): 25 min    Billable Minutes:  Evaluation 15  Therapeutic Exercise 10    Diagnosis: Acute encephalopathy   S/p R NICHELLE    Past Medical History:   Diagnosis Date    3-vessel CAD     Acquired hypothyroidism     ALLERGIC RHINITIS     Anemia in ESRD (end-stage renal disease) 2017    Anticoagulant long-term use     Atherosclerosis of native artery 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 2016    Atherosclerotic PVD with ulceration 2016    Blood transfusion     Cataract     Chronic combined systolic and diastolic heart failure 2015    Closed displaced fracture of right femoral neck s/p hemiarthroplasty on 2017    Cramp of both lower extremities 2016    ESRD on hemodialysis 2015    Hyperlipidemia     Ischemic cardiomyopathy 10/20/2015    Non-ST elevation MI (NSTEMI) 2015    PAF (paroxysmal atrial fibrillation) 3/10/2017    Renovascular hypertension 2015    Sinus brea-tachy syndrome 2017    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      Past Surgical History:   Procedure Laterality Date    ANGIOPLASTY      Renal PTA    CARDIAC CATHETERIZATION       SECTION      HIP FRACTURE SURGERY Right 2017    Hemiarthroplasty for displaced femoral neck fracture    HYSTERECTOMY      ovaries intact    RENAL ARTERY STENT      TONSILLECTOMY      VASCULAR SURGERY         Referring physician: Jalyn Saldana  Date referred to OT: 4/15    General Precautions: Standard, fall, aspiration (cardiac)  Orthopedic Precautions: RLE weight bearing as tolerated, RLE posterior precautions  Braces: N/A    Do you have any cultural, spiritual, Anabaptist conflicts, given your current situation?: none     Patient History:  Living  "Environment  Lives With: child(tania), adult  Living Arrangements: house  Home Accessibility: other (see comments) (no concerns)  Home Layout: Able to live on 1st floor  Stair Railings at Home: none  Transportation Available: car, family or friend will provide  Living Environment Comment: Pt lives with daughter in a 1 story house with 0 BENNY.  Pt was (I) PTA and was using no DME except for shower chair during bathing.  She will be alone during the day 2* daughter works but she will have assist after daughter returns from work.   Equipment Currently Used at Home: rollator, shower chair    Prior level of function:   Bed Mobility/Transfers: independent  Grooming: independent  Bathing: needs device  Upper Body Dressing: independent  Lower Body Dressing: independent  Toileting: independent  Home Management Skills: independent  Mode of Transportation: Car, Family, Friends  Occupation: Retired     Dominant hand: right    Subjective:  Communicated with RN prior to session.  "I cant do this.  I need to sit down."  Chief Complaint: weakness; feel like falling; heel pain   Patient/Family stated goals: for pt to walk again (family); to sit gown (pt)    Pain Rating: other (see comments) (did not quantify)  Location - Side: Left  Location - Orientation: generalized  Location: heel  Pain Addressed: Reposition, Distraction, Cessation of Activity, Nurse notified  Pain Rating Post-Intervention: other (see comments) (did not quantify; appeared to be in NAD)    Objective:  Patient found with: peripheral IV, telemetry, pulse ox (continuous), pressure relief boots, oxygen (IV not connected)    Cognitive Exam:  Oriented to: x4  Follows Commands/attention: Follows one-step commands  Communication: clear/fluent  Memory:  No Deficits noted  Safety awareness/insight to disability: impaired  Coping skills/emotional control: Appropriate to situation    Visual/perceptual:  Intact    Physical Exam:  Postural examination/scapula alignment: Rounded " shoulder and Head forward  Skin integrity: Visible skin intact  Edema: None noted     Sensation:   Intact    Upper Extremity Range of Motion:  Right Upper Extremity: WFL for age; shoulder deficits  Left Upper Extremity: WFL for age; shoulder deficits     Upper Extremity Strength:  Right Upper Extremity: Deficits: 4-/5  Left Upper Extremity: Deficits: 4-/5   Strength: 4-/5    Fine motor coordination:   Intact    Gross motor coordination: WFL    Functional Mobility:  Bed Mobility:  Scooting/Bridging: Maximum Assistance  Supine to Sit: Maximum Assistance    Transfers:  Sit <> Stand Assistance: Maximum Assistance (from EOB x3 trials)  Sit <> Stand Assistive Device: Rolling Walker (VC's for hand placement)  Bed <> Chair Technique: Stand Pivot  Bed <> Chair Transfer Assistance: Maximum Assistance  Bed <> Chair Assistive Device: No Assistive Device    Functional Ambulation: 3 small steps during stand pivot to chair with RW and Max A.  Mod to Max VC's    Activities of Daily Living:  Feeding Level of Assistance:  (complete prior to OT presence)  UE Dressing Level of Assistance: Moderate assistance (gown mgmt)  LE Dressing Level of Assistance: Total assistance (L sock)  Grooming Position: Seated, EOB  Grooming Level of Assistance: Stand by assistance (face washing)  Toileting Level of Assistance: Activity did not occur  Bathing Level of Assistance: Activity did not occur    Balance:   Static Sit: FAIR: Maintains without assist, but unable to take any challenges   Dynamic Sit: FAIR: Cannot move trunk without losing balance  Static Stand: 0: Needs MAXIMAL assist to maintain   Dynamic stand: 0: N/A    Therapeutic Activities and Exercises:  Pt and multiple family members educated on OT role and POC; educated to call RN for transfer back to bed.  Hip prec's discussed with pt demo'ing limited adherence of these during gait; will require continued education on this.     Completed reaching tasks at EOB x5 reps with each UE in  "forwards and lateral plane to promote postural awareness.     AM-PAC 6 CLICK ADL  How much help from another person does this patient currently need?  1 = Unable, Total/Dependent Assistance  2 = A lot, Maximum/Moderate Assistance  3 = A little, Minimum/Contact Guard/Supervision  4 = None, Modified [place]/Independent    Putting on and taking off regular lower body clothing? : 1  Bathing (including washing, rinsing, drying)?: 1  Toileting, which includes using toilet, bedpan, or urinal? : 2  Putting on and taking off regular upper body clothing?: 2  Taking care of personal grooming such as brushing teeth?: 3  Eating meals?: 3  Total Score: 12    AM-PAC Raw Score CMS "G-Code Modifier Level of Impairment Assistance   6 % Total / Unable   7 - 9 CM 80 - 100% Maximal Assist   10 - 14 CL 60 - 80% Moderate Assist   15 - 19 CK 40 - 60% Moderate Assist   20 - 22 CJ 20 - 40% Minimal Assist   23 CI 1-20% SBA / CGA   24 CH 0% Independent/ Mod I       Patient left up in chair with all lines intact, call button in reach, RN notified and family present    Assessment:  Padmini Miranda is a 85 y.o. female with a medical diagnosis of Acute encephalopathy and presents with good motivation and participation.  She remains restricted with endurance, safety, and spontaneous engagement in ADLs.  She remains a high fall risk and would benefit from continued OT services to improve quality of life and potential to return as (I) caregiver to self.    Rehab identified problem list/impairments: Rehab identified problem list/impairments: weakness, impaired endurance, impaired self care skills, impaired functional mobilty, gait instability, impaired balance, decreased coordination, impaired cognition, decreased upper extremity function, decreased lower extremity function, decreased ROM, pain, decreased safety awareness, impaired coordination, impaired cardiopulmonary response to activity, orthopedic precautions    Rehab potential is " good.    Activity tolerance: Good    Discharge recommendations: Discharge Facility/Level Of Care Needs: nursing facility, skilled     Barriers to discharge: Barriers to Discharge: Decreased caregiver support    Equipment recommendations: walker, rolling, bedside commode, bath bench, hip kit     GOALS:   Occupational Therapy Goals        Problem: Occupational Therapy Goal    Goal Priority Disciplines Outcome Interventions   Occupational Therapy Goal     OT, PT/OT Ongoing (interventions implemented as appropriate)    Description:  Goals to be met by: 4/27     Patient will increase functional independence with ADLs by performing:    Feeding with Set-up Assistance.  UE Dressing with Minimal Assistance.  LE Dressing with Moderate Assistance or Mod (I).   Grooming while standing with Supervision.  Toileting from bedside commode with Moderate Assistance for hygiene and clothing management.   Sitting at edge of bed x5 minutes with Supervision.  Stand pivot transfers with Minimal Assistance.                PLAN:  Patient to be seen 5 x/week to address the above listed problems via self-care/home management, therapeutic activities, therapeutic exercises, neuromuscular re-education, cognitive retraining  Plan of Care expires: 05/17/17  Plan of Care reviewed with: patient, family    OT G-codes  Functional Assessment Tool Used: [facility]  Score: 12  Functional Limitation: Self care  Self Care Current Status (): CL  Self Care Goal Status (): SARAY Neff  04/17/2017

## 2017-04-17 NOTE — PT/OT/SLP PROGRESS
Physical Therapy  Treatment    Padmini Miranda   MRN: [id number]   Admitting Diagnosis: Acute encephalopathy    PT Received On: 17  PT Start Time: 1433     PT Stop Time: 1458    PT Total Time (min): 25 min       Billable Minutes:  Therapeutic Activity 25    Treatment Type: Treatment  PT/PTA: PT     PTA Visit Number: 0       General Precautions: Standard, aspiration, nectar thick, fall  Orthopedic Precautions: RLE weight bearing as tolerated, RLE posterior precautions   Braces: N/A    Do you have any cultural, spiritual, Shinto conflicts, given your current situation?: none noted    Subjective:  Communicated with RN prior to session.      Pain Ratin/10  Location - Side: Right     Location: hip (left heel)  Pain Addressed: Reposition  Pain Rating Post-Intervention: 6/10    Objective:   Patient found with: peripheral IV, telemetry, oxygen    Functional Mobility:  Bed Mobility:   Supine to Sit: Maximum Assistance    Transfers:  Sit <> Stand Assistance: Total Assistance (3 trials.  Required max assist of one person with 2nd person guardign at legs to ensure safety.  )  Sit <> Stand Assistive Device: Rolling Walker    Gait:   Gait Distance: 4 small steps backwards on first standing trial.  6 lateral steps on 2nd trial, and 6 very small steps in completing pivot to B[place]air on third trial  Assistance 1: Maximum assistance  Gait Assistive Device: Rolling walker  Gait Pattern: 3-point gait  Gait Deviation(s): decreased rogelio, decreased step length, decreased velocity of limb motion    Balance:   Static Sit: FAIR-: Maintains without assist but inconsistent   Dynamic Sit: FAIR: Cannot move trunk without losing balance  Static Stand: POOR: Needs MODERATE assist to maintain  Dynamic stand: POOR: N/A     Therapeutic Activities and Exercises:  Pt performed seated EOB activities including washing face, and performing 5 reps of UE reaching with bilateral UE.  Pt educated on hip precautions but had difficulty with  recall.    While sitting, pt educated on LE positioning.  In sitting, pt's RLE noteably internally rotated at rest.  Pt and family educated on progress and need for return to SNF and continued PT to progress mobility     AM-PAC 6 CLICK MOBILITY  How much help from another person does this patient currently need?   1 = Unable, Total/Dependent Assistance  2 = A lot, Maximum/Moderate Assistance  3 = A little, Minimum/Contact Guard/Supervision  4 = None, Modified [place]/Independent    Turning over in bed (including adjusting bedclothes, sheets and blankets)?: 2  Sitting down on and standing up from a chair with arms (e.g., wheelchair, bedside commode, etc.): 2  Moving from lying on back to sitting on the side of the bed?: 2  Moving to and from a bed to a chair (including a wheelchair)?: 2  Need to walk in hospital room?: 2  Climbing 3-5 steps with a railing?: 2  Total Score: 12    AM-PAC Raw Score CMS G-Code Modifier Level of Impairment Assistance   6 % Total / Unable   7 - 9 CM 80 - 100% Maximal Assist   10 - 14 CL 60 - 80% Moderate Assist   15 - 19 CK 40 - 60% Moderate Assist   20 - 22 CJ 20 - 40% Minimal Assist   23 CI 1-20% SBA / CGA   24 CH 0% Independent/ Mod I     Patient left up in chair with all lines intact, call button in reach and PCT notified.    Assessment:  Padmini Miranda is a 85 y.o. female with a medical diagnosis of Acute encephalopathy and presents with impaired balance and safety with transfers and out of bed mobility.  Strength and balance for transfers has improved since previous session, but she currently continues to require max assist of 1 person with a 2nd person present for safety.  She additionally continues to have limtied endurance for mobiltiy tasks.  She will continue to benefit from PT to rehab her post NICHELLE with complication of encephalopathy and general weakness.  She will require SNF placement upon d/c from the acute care hospital. .    Rehab identified problem  list/impairments: Rehab identified problem list/impairments: weakness, impaired balance, pain, impaired endurance, impaired cardiopulmonary response to activity, decreased lower extremity function    Rehab potential is good.    Activity tolerance: Good    Discharge recommendations: Discharge Facility/Level Of Care Needs: nursing facility, skilled     Barriers to discharge: Barriers to Discharge: Decreased caregiver support    Equipment recommendations: Equipment Needed After Discharge: walker, rolling, bedside commode     GOALS:   Physical Therapy Goals        Problem: Physical Therapy Goal    Goal Priority Disciplines Outcome Goal Variances Interventions   Physical Therapy Goal     PT/OT, PT Ongoing (interventions implemented as appropriate)     Description:  Goals to be met by: 17     Patient will increase functional independence with mobility by performin. Supine to sit with MInimal Assistance  2. Sit to supine with MInimal Assistance  3. Rolling to Left and Right with Set-up Assistance.  4. Sit to stand transfer with Minimal Assistance  5. Bed to chair transfer with Moderate Assistance using Rolling Walker  6. Gait  x 30 feet with Moderate Assistance using Rolling Walker.   7. Lower extremity exercise program x15 reps per handout, with assistance as needed                PLAN:    Patient to be seen 6 x/week  to address the above listed problems via gait training, therapeutic activities, therapeutic exercises  Plan of Care expires: 17  Plan of Care reviewed with: patient         Slava Boo, PT  2017

## 2017-04-17 NOTE — CONSULTS
[facility]  Endocrinology  Diabetes Consult Note    Consult Requested by: Jalyn Saldana MD   Reason for admit: Acute encephalopathy    HISTORY OF PRESENT ILLNESS:  Reason for Consult: Management of hypothyroidism     Surgical Procedure and Date:   Right hip hemiarthroplasty for femoral neck fracture 4/5/17  PTA left SFA for PAD on 4/3/17    HPI:   Patient is a 85 y.o. female with a diagnosis of P-Afib, CHF, CAD, PAD, ESRD on HD with anemia and secondary HPT, HTN, hypothyroidism who is admitted for increase lethargy and hypersomnolence. She was recently admitted and discharged to SNF after prolonged hospitalization for right hip fx 2/2 mechanical fall. Post-op, she became hypotensive, hypothermic, and bradycardic. Cards rec stopping amio and coreg. TSH was high 21 with normal FT4 and LT4 was increased from 50-->75mcg daily. She was also started on midodrine to maintain BP. Endo consulted for eval of hypothyroidism contributing to encephalopathy. Infectious w/u unrevealing. MRI brain negative for acute stroke/bleed.     HR 40-60s  BP is stable but on midodrine   Hyponatremia 125-135   Lower limit BG 58-60s  GFR at baseline, intermittent hyperkalemia     TSH improved from 21-->15 in 5 days after increasing LT4 to 75mcg daily   FT4 0.88-->0.81 and has always been within normal  Low FT3 <1  7-am cortisol 14.7, albumin 2.7    No recent steroid use   Hx of PO prednisone and IA injections in 1/2015 and 9/2015 for gout   Hx of solumedrol in 1/2015 for bronchitis   No steroid use perioperatively in 4/2017                PMH, PSH, FH, SH updated and reviewed       Review of Systems  Constitutional: Positive for activity change (decreased activity) and fatigue. Negative for chills and fever.   HENT: Positive for sore throat.   Respiratory: Negative for cough and shortness of breath.   Cardiovascular: Negative for chest pain.   Gastrointestinal: Negative for abdominal pain and nausea.   Musculoskeletal:  Positive for myalgias (Right hip pain). Negative for back pain.   Skin: Negative for rash.   Neurological: Positive for weakness (Generalized). Negative for dizziness.   Psychiatric/Behavioral: Positive for decreased concentration. Negative for agitation, confusion and hallucinations.        PHYSICAL EXAMINATION:  Vitals:    04/17/17 0945   BP: (!) 108/32   Pulse: (!) 59   Resp:    Temp:      Body mass index is 20.49 kg/(m^2).    Physical Exam     Constitutional: She appears listless.   HENT:   Mouth/Throat: Oropharynx is clear and moist.   Eyes: Conjunctivae are normal. No scleral icterus.   Neck: Neck supple. No JVD present. No thyromegaly   Cardiovascular: Regular rhythm and normal heart sounds. Bradycardia present. Exam reveals no gallop and no friction rub.   No murmur heard.  Pulmonary/Chest: Effort normal. She has rales (Mild rales in bases).   Abdominal: Soft. Bowel sounds are normal. She exhibits no distension. There is no tenderness.   Musculoskeletal: She exhibits edema    Neurological: She appears listless.   Skin: Skin is warm and dry.   Psychiatric: Her affect is blunt. Her speech is delayed. She is slowed.   Dry skin   Delayed bicipital reflex    Labs Reviewed and Include     Recent Labs  Lab 04/17/17  0640   GLU 64*   CALCIUM 8.4*   ALBUMIN 2.8*   PROT 5.8*   *   K 4.4   CO2 24   CL 96   BUN 25*   CREATININE 4.2*   ALKPHOS 72   ALT 17   *   BILITOT 2.1*     Lab Results   Component Value Date    WBC 8.71 04/17/2017    HGB 8.3 (L) 04/17/2017    HCT 27.0 (L) 04/17/2017    MCV 89 04/17/2017     04/17/2017       Recent Labs  Lab 04/15/17  1239   TSH 15.558*   FREET4 0.81     No results found for: HGBA1C    Nutritional status:   Body mass index is 20.49 kg/(m^2).  Lab Results   Component Value Date    ALBUMIN 2.8 (L) 04/17/2017    ALBUMIN 2.7 (L) 04/16/2017    ALBUMIN 3.2 (L) 04/15/2017     No results found for: PREALBUMIN    Estimated Creatinine Clearance: 7.7 mL/min (based on Cr of  4.2).    Accu-Checks  No results for input(s): POCTGLUCOSE in the last 72 hours.     ASSESSMENT and PLAN    Acquired hypothyroidism  TFTs have always been c/w subclinical hypothyroidism in that FT4 normal with high TSH. However TSH>20 usually c/w overt hypothyroidism   Etiology unknown: amio vs autoimmune (can check TPO for completeness but wont alter management at this point)     Remote hx of hypothermia post-op (resolved) and now ongoing brea + hyponatremia + near hypoglycemia + hypotension (controlled w midodrine) in setting of precipitating event (hip surgery) is concerning for features of hypothyroidism (Clinically not myxedema), but she does not have biochemical evidence of profound hypothyroidism given detectable FT4.     It is difficult to interpret FT4 since it was checked presumably after taking LT4.   Also concern for decreased absorption if gut edema present     Hypotension+ hyponatremia+ near hypoglycemia is also worrisome for AI that is relative and precipitated by stress/srugery, but, biochemically appears to have adequate cortisol   (7am cortisol 14.7, which is likely even higher for degree of hypolabuminemia), making AI less likely, but definitive way to r/o is with ACTH stim (cortisol>18 r/o AI) test which has been ordered.     LT4 Weight based dose 1.6x50kg = 80, so rounded to 75mcg   Switch to equivalent IV LT4 40mcg daily, check FT4 daily   If ACTH stim test confirms AI (cortisol <18) then will want to give IV HC 50mg BEFORE IV LT4     Would have high suspicion for infection even if source not yet identified and would consider empiric abx        ESRD on hemodialysis  Per renal      Closed displaced fracture of right femoral neck s/p hemiarthroplasty on 4/5/2017 = osteoporosis   Needs o/p DEXA and optimization of pth, vit D,  phos as her esrd does not allow other med mgmt   Replete vit d rec ergocal 50K iu weekly      Bradycardia  See above      Hyponatremia  See above        Plan discussed with  patient, family, and RN at bedside.     Yenifer Oshea MD  Endocrinology  [facility], Cherri Crain MD,  have personally taken the history and examined the patient and agree with the resident's note as stated above.      i dont think her symptoms are due to myxedema - you can see a high tsh and low ft3 in sick euthyroid   Ok to change to iv lt4 as above  doubt AI with am cortisol > 12   Ok to treat empirically with HC if bp issues not responsive to fluids (relative AI)

## 2017-04-17 NOTE — PLAN OF CARE
Problem: Occupational Therapy Goal  Goal: Occupational Therapy Goal  Goals to be met by: 4/27     Patient will increase functional independence with ADLs by performing:    Feeding with Set-up Assistance.  UE Dressing with Minimal Assistance.  LE Dressing with Moderate Assistance or Mod (I).   Grooming while standing with Supervision.  Toileting from bedside commode with Moderate Assistance for hygiene and clothing management.   Sitting at edge of bed x5 minutes with Supervision.  Stand pivot transfers with Minimal Assistance.  Outcome: Ongoing (interventions implemented as appropriate)  OT eval completed.  POC set 5x per week with goals due x10 days.   SARAY Underwood  4/17/2017

## 2017-04-17 NOTE — PLAN OF CARE
Problem: Patient Care Overview  Goal: Plan of Care Review  Outcome: Ongoing (interventions implemented as appropriate)  4hr hd completed, net fluid [id number], target goal achieved, pt recieved 25gms albumin for b/p support,  pt tolerated well with b/p maintaining within parameters. Report given to Roxanne MARTINEZ.

## 2017-04-17 NOTE — PROGRESS NOTES
4HR maintenance HD treatment initiated via Lt  Upper Arm AVF without difficulty, good a/v flows obtained.

## 2017-04-17 NOTE — PROGRESS NOTES
Pt taken to Dialysis. Pt AAOx4. Stable VS. Fall precautions in place. No complaints of pain or signs of distress.

## 2017-04-17 NOTE — PROGRESS NOTES
[facility]  Consult Note Nephrology    Admit Date: 4/15/2017  Consult Requested By: Jalyn Saldana MD   LOS: 2 days     SUBJECTIVE:     Follow-up For:  ESRD    Interval History:  CLOVIS, seen in [place] while on HD tolerated well on HD no complications or complians BP tolerated UF 3 lts    Review of Systems:  Constitutional: no fever or chills  Respiratory: no cough or shortness of breath  Cardiovascular: no chest pain or palpitations  Gastrointestinal: no nausea or vomiting, no abdominal pain or change in bowel habits  Hematologic/Lymphatic: no easy bruising or lymphadenopathy  Musculoskeletal: no arthralgias or myalgias  Neurological: no seizures or tremors      OBJECTIVE:     Vital Signs (Most Recent)  Temp: 97.6 °F (36.4 °C) (04/17/17 1230)  Pulse: 60 (04/17/17 1500)  Resp: 17 (04/17/17 0730)  BP: (!) 138/36 (04/17/17 1230)  SpO2: 98 % (04/17/17 1230)    Vital Signs Range (Last 24H):  Temp:  [97.6 °F (36.4 °C)-98.4 °F (36.9 °C)]   Pulse:  [43-63]   Resp:  [14-17]   BP: ()/(26-56)   SpO2:  [97 %-100 %]       Intake/Output Summary (Last 24 hours) at 04/17/17 1741  Last data filed at 04/17/17 1400   Gross per 24 hour   Intake             1240 ml   Output             3800 ml   Net            -2560 ml         Physical Exam:   General: Well developed, well nourished in NAD  HEENT: Conjunctiva clear; Oropharynx clear  Neck: No JVD noted, Supple  CV- Normal S1, S2 with no murmurs,gallops,rubs  Resp- Lungs CTA Bilaterally, Unlabored  Abdomen- NTND, BS normoactive x4 quads, soft  Extrem- No cyanosis, clubbing,  2 + pitting edema.  Skin- No rashes, lesions, ulcers  Neuro: awake, Oriented x3, no FND      Laboratory:  CBC:   Recent Labs  Lab 04/17/17  0640   WBC 8.71   RBC 3.04*   HGB 8.3*   HCT 27.0*      MCV 89   MCH 27.3   MCHC 30.7*     BMP:   Recent Labs  Lab 04/17/17  0640   GLU 64*   CL 96   CO2 24   BUN 25*   CREATININE 4.2*   CALCIUM 8.4*   MG 2.1     CMP:   Recent Labs  Lab  04/17/17  0640   GLU 64*   CALCIUM 8.4*   ALBUMIN 2.8*   PROT 5.8*   *   K 4.4   CO2 24   CL 96   BUN 25*   CREATININE 4.2*   ALKPHOS 72   ALT 17   *   BILITOT 2.1*     PTH: No results for input(s): PTH in the last 168 hours.  Coagulation:   Recent Labs  Lab 04/15/17  1239   INR 1.4*     Cardiac Markers: No results for input(s): CKMB, TROPONINT, MYOGLOBIN in the last 168 hours.  ABGs: No results for input(s): PH, PCO2, HCO3, POCSATURATED, BE in the last 168 hours.    Labs reviewed  Diagnostic Results:  X-Ray: Reviewed  US: Reviewed  Echo: Reviewed    ASSESSMENT/PLAN:     Active Hospital Problems    Diagnosis  POA    *Acute encephalopathy [G93.40]  Yes    Bibasilar crackles [R09.89]  Yes    Oral thrush [B37.0]  Yes    Abnormal LFTs [R79.89]  Yes     Chronic    Hyponatremia [E87.1]  Yes    Acute blood loss anemia [D62]  Yes    Bradycardia [R00.1]  Yes    Bradycardia, drug induced [R00.1, T50.905A]  Yes    Closed displaced fracture of right femoral neck s/p hemiarthroplasty on 4/5/2017 [S72.001A]  Yes    3-vessel CAD [I25.10]  Yes    PAF (paroxysmal atrial fibrillation) [I48.0]  Yes    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     ESRD on IHD TTS   Out patient HD [facility] - [facility]/ Dr. Kaminski  On HD for: ~ 1 year  Duration of outpatient dialysis session - 3.75 hrs  EDW - 54 kg  Residual Mary Ann Function - no  - Will provide dialysis for metabolic clearance and volume management x 4 hrs  - Seen and examined today during hemodialysis; tolerating treatment  well without issues. Denied headaches, chest pain, abdominal pain, or muscle cramps   -   - Target ultrafiltration 3 lts  - Dialysate adjusted to current labs   Aceess: EDILBERTO AVF with good thrill and bruit      Active problem in hospital  - AMS      Anemia of Chronic Kidney Disease   - Will resume CURTIS with HD EPO 3000 uts  - Hg 8.3 at goal   - Will request iron studies to assess further needs of supplementation       Mineral Bone Disease in CKD   - Renal Function Panel Daily for electrolytes monitoring  - Phos 3.0  - Already on binders as outpatient Please continue Renvela 0.8 gms AC and scnacks  - CoCa WNL      Nutrition   - Renal Diet      HTN   - Controlled BP, will continue to monitor. Goal for BP is <130 mmHg SBP and BDP <80 mmHg.   - Cont Bidil 1 tab TID  - Coreg on hold secondary to bradycardia      Case discuss with Staff further recs with attestation.      Octavio Gong MD  Nephrology Fellow PGY4  [phone]

## 2017-04-17 NOTE — PROGRESS NOTES
Cardiology Consults  Progress Note                                                              Team: [facility]OSP MED H     Patient Name: Padmini Miranda   YOB: 1931  Location: 386/[facility]    Admit Date: 4/15/2017                     LOS: 2    SUBJECTIVE     INTERVAL HISTORY: AFVSSCLOVIS.  Alert and oriented x4.  Patient currently at dialysis. Complains of lightheadedness when tilting head back.  Complains of pain on L heel on Achilles tendon, no ulcer noted.  Denies chest pain, chest discomfort, SOB, palpitations.    HPI: Ms. Miranda is an 84 yo woman with PMHx of CHF, 3 vessel CAD, PAD (with recent PTA of left SFA on 4/3), HTN, hypothyroidism, and ESRD on HD that presents from HD today because of worsening lethargy, decreased concentration, and concern for a stroke. Family also noted that patient appeared to be slurring her words and noted right eye was drooping and patient was having difficulty staying awake and due to thus concerns was sent to [facility]. CT and MRI head and brain demonstrated no acute intracranial abnormalities. Patient's daughters at bedside reported that for past 1 week patient has been sleeping more than usual and has been less aware.      Pt was recently discharged after being admitted for L hip arthroplasty. Post surgery she was noted to have bradycardia and EP was consulted and recommended holding amiodarone and coreg. Despite not receiving these meds she has continued to have persistent bradycardia and has been fatigued while at SNF. Synthroid was adjusted from 50 mcg to 75 mcg daily to treat hypothyroidism.    REVIEW OF SYSTEMS:  General: No syncope, fatigue, fevers, chills, nausea, or vomiting  HEENT: No HAs; No change in vision or pallor  Cardiac: No angina, palpitations, orthopnea, or PND  Pulmonary: No dyspnea, cough, hemoptysis, or wheezing  GI: No abdominal distention, pain, constipation, or diarrhea  : No dysuria, urgency, frequency,  "hematuria  Hematologic/Lymphatic: No night sweats, easy bruising, or LAD  Extremities: No claudication, arthralgias, or myalgias  Derm: No cyanosis or rash  Neuro: No focal weakness or numbness      MEDICATIONS:  Scheduled:   sodium chloride 0.9%   Intravenous Once    apixaban  2.5 mg Oral BID    aspirin  81 mg Oral Daily    atorvastatin  40 mg Oral QAM    epoetin syed  3,000 Units Subcutaneous Every Tues, Thurs, Sat    levothyroxine  75 mcg Oral Before breakfast    midodrine  10 mg Oral TID    nystatin  500,000 Units Oral QID    pantoprazole  40 mg Oral Daily    polyethylene glycol  17 g Oral Daily    sevelamer carbonate  1.6 g Oral TID WM     Continuous:     PRN:  sodium chloride 0.9%, acetaminophen, albumin human 25%, ondansetron      OBJECTIVE:     VITALS  Most Recent Range (Last 24H)   BP (!) 96/50 (BP Method: cNIBP)  Pulse (!) 55  Temp 97.9 °F (36.6 °C) (Oral)   Resp 17  Ht 5' 2" (1.575 m)  Wt 50.8 kg (112 lb)  LMP  (LMP Unknown)  SpO2 98%  Breastfeeding? No  BMI 20.49 kg/m2 Temp:  [97.9 °F (36.6 °C)-98.4 °F (36.9 °C)]   Pulse:  [43-62]   Resp:  [14-20]   BP: ()/(50-56)   SpO2:  [96 %-100 %]      Intake and Output  BMI     Intake/Output Summary (Last 24 hours) at 04/17/17 0806  Last data filed at 04/17/17 0700   Gross per 24 hour   Intake              240 ml   Output                0 ml   Net              240 ml       Net I/O since admission: Body mass index is 20.49 kg/(m^2).        PHYSICAL EXAM  Constitutional: NAD  HEENT: Sclera anicteric, PERRLA, EOMI  Neck: Unable to visualize JVD, no carotid bruits  CV: RRR, + decrescendo diastolic murmur, obfuscated by systolic murmur most likely of aortic stenosis.   Pulm: CTAB, no wheezes, rales, or rhonchi  GI: Abdomen soft, NTND, +BS  Extremities: 2+ LE edema, warm and well perfused  Skin: No ecchymosis, erythema, or ulcers  Psych: AOx4, appropriate affect  Neuro: CNII-XII intact, no focal deficits      LABS  CBC/Anemia Labs Coag Labs "     Recent Labs  Lab 04/15/17  1239 04/16/17  0701 04/17/17  0640   WBC 7.61 6.87 8.71   HGB 10.2* 8.3* 8.3*   HCT 32.4* 26.1* 27.0*   MCV 87 86 89    174 194    Lab Results   Component Value Date    INR 1.4 (H) 04/15/2017    INR 1.3 (H) 04/06/2017    INR 1.2 04/04/2017        BMP     Recent Labs  Lab 04/15/17  1239 04/16/17  0701 04/17/17  0640   GLU 72 72 64*   * 133* 131*   K 4.0 4.1 4.4   CL 97 98 96   CO2 26 26 24   BUN 9 15 25*   CREATININE 2.2* 3.1* 4.2*   CALCIUM 8.5* 7.8* 8.4*      Mag & Phos LFTs     Recent Labs  Lab 04/13/17  0412 04/16/17  0701 04/17/17  0640   MG  --  1.9 2.1   PHOS 2.9 3.0 3.2      Recent Labs  Lab 04/15/17  1239 04/16/17  0701 04/17/17  0640   PROT 6.7 5.5* 5.8*   BILITOT 2.3* 1.9* 2.1*   ALKPHOS 86 69 72   * 93* 135*   ALT 11 10 17             Cardiac Enzymes Ejection Fractions/BNP   No results for input(s): CKTOTAL, CPK, TROPONINI, TROPONINT, TROPTSTAT, CPKMB, MB in the last 168 hours. EF   Date Value Ref Range Status   01/11/2017 50 55 - 65    12/04/2016 38 (A) 55 - 65    04/22/2016 43 (A) 55 - 65      No results for input(s): BNP in the last 168 hours.     No results found for: HGBA1C      Microbiology Results (last 7 days)     Procedure Component Value Units Date/Time    Urine culture [[id number]]     Order Status:  No result Specimen:  Urine     Gram stain [[id number]]     Order Status:  No result Specimen:  Urine           INVESTIGATION RESULTS    Imaging Results         US Abdomen Limited (Final result) Result time:  04/16/17 03:17:55    Final result by Gene Crowley MD (04/16/17 03:17:55)    Impression:        Multiple cysts within the right hepatic lobe measuring up to 4 cm.      Contracted gallbladder demonstrates no gross abnormalities.    Otherwise unremarkable examination.        ______________________________________     Electronically signed by resident: GENE CROWLEY MD  Date:     04/16/17  Time:    03:13            As the supervising and teaching  physician, I personally reviewed the images and resident's interpretation and I agree with the findings.          Electronically signed by: JONATHAN HERNANDEZ MD  Date:     04/16/17  Time:    03:17     Narrative:    Time of Procedure: 04/16/17 02:17:00  Accession # [id number]    Reason for study: Abnormal liver function tests.    Comparison: None.    Technique: Limited right upper quadrant ultrasound was performed.    Findings: The liver is normal in size, measuring 11.5cm.  There is a prominent largest measuring 3 point #4.0 x 3.8 cm within the inferior right hepatic lobe with thin septations.  There are additional scattered cysts measuring up to 1.1 cm throughout the right hepatic lobe.  There is a 9 mm calcification within the right hepatic lobe.  No solid masses are identified within the liver.  No intra- or extrahepatic biliary ductal dilatation. The common bile duct measures 0.4 cm.  The gallbladder is contracted and demonstrates no gross abnormalities. No evidence for cholelithiasis.  Sonographic Nieto's sign is negative.  The pancreas is not visualized secondary to midline bowel gas.  The spleen is normal in size and measures 7.4 x 3.2 cm. No ascites.            MRI Brain Without Contrast (Final result) Result time:  04/15/17 16:25:44    Final result by Morgan Farias MD (04/15/17 16:25:44)    Impression:        Sacrum motion    No evidence of recent infarction or other acute intracranial pathology.    Mild chronic microvascular ischemic changes.    Empty sella configuration.    Loss of the expected T2 flow-void in the right vertebral artery, consistent with slow flow or occlusion.      Electronically signed by: MORGAN FARIAS MD  Date:     04/15/17  Time:    16:25     Narrative:    MRI brain without contrast    04/15/17 15:43:58    Accession# [id number]    CLINICAL INDICATION: 85 year old F with   cerebral infarction    TECHNIQUE: Multiplanar multisequence MR imaging of the brain was performed without the use of  intravenous contrast. Examination mildly degraded by patient motion artifact.    COMPARISON: Head CT 04/15/2017.    FINDINGS:    No restricted diffusion to suggest the presence of an acute infarction on today's examination. Mild chronic microvascular ischemic changes in the supratentorial white matter. No parenchymal mass lesion or hemorrhage.    The ventricles are normal in size for age, without evidence of hydrocephalus.    No extra-axial blood or fluid collections. Empty sella configuration    Loss of the right vertebral artery flow void. The rest of the T2 skull base flow voids are preserved. The other T2 skull base flow voids are preserved. Bone marrow signal intensity is unremarkable.            CT Head Without Contrast (Final result) Result time:  04/15/17 14:02:13    Final result by Morgan Farias MD (04/15/17 14:02:13)    Impression:         No evidence of acute hemorrhage or major vascular distribution infarct.    Mild chronic ischemic changes.      Electronically signed by: MORGAN FARIAS MD  Date:     04/15/17  Time:    14:02     Narrative:    CT head without contrast    04/15/17 13:27:58    Accession# [id number]    CLINICAL INDICATION: 85 year old F with lethargy and slurred speech. Concern for Stroke     TECHNIQUE: Axial CT images obtained throughout the region of the head without the use of intravenous contrast. Axial, sagittal and coronal reconstructions were performed.    COMPARISON: No priors.    FINDINGS:    The ventricles are normal in size for age without evidence of hydrocephalus.    Mild chronic microvascular ischemic changes in the supratentorial white matter. 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 Small remote right cerebellar infarct. No parenchymal mass, hemorrhage, edema or major vascular distribution infarct.    No extra-axial blood or fluid collections. Empty sella configuration. Mild atherosclerotic calcification about the skull base.    The cranium  appears somewhat sclerotic, without evidence of displaced fracture. Mastoid air cells and paranasal sinuses are essentially clear.            X-Ray Chest AP Portable (Final result) Result time:  04/15/17 14:13:21    Final result by Elvis Johnson MD (04/15/17 14:13:21)    Impression:      Left greater than right pleural effusions with associated basilar airspace disease, slightly increased from 04/10/17.    Aneurysmal dilation of the descending aorta, not definitely worsened from 11/26/16 accounting for differences in technique.    Cardiomegaly.      Electronically signed by: ELVIS JOHNSON  Date:     04/15/17  Time:    14:13     Narrative:    CLINICAL HISTORY:  Stroke    TECHNIQUE: Single view portable frontal radiograph of the chest    COMPARISON: Chest radiograph 04/10/17.  CT chest 11/26/16      FINDINGS:  Support devices: Loop recorder device overlies the left chest wall.    Chest: Moderate larger of the cardiac silhouette is unchanged.  There is calcific atherosclerosis along the aortic arch with suggestion of an enlarged descending thoracic aorta measuring approximately 4.5 cm in caliber.  There is a moderate left and small right pleural effusion, slightly increased from the prior examination, with associated adjacent basilar airspace opacification, which has also slightly increased.    Upper Abdomen: Normal.    Other: N/A.                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        ASSESSMENT:   85 y.o. year old female with CAD and now persistent bradycardia and lethargy after recent hip arthroplasty and despite holding anti-hypertensive meds and amiodarone. EP consulted last time and recommended outpatient follow up and workup for possible PPM placement.       PLAN:    Persistent Sinus Bradycardia  Fatigue  - Patient's TSH is high and free T3 is low, despite recent increase in synthroid. This may very well be cause of pt's symptoms and bradycardia.  Endocrine consult placed by primary.  - Continue holding amiodarone and coreg.  - Patient's bradycardia does currently meet criteria for PPM as Class I indication (Symptomatic sinus bradycardia that results from required drug therapy for medical conditions) but only if all other causes have been ruled out first  - Rule out infectious etiology: cultures negative, no signs of acute infection, no fever or leukocytosis.  - Cards consults fellow discussed with EP marcellus on 4/17/2017 for further recs on PPM. Consult placed.  - No acute intervention necessary at this time  - Agree with HD for volume overload, appreciate nephrology assistance.    Thank you for allowing us to participate in the care of this patient.  Will continue to follow with you.    Inocencio Connell MD  PGY-1  Pager: [phone]

## 2017-04-17 NOTE — PLAN OF CARE
Plan of care reviewed with pt. VS stable. No acute events at this time. Pt more alert and opens both eyes completely. No complaints. Pt remains free from falls or injury. Pt scheduled for HD today. Bed low and locked, call light and personal belongings within reach. Will continue to monitor. Yuli Schaefer RN

## 2017-04-17 NOTE — ASSESSMENT & PLAN NOTE
TFTs have always been c/w subclinical hypothyroidism in that FT4 normal with high TSH. However TSH>20 usually c/w overt hypothyroidism   Etiology unknown: amio vs autoimmune (can check TPO for completeness but wont alter management at this point)     Remote hx of hypothermia post-op (resolved) and now ongoing brea + hyponatremia + near hypoglycemia + hypotension (controlled w midodrine) in setting of precipitating event (hip surgery) is concerning for features of myxedema/profound hypothyroidism but she does not have biochemical evidence of profound hypothyroidism given detectable FT4.   It is difficult to interpret FT4 since it was checked presumably after taking LT4.   Also concern for decreased absorption if gut edema present     Hypotension+ hyponatremia+ near hypoglycemia is also worrisome for AI that is relative and precipitated by stress/srugery, but, biochemically appears to have adequate cortisol   (7am cortisol 14.7, which is likely even higher for degree of hypolabuminemia)       Would have high suspicion for infection even if source not yet identified and would consider empiric abx    LT4 Weight based dose 1.6x50kg = 80, so rounded to 75mcg   Give stress dose HC 50mg IV q8 BEFORE LT4 is given.   Then switch to equivalent IV LT4 40mcg daily, check FT4 daily

## 2017-04-17 NOTE — PROGRESS NOTES
Pt returned to room from HD. 3L removed. VS stable. AAOx4. Nonskid socks on, call button within reach, and side rails up x2. Will continue to monitor.

## 2017-04-17 NOTE — PLAN OF CARE
12:15 PM. SW went to see pt in pts room however pt was off the floor. SW left a N SNF list for pt to review once return. SW left number on the list. Pt is not medically stable for DC yet.      RENALDO Payton, PAWAN  [id number]

## 2017-04-17 NOTE — PLAN OF CARE
Patient discharged to [facility] on 4/13/17 and readmitted on 4/15/17 for acute encephalopathy. Currently POD 12 s/p right hip hemiarthroplasty. Plan A to discharge back to [facility] as soon as medically stable. Plan B to discharge home with home health.      04/17/17 0831   Readmission Questionnaire   At the time of your discharge, did someone talk to you about what your health problems were? Yes   At the time of discharge, did someone talk to you about what to watch out for regarding worsening of your health problem? Yes   At the time of discharge, did someone talk to you about what to do if you experienced worsening of your health problem? Yes   At the time of discharge, did someone talk to you about which medication to take when you left the hospital and which ones to stop taking? Yes   At the time of discharge, did someone talk to you about when and where to follow up with a doctor after you left the hospital? Yes   What do you believe caused you to be sick enough to be re-admitted? unrelated readmit   How often do you need to have someone help you when you read instructions, pamphlets, or other written material from your doctor or pharmacy? Often   Do you have problems taking your medications as prescribed? No   Do you have any problems affording any of  your prescribed medications? No   Do you have problems obtaining/receiving your medications? No   Does the patient have transportation to healthcare appointments? Yes   Lives With child(tania), adult  (daughter)   Living Arrangements house  (1 story house)   Does the patient have family/friends to help with healtcare needs after discharge? yes   Who are your caregiver(s) and their phone number(s)? daughter- Maira Miranda [phone], daughter- Rafaela Champagne [phone]   Does your caregiver provide all the help you need? No   If no, what kind of help do you need at home? patient requires SNF- PT/OT recommendations. Patient lives with  daughter who works during the day.   Are you currently feeling confused? No   Are you currently having problems thinking? No   Are you currently having memory problems? No   Have you felt down, depressed, or hopeless? 0   Have you felt little interest or pleasure in doing things? 0   In the last 7 days, my sleep quality was: good

## 2017-04-17 NOTE — PLAN OF CARE
POD 12 s/p right hip hemiarthroplasty. Patient discharged to [facility] on 4/13/17 and readmitted on 4/15/17 for acute encephalopathy. Patient is currently a dialysis patient with [facility] @ 6AM. PT/OT ordered to eval and treat. PT/OT recommending SNF. Patient currently lives with her daughter who works during the day. CM completed discharge assessment and planning. SW and CM will continue to follow for any additional needs. Plan A to discharge back to [facility] as soon as medically stable. Plan B to discharge home with home health.     .PCP: Randy Lyles MD    Pharmacy:   [facility] [address] [place], [place] - [address]  [address]  [facility] [place] [address]  Phone: [phone] Fax: [phone]    [facility] - [place], [place] - [address]  [address]  [facility] [address]  Phone: [phone] Fax: [phone]    [facility] - [place], [place] - [address]  [address] [address]  Phone: [phone] Fax: [phone]    Payor: [employer] [facility] MEDICARE / Plan: [employer] [facility] MEDICARE / Product Type: Medicare Advantage /      04/17/17 0824   Discharge Assessment   Assessment Type Discharge Planning Assessment   Confirmed/corrected address and phone number on facesheet? Yes   Assessment information obtained from? Medical Record   Expected Length of Stay (days) 4   Communicated expected length of stay with patient/caregiver yes   Prior to hospitilization cognitive status: Alert/Oriented   Prior to hospitalization functional status: Assistive Equipment   Current cognitive status: Alert/Oriented   Current Functional Status: Assistive Equipment   Arrived From skilled nursing facility  ([facility])   Lives With child(tania), adult  (daughter)   Able to Return to Prior Arrangements yes   Is patient able to care for self after discharge? Yes   How many people do you have in your home that can help with your care  after discharge? 1   Who are your caregiver(s) and their phone number(s)? daughter- Maira Miranda [phone], daughter- Rafaela Champagne [phone]   Patient's perception of discharge disposition skilled nursing facility   Readmission Within The Last 30 Days current reason for admission unrelated to previous admission   Patient currently being followed by outpatient case management? No   Patient currently receives home health services? No   Does the patient currently use HME? Yes   Patient currently receives private duty nursing? No   Patient currently receives any other outside agency services? No   Equipment Currently Used at Home rollator;shower chair   Do you have any problems affording any of your prescribed medications? No   Is the patient taking medications as prescribed? yes   Do you have any financial concerns preventing you from receiving the healthcare you need? No   Does the patient have transportation to healthcare appointments? Yes   Transportation Available family or friend will provide   On Dialysis? Yes   If yes, what is the name of the dialysis unit? [facility] 6[facility]   Does the patient receive outpatient dialysis? Yes   Does the patient receive services at the [facility]mad[facility]? No   Are there any open cases? No   Discharge Plan A Skilled Nursing Facility   Discharge Plan B Home Health;Home with family   Patient/Family In Agreement With Plan yes

## 2017-04-17 NOTE — PLAN OF CARE
Problem: Physical Therapy Goal  Goal: Physical Therapy Goal  Goals to be met by: 17     Patient will increase functional independence with mobility by performin. Supine to sit with MInimal Assistance  2. Sit to supine with MInimal Assistance  3. Rolling to Left and Right with Set-up Assistance.  4. Sit to stand transfer with Minimal Assistance  5. Bed to chair transfer with Moderate Assistance using Rolling Walker  6. Gait x 30 feet with Moderate Assistance using Rolling Walker.   7. Lower extremity exercise program x15 reps per handout, with assistance as needed   Outcome: Ongoing (interventions implemented as appropriate)  Goals remain appropriate and not met     Slava Boo, PT  2017

## 2017-04-17 NOTE — CONSULTS
Electrophysiology Consult Note  Attending Physician: Jalyn Saldana MD  Reason for Consult: Assessment for PPM     HPI:   85 y.o. 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 Patient regularly follow with Dr. Antoine. She was recently admitted for a R hip arthroplasty on 4/5/2017 after having a mechanical fall, and at that time was found to have profound bradycardia to the 30's, though asymptomatic. Amiodarone (which had been initiated in early March) and Coreg were stopped, and patient's HR's improved to the 60's by discharge after recovery and after receiving HD. She was discharged on Eliquis only, and plan was for outpatient follow up and re-assessment for possible PPM.    Patient now re-admitted with lethargy, current workup being done by primary service for encephalopathy. EP now consulted for bradycardia and assessment for PPM at this time.    MRI Head negative.  Telemetry review shows sinus bradycardia with rates 50's to 60's, no evidence of high grade AV block.  ECG on 4/15/2017 14:55 with sinus bradycardia, rate 49 bpm, IVCD with QRS duration 124 ms.    Patient remains hypothyroid on this admission with TSH 15, Free T4 0.81, and Free T3 < 0.1.  Synthroid recently increased from 50 mcg to 75 mcg.  Endocrinology has been consulted, awaiting rec's.    ILR interrogated on 4/15/2017, AT/AF burden 10.6%, 89 bradycardia episodes and 34 tachycardia episodes. Most recent episode was sinus bradycardia with rate ~30 bpm on 4/15/2017 at 14:28.    Patient evaluated during HD today. No episodes of syncope. C/o R hip pain, but otherwise no complaints of chest pain or palpitations.    ROS:    Constitution: Negative for fever, chills, weight loss or gain.   HENT: Negative for sore throat, rhinorrhea, or headache.  Eyes: Negative for blurred or double vision.   Cardiovascular: See above  Pulmonary:  Negative for SOB   Gastrointestinal: Negative for abdominal pain, nausea, vomiting, or diarrhea.   : Negative for dysuria.   Neurological: Negative for focal weakness or sensory changes.  PMH:     Past Medical History:   Diagnosis Date    3-vessel CAD     Acquired hypothyroidism     ALLERGIC RHINITIS     Anemia in ESRD (end-stage renal disease) 2017    Anticoagulant long-term use     Atherosclerosis of native artery 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 2016    Atherosclerotic PVD with ulceration 2016    Blood transfusion     Cataract     Chronic combined systolic and diastolic heart failure 2015    Closed displaced fracture of right femoral neck s/p hemiarthroplasty on 2017    Cramp of both lower extremities 2016    ESRD on hemodialysis 2015    Hyperlipidemia     Ischemic cardiomyopathy 10/20/2015    Non-ST elevation MI (NSTEMI) 2015    PAF (paroxysmal atrial fibrillation) 3/10/2017    Renovascular hypertension 2015    Sinus brea-tachy syndrome 2017    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     Past Surgical History:   Procedure Laterality Date    ANGIOPLASTY      Renal PTA    CARDIAC CATHETERIZATION       SECTION      HIP FRACTURE SURGERY Right 2017    Hemiarthroplasty for displaced femoral neck fracture    HYSTERECTOMY      ovaries intact    RENAL ARTERY STENT      TONSILLECTOMY      VASCULAR SURGERY       Allergies:     Review of patient's allergies indicates:   Allergen Reactions    Ativan [lorazepam] Hallucinations    Crab Other (See Comments)     Causes Gout    Crayfish Other (See Comments)     Causes Gout    Promethazine Other (See Comments)     Hallucinations      Medications:     Current Facility-Administered Medications on File Prior to Encounter   Medication Dose Route Frequency Provider Last Rate Last Dose    [DISCONTINUED] acetaminophen tablet 650 mg  650 mg Oral Q6H PRN  Olena Dailey MD        [DISCONTINUED] apixaban tablet 2.5 mg  2.5 mg Oral BID Jalyn Saldana MD   2.5 mg at 04/14/17 2201    [DISCONTINUED] aspirin chewable tablet 81 mg  81 mg Oral Daily Jalyn Saldana MD   81 mg at 04/14/17 0940    [DISCONTINUED] atorvastatin tablet 40 mg  40 mg Oral QAM Jalyn Saldana MD   40 mg at 04/14/17 0610    [DISCONTINUED] docusate sodium capsule 100 mg  100 mg Oral TID Jalyn Saldana MD   100 mg at 04/14/17 1407    [DISCONTINUED] doxycycline tablet 100 mg  100 mg Oral Q12H Olena Dailey MD   100 mg at 04/14/17 2200    [DISCONTINUED] epoetin syed injection 3,000 Units  3,000 Units Subcutaneous Every Tues, Thurs, Sat Jalyn Saldana MD        [DISCONTINUED] gabapentin capsule 100 mg  100 mg Oral QHS Jalyn Saldana MD   100 mg at 04/14/17 2200    [DISCONTINUED] gelatin adsorbable 12-7 mm top sponge sponge 1 applicator  1 each Topical PRN Jalyn Saldana MD        [DISCONTINUED] levothyroxine tablet 75 mcg  75 mcg Oral Before breakfast Jalyn Saldana MD   75 mcg at 04/14/17 0609    [DISCONTINUED] metoclopramide HCl 5 mg/5 mL solution 5 mg  5 mg Oral Q6H PRN Jalyn Saldana MD   5 mg at 04/14/17 1401    [DISCONTINUED] midodrine tablet 10 mg  10 mg Oral TID Jalyn Saldana MD   10 mg at 04/15/17 0600    [DISCONTINUED] oxycodone immediate release tablet 10 mg  10 mg Oral Q4H PRN Jalyn Saldana MD   10 mg at 04/15/17 0213    [DISCONTINUED] oxycodone immediate release tablet 5 mg  5 mg Oral Q6H PRN Olena Dailey MD        [DISCONTINUED] pantoprazole EC tablet 40 mg  40 mg Oral Daily Jalyn Saldana MD   40 mg at 04/14/17 0941    [DISCONTINUED] polyethylene glycol packet 17 g  17 g Oral Daily Jalyn Saldana MD   17 g at 04/14/17 0941    [DISCONTINUED] ramelteon tablet 8 mg  8 mg Oral Nightly PRN Jalyn Saldana MD        [DISCONTINUED] sevelamer carbonate pwpk 1.6 g  1.6 g Oral TID WM Jalyn Saldana MD    1.6 g at 04/14/17 1786     Current Outpatient Prescriptions on File Prior to Encounter   Medication Sig Dispense Refill    aspirin 81 MG Chew Take 1 tablet (81 mg total) by mouth once daily. (Patient taking differently: Take 81 mg by mouth every morning. ) 30 tablet 3    atorvastatin (LIPITOR) 40 MG tablet Take 1 tablet (40 mg total) by mouth once daily. (Patient taking differently: Take 40 mg by mouth every morning. ) 30 tablet 11    ELIQUIS 2.5 mg Tab TAKE 1 BY MOUTH TWO TIMES  DAILY 180 tablet 3    epoetin syed (PROCRIT) 10,000 unit/mL injection Inject 0.28 mLs (2,800 Units total) into the skin every Tues, Thurs, Sat. 0.28 mL 30    multivitamin (ONE DAILY MULTIVITAMIN) per tablet Take 1 tablet by mouth every morning.      PROTONIX 40 mg tablet TAKE 1 BY MOUTH DAILY (Patient taking differently: TAKE 1 TABLET  BY MOUTH  DAILY) 30 tablet 5    RENVELA 0.8 gram PwPk       SYNTHROID 50 mcg tablet TAKE 1 BY MOUTH DAILY      BEFORE BREAKFAST 90 tablet 2    nitroGLYCERIN (NITROSTAT) 0.4 MG SL tablet Place 1 tablet (0.4 mg total) under the tongue every 5 (five) minutes as needed for Chest pain. 25 tablet 5       Inpatient Medications   Continuous Infusions:   Scheduled Meds:   sodium chloride 0.9%   Intravenous Once    apixaban  2.5 mg Oral BID    aspirin  81 mg Oral Daily    atorvastatin  40 mg Oral QAM    cosyntropin  0.25 mg Intravenous Once    epoetin syed  3,000 Units Subcutaneous Every Tues, Thurs, Sat    levothyroxine  75 mcg Oral Before breakfast    midodrine  10 mg Oral TID    nystatin  500,000 Units Oral QID    pantoprazole  40 mg Oral Daily    polyethylene glycol  17 g Oral Daily    sevelamer carbonate  1.6 g Oral TID WM     PRN Meds:sodium chloride 0.9%, acetaminophen, albumin human 25%, ondansetron     Social History:     Social History   Substance Use Topics    Smoking status: Never Smoker    Smokeless tobacco: Never Used    Alcohol use Yes      Comment: occasional wine use     Family  History:     Family History   Problem Relation Age of Onset    Adopted: Yes    Heart disease Father      Physical Exam:     Vitals:  Temp:  [97.7 °F (36.5 °C)-98.4 °F (36.9 °C)]   Pulse:  [43-62]   Resp:  [14-20]   BP: ()/(26-56)   SpO2:  [96 %-100 %]  I/O's:    Intake/Output Summary (Last 24 hours) at 04/17/17 1053  Last data filed at 04/17/17 0700   Gross per 24 hour   Intake              240 ml   Output                0 ml   Net              240 ml      Constitutional: NAD, conversant  HEENT: Sclera anicteric, PERRLA  Neck: No JVD, no carotid bruits  CV: Feroz, no murmur, normal S1/S2  Pulm: CTAB, no wheezes, rales, or ronchi  GI: Abdomen soft, NTND, +BS  Extremities: No LE edema, warm and well perfused  Skin: No ecchymosis, erythema, or ulcers  Psych: AOx3, appropriate affect  Neuro: No gross deficits    Labs:       Recent Labs  Lab 04/15/17  1239 04/16/17  0701 04/17/17  0640   * 133* 131*   K 4.0 4.1 4.4   CL 97 98 96   CO2 26 26 24   BUN 9 15 25*   CREATININE 2.2* 3.1* 4.2*   ANIONGAP 12 9 11       Recent Labs  Lab 04/15/17  1239 04/16/17  0701 04/17/17  0640   * 93* 135*   ALT 11 10 17   ALKPHOS 86 69 72   BILITOT 2.3* 1.9* 2.1*   ALBUMIN 3.2* 2.7* 2.8*     No results for input(s): TROPONINI, BNP in the last 168 hours.   Recent Labs  Lab 04/15/17  1239 04/16/17  0701 04/17/17  0640   WBC 7.61 6.87 8.71   HGB 10.2* 8.3* 8.3*   HCT 32.4* 26.1* 27.0*    174 194   GRAN 84.7*  6.4 79.5*  5.5 78.2*  6.8       Recent Labs  Lab 04/15/17  1239   INR 1.4*     Lab Results   Component Value Date    CHOL 193 09/16/2014    HDL 67 09/16/2014    LDLCALC 105.2 09/16/2014    TRIG 104 09/16/2014     No results found for: HGBA1C     Micro:  Blood Cultures  Lab Results   Component Value Date    LABBLOO No growth after 5 days. 04/10/2017     Urine Cultures  Lab Results   Component Value Date    LABURIN No growth 04/04/2017    LABURIN No significant growth 04/28/2014    LABURIN  11/18/2013      Multiple organisms isolated. None in predominance.  Repeat if    LABURIN clinically necessary. 2013    LABURIN No significant growth 2013       Imaging:     EF   Date Value Ref Range Status   2017 50 55 - 65    2016 38 (A) 55 - 65    2016 43 (A) 55 - 65    2015 25 (A) 55 - 65    10/14/2015 25 (A) 55 - 65      EK/15/2017 14:55: sinus bradycardia, rate 49 bpm, IVCD with QRS duration 124 ms    Telemetry: Sinus bradycardia with rates 50's to 60's, no evidence of high grade AV block    Assessment:   85 y.o. 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 Patient regularly follow with Dr. Antoine. She was recently admitted for a R hip arthroplasty on 2017 after having a mechanical fall, and at that time was found to have profound bradycardia to the 30's, though asymptomatic. Amiodarone (which had been initiated in early March) and Coreg were stopped, and patient's HR's improved to the 60's by discharge after recovery and after receiving HD. She was discharged on Eliquis only, and plan was for outpatient follow up and re-assessment for possible PPM.    Patient now re-admitted with lethargy, current workup being done by primary service for encephalopathy. EP now consulted for bradycardia and assessment for PPM at this time.    Telemetry review shows sinus bradycardia with rates 50's to 60's, no evidence of high grade AV block.  ECG on 4/15/2017 14:55 with sinus bradycardia, rate 49 bpm, IVCD with QRS duration 124 ms.    ILR interrogated on 4/15/2017, AT/AF burden 10.6%, 89 bradycardia episodes and 34 tachycardia episodes. Most recent episode was sinus bradycardia with rate ~30 bpm on 4/15/2017 at 14:28.    Plan:   Sinus bradycardia  - Unchanged from previous admission, lethargy likely 2/2 metabolic derangement and/or hypothyroidism; no syncope  - Awaiting endocrinology  rec's  - No AV shikha blocking agents at this time  - No indication for PPM at this time, but will re-assess prior to discharge, likely will follow up with Dr. Antoine as outpatient once recovered     Patient seen and examined this morning. Thank you for the opportunity to participate in the care of this interesting patient. Discussed with Dr. Geiger - staff attestation to follow.      Signed:  Wilman Quijano MD  Cardiology Fellow, PGY-4  Pager: [phone]  4/17/2017 10:53 AM

## 2017-04-17 NOTE — PLAN OF CARE
Problem: Patient Care Overview  Goal: Individualization & Mutuality  Plan of care discussed with patient. Patient is free of fall/trauma/injury. Denies CP, SOB, or pain/discomfort. Pt AAOx4. VS stable. Pt had 3L taken off in HD today. All questions addressed. Will continue to monitor

## 2017-04-17 NOTE — SUBJECTIVE & OBJECTIVE
PMH, PSH, FH, SH updated and reviewed       Review of Systems  Constitutional: Positive for activity change (decreased activity) and fatigue. Negative for chills and fever.   HENT: Positive for sore throat.   Respiratory: Negative for cough and shortness of breath.   Cardiovascular: Negative for chest pain.   Gastrointestinal: Negative for abdominal pain and nausea.   Musculoskeletal: Positive for myalgias (Right hip pain). Negative for back pain.   Skin: Negative for rash.   Neurological: Positive for weakness (Generalized). Negative for dizziness.   Psychiatric/Behavioral: Positive for decreased concentration. Negative for agitation, confusion and hallucinations.        PHYSICAL EXAMINATION:  Vitals:    04/17/17 0945   BP: (!) 108/32   Pulse: (!) 59   Resp:    Temp:      Body mass index is 20.49 kg/(m^2).    Physical Exam     Constitutional: She appears listless.   HENT:   Mouth/Throat: Oropharynx is clear and moist.   Eyes: Conjunctivae are normal. No scleral icterus.   Neck: Neck supple. No JVD present. No thyromegaly   Cardiovascular: Regular rhythm and normal heart sounds. Bradycardia present. Exam reveals no gallop and no friction rub.   No murmur heard.  Pulmonary/Chest: Effort normal. She has rales (Mild rales in bases).   Abdominal: Soft. Bowel sounds are normal. She exhibits no distension. There is no tenderness.   Musculoskeletal: She exhibits edema    Neurological: She appears listless.   Skin: Skin is warm and dry.   Psychiatric: Her affect is blunt. Her speech is delayed. She is slowed.

## 2017-04-18 NOTE — PROGRESS NOTES
[facility] Hospital Medicine  Progress Note    Patient Name: Padmini Mirnada  MRN: [id number]  Patient Class: IP- Inpatient   Admission Date: 4/15/2017  Length of Stay: 3 days  Attending Physician: Tahira Tomlinson MD  Primary Care Provider: Randy Lyles MD    [facility] Medicine Team: [facility] HOSP MED  Tahira Tomlinson MD    Subjective:     Principal Problem:Acute encephalopathy    HPI:  84 y/o with past medical history of chronic combined systolic and diastolic heart failure, 3 vessel CAD, atherosclerosis of bilateral lower extremity with left heel ulcer with recent PTA of left SFA on 4/3 by Vascular surgery, essential HTN, hypothyroidism and ESRD on HD was sent from acute dialysis today due to concern for increasing lethargy. Family also noted that patient appeared to be slurring her words and noted right eye was drooping and patient was having difficulty staying awake and due to thus concerns was sent to [facility]. CTscan of head showed no acute findings but MRI of brain has been ordered to better evaluate. Patient's daughters at bedside and report for past 1 week patient has been sleeping more than normal and not nearly as alert as she usually is. I am familiar with patient as she was just discharged from my medical service on 4/13 and patient does appear to be more sleepy than normal but when aroused in the ER patient is responding to questions appropriately. Patient reports she generally feels weak but denies any focal weakness in ER. Family is very upset as they feel patient was not progressing with PT prior to discharge to SNF on 4/13 and feel patient has not been herself for over 1 week with increased lethargy.     As noted patient was just discharged from [facility] on 4/13 to [facility] [place] after prolonged hospitalization following surgical repair of a right hip fracture. Patient even prior to that surgery was seen and evaluated by Cardiology for sinus bradycardia  with HR in 30-40's. Patient during admit was felt not to need pacemaker placement and felt bradycardia related to Amiodarone and Coreg that had been recently been discontinued. Patient eventually underwent right hip hemiarthroplasty on 4/5 posterior-op, patient noted to be hypothermic and persistently bradycardiac so patient was evaluated for possible infection but there was no concern for infection, as clinically, the patient had normal WBC and no clinical symptoms to suggest infection. TSH (normal on 3/10) was checked during recent admit and found to be severely elevated at 21.6, with low normal Free T4 of 0.88. Synthroid was adjusted from 50 mcg to 75 mcg daily to treat hypothyroidism. Patient did complain of odynophagia after surgery felt related to ET tube. Speech therapy consult who recommended MBSS and patient underwent MBSS on 4/12 and patient passed with recommendation by Speech for regular diet and nectar thickened liquids. Patient to be discharged to [facility] for continued PT/OT but needs outpatient follow-up with EP Cardiology to reassess need for pacemaker for sinus bradycardia on 4/13.       Hospital Course:  MRI of rafat unremarkable for any signs of an acute stroke or bleed to explain increased fatigue or lethargy. Patient afebrile with normal WBC so infectious etiology felt highly unlikely. Random am cortisol done was 14.7 so does not appear to be consistent with adrenal insufficiency but Endocrine consulted. Cardiology re-consulted for patient's bradycardia to see if contributing to patient's increasing lethargy and fatigue and unclear if it is causing patient's symptoms but Cardiology doubts is true cause of lethargy and recommended Endocrine consult to look for thyroid issues as cause of bradycardia and somulence. Cardiology recommends no need for pacemaker at this time and to follow-up with EP Cardiology as outpatient. Neurontin and opiates for pain discontinued in case they were contributing  to cause. Patient with recent diagnosis of hypothyroidism likely related to Amiodarone that recently was discontinued due to bradycardia. Endocrine consulted on 4/18 and evaluated patient and not worried by thyroid function but concerned for adrenal insufficiency so Cosyntropin stimulation test ordered on 4/18 to evaluate patient. Patient dialyzed by Nephrology on 4/18 a day prior to normal dialysis day as showing signs of hypervolemia. Patient slightly more alert and responsive on 4/18 and did work with PT.     Interval History: Patient complaining of increased neuropathic pain in her legs today. Neurontin held on admit due to somnolence and patient does appear more alert today. Pt continues to complain of severe Left leg pain.     Review of Systems   Constitutional: Positive for fatigue. Negative for chills and fever.   HENT: Positive for sore throat.    Respiratory: Negative for cough and shortness of breath.    Cardiovascular: Negative for chest pain.   Gastrointestinal: Negative for abdominal pain and nausea.   Musculoskeletal: Positive for myalgias (Right hip pain). Negative for back pain.   Skin: Negative for rash.   Neurological: Positive for weakness (Generalized). Negative for dizziness.   Psychiatric/Behavioral: Negative for confusion and hallucinations.     Objective:     Vital Signs (Most Recent):  Temp: 97.9 °F (36.6 °C) (04/18/17 1226)  Pulse: (!) 54 (04/18/17 1226)  Resp: 18 (04/18/17 1226)  BP: (!) 148/64 (04/18/17 1226)  SpO2: (!) 94 % (04/18/17 0842) Vital Signs (24h Range):  Temp:  [97.9 °F (36.6 °C)-99.1 °F (37.3 °C)] 97.9 °F (36.6 °C)  Pulse:  [54-62] 54  Resp:  [18-23] 18  SpO2:  [94 %-100 %] 94 %  BP: (100-148)/(33-65) 148/64     Weight: 50.8 kg (112 lb)  Body mass index is 20.49 kg/(m^2).    Intake/Output Summary (Last 24 hours) at 04/18/17 1318  Last data filed at 04/17/17 1400   Gross per 24 hour   Intake              200 ml   Output                0 ml   Net              200 ml       Physical Exam   Constitutional: No distress.   Eyes: No scleral icterus.   Neck: Neck supple. No JVD present.   Cardiovascular: Regular rhythm and normal heart sounds.  Bradycardia present.  Exam reveals no gallop and no friction rub.    No murmur heard.  Pulmonary/Chest: Effort normal. She has rales (Mild rales in bases).   Abdominal: Soft. Bowel sounds are normal. She exhibits no distension. There is no tenderness.   Musculoskeletal: She exhibits edema (1-2 + edema in lower extremities; 1+ edema in left arm).   Neurological: She is alert.   Skin: Skin is warm and dry.   Psychiatric: Her speech is delayed.       Assessment/Plan:      * Acute encephalopathy  Improved today on 4/18 as patient slightly more alert today and actually able to participate with PT and family reports some improvement. Patient afebrile with normal WBC and no bandemia so infectious etiology unlikely, but CXR is concerning.  Patient with no evidence of acute hypoxia as oxygen sats > 90 on 2 liters of oxygen. Medication list reviewed for new medications recently added and no new medications added recently that could be causing encephalopathy but Neurontin on hold as can be sedating even though patient on a very low dose but may need to restart if patient's pain in legs starts getting worse.   · MRI of brain unremarkable for acute stroke or bleed on 4/15.   · AM Cortisol level normal 14.7. Endocrine consulted and recommended Cosyntropin stim test that was ordered by them on 4/18 to evaluate for relative adrenal insufficiency and was normal.   · Unable to get urine sample so far as patient makes scant urine to check for UTI but is ordered.  · Will hold all narcotics/opiates for pain. Avoid any sleep aid agents at this time that could affect alertness level.  · Endocrine consulted on 4/18 to see if hypothyroidism is causing fatigue and lethargy but they stated no evidence of myxedema but stated they would do trial of IV Synthroid to see if helps  patient but they doubt hypothyroidism cause of lethargy.   · EP Cardiology consulted and evaluated patient to see if bradycardia causing fatigue/weakness but feel unlikely and do not recommend pacemaker placement at this time and recommended follow-up with Dr. Antoine in EP clinic as outpatient and he would consider once patient euthyroid.   · Please avoid any benzodiazepines as can worsen delirium and only should use in delirium caused by benzodiazepine or alcohol withdrawal.   · Please avoid all anticholinergic medications as can worsen delirium.   · Please keep curtains and blinds open during the day and closed during the night.   · Please continue to have nursing and family reorient patient and encourage family to visit and stay at night if possible.   · Please avoid physical restraints if possible as likely to worsen and increase agitation in delirious patient.   · Will consult ID to see if they feel there is concern for occult infection and reason to treat with antibiotics as suggested by endocrine.   · Will check Vit B12, folate, thiamine, vit D  · - consult neurology for help with workup      Acquired hypothyroidism  Appreciate Endocrine consult and they do not feel that any adjustments needed in Synthroid but will give trial of IV Synthroid as per Endocrine to see if helps patient. Endocrine notes no signs of myxedema so doubt lethargy related to thyroid disease.       Acute blood loss anemia  Hgb is stable and there are no signs of active bleeding. Hgb 8.3 so doubt cause of lethargy.  No indication to transfuse blood in this patient at this time. Nephrology to continue Epogen with HD to treat chronic anemia related to ESRD.      Oral thrush  Much improved. Patient with very mild case and will continue treatement with Nystatin swish and spit 4 times daily.       Renovascular hypertension  Bidil discontinued on previous admit due to issues with hypotension during HD and will continue to hold and continue  Midodrine to help with BP and volume removal during HD. Patient tolerating volume removal with HD on Midodrine and will continue.       Abnormal LFTs  RUQ ultrasound unremarkable except for hepatic cysts and unlikely to be causing mild increase in LFT's but LFT's are improving so no further evaluation at this time and could just be hepatic congestion from hypervolemia related to chronic renal failure in combination with chronic heart failure. Monitor daily CMP. No need to discontinue Lipitor at this time as statin not likely cause and needs for CAD and severe PVD.      Chronic combined systolic and diastolic heart failure  Patient with signs of volume overload on exam and has pleural effusions on CXR but volume status appears largely unchanged from her recent discharge on 4/13. Nephrology consulted to continue HD for fluid removal and patient had 3 liters of fluid removed during HD on 4/17. Nephrology to assess patient daily for continued volume removal in hospital. Patient's oxygen status improved and patient with O2 sats > 90% on 2 liters of oxygen.        ESRD on hemodialysis  Nephrology consulted and managing patient's HD needs in hospital. Patient dialyzed on 4/15. Patient hypervolemic on exam today so dialyzed by Nephrology today, 4/18 and 3 liters removed.       Closed displaced fracture of right femoral neck s/p hemiarthroplasty on 4/5/2017  Patient is POD # 13. Patient reports no pain in right hip currently and main pain related to her neuropathic pain. Plan to continue PT/OT for gait training and strengthening and restoration of ADL's on this admit to continue therapy. Patient is FWB/WBAT: right lower extremity, posterior hip precautions as per Orthopedic recommendations. Patient on Eliquis for long term anticoagulation for atrial fibrillation so no DVT prophylaxis needed. Pain is well controlled and will use just Tylenol for pain and avoid any sedating narcotics. Patient needs SNF placement on discharge  and likely can return to [facility] once medically stable.       Atherosclerosis of native artery 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  Patient with known heel ulcers to both legs but no signs to indicate infection. Patient recently evaluated by Vascular Surgery on 4/13 and Vascular felt ulcers were improving and did not feel any further intervention needed at this time. Continue local wound care for heel ulcers on this admit.      Bradycardia, drug induced  Slight improvement. Patient still with persistent sinus bradycardia but HR now in 50-60's instead of 40-50's. Cardiology re-evaluated patient to see if they feel bradycardia could be contributing to patient's lethargy but they doubt and do not recommend pacemaker needed at this time and recommends outpatient follow-up with Dr. Antoine in EP Cardiology clinic. Avoid any AV shikha blocking agents. Continue telemetry monitoring.       PAF (paroxysmal atrial fibrillation)  Patient is actually bradycardiac so no AV shikha blocking agents are required. Patient has an implanted loop recorder in place. Continue Eliquis for long term anticoagulation.      3-vessel CAD  Controlled. Plan to continue ASA and Lipitor to treat. No beta blocker due to bradycardia.        VTE Risk Mitigation         Ordered     apixaban tablet 2.5 mg  2 times daily     Route:  Oral        04/15/17 1618     Medium Risk of VTE  Once      04/15/17 1431          Tahira Tomlinson MD  Department of Hospital Medicine   [facility]

## 2017-04-18 NOTE — ASSESSMENT & PLAN NOTE
84 y/o with chronic combined systolic and diastolic heart failure, 3 vessel CAD, atherosclerosis of bilateral lower extremity with left heel ulcer ( recent PTA of left SFA on 4/3 by Vascular surgery), essential HTN, hypothyroidism, ESRD on HD, and recent surgical repair of left hip fracture on 4/5 who was admitted for increasing lethargy and somnolence.   Endocrine consulted to evaluate given hypothyroidism (recent adjustment of synthroid for TSH of 21), but they find no evidence of myxedema and evaluation for adrenal insufficiency is negative.   Endocrine concerned for occult infection and ID is consulted for further evaluation.       Patient is afebrile.  No leukocytosis.  No current blood cultures.  CXR does show pleural fluid, but no hypoxemia, no cough, no shortness of breath.  Urine culture pending collection, though patient is asymptomatic and produces little urine and in setting of HD, this may not be of clinical utility.     Not currently on antimicrobial therapy.   Per family, patient has clinically improved since admission, though still not back to baseline.       -  Blood cultures X 2   -  No indication for antimicrobials at this time.     - Will review data further and follow up with you tomorrow.     Patient seen by, and plan discussed with, ID staff  Discussed with Primary Team

## 2017-04-18 NOTE — CONSULTS
[facility]  Infectious Disease  Consult Note    Patient Name: Padmini Miranda  MRN: [id number]  Admission Date: 4/15/2017  Hospital Length of Stay: 3 days  Attending Physician: Paula Tomlinson MD  Primary Care Provider: Randy Lyles MD     Isolation Status: No active isolations        Inpatient consult to Infectious Diseases  Consult performed by: BRENNA MORRIS  Consult ordered by: PAULA TOMLINSON Consult acknowledged.   Full consult and recommendations to follow.   In the interim, please call for any immediate concerns.     Thank you.   Please call for any questions or concerns.  MILLA Jang, ANP-C  [phone] pager,  [phone]

## 2017-04-18 NOTE — SUBJECTIVE & OBJECTIVE
Interval History: Patient complaining of increased neuropathic pain in her legs today. Neurontin held on admit due to somnolence and patient does appear more alert today. Pt continues to complain of severe Left leg pain.     Review of Systems   Constitutional: Positive for fatigue. Negative for chills and fever.   HENT: Positive for sore throat.    Respiratory: Negative for cough and shortness of breath.    Cardiovascular: Negative for chest pain.   Gastrointestinal: Negative for abdominal pain and nausea.   Musculoskeletal: Positive for myalgias (Right hip pain). Negative for back pain.   Skin: Negative for rash.   Neurological: Positive for weakness (Generalized). Negative for dizziness.   Psychiatric/Behavioral: Negative for confusion and hallucinations.     Objective:     Vital Signs (Most Recent):  Temp: 97.9 °F (36.6 °C) (04/18/17 1226)  Pulse: (!) 54 (04/18/17 1226)  Resp: 18 (04/18/17 1226)  BP: (!) 148/64 (04/18/17 1226)  SpO2: (!) 94 % (04/18/17 0842) Vital Signs (24h Range):  Temp:  [97.9 °F (36.6 °C)-99.1 °F (37.3 °C)] 97.9 °F (36.6 °C)  Pulse:  [54-62] 54  Resp:  [18-23] 18  SpO2:  [94 %-100 %] 94 %  BP: (100-148)/(33-65) 148/64     Weight: 50.8 kg (112 lb)  Body mass index is 20.49 kg/(m^2).    Intake/Output Summary (Last 24 hours) at 04/18/17 1318  Last data filed at 04/17/17 1400   Gross per 24 hour   Intake              200 ml   Output                0 ml   Net              200 ml      Physical Exam   Constitutional: No distress.   Eyes: No scleral icterus.   Neck: Neck supple. No JVD present.   Cardiovascular: Regular rhythm and normal heart sounds.  Bradycardia present.  Exam reveals no gallop and no friction rub.    No murmur heard.  Pulmonary/Chest: Effort normal. She has rales (Mild rales in bases).   Abdominal: Soft. Bowel sounds are normal. She exhibits no distension. There is no tenderness.   Musculoskeletal: She exhibits edema (1-2 + edema in lower extremities; 1+ edema in left arm).    Neurological: She is alert.   Skin: Skin is warm and dry.   Psychiatric: Her speech is delayed.

## 2017-04-18 NOTE — ASSESSMENT & PLAN NOTE
Patient with signs of volume overload on exam and has pleural effusions on CXR but volume status appears largely unchanged from her recent discharge on 4/13. Nephrology consulted to continue HD for fluid removal and patient had 3 liters of fluid removed during HD on 4/17. Nephrology to assess patient daily for continued volume removal in hospital. Patient's oxygen status improved and patient with O2 sats > 90% on 2 liters of oxygen.

## 2017-04-18 NOTE — SUBJECTIVE & OBJECTIVE
Past Medical History:   Diagnosis Date    3-vessel CAD     Acquired hypothyroidism     ALLERGIC RHINITIS     Anemia in ESRD (end-stage renal disease) 2017    Anticoagulant long-term use     Atherosclerosis of native artery 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 2016    Atherosclerotic PVD with ulceration 2016    Blood transfusion     Cataract     Chronic combined systolic and diastolic heart failure 2015    Closed displaced fracture of right femoral neck s/p hemiarthroplasty on 2017    Cramp of both lower extremities 2016    ESRD on hemodialysis 2015    Hyperlipidemia     Ischemic cardiomyopathy 10/20/2015    Non-ST elevation MI (NSTEMI) 2015    PAF (paroxysmal atrial fibrillation) 3/10/2017    Renovascular hypertension 2015    Sinus brea-tachy syndrome 2017    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       Past Surgical History:   Procedure Laterality Date    ANGIOPLASTY      Renal PTA    CARDIAC CATHETERIZATION       SECTION      HIP FRACTURE SURGERY Right 2017    Hemiarthroplasty for displaced femoral neck fracture    HYSTERECTOMY      ovaries intact    RENAL ARTERY STENT      TONSILLECTOMY      VASCULAR SURGERY         Review of patient's allergies indicates:   Allergen Reactions    Ativan [lorazepam] Hallucinations    Crab Other (See Comments)     Causes Gout    Crayfish Other (See Comments)     Causes Gout    Promethazine Other (See Comments)     Hallucinations        Medications:  Prescriptions Prior to Admission   Medication Sig    aspirin 81 MG Chew Take 1 tablet (81 mg total) by mouth once daily. (Patient taking differently: Take 81 mg by mouth every morning. )    atorvastatin (LIPITOR) 40 MG tablet Take 1 tablet (40 mg total) by mouth once daily. (Patient taking differently: Take 40 mg by mouth every morning. )    ELIQUIS 2.5 mg Tab TAKE 1 BY MOUTH TWO TIMES  DAILY    epoetin  syed (PROCRIT) 10,000 unit/mL injection Inject 0.28 mLs (2,800 Units total) into the skin every Tues, Thurs, Sat.    multivitamin (ONE DAILY MULTIVITAMIN) per tablet Take 1 tablet by mouth every morning.    PROTONIX 40 mg tablet TAKE 1 BY MOUTH DAILY (Patient taking differently: TAKE 1 TABLET  BY MOUTH  DAILY)    RENVELA 0.8 gram PwPk     SYNTHROID 50 mcg tablet TAKE 1 BY MOUTH DAILY      BEFORE BREAKFAST    nitroGLYCERIN (NITROSTAT) 0.4 MG SL tablet Place 1 tablet (0.4 mg total) under the tongue every 5 (five) minutes as needed for Chest pain.     Antibiotics     None        Antifungals     Start     Stop Route Frequency Ordered    04/15/17 1143  nystatin 100,000 unit/mL suspension 500,000 Units      -- Oral 4 times daily 04/15/17 1143        Antivirals     None           Immunization History   Administered Date(s) Administered    Influenza - High Dose 01/04/2013, 11/18/2013    PPD Test 05/19/2015, 07/23/2015, 09/01/2015, 12/29/2016, 04/10/2017    Pneumococcal Conjugate - 13 Valent 07/27/2015       Family History     Problem Relation (Age of Onset)    Heart disease Father        Social History     Social History    Marital status:      Spouse name: N/A    Number of children: N/A    Years of education: N/A     Social History Main Topics    Smoking status: Never Smoker    Smokeless tobacco: Never Used    Alcohol use Yes      Comment: occasional wine use    Drug use: No    Sexual activity: No     Other Topics Concern    None     Social History Narrative     Review of Systems   Constitutional: Positive for activity change, appetite change and fatigue. Negative for chills and fever.   HENT: Positive for sore throat. Negative for congestion, mouth sores and rhinorrhea.    Respiratory: Negative for cough, shortness of breath and wheezing.    Cardiovascular: Positive for leg swelling (left leg swelling ). Negative for chest pain.   Gastrointestinal: Positive for nausea. Negative for abdominal  distention, abdominal pain, constipation, diarrhea and vomiting.   Genitourinary: Positive for decreased urine volume. Negative for dysuria and flank pain.        Decreased urine output -  ESRD on HD     Musculoskeletal: Positive for arthralgias. Negative for back pain and neck pain.        Right hip pain   Skin: Negative for rash and wound.   Neurological: Positive for weakness. Negative for dizziness and headaches.   Psychiatric/Behavioral: Positive for confusion (resolving per family) and hallucinations (prior to admit). The patient is not nervous/anxious.      Objective:     Vital Signs (Most Recent):  Temp: 97.5 °F (36.4 °C) (04/18/17 1708)  Pulse: (!) 50 (04/18/17 1708)  Resp: 18 (04/18/17 1708)  BP: (!) 167/74 (04/18/17 1708)  SpO2: 100 % (04/18/17 1708) Vital Signs (24h Range):  Temp:  [97.5 °F (36.4 °C)-99.1 °F (37.3 °C)] 97.5 °F (36.4 °C)  Pulse:  [46-62] 50  Resp:  [18] 18  SpO2:  [94 %-100 %] 100 %  BP: (100-167)/(33-74) 167/74     Weight: 50.8 kg (112 lb)  Body mass index is 20.49 kg/(m^2).    Estimated Creatinine Clearance: 12 mL/min (based on Cr of 2.7).    Physical Exam   Constitutional: No distress.   Elderly, frail   HENT:   Head: Normocephalic.   Eyes: Conjunctivae are normal. No scleral icterus.   Neck: Normal range of motion.   Cardiovascular: Regular rhythm.  Exam reveals no gallop and no friction rub.    Murmur heard.  Pulmonary/Chest: Effort normal. She has rales (basilar crackles).   Abdominal: Soft. She exhibits no distension. There is no tenderness.   Musculoskeletal: She exhibits edema (left leg pitting edema > right ).   Neurological: She is alert.   Moves all extremities   Skin: Skin is warm and dry. She is not diaphoretic.   Peripheral IV sites clear     Left AVF - + thrill   Vitals reviewed.      Significant Labs:   Blood Culture:   Recent Labs  Lab 04/10/17  0531   LABBLOO No growth after 5 days.     CBC:   Recent Labs  Lab 04/17/17  0640 04/18/17  0342   WBC 8.71 9.24   HGB 8.3*  8.1*   HCT 27.0* 25.4*    173     CMP:   Recent Labs  Lab 04/17/17  0640 04/18/17  0342   * 137   K 4.4 4.1   CL 96 103   CO2 24 22*   GLU 64* 93   BUN 25* 13   CREATININE 4.2* 2.7*   CALCIUM 8.4* 9.0   PROT 5.8* 6.0   ALBUMIN 2.8* 3.1*   BILITOT 2.1* 2.3*   ALKPHOS 72 77   * 130*   ALT 17 17   ANIONGAP 11 12   EGFRNONAA 9.1* 15.5*     Respiratory Culture: No results for input(s): GSRESP, RESPIRATORYC in the last 4320 hours.  Urine Culture:   Recent Labs  Lab 04/04/17  0148   LABURIN No growth     Urine Studies:   Recent Labs  Lab 04/04/17  0149   COLORU Yellow   APPEARANCEUA Clear   PHUR 7.0   SPECGRAV 1.020   PROTEINUA 2+*   GLUCUA Negative   KETONESU Negative   BILIRUBINUA Negative   OCCULTUA Negative   NITRITE Negative   UROBILINOGEN Negative   LEUKOCYTESUR Negative   RBCUA 0   WBCUA 1   BACTERIA None   SQUAMEPITHEL 1   HYALINECASTS 0       Significant Imaging: I have reviewed all pertinent imaging results/findings within the past 24 hours.

## 2017-04-18 NOTE — ASSESSMENT & PLAN NOTE
Bidil discontinued on previous admit due to issues with hypotension during HD and will continue to hold and continue Midodrine to help with BP and volume removal during HD. Patient tolerating volume removal with HD on Midodrine and will continue.

## 2017-04-18 NOTE — ASSESSMENT & PLAN NOTE
Patient is POD # 13. Patient reports no pain in right hip currently and main pain related to her neuropathic pain. Plan to continue PT/OT for gait training and strengthening and restoration of ADL's on this admit to continue therapy. Patient is FWB/WBAT: right lower extremity, posterior hip precautions as per Orthopedic recommendations. Patient on Eliquis for long term anticoagulation for atrial fibrillation so no DVT prophylaxis needed. Pain is well controlled and will use just Tylenol for pain and avoid any sedating narcotics. Patient needs SNF placement on discharge and likely can return to [facility] once medically stable.

## 2017-04-18 NOTE — PROGRESS NOTES
[facility] Hospital Medicine  Progress Note    Patient Name: Padmini Miranda  MRN: [id number]  Patient Class: IP- Inpatient   Admission Date: 4/15/2017  Length of Stay: 2 days  Attending Physician: Jalyn Saldana MD  Primary Care Provider: Randy Lyles MD    [facility] Medicine Team: [facility]  Jalyn Saldana MD    Subjective:     Principal Problem:Acute encephalopathy    HPI:  84 y/o with past medical history of chronic combined systolic and diastolic heart failure, 3 vessel CAD, atherosclerosis of bilateral lower extremity with left heel ulcer with recent PTA of left SFA on 4/3 by Vascular surgery, essential HTN, hypothyroidism and ESRD on HD was sent from acute dialysis today due to concern for increasing lethargy. Family also noted that patient appeared to be slurring her words and noted right eye was drooping and patient was having difficulty staying awake and due to thus concerns was sent to [facility] ER. CTscan of head showed no acute findings but MRI of brain has been ordered to better evaluate. Patient's daughters at bedside and report for past 1 week patient has been sleeping more than normal and not nearly as alert as she usually is. I am familiar with patient as she was just discharged from my medical service on 4/13 and patient does appear to be more sleepy than normal but when aroused in the ER patient is responding to questions appropriately. Patient reports she generally feels weak but denies any focal weakness in ER. Family is very upset as they feel patient was not progressing with PT prior to discharge to SNF on 4/13 and feel patient has not been herself for over 1 week with increased lethargy.     As noted patient was just discharged from [facility] on 4/13 to [facility] [place] after prolonged hospitalization following surgical repair of a right hip fracture. Patient even prior to that surgery was seen and evaluated by Cardiology for sinus  bradycardia with HR in 30-40's. Patient during admit was felt not to need pacemaker placement and felt bradycardia related to Amiodarone and Coreg that had been recently been discontinued. Patient eventually underwent right hip hemiarthroplasty on 4/5 posterior-op, patient noted to be hypothermic and persistently bradycardiac so patient was evaluated for possible infection but there was no concern for infection, as clinically, the patient had normal WBC and no clinical symptoms to suggest infection. TSH (normal on 3/10) was checked during recent admit and found to be severely elevated at 21.6, with low normal Free T4 of 0.88. Synthroid was adjusted from 50 mcg to 75 mcg daily to treat hypothyroidism. Patient did complain of odynophagia after surgery felt related to ET tube. Speech therapy consult who recommended MBSS and patient underwent MBSS on 4/12 and patient passed with recommendation by Speech for regular diet and nectar thickened liquids. Patient to be discharged to [facility] for continued PT/OT but needs outpatient follow-up with EP Cardiology to reassess need for pacemaker for sinus bradycardia on 4/13.       Hospital Course:  MRI of rafat unremarkable for any signs of an acute stroke or bleed to explain increased fatigue or lethargy. Patient afebrile with normal WBC so infectious etiology felt highly unlikely. Random am cortisol done was 14.7 so does not appear to be consistent with adrenal insufficiency but Endocrine consulted. Cardiology re-consulted for patient's bradycardia to see if contributing to patient's increasing lethargy and fatigue and unclear if it is causing patient's symptoms but Cardiology doubts is true cause of lethargy and recommended Endocrine consult to look for thyroid issues as cause of bradycardia and somulence. Cardiology recommends no need for pacemaker at this time and to follow-up with EP Cardiology as outpatient. Neurontin and opiates for pain discontinued in case they were  contributing to cause. Patient with recent diagnosis of hypothyroidism likely related to Amiodarone that recently was discontinued due to bradycardia. Endocrine consulted on 4/18 and evaluated patient and not worried by thyroid function but concerned for adrenal insufficiency so Cosyntropin stimulation test ordered on 4/18 to evaluate patient. Patient dialyzed by Nephrology on 4/18 a day prior to normal dialysis day as showing signs of hypervolemia. Patient slightly more alert and responsive on 4/18 and did work with PT.     Interval History: Patient complaining of increased neuropathic pain in her legs today. Neurontin held on admit due to somnolence and patient does appear more alert today but unclear if Neurontin was contributing at all but consider restarting tomorrow. Family at bedside and updated on test results. Discussed again with family that cardiology not recommending pacemaker at this time and they stated they talked to Cardiology today as well as Endocrine and discussed with family about working up for possible adrenal insufficiency. Patient reports her throat is feeling better since starting Nystatin for thrush.     Review of Systems   Constitutional: Positive for fatigue. Negative for chills and fever.   HENT: Positive for sore throat.    Respiratory: Negative for cough and shortness of breath.    Cardiovascular: Negative for chest pain.   Gastrointestinal: Negative for abdominal pain and nausea.   Musculoskeletal: Positive for myalgias (Right hip pain). Negative for back pain.   Skin: Negative for rash.   Neurological: Positive for weakness (Generalized). Negative for dizziness.   Psychiatric/Behavioral: Negative for confusion and hallucinations.     Objective:     Vital Signs (Most Recent):  Temp: 97.9 °F (36.6 °C) (04/17/17 1936)  Pulse: (!) 59 (04/17/17 1936)  Resp: 18 (04/17/17 1936)  BP: (!) 104/54 (04/17/17 1936)  SpO2: 99 % (04/17/17 1936) Vital Signs (24h Range):  Temp:  [97.6 °F (36.4  °C)-97.9 °F (36.6 °C)] 97.9 °F (36.6 °C)  Pulse:  [43-63] 59  Resp:  [13-23] 18  SpO2:  [97 %-100 %] 99 %  BP: ()/(26-63) 104/54     Weight: 50.8 kg (112 lb)  Body mass index is 20.49 kg/(m^2).    Intake/Output Summary (Last 24 hours) at 04/17/17 2028  Last data filed at 04/17/17 1400   Gross per 24 hour   Intake             1240 ml   Output             3800 ml   Net            -2560 ml      Physical Exam   Constitutional: No distress.   Eyes: No scleral icterus.   Neck: Neck supple. No JVD present.   Cardiovascular: Regular rhythm and normal heart sounds.  Bradycardia present.  Exam reveals no gallop and no friction rub.    No murmur heard.  Pulmonary/Chest: Effort normal. She has rales (Mild rales in bases).   Abdominal: Soft. Bowel sounds are normal. She exhibits no distension. There is no tenderness.   Musculoskeletal: She exhibits edema (1-2 + edema in lower extremities; 1+ edema in left arm).   Neurological: She is alert.   Skin: Skin is warm and dry.   Psychiatric: Her speech is delayed.       Significant Labs:   Recent Labs      04/16/17   0701  04/17/17   0640   GLU  72  64*   NA  133*  131*   K  4.1  4.4   CL  98  96   CO2  26  24   BUN  15  25*   CREATININE  3.1*  4.2*   ANIONGAP  9  11   BILITOT  1.9*  2.1*   AST  93*  135*   ALT  10  17   ALKPHOS  69  72   ALBUMIN  2.7*  2.8*   PROT  5.5*  5.8*   MG  1.9  2.1   PHOS  3.0  3.2          CBC:   Recent Labs  Lab 04/16/17  0701 04/17/17  0640   WBC 6.87 8.71   HGB 8.3* 8.3*   HCT 26.1* 27.0*    194     Significant Imaging: I have reviewed all pertinent imaging results/findings within the past 24 hours.    Assessment/Plan:      * Acute encephalopathy  Improved today on 4/18 as patient slightly more alert today and actually able to participate with PT and family reports some improvement. Patient afebrile with normal WBC and no bandemia so infectious etiology unlikely. Patient with no evidence of acute hypoxia as oxygen sats > 90 on 2 liters of  oxygen. Medication list reviewed for new medications recently added and no new medications added recently that could be causing encephalopathy but Neurontin on hold as can be sedating even though patient on a very low dose but may need to restart on 4/18 if patient's pain in legs starts getting worse.   · MRI of brain unremarkable for acute stroke or bleed on 4/15.   · AM Cortisol level normal 14.7 but Endocrine consulted and recommended Cosyntropin stim test that was ordered by them on 4/18 to evaluate for relative adrenal insufficiency.  · Unable to get urine sample so far as patient makes scant urine to check for UTI but is ordered.  · Will hold all narcotics/opiates for pain. Avoid any sleep aid agents at this time that could affect alertness level.  · Endocrine consulted on 4/18 to see if hypothyroidism is causing fatigue and lethargy but they stated no evidence of myxedema but stated they would do trial of IV Synthroid to see if helps patient but they doubt hypothyroidism cause of lethargy.   · EP Cardiology consulted and evaluated patient to see if bradycardia causing fatigue/weakness but feel unlikely and do not recommend pacemaker placement at this time and recommended follow-up with Dr. Antoine in EP clinic as outpatient and he would consider once patient euthyroid.   · Please avoid any benzodiazepines as can worsen delirium and only should use in delirium caused by benzodiazepine or alcohol withdrawal.   · Please avoid all anticholinergic medications as can worsen delirium.   · Please keep curtains and blinds open during the day and closed during the night.   · Please continue to have nursing and family reorient patient and encourage family to visit and stay at night if possible.   · Please avoid physical restraints if possible as likely to worsen and increase agitation in delirious patient.       Oral thrush  Much improved. Patient with very mild case and will continue treatement with Nystatin swish and  spit 4 times daily.       Abnormal LFTs  RUQ ultrasound unremarkable except for hepatic cysts and unlikely to be causing mild increase in LFT's but LFT's are improving so no further evaluation at this time and could just be hepatic congestion from hypervolemia related to chronic renal failure in combination with chronic heart failure. Monitor daily CMP. No need to discontinue Lipitor at this time as statin not likely cause and needs for CAD and severe PVD.      ESRD on hemodialysis  Nephrology consulted and managing patient's HD needs in hospital. Patient dialyzed on 4/15. Patient hypervolemic on exam today so dialyzed by Nephrology today, 4/18 and 3 liters removed.       Bradycardia, drug induced  Slight improvement. Patient still with persistent sinus bradycardia but HR now in 50-60's instead of 40-50's. Cardiology re-evaluated patient to see if they feel bradycardia could be contributing to patient's lethargy but they doubt and do not recommend pacemaker needed at this time and recommends outpatient follow-up with Dr. Antoine in EP Cardiology clinic. Avoid any AV shikha blocking agents. Continue telemetry monitoring.       Closed displaced fracture of right femoral neck s/p hemiarthroplasty on 4/5/2017  Patient is POD # 12. Patient reports no pain in right hip currently and main pain related to her neuropathic pain. Plan to continue PT/OT for gait training and strengthening and restoration of ADL's on this admit to continue therapy. Patient is FWB/WBAT: right lower extremity, posterior hip precautions as per Orthopedic recommendations. Patient on Eliquis for long term anticoagulation for atrial fibrillation so no DVT prophylaxis needed. Pain is well controlled and will use just Tylenol for pain and avoid any sedating narcotics. Patient needs SNF placement on discharge and likely can return to [facility] once medically stable.       Renovascular hypertension  Bidil discontinued on previous admit due to issues with  hypotension during HD and will continue to hold and continue Midodrine to help with BP and volume removal during HD. Patient tolerating volume removal with HD on Midodrine and will continue.       Acquired hypothyroidism  Appreciate Endocrine consult and they do not feel that any adjustments needed in Synthroid but will give trial of IV Synthroid as per Endocrine to see if helps patient. Endocrine notes no signs of myxedema so doubt lethargy related to thyroid disease.       Chronic combined systolic and diastolic heart failure  Patient with signs of volume overload on exam and has pleural effusions on CXR but volume status appears largely unchanged from her recent discharge on 4/13. Nephrology consulted to continue HD for fluid removal and patient had 3 liters of fluid removed during HD on 4/18. Nephrology to assess patient daily for continued volume removal in hospital. Patient's oxygen status improved and patient with O2 sats > 90% on 2 liters of oxygen.        Acute blood loss anemia  Hgb is stable and there are no signs of active bleeding. Hgb 8.3 so doubt cause of lethargy. Anemia unlikely to No indication to transfuse blood in this patient at this time. Nephrology to continue Epogen with HD to treat chronic anemia related to ESRD.      Atherosclerosis of native artery 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  Patient with known heel ulcers to both legs but no signs to indicate infection. Patient recently evaluated by Vascular Surgery on 4/13 and Vascular felt ulcers were improving and did not feel any further intervention needed at this time. Continue local wound care for heel ulcers on this admit.      PAF (paroxysmal atrial fibrillation)  Patient is actually bradycardiac so no AV shikha blocking agents are required. Patient has an implanted loop recorder in place. Continue Eliquis for long term anticoagulation.      3-vessel CAD  Controlled. Plan to continue ASA and Lipitor to treat. No beta blocker  due to bradycardia.        Acute respiratory failure with hypoxia  Improved. Likely related to pleural effusions and hypervolemia from chronic renal failure and heart failure. Continue oxygen at 2 liters with plan to wean to keep oxygen sats > 90%.       VTE Risk Mitigation         Ordered     apixaban tablet 2.5 mg  2 times daily     Route:  Oral        04/15/17 1618     Medium Risk of VTE  Once      04/15/17 1431          Jalyn Saldana MD  Department of Hospital Medicine   [facility]

## 2017-04-18 NOTE — PROGRESS NOTES
"[facility]  Endocrinology  Progress Note    Admit Date: 4/15/2017     Reason for Consult: Management of hypothyroidism     Surgical Procedure and Date:   Right hip hemiarthroplasty for femoral neck fracture 4/5/17  PTA left SFA for PAD on 4/3/17    HPI:   Patient is a 85 y.o. female with a diagnosis of P-Afib, CHF, CAD, PAD, ESRD on HD with anemia and secondary HPT, HTN, hypothyroidism who is admitted for increase lethargy and hypersomnolence. She was recently admitted and discharged to SNF after prolonged hospitalization for right hip fx 2/2 mechanical fall. Post-op, she became hypotensive, hypothermic, and bradycardic. Cards rec stopping amio and coreg. TSH was high 21 with normal FT4 and LT4 was increased from 50-->75mcg daily. She was also started on midodrine to maintain BP. Endo consulted for eval of hypothyroidism contributing to encephalopathy. Infectious w/u unrevealing. MRI brain negative for acute stroke/bleed.     HR 40-60s  BP is stable but on midodrine   Hyponatremia 125-135   Lower limit BG 58-60s  GFR at baseline, intermittent hyperkalemia     TSH improved from 21-->15 in 5 days after increasing LT4 to 75mcg daily   FT4 0.88-->0.81 and has always been within normal  Low FT3 <1  7-am cortisol 14.7, albumin 2.7    No recent steroid use   Hx of PO prednisone and IA injections in 1/2015 and 9/2015 for gout   Hx of solumedrol in 1/2015 for bronchitis   No steroid use perioperatively in 4/2017              Interval HPI:   Overnight events:  Had ACTH stim test which timing was done incorrectly but nonetheless the baseline/random cortisol was 19 higher than 7am cortisol = 14.7 and stimulated to 29 ruling out AI.   IV LT4 40mcg scheduled for today.   HR remains 50-60s  S/p HD yesterday with improvement in hyponatremia, now normal Na.     BP (!) 140/65 (BP Location: Right arm, Patient Position: Lying, BP Method: Automatic)  Pulse (!) 58  Temp 99.1 °F (37.3 °C) (Oral)   Resp 18  Ht 5' 2" " (1.575 m)  Wt 50.8 kg (112 lb)  LMP  (LMP Unknown)  SpO2 (!) 94%  Breastfeeding? No  BMI 20.49 kg/m2    Labs Reviewed and Include      Recent Labs  Lab 04/18/17  0342   GLU 93   CALCIUM 9.0   ALBUMIN 3.1*   PROT 6.0      K 4.1   CO2 22*      BUN 13   CREATININE 2.7*   ALKPHOS 77   ALT 17   *   BILITOT 2.3*     Lab Results   Component Value Date    WBC 9.24 04/18/2017    HGB 8.1 (L) 04/18/2017    HCT 25.4 (L) 04/18/2017    MCV 86 04/18/2017     04/18/2017       Recent Labs  Lab 04/15/17  1239   TSH 15.558*   FREET4 0.81     No results found for: HGBA1C    Nutritional status:   Body mass index is 20.49 kg/(m^2).  Lab Results   Component Value Date    ALBUMIN 3.1 (L) 04/18/2017    ALBUMIN 2.8 (L) 04/17/2017    ALBUMIN 2.7 (L) 04/16/2017     No results found for: PREALBUMIN    Estimated Creatinine Clearance: 12 mL/min (based on Cr of 2.7).    Accu-Checks  No results for input(s): POCTGLUCOSE in the last 72 hours.    Current Medications and/or Treatments Impacting Glycemic Control  Immunotherapy:  Immunosuppressants     None        Steroids:   Hormones     None        Pressors:    Autonomic Drugs     Start     Stop Route Frequency Ordered    04/15/17 2200  midodrine tablet 10 mg      -- Oral 3 times daily 04/15/17 1618        Hyperglycemia/Diabetes Medications: Antihyperglycemics     None          ASSESSMENT and PLAN    Acquired hypothyroidism  Clinically hypothyroid, not in myxedema.   Remote hx of hypothermia post-op (resolved) and now ongoing brea.   hyponatremia resolved. Monitor for hypoglycemia + hypotension  Given concern for possible decreased absorption if gut edema present, rec continuing IV LT4 40mcg daily (and switch back to equivalent dose of PO LT4 75mcg daily at time of discharge)    Check FT4 friday on IV LT4.   Repeat TSH in 6 weeks   Cortisol >18 on stim test, rules out AI.   Would have high suspicion for infection even if source not yet identified and could consider  empiric abx      ESRD on hemodialysis  Per renal        Closed displaced fracture of right femoral neck s/p hemiarthroplasty on 4/5/2017 = osteoporosis   Needs o/p DEXA and optimization of pth, vit D, phos as her esrd does not allow other med mgmt   Replete vit d rec ergocal 50K iu weekly        Bradycardia  See above        Yenifer Oshea MD  Endocrinology  [facility] CUETO, Cherri Crain MD,  have personally taken the history and examined the patient and agree with the resident's note as stated above.

## 2017-04-18 NOTE — NURSING TRANSFER
Nursing Transfer Note      4/17/2017     Transfer To: 1103    Transfer via bed    Transfer with to O2, cardiac monitoring    Transported by RNx2    Medicines sent: yes, tubed    Chart send with patient: Yes    Notified: daughter at bedside    Upon arrival to floor:Accepting RN notified, pt transferred to bed, cardiac monitor applied, patient oriented to room, call bell in reach and bed in lowest position

## 2017-04-18 NOTE — PLAN OF CARE
Problem: Physical Therapy Goal  Goal: Physical Therapy Goal  Goals to be met by: 17     Patient will increase functional independence with mobility by performin. Supine to sit with MInimal Assistance  2. Sit to supine with MInimal Assistance  3. Rolling to Left and Right with Set-up Assistance.  4. Sit to stand transfer with Minimal Assistance  5. Bed to chair transfer with Moderate Assistance using Rolling Walker  6. Gait x 30 feet with Moderate Assistance using Rolling Walker.   7. Lower extremity exercise program x15 reps per handout, with assistance as needed   Patient goals remain appropriate.  Patient will continue to benefit from further PT services.  Jani Dumont, PTA

## 2017-04-18 NOTE — ASSESSMENT & PLAN NOTE
Slight improvement. Patient still with persistent sinus bradycardia but HR now in 50-60's instead of 40-50's. Cardiology re-evaluated patient to see if they feel bradycardia could be contributing to patient's lethargy but they doubt and do not recommend pacemaker needed at this time and recommends outpatient follow-up with Dr. Antoine in [facility] Cardiology clinic. Avoid any AV shikha blocking agents. Continue telemetry monitoring.

## 2017-04-18 NOTE — SUBJECTIVE & OBJECTIVE
Past Medical History:   Diagnosis Date    3-vessel CAD     Acquired hypothyroidism     ALLERGIC RHINITIS     Anemia in ESRD (end-stage renal disease) 2017    Anticoagulant long-term use     Atherosclerosis of native artery 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 2016    Atherosclerotic PVD with ulceration 2016    Blood transfusion     Cataract     Chronic combined systolic and diastolic heart failure 2015    Closed displaced fracture of right femoral neck s/p hemiarthroplasty on 2017    Cramp of both lower extremities 2016    ESRD on hemodialysis 2015    Hyperlipidemia     Ischemic cardiomyopathy 10/20/2015    Non-ST elevation MI (NSTEMI) 2015    PAF (paroxysmal atrial fibrillation) 3/10/2017    Renovascular hypertension 2015    Sinus brea-tachy syndrome 2017    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       Past Surgical History:   Procedure Laterality Date    ANGIOPLASTY      Renal PTA    CARDIAC CATHETERIZATION       SECTION      HIP FRACTURE SURGERY Right 2017    Hemiarthroplasty for displaced femoral neck fracture    HYSTERECTOMY      ovaries intact    RENAL ARTERY STENT      TONSILLECTOMY      VASCULAR SURGERY         Review of patient's allergies indicates:   Allergen Reactions    Ativan [lorazepam] Hallucinations    Crab Other (See Comments)     Causes Gout    Crayfish Other (See Comments)     Causes Gout    Promethazine Other (See Comments)     Hallucinations          No current facility-administered medications on file prior to encounter.      Current Outpatient Prescriptions on File Prior to Encounter   Medication Sig    aspirin 81 MG Chew Take 1 tablet (81 mg total) by mouth once daily. (Patient taking differently: Take 81 mg by mouth every morning. )    atorvastatin (LIPITOR) 40 MG tablet Take 1 tablet (40 mg total) by mouth once daily. (Patient taking differently: Take 40 mg by  mouth every morning. )    ELIQUIS 2.5 mg Tab TAKE 1 BY MOUTH TWO TIMES  DAILY    epoetin syed (PROCRIT) 10,000 unit/mL injection Inject 0.28 mLs (2,800 Units total) into the skin every Tues, Thurs, Sat.    multivitamin (ONE DAILY MULTIVITAMIN) per tablet Take 1 tablet by mouth every morning.    PROTONIX 40 mg tablet TAKE 1 BY MOUTH DAILY (Patient taking differently: TAKE 1 TABLET  BY MOUTH  DAILY)    RENVELA 0.8 gram PwPk     SYNTHROID 50 mcg tablet TAKE 1 BY MOUTH DAILY      BEFORE BREAKFAST    nitroGLYCERIN (NITROSTAT) 0.4 MG SL tablet Place 1 tablet (0.4 mg total) under the tongue every 5 (five) minutes as needed for Chest pain.     Family History     Problem Relation (Age of Onset)    Heart disease Father        Social History Main Topics    Smoking status: Never Smoker    Smokeless tobacco: Never Used    Alcohol use Yes      Comment: occasional wine use    Drug use: No    Sexual activity: No     Review of Systems   Constitutional:        Pt noted not sleeping well 2/2 Tramadol.   HENT: Negative for sore throat.    Eyes: Positive for visual disturbance (2 days of blurriness in the right eye.). Negative for pain.   Respiratory: Negative for cough and shortness of breath.    Cardiovascular: Negative for chest pain and palpitations.   Gastrointestinal: Positive for nausea and vomiting. Negative for abdominal pain, constipation and diarrhea.        Last BM yesterday.   Genitourinary:        Pt is ESRD and does not make urine.   Musculoskeletal: Negative for myalgias.   Neurological: Negative for headaches.        Pt notes burning sensation over left leg.   Psychiatric/Behavioral:        Pt affirmed being in a good mood.     Objective:     Vital Signs (Most Recent):  Temp: 97.5 °F (36.4 °C) (04/18/17 1708)  Pulse: (!) 50 (04/18/17 1708)  Resp: 18 (04/18/17 1708)  BP: (!) 167/74 (04/18/17 1708)  SpO2: 100 % (04/18/17 1708) Vital Signs (24h Range):  Temp:  [97.5 °F (36.4 °C)-99.1 °F (37.3 °C)] 97.5 °F (36.4  "°C)  Pulse:  [46-62] 50  Resp:  [18] 18  SpO2:  [94 %-100 %] 100 %  BP: (100-167)/(33-74) 167/74     Weight: 50.8 kg (112 lb)  Body mass index is 20.49 kg/(m^2).    Physical Exam   Constitutional: She appears well-developed and well-nourished. No distress.   HENT:   Mouth/Throat: Oropharynx is clear and moist. No oropharyngeal exudate.   Eyes: Conjunctivae and EOM are normal. Pupils are equal, round, and reactive to light. Right eye exhibits no discharge. Left eye exhibits no discharge. No scleral icterus.   Neck: Normal range of motion.   Cardiovascular:   Murmur (Suggestive of aortic stenosis.) heard.  Bradycardia and murmur suggestive of aortic stenosis.   Pulmonary/Chest: No stridor.   Musculoskeletal:   See Neuro exam.   Neurological:   See Neuro exam.   Skin: Skin is warm and dry. She is not diaphoretic. No erythema. No pallor.   Psychiatric:   See Neuro exam.   Vitals reviewed.      NEUROLOGICAL EXAMINATION:     MENTAL STATUS        Alert and oriented to person, place, year, month, and day of month.  Correctly spelled "WORLD" forward and backward.  Understood 3 jokes that required some thinking.  1/3 object recall.     CRANIAL NERVES   Cranial nerves II through XII intact.     CN III, IV, VI   Pupils are equal, round, and reactive to light.  Extraocular motions are normal.     MOTOR EXAM        5/5 bilateral bicep, tricep, and handgrip strength.  4/5 bilateral plantarflexion strength.  5/5 bilateral dorsiflexion.  Could not perform leg extension, bilaterally.  Could not perform right leg flexion .  5/5 left leg flexion.  Of note, the patient underwent right-sided hemiarthroplasty for displaced femoral neck fracture on (04/05/17).     REFLEXES        1+ bilateral biceps, brachioradialis, and ankle reflexes.  Patellar reflexes were not possible 2/2 pain flexing the legs.     SENSORY EXAM        Bilateral and equal sensation over the face, shoulders, arms, forearms, hands, abdomen, thighs, legs, and feet.   "     GAIT AND COORDINATION        Normal, bilateral random alternating movements (arm/hand). Normal, bilateral 1st and 2nd digit open/close movements.         Significant Labs:   CBC:   Recent Labs  Lab 04/17/17  0640 04/18/17  0342   WBC 8.71 9.24   HGB 8.3* 8.1*   HCT 27.0* 25.4*    173     CMP:   Recent Labs  Lab 04/17/17  0640 04/18/17  0342   GLU 64* 93   * 137   K 4.4 4.1   CL 96 103   CO2 24 22*   BUN 25* 13   CREATININE 4.2* 2.7*   CALCIUM 8.4* 9.0   MG 2.1 2.0   PROT 5.8* 6.0   ALBUMIN 2.8* 3.1*   BILITOT 2.1* 2.3*   ALKPHOS 72 77   * 130*   ALT 17 17   ANIONGAP 11 12   EGFRNONAA 9.1* 15.5*     All pertinent lab results from the past 24 hours have been reviewed.    Significant Imaging: I have reviewed all pertinent imaging results/findings within the past 24 hours.

## 2017-04-18 NOTE — CONSULTS
[facility]  Neurology  Consult Note    Patient Name: Padmini Miranda  MRN: [id number]  Admission Date: 4/15/2017  Hospital Length of Stay: 3 days  Code Status: Full Code   Attending Provider: Jacques Laura MD  Consulting Provider: Jacques Arriaga MD  Primary Care Physician: Randy Lyles MD  Principal Problem:Acute encephalopathy    Inpatient consult to Neurology  Consult performed by: JACQUES ARRIAGA  Consult ordered by: PAULA REEDER         Subjective:     Chief Complaint:  Somnolence and lethargy    HPI:   Padmini Miranda is an 84 yo  female with a PMH including chronic combined systolic and diastolic heart failure, 3 vessel CAD, atherosclerosis of bilateral lower extremity with left heel ulcer with recent PTA of left SFA on 4/3 by Vascular surgery, essential HTN, hypothyroidism and ESRD on (T/Th/Sat) HD regimen.  PSH includes a right-sided hemiarthroplasty for displaced femoral neck fracture on [04/05/17].    The patient's family noted that approximately 2 days following the aforementioned surgery, the patient became increasingly lethargic.  The patient was D/C'd to [facility] on [04/13/17] for rehabilitation, subsequent to which she was found to be sleeping more than normal.  The family noted right-hand shaking last Thursday [04/13/17], which had slowed by Saturday [04/15/17].  The family also noted the patient experienced visual hallucinations on Friday and Saturday [04/15/17 - 04/16/17], in which she was speaking to someone who was not in the room.  Lethargy, word slurring, and right eyelid drooping was also noted during her last dialysis treatment on [04/15/17], which precipitated a visit to the ED.  In general, the patient has been lethargic with generalized weakness, with somnolence, for the past approximately 10 days.    The General Neurology service was consulted 2/2 somnolence and lethargy.       Past Medical History:   Diagnosis Date    3-vessel CAD      Acquired hypothyroidism     ALLERGIC RHINITIS     Anemia in ESRD (end-stage renal disease) 2017    Anticoagulant long-term use     Atherosclerosis of native artery 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 2016    Atherosclerotic PVD with ulceration 2016    Blood transfusion     Cataract     Chronic combined systolic and diastolic heart failure 2015    Closed displaced fracture of right femoral neck s/p hemiarthroplasty on 2017    Cramp of both lower extremities 2016    ESRD on hemodialysis 2015    Hyperlipidemia     Ischemic cardiomyopathy 10/20/2015    Non-ST elevation MI (NSTEMI) 2015    PAF (paroxysmal atrial fibrillation) 3/10/2017    Renovascular hypertension 2015    Sinus brea-tachy syndrome 2017    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       Past Surgical History:   Procedure Laterality Date    ANGIOPLASTY      Renal PTA    CARDIAC CATHETERIZATION       SECTION      HIP FRACTURE SURGERY Right 2017    Hemiarthroplasty for displaced femoral neck fracture    HYSTERECTOMY      ovaries intact    RENAL ARTERY STENT      TONSILLECTOMY      VASCULAR SURGERY         Review of patient's allergies indicates:   Allergen Reactions    Ativan [lorazepam] Hallucinations    Crab Other (See Comments)     Causes Gout    Crayfish Other (See Comments)     Causes Gout    Promethazine Other (See Comments)     Hallucinations          No current facility-administered medications on file prior to encounter.      Current Outpatient Prescriptions on File Prior to Encounter   Medication Sig    aspirin 81 MG Chew Take 1 tablet (81 mg total) by mouth once daily. (Patient taking differently: Take 81 mg by mouth every morning. )    atorvastatin (LIPITOR) 40 MG tablet Take 1 tablet (40 mg total) by mouth once daily. (Patient taking differently: Take 40 mg by mouth every morning. )    ELIQUIS 2.5 mg Tab TAKE 1 BY  MOUTH TWO TIMES  DAILY    epoetin syed (PROCRIT) 10,000 unit/mL injection Inject 0.28 mLs (2,800 Units total) into the skin every Tues, Thurs, Sat.    multivitamin (ONE DAILY MULTIVITAMIN) per tablet Take 1 tablet by mouth every morning.    PROTONIX 40 mg tablet TAKE 1 BY MOUTH DAILY (Patient taking differently: TAKE 1 TABLET  BY MOUTH  DAILY)    RENVELA 0.8 gram PwPk     SYNTHROID 50 mcg tablet TAKE 1 BY MOUTH DAILY      BEFORE BREAKFAST    nitroGLYCERIN (NITROSTAT) 0.4 MG SL tablet Place 1 tablet (0.4 mg total) under the tongue every 5 (five) minutes as needed for Chest pain.     Family History     Problem Relation (Age of Onset)    Heart disease Father        Social History Main Topics    Smoking status: Never Smoker    Smokeless tobacco: Never Used    Alcohol use Yes      Comment: occasional wine use    Drug use: No    Sexual activity: No     Review of Systems   Constitutional:        Pt noted not sleeping well 2/2 Tramadol.   HENT: Negative for sore throat.    Eyes: Positive for visual disturbance (2 days of blurriness in the right eye.). Negative for pain.   Respiratory: Negative for cough and shortness of breath.    Cardiovascular: Negative for chest pain and palpitations.   Gastrointestinal: Positive for nausea and vomiting. Negative for abdominal pain, constipation and diarrhea.        Last BM yesterday.   Genitourinary:        Pt is ESRD and does not make urine.   Musculoskeletal: Negative for myalgias.   Neurological: Negative for headaches.        Pt notes burning sensation over left leg.   Psychiatric/Behavioral:        Pt affirmed being in a good mood.     Objective:     Vital Signs (Most Recent):  Temp: 97.5 °F (36.4 °C) (04/18/17 1708)  Pulse: (!) 50 (04/18/17 1708)  Resp: 18 (04/18/17 1708)  BP: (!) 167/74 (04/18/17 1708)  SpO2: 100 % (04/18/17 1708) Vital Signs (24h Range):  Temp:  [97.5 °F (36.4 °C)-99.1 °F (37.3 °C)] 97.5 °F (36.4 °C)  Pulse:  [46-62] 50  Resp:  [18] 18  SpO2:  [94  "%-100 %] 100 %  BP: (100-167)/(33-74) 167/74     Weight: 50.8 kg (112 lb)  Body mass index is 20.49 kg/(m^2).    Physical Exam   Constitutional: She appears well-developed and well-nourished. No distress.   HENT:   Mouth/Throat: Oropharynx is clear and moist. No oropharyngeal exudate.   Eyes: Conjunctivae and EOM are normal. Pupils are equal, round, and reactive to light. Right eye exhibits no discharge. Left eye exhibits no discharge. No scleral icterus.   Neck: Normal range of motion.   Cardiovascular:   Murmur (Suggestive of aortic stenosis.) heard.  Bradycardia and murmur suggestive of aortic stenosis.   Pulmonary/Chest: No stridor.   Musculoskeletal:   See Neuro exam.   Neurological:   See Neuro exam.   Skin: Skin is warm and dry. She is not diaphoretic. No erythema. No pallor.   Psychiatric:   See Neuro exam.   Vitals reviewed.      NEUROLOGICAL EXAMINATION:     MENTAL STATUS        Alert and oriented to person, place, year, month, and day of month.  Correctly spelled "WORLD" forward and backward.  Understood 3 jokes that required some thinking.  1/3 object recall.     CRANIAL NERVES   Cranial nerves II through XII intact.     CN III, IV, VI   Pupils are equal, round, and reactive to light.  Extraocular motions are normal.     MOTOR EXAM        5/5 bilateral bicep, tricep, and handgrip strength.  4/5 bilateral plantarflexion strength.  5/5 bilateral dorsiflexion.  Could not perform leg extension, bilaterally.  Could not perform right leg flexion .  5/5 left leg flexion.  Of note, the patient underwent right-sided hemiarthroplasty for displaced femoral neck fracture on (04/05/17).     REFLEXES        1+ bilateral biceps, brachioradialis, and ankle reflexes.  Patellar reflexes were not possible 2/2 pain flexing the legs.     SENSORY EXAM        Bilateral and equal sensation over the face, shoulders, arms, forearms, hands, abdomen, thighs, legs, and feet.       GAIT AND COORDINATION        Normal, bilateral " random alternating movements (arm/hand). Normal, bilateral 1st and 2nd digit open/close movements.         Significant Labs:   CBC:   Recent Labs  Lab 04/17/17  0640 04/18/17  0342   WBC 8.71 9.24   HGB 8.3* 8.1*   HCT 27.0* 25.4*    173     CMP:   Recent Labs  Lab 04/17/17  0640 04/18/17  0342   GLU 64* 93   * 137   K 4.4 4.1   CL 96 103   CO2 24 22*   BUN 25* 13   CREATININE 4.2* 2.7*   CALCIUM 8.4* 9.0   MG 2.1 2.0   PROT 5.8* 6.0   ALBUMIN 2.8* 3.1*   BILITOT 2.1* 2.3*   ALKPHOS 72 77   * 130*   ALT 17 17   ANIONGAP 11 12   EGFRNONAA 9.1* 15.5*     All pertinent lab results from the past 24 hours have been reviewed.    Significant Imaging: I have reviewed all pertinent imaging results/findings within the past 24 hours.    Assessment and Plan:     * Acute encephalopathy  - Hx of cardiovascular disease and bradycardia  - Hx of hypothyroidism  - Adrenal insufficiency negative 2/2 ACTH challenge    (DX) Hypoperfusion VS Hypothyroidism (less likely) VS Adrenal Insufficiency (ruled-out)  - Hypoperfusion seems quite plausible given hx of cardiovascular disease  - Evaluation of vasculature perfusing the brain is recommended  - CT angiogram is the gold-standard but is contraindicated 2/2 renal failure    Recommendations:  1) U/S carotid arteries  2) Transcranial doppler            VTE Risk Mitigation         Ordered     apixaban tablet 2.5 mg  2 times daily     Route:  Oral        04/15/17 1618     Medium Risk of VTE  Once      04/15/17 1431          Thank you for your consult. I will follow-up with patient. Please contact us if you have any additional questions.    Samy Arriaga MD  Neurology  [facility]    ====================  Patient seen and examined.  I agree with the history, exam, assessment and plan within the resident's note as stated above.  Note has been edited by me to reflect my work and changes.    Samy Laura MD, MPH  Division of Movement and Memory  Disorders  [facility]

## 2017-04-18 NOTE — ASSESSMENT & PLAN NOTE
Nephrology consulted and managing patient's HD needs in hospital. Patient dialyzed on 4/15. Patient hypervolemic on exam today so dialyzed by Nephrology today, 4/18 and 3 liters removed.

## 2017-04-18 NOTE — ASSESSMENT & PLAN NOTE
- Hx of cardiovascular disease and bradycardia  - Hx of hypothyroidism  - Adrenal insufficiency negative 2/2 ACTH challenge    (DX) Hypoperfusion VS Hypothyroidism (less likely) VS Adrenal Insufficiency (ruled-out)  - Hypoperfusion seems quite plausible given hx of cardiovascular disease  - Evaluation of vasculature perfusing the brain is recommended  - CT angiogram is the gold-standard but is contraindicated 2/2 renal failure    Recommendations:  1) U/S carotid arteries  2) Transcranial doppler

## 2017-04-18 NOTE — ASSESSMENT & PLAN NOTE
Hgb is stable and there are no signs of active bleeding. Hgb 8.3 so doubt cause of lethargy.  No indication to transfuse blood in this patient at this time. Nephrology to continue Epogen with HD to treat chronic anemia related to ESRD.

## 2017-04-18 NOTE — ASSESSMENT & PLAN NOTE
Improved. Likely related to pleural effusions and hypervolemia from chronic renal failure and heart failure. Continue oxygen at 2 liters with plan to wean to keep oxygen sats > 90%.

## 2017-04-18 NOTE — PROGRESS NOTES
Wound care consulted for stage 2 to sacrum  The medical history includes past medical history of chronic combined systolic and diastolic heart failure, 3 vessel CAD, atherosclerosis of bilateral lower extremity with left heel ulcer with recent PTA of left SFA on 4/3 by Vascular surgery, essential HTN, hypothyroidism and ESRD on HD was sent from acute dialysis today due to concern for increasing lethargy, right hip fracture/surgery.     The buttocks are darkened, the right buttock has a stage 2 pressure injury and a skin tear noted.  The stage 2 is ~ 2 cm x 1 cm x 0.2 and the skin tear is ~ 4cm L x 1.5 cm W x 0.1 cm D.  The wound is pink/moist with open edges. A mepilex foam dressing was applied over the area.     Consent was received from the patient for the wound photograph.      Recommendations:  1. Medi-honey to the wound bed, cover with a mepore dressing daily.   2. Reposition every 2 hours with wedge.   3. Heel protectors

## 2017-04-18 NOTE — ASSESSMENT & PLAN NOTE
Much improved. Patient with very mild case and will continue treatement with Nystatin swish and spit 4 times daily.

## 2017-04-18 NOTE — ASSESSMENT & PLAN NOTE
Patient with signs of volume overload on exam and has pleural effusions on CXR but volume status appears largely unchanged from her recent discharge on 4/13. Nephrology consulted to continue HD for fluid removal and patient had 3 liters of fluid removed during HD on 4/18. Nephrology to assess patient daily for continued volume removal in hospital. Patient's oxygen status improved and patient with O2 sats > 90% on 2 liters of oxygen.

## 2017-04-18 NOTE — ASSESSMENT & PLAN NOTE
Patient is POD # 12. Patient reports no pain in right hip currently and main pain related to her neuropathic pain. Plan to continue PT/OT for gait training and strengthening and restoration of ADL's on this admit to continue therapy. Patient is FWB/WBAT: right lower extremity, posterior hip precautions as per Orthopedic recommendations. Patient on Eliquis for long term anticoagulation for atrial fibrillation so no DVT prophylaxis needed. Pain is well controlled and will use just Tylenol for pain and avoid any sedating narcotics. Patient needs SNF placement on discharge and likely can return to [facility] once medically stable.

## 2017-04-18 NOTE — ASSESSMENT & PLAN NOTE
Hgb is stable and there are no signs of active bleeding. Hgb 8.3 so doubt cause of lethargy. Anemia unlikely to No indication to transfuse blood in this patient at this time. Nephrology to continue Epogen with HD to treat chronic anemia related to ESRD.

## 2017-04-18 NOTE — SUBJECTIVE & OBJECTIVE
"Interval HPI:   Overnight events:  Had ACTH stim test which timing was done incorrectly but nonetheless the baseline/random cortisol was 19 higher than 7am cortisol = 14.7 and stimulated to 29 ruling out AI.   IV LT4 40mcg scheduled for today.   HR remains 50-60s  S/p HD yesterday with improvement in hyponatremia, now normal Na.     BP (!) 140/65 (BP Location: Right arm, Patient Position: Lying, BP Method: Automatic)  Pulse (!) 58  Temp 99.1 °F (37.3 °C) (Oral)   Resp 18  Ht 5' 2" (1.575 m)  Wt 50.8 kg (112 lb)  LMP  (LMP Unknown)  SpO2 (!) 94%  Breastfeeding? No  BMI 20.49 kg/m2    Labs Reviewed and Include      Recent Labs  Lab 04/18/17  0342   GLU 93   CALCIUM 9.0   ALBUMIN 3.1*   PROT 6.0      K 4.1   CO2 22*      BUN 13   CREATININE 2.7*   ALKPHOS 77   ALT 17   *   BILITOT 2.3*     Lab Results   Component Value Date    WBC 9.24 04/18/2017    HGB 8.1 (L) 04/18/2017    HCT 25.4 (L) 04/18/2017    MCV 86 04/18/2017     04/18/2017       Recent Labs  Lab 04/15/17  1239   TSH 15.558*   FREET4 0.81     No results found for: HGBA1C    Nutritional status:   Body mass index is 20.49 kg/(m^2).  Lab Results   Component Value Date    ALBUMIN 3.1 (L) 04/18/2017    ALBUMIN 2.8 (L) 04/17/2017    ALBUMIN 2.7 (L) 04/16/2017     No results found for: PREALBUMIN    Estimated Creatinine Clearance: 12 mL/min (based on Cr of 2.7).    Accu-Checks  No results for input(s): POCTGLUCOSE in the last 72 hours.    Current Medications and/or Treatments Impacting Glycemic Control  Immunotherapy:  Immunosuppressants     None        Steroids:   Hormones     None        Pressors:    Autonomic Drugs     Start     Stop Route Frequency Ordered    04/15/17 2200  midodrine tablet 10 mg      -- Oral 3 times daily 04/15/17 1618        Hyperglycemia/Diabetes Medications: Antihyperglycemics     None        "

## 2017-04-18 NOTE — ASSESSMENT & PLAN NOTE
RUQ ultrasound unremarkable except for hepatic cysts and unlikely to be causing mild increase in LFT's but LFT's are improving so no further evaluation at this time and could just be hepatic congestion from hypervolemia related to chronic renal failure in combination with chronic heart failure. Monitor daily CMP. No need to discontinue Lipitor at this time as statin not likely cause and needs for CAD and severe PVD.

## 2017-04-18 NOTE — PHYSICIAN QUERY
PT Name: Padmini Miranda  MR #: [id number]    Physician Query Form - Neurological Condition Clarification       CDS/: Venancio Aaron               Contact information:[phone]    This form is a permanent document in the medical record.     Query Date: April 18, 2017    By submitting this query, we are merely seeking further clarification of documentation. Please utilize your independent clinical judgment when addressing the question(s) below.    The Medical record contains the following:   Indicators   Supporting Clinical Findings Location in Medical Record   x AMS, Confusion, LOC, etc. AMS that has progressively worsened since discharge from [facility]  04/15/17 ED Provider Note   x Acute / Chronic Illness Acute encephalopathy, Fatigue, lethargic, slurred speechx2 days, from dialysis. CAD, CHF, HTN,ESRD on HD, lethargy, Acute Respiratory failure, Paroxysmal atrial fibrillation. Delirium      04/15/17 H&P   x Radiology Findings Mild chronic microvascular ischemic changes in the supratentorial white matter. 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 Small remote right cerebellar infarct. No parenchymal mass, hemorrhage, edema or major vascular   distribution infarct.          04/15/17 CT Head Without   Contrast   x Electrolyte Imbalance Hyponatremia 125-135  GFR at baseline, intermittent hyperkalemia  04/18/17 Endocrinology   Progress Note    Medication     x Treatment    Please avoid any benzodiazepines as can worsen delirium and only should use in delirium caused by benzodiazepine or alcohol withdrawal     Please avoid all anticholinergic medications as can worsen delirium           04/15/17 H&P    Other       Provider, please specify the diagnosis or diagnoses associated with above clinical findings.    [  ] Alcoholic Encephalopathy    [  ] Hepatic Encephalopathy    [  ] Hypertensive Encephalopathy    [ x ] Metabolic Encephalopathy    [  ] Toxic Encephalopathy    [  ] Traumatic  Encephalopathy    [  ] Wernickes Encephalopathy    [  ] Other Encephalopathy    [  ] Unspecified Encephalopathy    [  ] Other (please specify): _____________________________________    [  ] Clinically Undetermined      Please document in your progress notes daily for the duration of treatment until resolved, and  include in your discharge summary.

## 2017-04-18 NOTE — ASSESSMENT & PLAN NOTE
Patient is actually bradycardiac so no AV shikha blocking agents are required. Patient has an implanted loop recorder in place. Continue Eliquis for long term anticoagulation.

## 2017-04-18 NOTE — ASSESSMENT & PLAN NOTE
Improved today on 4/18 as patient slightly more alert today and actually able to participate with PT and family reports some improvement. Patient afebrile with normal WBC and no bandemia so infectious etiology unlikely, but CXR is concerning.  Patient with no evidence of acute hypoxia as oxygen sats > 90 on 2 liters of oxygen. Medication list reviewed for new medications recently added and no new medications added recently that could be causing encephalopathy but Neurontin on hold as can be sedating even though patient on a very low dose but may need to restart if patient's pain in legs starts getting worse.   · MRI of brain unremarkable for acute stroke or bleed on 4/15.   · AM Cortisol level normal 14.7. Endocrine consulted and recommended Cosyntropin stim test that was ordered by them on 4/18 to evaluate for relative adrenal insufficiency and was normal.   · Unable to get urine sample so far as patient makes scant urine to check for UTI but is ordered.  · Will hold all narcotics/opiates for pain. Avoid any sleep aid agents at this time that could affect alertness level.  · Endocrine consulted on 4/18 to see if hypothyroidism is causing fatigue and lethargy but they stated no evidence of myxedema but stated they would do trial of IV Synthroid to see if helps patient but they doubt hypothyroidism cause of lethargy.   · EP Cardiology consulted and evaluated patient to see if bradycardia causing fatigue/weakness but feel unlikely and do not recommend pacemaker placement at this time and recommended follow-up with Dr. Antoine in EP clinic as outpatient and he would consider once patient euthyroid.   · Please avoid any benzodiazepines as can worsen delirium and only should use in delirium caused by benzodiazepine or alcohol withdrawal.   · Please avoid all anticholinergic medications as can worsen delirium.   · Please keep curtains and blinds open during the day and closed during the night.   · Please continue to have  nursing and family reorient patient and encourage family to visit and stay at night if possible.   · Please avoid physical restraints if possible as likely to worsen and increase agitation in delirious patient.   · Will consult ID to see if they feel there is concern for occult infection and reason to treat with antibiotics as suggested by endocrine.   · Will check Vit B12, folate, thiamine, vit D  · - consult neurology for help with workup

## 2017-04-18 NOTE — PLAN OF CARE
Problem: Patient Care Overview  Goal: Plan of Care Review  Outcome: Ongoing (interventions implemented as appropriate)  Patient transferred to [facility] last pm.  Daughter at bedside. She has been c/o pain and was medicated once with tylenol. mepilex dressing intact to sacral stage 2 ulcer, support boots on bilateral  Dressing intack to left heel ulcer. Dressing to surgical site on right hip intact. This am patient pulling off oxygen and pulse ox probe, is oriented times 3.

## 2017-04-18 NOTE — PT/OT/SLP PROGRESS
Physical Therapy  Treatment    Padmini Miranda   MRN: [id number]   Admitting Diagnosis: Acute encephalopathy    PT Received On: 17  PT Start Time: 1115     PT Stop Time: 1140    PT Total Time (min): 25 min       Billable Minutes:  Therapeutic Activity 25    Treatment Type: Treatment  PT/PTA: PTA     PTA Visit Number: 1       General Precautions: Standard, fall, aspiration, nectar thick (cardiac)  Orthopedic Precautions: RLE weight bearing as tolerated, RLE posterior precautions   Braces:      Do you have any cultural, spiritual, Restorationist conflicts, given your current situation?: none noted    Subjective:  Communicated with NSG prior to session.  Patient complained of dizziness upon sitting and standing. Patient stated she felt a little better once seated in BS chair    Pain Ratin/10                   Objective:   Patient found with: oxygen, pressure relief boots    Functional Mobility:  Bed Mobility:   Scooting/Bridging: Maximum Assistance  Supine to Sit: Moderate Assistance    Transfers:  Sit <> Stand Assistance: Maximum Assistance (from bed x2 trials)  Sit <> Stand Assistive Device: Rolling Walker  Bed <> Chair Technique: Stand Pivot  Bed <> Chair Assistance: Maximum Assistance    Gait:   Gait Distance: patient unable          Balance:   Static Sit: FAIR+: Able to take MINIMAL challenges from all directions  Dynamic Sit: FAIR+: Maintains balance through MINIMAL excursions of active trunk motion  Static Stand: 0: Needs MAXIMAL assist to maintain   Dynamic stand: 0: N/A     Therapeutic Activities and Exercises:  Patient performed THR protocol exercises x20-25 reps with V/T cues  Patient recalled 2/3 hip precautions  Patient tolerated sitting EOB 10 min with SBA  Patient performed two standing trials with RW max assistance 15-30 sec each trial.  Noted significant B knee buckling.          AM-PAC 6 CLICK MOBILITY  How much help from another person does this patient currently need?   1 = Unable,  Total/Dependent Assistance  2 = A lot, Maximum/Moderate Assistance  3 = A little, Minimum/Contact Guard/Supervision  4 = None, Modified Letcher/Independent    Turning over in bed (including adjusting bedclothes, sheets and blankets)?: 2  Sitting down on and standing up from a chair with arms (e.g., wheelchair, bedside commode, etc.): 2  Moving from lying on back to sitting on the side of the bed?: 2  Moving to and from a bed to a chair (including a wheelchair)?: 2  Need to walk in hospital room?: 2  Climbing 3-5 steps with a railing?: 1  Total Score: 11    AM-PAC Raw Score CMS G-Code Modifier Level of Impairment Assistance   6 % Total / Unable   7 - 9 CM 80 - 100% Maximal Assist   10 - 14 CL 60 - 80% Moderate Assist   15 - 19 CK 40 - 60% Moderate Assist   20 - 22 CJ 20 - 40% Minimal Assist   23 CI 1-20% SBA / CGA   24 CH 0% Independent/ Mod I     Patient left up in chair with all lines intact, call button in reach and daughter present.    Assessment:  Padmini Miranda is a 85 y.o. female with a medical diagnosis of Acute encephalopathy and presents with decrease strength, mobility, balance and coordination.  Patient requires assistance with all mobility and transfers.  Noted fair sitting, but poor standing balance.  Patient would benefit from further IP therapy.    Rehab identified problem list/impairments: Rehab identified problem list/impairments: weakness, impaired endurance, gait instability, impaired functional mobilty, impaired balance, impaired self care skills, decreased lower extremity function, orthopedic precautions    Rehab potential is fair.    Activity tolerance: Fair    Discharge recommendations: Discharge Facility/Level Of Care Needs: nursing facility, skilled     Barriers to discharge: Barriers to Discharge: Decreased caregiver support, Inaccessible home environment    Equipment recommendations: Equipment Needed After Discharge: bedside commode, walker, rolling, wheelchair, hip kit,  bath bench     GOALS:   Physical Therapy Goals        Problem: Physical Therapy Goal    Goal Priority Disciplines Outcome Goal Variances Interventions   Physical Therapy Goal     PT/OT, PT Ongoing (interventions implemented as appropriate)     Description:  Goals to be met by: 17     Patient will increase functional independence with mobility by performin. Supine to sit with MInimal Assistance  2. Sit to supine with MInimal Assistance  3. Rolling to Left and Right with Set-up Assistance.  4. Sit to stand transfer with Minimal Assistance  5. Bed to chair transfer with Moderate Assistance using Rolling Walker  6. Gait  x 30 feet with Moderate Assistance using Rolling Walker.   7. Lower extremity exercise program x15 reps per handout, with assistance as needed                PLAN:    Patient to be seen 6 x/week  to address the above listed problems via gait training, therapeutic activities, therapeutic exercises  Plan of Care expires: 17  Plan of Care reviewed with: patient, daughter         Jani Dumont II, PTA  2017

## 2017-04-18 NOTE — SUBJECTIVE & OBJECTIVE
Interval History: Patient complaining of increased neuropathic pain in her legs today. Neurontin held on admit due to somnolence and patient does appear more alert today but unclear if Neurontin was contributing at all but consider restarting tomorrow. Family at bedside and updated on test results. Discussed again with family that cardiology not recommending pacemaker at this time and they stated they talked to Cardiology today as well as Endocrine and discussed with family about working up for possible adrenal insufficiency. Patient reports her throat is feeling better since starting Nystatin for thrush.     Review of Systems   Constitutional: Positive for fatigue. Negative for chills and fever.   HENT: Positive for sore throat.    Respiratory: Negative for cough and shortness of breath.    Cardiovascular: Negative for chest pain.   Gastrointestinal: Negative for abdominal pain and nausea.   Musculoskeletal: Positive for myalgias (Right hip pain). Negative for back pain.   Skin: Negative for rash.   Neurological: Positive for weakness (Generalized). Negative for dizziness.   Psychiatric/Behavioral: Negative for confusion and hallucinations.     Objective:     Vital Signs (Most Recent):  Temp: 97.9 °F (36.6 °C) (04/17/17 1936)  Pulse: (!) 59 (04/17/17 1936)  Resp: 18 (04/17/17 1936)  BP: (!) 104/54 (04/17/17 1936)  SpO2: 99 % (04/17/17 1936) Vital Signs (24h Range):  Temp:  [97.6 °F (36.4 °C)-97.9 °F (36.6 °C)] 97.9 °F (36.6 °C)  Pulse:  [43-63] 59  Resp:  [13-23] 18  SpO2:  [97 %-100 %] 99 %  BP: ()/(26-63) 104/54     Weight: 50.8 kg (112 lb)  Body mass index is 20.49 kg/(m^2).    Intake/Output Summary (Last 24 hours) at 04/17/17 2028  Last data filed at 04/17/17 1400   Gross per 24 hour   Intake             1240 ml   Output             3800 ml   Net            -2560 ml      Physical Exam   Constitutional: No distress.   Eyes: No scleral icterus.   Neck: Neck supple. No JVD present.   Cardiovascular: Regular  rhythm and normal heart sounds.  Bradycardia present.  Exam reveals no gallop and no friction rub.    No murmur heard.  Pulmonary/Chest: Effort normal. She has rales (Mild rales in bases).   Abdominal: Soft. Bowel sounds are normal. She exhibits no distension. There is no tenderness.   Musculoskeletal: She exhibits edema (1-2 + edema in lower extremities; 1+ edema in left arm).   Neurological: She is alert.   Skin: Skin is warm and dry.   Psychiatric: Her speech is delayed.       Significant Labs:   Recent Labs      04/16/17   0701  04/17/17   0640   GLU  72  64*   NA  133*  131*   K  4.1  4.4   CL  98  96   CO2  26  24   BUN  15  25*   CREATININE  3.1*  4.2*   ANIONGAP  9  11   BILITOT  1.9*  2.1*   AST  93*  135*   ALT  10  17   ALKPHOS  69  72   ALBUMIN  2.7*  2.8*   PROT  5.5*  5.8*   MG  1.9  2.1   PHOS  3.0  3.2          CBC:   Recent Labs  Lab 04/16/17  0701 04/17/17  0640   WBC 6.87 8.71   HGB 8.3* 8.3*   HCT 26.1* 27.0*    194     Significant Imaging: I have reviewed all pertinent imaging results/findings within the past 24 hours.

## 2017-04-18 NOTE — ASSESSMENT & PLAN NOTE
Appreciate Endocrine consult and they do not feel that any adjustments needed in Synthroid but will give trial of IV Synthroid as per Endocrine to see if helps patient. Endocrine notes no signs of myxedema so doubt lethargy related to thyroid disease.

## 2017-04-18 NOTE — ASSESSMENT & PLAN NOTE
Improved today on 4/18 as patient slightly more alert today and actually able to participate with PT and family reports some improvement. Patient afebrile with normal WBC and no bandemia so infectious etiology unlikely. Patient with no evidence of acute hypoxia as oxygen sats > 90 on 2 liters of oxygen. Medication list reviewed for new medications recently added and no new medications added recently that could be causing encephalopathy but Neurontin on hold as can be sedating even though patient on a very low dose but may need to restart on 4/18 if patient's pain in legs starts getting worse.   · MRI of brain unremarkable for acute stroke or bleed on 4/15.   · AM Cortisol level normal 14.7 but Endocrine consulted and recommended Cosyntropin stim test that was ordered by them on 4/18 to evaluate for relative adrenal insufficiency.  · Unable to get urine sample so far as patient makes scant urine to check for UTI but is ordered.  · Will hold all narcotics/opiates for pain. Avoid any sleep aid agents at this time that could affect alertness level.  · Endocrine consulted on 4/18 to see if hypothyroidism is causing fatigue and lethargy but they stated no evidence of myxedema but stated they would do trial of IV Synthroid to see if helps patient but they doubt hypothyroidism cause of lethargy.   · EP Cardiology consulted and evaluated patient to see if bradycardia causing fatigue/weakness but feel unlikely and do not recommend pacemaker placement at this time and recommended follow-up with Dr. Antoine in EP clinic as outpatient and he would consider once patient euthyroid.   · Please avoid any benzodiazepines as can worsen delirium and only should use in delirium caused by benzodiazepine or alcohol withdrawal.   · Please avoid all anticholinergic medications as can worsen delirium.   · Please keep curtains and blinds open during the day and closed during the night.   · Please continue to have nursing and family reorient  patient and encourage family to visit and stay at night if possible.   · Please avoid physical restraints if possible as likely to worsen and increase agitation in delirious patient.

## 2017-04-18 NOTE — CONSULTS
[facility]  Infectious Disease  Consult Note    Patient Name: Padmini Miranda  MRN: [id number]  Admission Date: 4/15/2017  Hospital Length of Stay: 3 days  Attending Physician: Tahira Tomlinson MD  Primary Care Provider: Randy Lyles MD     Isolation Status: No active isolations    Patient information was obtained from patient, relative(s) and past medical records.      Consults  Assessment/Plan:     * Acute encephalopathy      86 y/o with chronic combined systolic and diastolic heart failure, 3 vessel CAD, atherosclerosis of bilateral lower extremity with left heel ulcer ( recent PTA of left SFA on 4/3 by Vascular surgery), essential HTN, hypothyroidism, ESRD on HD, and recent surgical repair of left hip fracture on 4/5 who was admitted for increasing lethargy and somnolence.   Endocrine consulted to evaluate given hypothyroidism (recent adjustment of synthroid for TSH of 21), but they find no evidence of myxedema and evaluation for adrenal insufficiency is negative.   Endocrine concerned for occult infection and ID is consulted for further evaluation.       Patient is afebrile.  No leukocytosis.  No current blood cultures.  CXR does show pleural fluid, but no hypoxemia, no cough, no shortness of breath.  Urine culture pending collection, though patient is asymptomatic and produces little urine and in setting of HD, this may not be of clinical utility.     Not currently on antimicrobial therapy.   Per family, patient has clinically improved since admission, though still not back to baseline.       -  Blood cultures X 2   -  No indication for antimicrobials at this time.     - Will review data further and follow up with you tomorrow.     Patient seen by, and plan discussed with, ID staff  Discussed with Primary Team      Thank you for your consult. I will follow-up with patient. Please contact us if you have any additional questions.     Thank you.   Please call for any questions or  concerns.  MILLA Jang, ANP-C  [phone]    Subjective:     Principal Problem: Acute encephalopathy    HPI:  Ms. Miranda is an 84 y/o with chronic combined systolic and diastolic heart failure, 3 vessel CAD, atherosclerosis of bilateral lower extremity with left heel ulcer ( recent PTA of left SFA on 4/3 by Vascular surgery), essential HTN, hypothyroidism and ESRD on HD who was sent to ED from dialysis on 4/15 due to concern for increasing lethargy. Family  noted that patient appeared to be slurring her words and noted right eye was drooping and patient was having difficulty staying awake and due to thus concerns was sent to [facility].  CTscan of head showed no acute findings. MRI showed no acute findings.    Patient's daughters report patient has been sleeping more than normal and not nearly as alert as she usually is.      Of note, patient was discharged from [facility]/13 to [facility] [place] after a prolonged hospitalization following surgical repair of a right hip fracture ( on 4/5).   Prior to that surgery she was seen and evaluated by Cardiology for sinus bradycardia with HR in 30-40's.  Patient during admit was felt not to need pacemaker placement and felt bradycardia related to Amiodarone and Coreg that had been recently been discontinued.   She eventually underwent right hip hemiarthroplasty on 4/5.  She was noted after that to be hypothermic and persistently bradycardic, and was evaluated for possible infection - blood and urine cultures were negative, she had no leukocytosis or other clinical symptoms suggestive of infection.        Has been evaluated by Endocrinology.    TSH (normal on 3/10) was checked during recent admit and found to be severely elevated at 21.6, with low normal Free T4 of 0.88. Synthroid was adjusted from 50 mcg to 75 mcg daily to treat hypothyroidism.   TSH improved from 21 >> 15 in 5 days after increasing synthroid.      Cortisol > 18 on stim test,  "ruling out adrenal insufficiency.     No current blood cultures.    Urine culture waiting to be collected, though she makes little urine.   CXR  - "abundant" left pleural fluid.  Smaller dependent right pleural fluid collection. There is a patchy oacification in right lower lung suggestive of aspiration or pneumonia.   Abdominal US - multiple cysts in right hepatic lobe    Patient is afebrile, no leukocytosis, hemodynamically stable.     At time of visit she is sitting up in chair with family with her.  Family reports she is more alert today, better, but not yet back to baseline.  Complaining of fatigue, poor appetite and left lower extremity pain.              Past Medical History:   Diagnosis Date    3-vessel CAD     Acquired hypothyroidism     ALLERGIC RHINITIS     Anemia in ESRD (end-stage renal disease) 2017    Anticoagulant long-term use     Atherosclerosis of native artery 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 2016    Atherosclerotic PVD with ulceration 2016    Blood transfusion     Cataract     Chronic combined systolic and diastolic heart failure 2015    Closed displaced fracture of right femoral neck s/p hemiarthroplasty on 2017    Cramp of both lower extremities 2016    ESRD on hemodialysis 2015    Hyperlipidemia     Ischemic cardiomyopathy 10/20/2015    Non-ST elevation MI (NSTEMI) 2015    PAF (paroxysmal atrial fibrillation) 3/10/2017    Renovascular hypertension 2015    Sinus brea-tachy syndrome 2017    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       Past Surgical History:   Procedure Laterality Date    ANGIOPLASTY      Renal PTA    CARDIAC CATHETERIZATION       SECTION      HIP FRACTURE SURGERY Right 2017    Hemiarthroplasty for displaced femoral neck fracture    HYSTERECTOMY      ovaries intact    RENAL ARTERY STENT      TONSILLECTOMY      VASCULAR SURGERY         Review of " patient's allergies indicates:   Allergen Reactions    Ativan [lorazepam] Hallucinations    Crab Other (See Comments)     Causes Gout    Crayfish Other (See Comments)     Causes Gout    Promethazine Other (See Comments)     Hallucinations        Medications:  Prescriptions Prior to Admission   Medication Sig    aspirin 81 MG Chew Take 1 tablet (81 mg total) by mouth once daily. (Patient taking differently: Take 81 mg by mouth every morning. )    atorvastatin (LIPITOR) 40 MG tablet Take 1 tablet (40 mg total) by mouth once daily. (Patient taking differently: Take 40 mg by mouth every morning. )    ELIQUIS 2.5 mg Tab TAKE 1 BY MOUTH TWO TIMES  DAILY    epoetin syed (PROCRIT) 10,000 unit/mL injection Inject 0.28 mLs (2,800 Units total) into the skin every Tues, Thurs, Sat.    multivitamin (ONE DAILY MULTIVITAMIN) per tablet Take 1 tablet by mouth every morning.    PROTONIX 40 mg tablet TAKE 1 BY MOUTH DAILY (Patient taking differently: TAKE 1 TABLET  BY MOUTH  DAILY)    RENVELA 0.8 gram PwPk     SYNTHROID 50 mcg tablet TAKE 1 BY MOUTH DAILY      BEFORE BREAKFAST    nitroGLYCERIN (NITROSTAT) 0.4 MG SL tablet Place 1 tablet (0.4 mg total) under the tongue every 5 (five) minutes as needed for Chest pain.     Antibiotics     None        Antifungals     Start     Stop Route Frequency Ordered    04/15/17 1143  nystatin 100,000 unit/mL suspension 500,000 Units      -- Oral 4 times daily 04/15/17 1143        Antivirals     None           Immunization History   Administered Date(s) Administered    Influenza - High Dose 01/04/2013, 11/18/2013    PPD Test 05/19/2015, 07/23/2015, 09/01/2015, 12/29/2016, 04/10/2017    Pneumococcal Conjugate - 13 Valent 07/27/2015       Family History     Problem Relation (Age of Onset)    Heart disease Father        Social History     Social History    Marital status:      Spouse name: N/A    Number of children: N/A    Years of education: N/A     Social History Main Topics     Smoking status: Never Smoker    Smokeless tobacco: Never Used    Alcohol use Yes      Comment: occasional wine use    Drug use: No    Sexual activity: No     Other Topics Concern    None     Social History Narrative     Review of Systems   Constitutional: Positive for activity change, appetite change and fatigue. Negative for chills and fever.   HENT: Positive for sore throat. Negative for congestion, mouth sores and rhinorrhea.    Respiratory: Negative for cough, shortness of breath and wheezing.    Cardiovascular: Positive for leg swelling (left leg swelling ). Negative for chest pain.   Gastrointestinal: Positive for nausea. Negative for abdominal distention, abdominal pain, constipation, diarrhea and vomiting.   Genitourinary: Positive for decreased urine volume. Negative for dysuria and flank pain.        Decreased urine output -  ESRD on HD     Musculoskeletal: Positive for arthralgias. Negative for back pain and neck pain.        Right hip pain   Skin: Negative for rash and wound.   Neurological: Positive for weakness. Negative for dizziness and headaches.   Psychiatric/Behavioral: Positive for confusion (resolving per family) and hallucinations (prior to admit). The patient is not nervous/anxious.      Objective:     Vital Signs (Most Recent):  Temp: 97.5 °F (36.4 °C) (04/18/17 1708)  Pulse: (!) 50 (04/18/17 1708)  Resp: 18 (04/18/17 1708)  BP: (!) 167/74 (04/18/17 1708)  SpO2: 100 % (04/18/17 1708) Vital Signs (24h Range):  Temp:  [97.5 °F (36.4 °C)-99.1 °F (37.3 °C)] 97.5 °F (36.4 °C)  Pulse:  [46-62] 50  Resp:  [18] 18  SpO2:  [94 %-100 %] 100 %  BP: (100-167)/(33-74) 167/74     Weight: 50.8 kg (112 lb)  Body mass index is 20.49 kg/(m^2).    Estimated Creatinine Clearance: 12 mL/min (based on Cr of 2.7).    Physical Exam   Constitutional: No distress.   Elderly, frail   HENT:   Head: Normocephalic.   Eyes: Conjunctivae are normal. No scleral icterus.   Neck: Normal range of motion.    Cardiovascular: Regular rhythm.  Exam reveals no gallop and no friction rub.    Murmur heard.  Pulmonary/Chest: Effort normal. She has rales (basilar crackles).   Abdominal: Soft. She exhibits no distension. There is no tenderness.   Musculoskeletal: She exhibits edema (left leg pitting edema > right ).   Neurological: She is alert.   Moves all extremities   Skin: Skin is warm and dry. She is not diaphoretic.   Peripheral IV sites clear     Left AVF - + thrill   Vitals reviewed.      Significant Labs:   Blood Culture:   Recent Labs  Lab 04/10/17  0531   LABBLOO No growth after 5 days.     CBC:   Recent Labs  Lab 04/17/17  0640 04/18/17  0342   WBC 8.71 9.24   HGB 8.3* 8.1*   HCT 27.0* 25.4*    173     CMP:   Recent Labs  Lab 04/17/17  0640 04/18/17  0342   * 137   K 4.4 4.1   CL 96 103   CO2 24 22*   GLU 64* 93   BUN 25* 13   CREATININE 4.2* 2.7*   CALCIUM 8.4* 9.0   PROT 5.8* 6.0   ALBUMIN 2.8* 3.1*   BILITOT 2.1* 2.3*   ALKPHOS 72 77   * 130*   ALT 17 17   ANIONGAP 11 12   EGFRNONAA 9.1* 15.5*     Respiratory Culture: No results for input(s): GSRESP, RESPIRATORYC in the last 4320 hours.  Urine Culture:   Recent Labs  Lab 04/04/17  0148   LABURIN No growth     Urine Studies:   Recent Labs  Lab 04/04/17  0149   COLORU Yellow   APPEARANCEUA Clear   PHUR 7.0   SPECGRAV 1.020   PROTEINUA 2+*   GLUCUA Negative   KETONESU Negative   BILIRUBINUA Negative   OCCULTUA Negative   NITRITE Negative   UROBILINOGEN Negative   LEUKOCYTESUR Negative   RBCUA 0   WBCUA 1   BACTERIA None   SQUAMEPITHEL 1   HYALINECASTS 0       Significant Imaging: I have reviewed all pertinent imaging results/findings within the past 24 hours.

## 2017-04-18 NOTE — PROGRESS NOTES
MD notified about pt bp 148/64. MD ordered to hold Midodrine. MD also ordered to give midodrine if she goes to dialysis.

## 2017-04-18 NOTE — ASSESSMENT & PLAN NOTE
Clinically hypothyroid, not in myxedema.   Remote hx of hypothermia post-op (resolved) and now ongoing brea.   hyponatremia resolved. Monitor for hypoglycemia + hypotension  Given concern for possible decreased absorption if gut edema present, rec continuing IV LT4 40mcg daily (and switch back to equivalent dose of PO LT4 75mcg daily at time of discharge)    Check FT4 q2 days while on IV LT4.   Repeat TSH in 6 weeks   Cortisol >18 on stim test, rules out AI.   Would have high suspicion for infection even if source not yet identified and could consider empiric abx

## 2017-04-18 NOTE — PLAN OF CARE
Problem: Patient Care Overview  Goal: Plan of Care Review  Outcome: Ongoing (interventions implemented as appropriate)  PAtient slept well, no complaints overnight, family member at bedside, no falls or injuries  Noted.Left upper arm fistula +/+.

## 2017-04-19 NOTE — ASSESSMENT & PLAN NOTE
Improved. Patient with signs of volume overload on exam and has pleural effusions on CXR but volume status appears largely unchanged from her recent discharge on 4/13 and patient appears improved after last 2 dialysis sesssions. Nephrology consulted to continue HD for fluid removal and patient had 3 liters of fluid removed during HD on 4/17 and 1 liter on 4/19. Nephrology to assess patient daily for continued volume removal in hospital. Patient's oxygen status improved and patient with O2 sats > 90% on 2 liters of oxygen.

## 2017-04-19 NOTE — PLAN OF CARE
Problem: Patient Care Overview  Goal: Plan of Care Review  Outcome: Ongoing (interventions implemented as appropriate)  Slept much better tonight. Dressing to sacral decubitus intact, right hip dressing intact. daughter at bedside. Awaiting dialysis this am. No prn med given this shift.

## 2017-04-19 NOTE — PLAN OF CARE
2:29 PM. MARQUITA placed call to pts daughter Latanya Morrow [phone] leaving a voice message noting that the [facility] SN[facility] list has been left in the pts room, calling to see if she and the other daughters have had the chance to review the list and come up with choices. MARQUITA provided call back number.       RENALDO Payton LMSW  [phone]

## 2017-04-19 NOTE — ASSESSMENT & PLAN NOTE
Patient normotensive on no BP medications. Bidil discontinued on previous admit due to issues with hypotension during HD and will continue to hold and continue Midodrine to help with BP and volume removal during HD. Patient tolerating volume removal with HD on Midodrine and will continue.

## 2017-04-19 NOTE — PROGRESS NOTES
[facility]  Consult Note Nephrology    Admit Date: 4/15/2017  Consult Requested By: Jalyn Saldana MD   LOS: 4 days     SUBJECTIVE:     Follow-up For:  ESRD    Interval History:  [facility], seen in [facility] while on HD tolerated well on HD no complications or complains.    Review of Systems:  Constitutional: no fever or chills  Respiratory: no cough or shortness of breath  Cardiovascular: no chest pain or palpitations  Gastrointestinal: no nausea or vomiting, no abdominal pain or change in bowel habits  Hematologic/Lymphatic: no easy bruising or lymphadenopathy  Musculoskeletal: no arthralgias or myalgias  Neurological: no seizures or tremors      OBJECTIVE:     Vital Signs (Most Recent)  Temp: 97.3 °F (36.3 °C) (04/19/17 1436)  Pulse: (!) 51 (04/19/17 1442)  Resp: 16 (04/19/17 1436)  BP: (!) 117/46 (04/19/17 1436)  SpO2: 95 % (04/19/17 1436)    Vital Signs Range (Last 24H):  Temp:  [97.3 °F (36.3 °C)-98.1 °F (36.7 °C)]   Pulse:  [49-57]   Resp:  [14-18]   BP: ()/(39-53)   SpO2:  [91 %-100 %]       Intake/Output Summary (Last 24 hours) at 04/19/17 1714  Last data filed at 04/19/17 1330   Gross per 24 hour   Intake              600 ml   Output             1629 ml   Net            -1029 ml         Physical Exam:   General: Well developed, well nourished in NAD  HEENT: Conjunctiva clear; Oropharynx clear  Neck: No JVD noted, Supple  CV- Normal S1, S2 with no murmurs,gallops,rubs  Resp- Lungs CTA Bilaterally, Unlabored  Abdomen- NTND, BS normoactive x4 quads, soft  Extrem- No cyanosis, clubbing, trace + pitting edema.  Skin- No rashes, lesions, ulcers  Neuro: awake, Oriented x3, no FND      Laboratory:  CBC:     Recent Labs  Lab 04/18/17  0342   WBC 9.24   RBC 2.95*   HGB 8.1*   HCT 25.4*      MCV 86   MCH 27.5   MCHC 31.9*     BMP:     Recent Labs  Lab 04/18/17 0342   GLU 93      CO2 22*   BUN 13   CREATININE 2.7*   CALCIUM 9.0   MG 2.0     CMP:     Recent Labs  Lab 04/18/17 0342    GLU 93   CALCIUM 9.0   ALBUMIN 3.1*   PROT 6.0      K 4.1   CO2 22*      BUN 13   CREATININE 2.7*   ALKPHOS 77   ALT 17   *   BILITOT 2.3*     PTH: No results for input(s): PTH in the last 168 hours.  Coagulation:     Recent Labs  Lab 04/15/17  1239   INR 1.4*     Cardiac Markers: No results for input(s): CKMB, TROPONINT, MYOGLOBIN in the last 168 hours.  ABGs: No results for input(s): PH, PCO2, HCO3, POCSATURATED, BE in the last 168 hours.    Labs reviewed  Diagnostic Results:  X-Ray: Reviewed  US: Reviewed  Echo: Reviewed    ASSESSMENT/PLAN:     Active Hospital Problems    Diagnosis  POA    *Acute encephalopathy [G93.40]  Yes    Bibasilar crackles [R09.89]  Yes    Oral thrush [B37.0]  Yes    Abnormal LFTs [R79.89]  Yes     Chronic    Hyponatremia [E87.1]  Yes    Acute blood loss anemia [D62]  Yes    Bradycardia [R00.1]  Yes    Bradycardia, drug induced [R00.1, T50.905A]  Yes    Closed displaced fracture of right femoral neck s/p hemiarthroplasty on 4/5/2017 [S72.001A]  Yes    3-vessel CAD [I25.10]  Yes    PAF (paroxysmal atrial fibrillation) [I48.0]  Yes    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     ESRD on IHD TTS   Out patient [facility]/ Dr. Kaminski  On HD for: ~ 1 year  Duration of outpatient dialysis session - 3.75 hrs  EDW - 54 kg  Residual Mary Ann Function - no  - Will provide dialysis for metabolic clearance and volume management x 4 hrs  - Seen and examined today during hemodialysis; tolerating treatment well without  issues. Denied headaches, chest pain, abdominal pain, or muscle cramps   -   - Target ultrafiltration 1 lts  - Dialysate adjusted to current labs   Aceess: EDILBERTO AVF with good thrill and bruit      Active problem in hospital  - AMS      Anemia of Chronic Kidney Disease   - Will resume CURTIS with HD EPO 3000 uts  - Hg 8.3 at goal   - TSAT 77 Ferritin 674      Mineral Bone Disease in CKD   - Renal Function Panel Daily for electrolytes monitoring  - Phos 1.9  - DC binders today  - CoCa WNL      Nutrition   - please change diet to cardiac diet      HTN   - Controlled BP, will continue to monitor. Goal for BP is <130 mmHg SBP and BDP <80 mmHg.   - Cont Bidil 1 tab TID  - Coreg on hold secondary to bradycardia      Case discuss with Staff further recs with attestation.      Octavio Gong MD  Nephrology Fellow PGY4  [phone]

## 2017-04-19 NOTE — SUBJECTIVE & OBJECTIVE
Interval History: Patient more alert today and able to hold a conversation without falling asleep. ID and Neurology evaluated patient but no evidence to suggest occult infection or stroke as cause of increased lethargy. Patient denies any sore throat and reports appetite is slowly improving. Patient dialyzed today.      Review of Systems   Constitutional: Positive for fatigue. Negative for chills and fever.   Respiratory: Negative for cough and shortness of breath.    Cardiovascular: Negative for chest pain.   Gastrointestinal: Negative for abdominal pain and nausea.   Musculoskeletal: Positive for myalgias (Right hip pain). Negative for back pain.   Skin: Negative for rash.   Neurological: Positive for weakness (Generalized). Negative for dizziness.   Psychiatric/Behavioral: Negative for confusion and hallucinations.     Objective:     Vital Signs (Most Recent):  Temp: 97.3 °F (36.3 °C) (04/19/17 1436)  Pulse: (!) 51 (04/19/17 1442)  Resp: 16 (04/19/17 1436)  BP: (!) 117/46 (04/19/17 1436)  SpO2: 95 % (04/19/17 1436) Vital Signs (24h Range):  Temp:  [97.3 °F (36.3 °C)-98.1 °F (36.7 °C)] 97.3 °F (36.3 °C)  Pulse:  [49-57] 51  Resp:  [14-18] 16  SpO2:  [91 %-100 %] 95 %  BP: ()/(39-74) 117/46     Weight: 50.8 kg (112 lb)  Body mass index is 20.49 kg/(m^2).    Intake/Output Summary (Last 24 hours) at 04/19/17 1653  Last data filed at 04/19/17 1330   Gross per 24 hour   Intake              600 ml   Output             1629 ml   Net            -1029 ml      Physical Exam   Constitutional: No distress.   Eyes: No scleral icterus.   Neck: Neck supple. No JVD present.   Cardiovascular: Regular rhythm and normal heart sounds.  Bradycardia present.  Exam reveals no gallop and no friction rub.    No murmur heard.  Pulmonary/Chest: Effort normal. She has rales (Mild rales in bases).   Abdominal: Soft. Bowel sounds are normal. She exhibits no distension. There is no tenderness.   Musculoskeletal: She exhibits edema (1-2 +  edema in lower extremities; 1+ edema in left arm).   Neurological: She is alert.   Skin: Skin is warm and dry.   Psychiatric: She has a normal mood and affect. Her speech is normal.       Significant Labs:   CBC:   Recent Labs  Lab 04/18/17  0342   WBC 9.24   HGB 8.1*   HCT 25.4*        CMP:   Recent Labs  Lab 04/18/17  0342      K 4.1      CO2 22*   GLU 93   BUN 13   CREATININE 2.7*   CALCIUM 9.0   PROT 6.0   ALBUMIN 3.1*   BILITOT 2.3*   ALKPHOS 77   *   ALT 17   ANIONGAP 12   EGFRNONAA 15.5*     Blood Culture, Routine   Date Value Ref Range Status   04/19/2017 No Growth to date  Preliminary   04/19/2017 No Growth to date  Preliminary      Significant Imaging: I have reviewed all pertinent imaging results/findings within the past 24 hours.

## 2017-04-19 NOTE — PROGRESS NOTES
[facility]  Neurology  Progress Note    Patient Name: Padmini Miranda  MRN: [id number]  Admission Date: 4/15/2017  Hospital Length of Stay: 4 days  Code Status: Full Code   Attending Provider: Jalyn Saldana MD  Primary Care Physician: Randy Lyles MD   Principal Problem:Acute encephalopathy      Subjective:     Interval History: NAEON were reported.  HR: 46-58, BP:  / 45-74.  Pt afebrile.  VS otherwise stable.  HD this morning.  U/S carotid arteries and transcranial doppler recommended.      Current Facility-Administered Medications   Medication Dose Route Frequency Provider Last Rate Last Dose    0.9%  NaCl infusion   Intravenous PRN Octavio Gong MD        0.9%  NaCl infusion   Intravenous PRN Octavio Gong MD        0.9%  NaCl infusion   Intravenous Once Octavio Gong MD        acetaminophen tablet 650 mg  650 mg Oral Q6H PRN Jalyn Saldana MD   650 mg at 04/18/17 1723    albumin human 25% bottle 25 g  25 g Intravenous PRN Octavio Gong MD   25 g at 04/17/17 1024    apixaban tablet 2.5 mg  2.5 mg Oral BID Jalyn Saldana MD   2.5 mg at 04/18/17 2129    aspirin chewable tablet 81 mg  81 mg Oral Daily Jalyn Saldana MD   81 mg at 04/18/17 0858    atorvastatin tablet 40 mg  40 mg Oral QAM Jalyn Saldana MD   40 mg at 04/19/17 0622    epoetin syed injection 3,000 Units  3,000 Units Subcutaneous Every Tues, Thurs, Sat Jalyn Saldana MD   Stopped at 04/18/17 1700    levothyroxine injection 40 mcg  40 mcg Intravenous Daily Tahira Tomlinson MD        lidocaine 5 % patch 1 patch  1 patch Transdermal Daily Tahira Tomlinson MD   1 patch at 04/18/17 1014    midodrine tablet 10 mg  10 mg Oral TID Jalyn Saldana MD   10 mg at 04/19/17 0622    nystatin 100,000 unit/mL suspension 500,000 Units  500,000 Units Oral QID Denia Fry MD   500,000 Units at 04/19/17 0622    ondansetron disintegrating tablet 8 mg  8 mg Oral Q8H PRN  Jalyn Saldana MD   Stopped at 04/18/17 1222    pantoprazole EC tablet 40 mg  40 mg Oral Daily Jalyn Saldana MD   40 mg at 04/18/17 0859    polyethylene glycol packet 17 g  17 g Oral Daily Jalyn Saldana MD   17 g at 04/18/17 1015       Review of Systems   Constitutional:        Pt affirmed sleeping well overnight.  Pt denies pain and has no complaints.   HENT: Negative for sore throat.    Eyes: Negative for pain and visual disturbance.   Respiratory: Negative for cough and shortness of breath (On 4 L NC).    Cardiovascular: Negative for chest pain and palpitations.   Gastrointestinal: Negative for abdominal pain, constipation, diarrhea, nausea and vomiting.   Genitourinary: Negative for dysuria.   Musculoskeletal: Negative for myalgias.   Neurological: Negative for dizziness and headaches.   Psychiatric/Behavioral:        Denies discouragement and anxiety     Objective:     Vital Signs (Most Recent):  Temp: 98.1 °F (36.7 °C) (04/19/17 0759)  Pulse: (!) 52 (04/19/17 0759)  Resp: 16 (04/19/17 0759)  BP: (!) 101/48 (04/19/17 0759)  SpO2: 100 % (04/19/17 0759) Vital Signs (24h Range):  Temp:  [97.5 °F (36.4 °C)-98.1 °F (36.7 °C)] 98.1 °F (36.7 °C)  Pulse:  [46-57] 52  Resp:  [14-18] 16  SpO2:  [91 %-100 %] 100 %  BP: ()/(45-74) 101/48     Weight: 50.8 kg (112 lb)  Body mass index is 20.49 kg/(m^2).    Physical Exam   Constitutional: She is oriented to person, place, and time. She appears well-developed and well-nourished. No distress.   HENT:   Mouth/Throat: Oropharynx is clear and moist. No oropharyngeal exudate.   Eyes: Conjunctivae and EOM are normal. Pupils are equal, round, and reactive to light. Right eye exhibits no discharge. Left eye exhibits no discharge. No scleral icterus.   Neck: Normal range of motion.   Pulmonary/Chest: No stridor. No respiratory distress.   Musculoskeletal:   See Neuro exam.   Neurological: She is oriented to person, place, and time.   See Neuro exam.   Skin:  Skin is warm. She is not diaphoretic. No erythema. No pallor.   Psychiatric: Her speech is normal.   See Neuro exam.   Vitals reviewed.      NEUROLOGICAL EXAMINATION:     MENTAL STATUS   Oriented to person, place, and time.   Attention: normal. Concentration: normal.   Speech: speech is normal   Level of consciousness: alert  Knowledge: good.        The patient is alert and oriented to person, place, year, and month.  Pt correctly spelled WORLD forward and backward.  Pt is cooperative, talkative, lucid, and articulate.    If given a few moments of silence, however, the patient tended to drift off into sleep.     CRANIAL NERVES   Cranial nerves II through XII intact.     CN III, IV, VI   Pupils are equal, round, and reactive to light.  Extraocular motions are normal.     MOTOR EXAM        5/5 bilateral bicep, tricep, handgrip strength.    Assessment of LE strength was not possible 2/2 inability to move LE's without pain.     REFLEXES        1+ bilateral brachioradialis.     SENSORY EXAM        Bilateral and equal sensation over the face, shoulders, arms, forearms, hands, abdomen, thighs, legs, and feet.       GAIT AND COORDINATION        Normal, bilateral random alternating movements (arm/hand), although the patient's right arm/hand movement was restricted by telemetry wiring. Normal, bilateral 1st and 2nd digit open/close movements.         Significant Labs:   Blood Culture: No results for input(s): LABBLOO in the last 48 hours.  CBC:   Recent Labs  Lab 04/18/17  0342   WBC 9.24   HGB 8.1*   HCT 25.4*        CMP:   Recent Labs  Lab 04/18/17  0342   GLU 93      K 4.1      CO2 22*   BUN 13   CREATININE 2.7*   CALCIUM 9.0   MG 2.0   PROT 6.0   ALBUMIN 3.1*   BILITOT 2.3*   ALKPHOS 77   *   ALT 17   ANIONGAP 12   EGFRNONAA 15.5*     All pertinent lab results from the past 24 hours have been reviewed.    Significant Imaging: I have reviewed all pertinent imaging results/findings within the past  24 hours.    Assessment and Plan:     * Acute encephalopathy  - Hx of cardiovascular disease and bradycardia  - Hx of hypothyroidism  - Adrenal insufficiency negative 2/2 ACTH challenge    (DX) Hypoperfusion VS Hypothyroidism (less likely) VS Adrenal Insufficiency (ruled-out)  - Hypoperfusion seems quite plausible given hx of cardiovascular disease  - Evaluation of vasculature perfusing the brain is recommended  - CT angiogram is the gold-standard but is contraindicated 2/2 renal failure    Recommendations:  1) U/S carotid arteries  2) Transcranial doppler            VTE Risk Mitigation         Ordered     apixaban tablet 2.5 mg  2 times daily     Route:  Oral        04/15/17 1618     Medium Risk of VTE  Once      04/15/17 1431          Samy Arriaga MD  Neurology  [facility]    ==============  Off the floor during rounds for dialysis.  I agree with the history, assessment and plan within the resident's note as stated above.  Note has been edited by me to reflect my work and changes.    Doppler imaging planning today and tomorrow.  No other changes to the plan.    Samy Laura MD, MPH  Division of Movement and Memory Disorders  [facility]

## 2017-04-19 NOTE — SUBJECTIVE & OBJECTIVE
Interval History:     Review of Systems  Objective:     Vital Signs (Most Recent):  Temp: 98.1 °F (36.7 °C) (04/19/17 0759)  Pulse: (!) 52 (04/19/17 0759)  Resp: 16 (04/19/17 0759)  BP: (!) 101/48 (04/19/17 0759)  SpO2: 100 % (04/19/17 0759) Vital Signs (24h Range):  Temp:  [97.5 °F (36.4 °C)-98.1 °F (36.7 °C)] 98.1 °F (36.7 °C)  Pulse:  [46-57] 52  Resp:  [14-18] 16  SpO2:  [91 %-100 %] 100 %  BP: ()/(45-74) 101/48     Weight: 50.8 kg (112 lb)  Body mass index is 20.49 kg/(m^2).    Estimated Creatinine Clearance: 12 mL/min (based on Cr of 2.7).    Physical Exam    Significant Labs:   Blood Culture:   Recent Labs  Lab 04/10/17  0531   LABBLOO No growth after 5 days.     CBC:   Recent Labs  Lab 04/18/17  0342   WBC 9.24   HGB 8.1*   HCT 25.4*        CMP:   Recent Labs  Lab 04/18/17  0342      K 4.1      CO2 22*   GLU 93   BUN 13   CREATININE 2.7*   CALCIUM 9.0   PROT 6.0   ALBUMIN 3.1*   BILITOT 2.3*   ALKPHOS 77   *   ALT 17   ANIONGAP 12   EGFRNONAA 15.5*       Significant Imaging: I have reviewed all pertinent imaging results/findings within the past 24 hours.

## 2017-04-19 NOTE — PROGRESS NOTES
Maintenance HD treatment started. No complications with access to left upper arm. Lines secured and telemetry in place. No complaints of discomfort at this time.

## 2017-04-19 NOTE — ASSESSMENT & PLAN NOTE
Resolved. RUQ ultrasound unremarkable except for hepatic cysts and unlikely to be causing mild increase in LFT's but LFT's are improving so no further evaluation at this time and could just be hepatic congestion from hypervolemia related to chronic renal failure in combination with chronic heart failure. Monitor daily CMP. No need to discontinue Lipitor at this time as statin not likely cause and needs for CAD and severe PVD.

## 2017-04-19 NOTE — PHYSICIAN QUERY
"  PT Name: Padmini Miranda  MR #: [id number]    Physician Query Form - Integumentary System Clarification      CDS/: Venancio Aaron               Contact information: [phone]    This form is a permanent document in the medical record.     Query Date: April 19, 2017    By submitting this query, we are merely seeking further clarification of documentation. Please utilize your independent clinical judgment when addressing the question(s) below.    The Medical Record contains the following:   Indicator   Supporting Clinical Findings Location in Medical Record    "Redness" documented     x "Ulcer" documented The buttocks are darkened, the right buttock has a stage 2 pressure injury and a skin tear noted.  The stage 2 is ~ 2 cm x 1 cm x 0.2      04/18/17 Wound Care Progress note   x Wound care consult Wound care consulted for stage 2 to sacrum 04/18/17 Wound Care Progress Note    Medication:     x Treatment: Recommendations:   1. Medi-honey to the wound bed, cover with a mepore dressing daily.   2. Reposition every 2 hours with wedge.   3. Heel protectors       04/18/17 Wound Care   Progress Note       Other:        Provider, Please specify the diagnosis or diagnoses associated with above clinical findings.    Skin ulcer secondary to (check all that apply):  [  ] Diabetes  [ x ] Atherosclerotic vascular disease  [  ] Neuropathy  [  ] Peripheral Vascular Disease (PVD)  [  ] Other _____________________________    Abscess (Specify site and organism if known) ______________________________________    Cellulitis (Specify site and organism if known) _____________________________________    Gangrene:  [  ] Gas Gangrene  [  ] Dry Gangrene    [  ] Other Integumentary Diagnosis (please specify) _________________________________  [  ] Clinically undetermined    Please document in your progress notes daily for the duration of treatment, until resolved, and include in your discharge summary.    "

## 2017-04-19 NOTE — PROGRESS NOTES
[facility] Hospital Medicine  Progress Note    Patient Name: Padmini Miranda  MRN: [id number]  Patient Class: IP- Inpatient   Admission Date: 4/15/2017  Length of Stay: 4 days  Attending Physician: Jalyn Saldana MD  Primary Care Provider: Randy Lyles MD    [facility] Medicine Team: [facility]  Jalyn Saldana MD    Subjective:     Principal Problem:Acute encephalopathy    HPI:  84 y/o with past medical history of chronic combined systolic and diastolic heart failure, 3 vessel CAD, atherosclerosis of bilateral lower extremity with left heel ulcer with recent PTA of left SFA on 4/3 by Vascular surgery, essential HTN, hypothyroidism and ESRD on HD was sent from acute dialysis today due to concern for increasing lethargy. Family also noted that patient appeared to be slurring her words and noted right eye was drooping and patient was having difficulty staying awake and due to thus concerns was sent to [facility]. CTscan of head showed no acute findings but MRI of brain has been ordered to better evaluate. Patient's daughters at bedside and report for past 1 week patient has been sleeping more than normal and not nearly as alert as she usually is. I am familiar with patient as she was just discharged from my medical service on 4/13 and patient does appear to be more sleepy than normal but when aroused in the ER patient is responding to questions appropriately. Patient reports she generally feels weak but denies any focal weakness in ER. Family is very upset as they feel patient was not progressing with PT prior to discharge to SNF on 4/13 and feel patient has not been herself for over 1 week with increased lethargy.     As noted patient was just discharged from [facility] on 4/13 to [facility] after prolonged hospitalization following surgical repair of a right hip fracture. Patient even prior to that surgery was seen and evaluated by Cardiology for sinus  bradycardia with HR in 30-40's. Patient during admit was felt not to need pacemaker placement and felt bradycardia related to Amiodarone and Coreg that had been recently been discontinued. Patient eventually underwent right hip hemiarthroplasty on 4/5 posterior-op, patient noted to be hypothermic and persistently bradycardiac so patient was evaluated for possible infection but there was no concern for infection, as clinically, the patient had normal WBC and no clinical symptoms to suggest infection. TSH (normal on 3/10) was checked during recent admit and found to be severely elevated at 21.6, with low normal Free T4 of 0.88. Synthroid was adjusted from 50 mcg to 75 mcg daily to treat hypothyroidism. Patient did complain of odynophagia after surgery felt related to ET tube. Speech therapy consult who recommended MBSS and patient underwent MBSS on 4/12 and patient passed with recommendation by Speech for regular diet and nectar thickened liquids. Patient to be discharged to [facility] for continued PT/OT but needs outpatient follow-up with EP Cardiology to reassess need for pacemaker for sinus bradycardia on 4/13.       Hospital Course:  MRI of rafat unremarkable for any signs of an acute stroke or bleed to explain increased fatigue or lethargy. Patient afebrile with normal WBC so infectious etiology felt highly unlikely. Random am cortisol done was 14.7 so does not appear to be consistent with adrenal insufficiency but Endocrine consulted. Cardiology re-consulted for patient's bradycardia to see if contributing to patient's increasing lethargy and fatigue and unclear if it is causing patient's symptoms but Cardiology doubts is true cause of lethargy and recommended Endocrine consult to look for thyroid issues as cause of bradycardia and somulence. Cardiology recommends no need for pacemaker at this time and to follow-up with EP Cardiology as outpatient. Neurontin and opiates for pain discontinued in case they were  contributing to cause. Patient with recent diagnosis of hypothyroidism likely related to Amiodarone that recently was discontinued due to bradycardia. Endocrine consulted on 4/18 and evaluated patient and not worried by thyroid function but concerned for adrenal insufficiency so Cosyntropin stimulation test ordered on 4/18 to evaluate patient. Patient dialyzed by Nephrology on 4/18 a day prior to normal dialysis day as showing signs of hypervolemia. Patient slightly more alert and responsive on 4/18 and did work with PT. Infectious disease consulted for possible occult infection as cause but they felt highly unlikely as patient had no clinical signs to suggest infection but did draw blood cultures due to heart murmur on 4/18 and so far blood cultures are negative but ID did not feel any other infectious work-up or empiric treatment with antibiotics indicated. Neurology consulted to evaluate patient for lethargy. They did not feel any clinical evidence of stroke but did recommend transcranial doppler and Carotid US to assess for decreased blood flow to brain as cause and those studies ordered on 4/19. Patient alertness level on 4/19 is improved compared to previous days.     Interval History: Patient more alert today and able to hold a conversation without falling asleep. ID and Neurology evaluated patient but no evidence to suggest occult infection or stroke as cause of increased lethargy. Patient denies any sore throat and reports appetite is slowly improving. Patient dialyzed today.      Review of Systems   Constitutional: Positive for fatigue. Negative for chills and fever.   Respiratory: Negative for cough and shortness of breath.    Cardiovascular: Negative for chest pain.   Gastrointestinal: Negative for abdominal pain and nausea.   Musculoskeletal: Positive for myalgias (Right hip pain). Negative for back pain.   Skin: Negative for rash.   Neurological: Positive for weakness (Generalized). Negative for  dizziness.   Psychiatric/Behavioral: Negative for confusion and hallucinations.     Objective:     Vital Signs (Most Recent):  Temp: 97.3 °F (36.3 °C) (04/19/17 1436)  Pulse: (!) 51 (04/19/17 1442)  Resp: 16 (04/19/17 1436)  BP: (!) 117/46 (04/19/17 1436)  SpO2: 95 % (04/19/17 1436) Vital Signs (24h Range):  Temp:  [97.3 °F (36.3 °C)-98.1 °F (36.7 °C)] 97.3 °F (36.3 °C)  Pulse:  [49-57] 51  Resp:  [14-18] 16  SpO2:  [91 %-100 %] 95 %  BP: ()/(39-74) 117/46     Weight: 50.8 kg (112 lb)  Body mass index is 20.49 kg/(m^2).    Intake/Output Summary (Last 24 hours) at 04/19/17 1653  Last data filed at 04/19/17 1330   Gross per 24 hour   Intake              600 ml   Output             1629 ml   Net            -1029 ml      Physical Exam   Constitutional: No distress.   Eyes: No scleral icterus.   Neck: Neck supple. No JVD present.   Cardiovascular: Regular rhythm and normal heart sounds.  Bradycardia present.  Exam reveals no gallop and no friction rub.    No murmur heard.  Pulmonary/Chest: Effort normal. She has rales (Mild rales in bases).   Abdominal: Soft. Bowel sounds are normal. She exhibits no distension. There is no tenderness.   Musculoskeletal: She exhibits edema (1-2 + edema in lower extremities; 1+ edema in left arm).   Neurological: She is alert.   Skin: Skin is warm and dry.   Psychiatric: She has a normal mood and affect. Her speech is normal.       Significant Labs:   CBC:   Recent Labs  Lab 04/18/17  0342   WBC 9.24   HGB 8.1*   HCT 25.4*        CMP:   Recent Labs  Lab 04/18/17  0342      K 4.1      CO2 22*   GLU 93   BUN 13   CREATININE 2.7*   CALCIUM 9.0   PROT 6.0   ALBUMIN 3.1*   BILITOT 2.3*   ALKPHOS 77   *   ALT 17   ANIONGAP 12   EGFRNONAA 15.5*     Blood Culture, Routine   Date Value Ref Range Status   04/19/2017 No Growth to date  Preliminary   04/19/2017 No Growth to date  Preliminary      Significant Imaging: I have reviewed all pertinent imaging  results/findings within the past 24 hours.    Assessment/Plan:      * Acute encephalopathy  Improved today as patient more alert and responsive compared to when I last saw patient on 4/18. Patient oriented x 4. Patient afebrile with normal WBC and no bandemia so infectious etiology unlikely. ID consulted and highly doubt infectious cause but did order blood cultures that are so far no growth. Patient with no evidence of acute hypoxia as oxygen sats > 90 on 2 liters of oxygen. Medication list reviewed for new medications recently added and no new medications added recently that could be causing encephalopathy but Neurontin on hold as can be sedating even though patient on a very low dose but may need to restart if patient's pain in legs starts getting worse.   · MRI of brain unremarkable for acute stroke or bleed on 4/15.   · AM Cortisol level normal 14.7. Endocrine consulted and recommended Cosyntropin stim test that was ordered by them on 4/18 to evaluate for relative adrenal insufficiency and was normal.   · Will hold all narcotics/opiates for pain. Avoid any sleep aid agents at this time that could affect alertness level.  · Endocrine consulted on 4/18 to see if hypothyroidism is causing fatigue and lethargy but they stated no evidence of myxedema but stated they would do trial of IV Synthroid to see if helps patient but they doubt hypothyroidism cause of lethargy.   · EP Cardiology consulted and evaluated patient to see if bradycardia causing fatigue/weakness but feel unlikely and do not recommend pacemaker placement at this time and recommended follow-up with Dr. Antoine in EP clinic as outpatient and he would consider once patient euthyroid.   · Neurology consulted on 4/18 and recommend carotid and transcranial doppler ultrasound to look for low flow to brain and ordered on 4/19.   · Please avoid any benzodiazepines as can worsen delirium and only should use in delirium caused by benzodiazepine or alcohol  withdrawal.   · Please avoid all anticholinergic medications as can worsen delirium.   · Please keep curtains and blinds open during the day and closed during the night.   · Please continue to have nursing and family reorient patient and encourage family to visit and stay at night if possible.   · Please avoid physical restraints if possible as likely to worsen and increase agitation in delirious patient.   · Will consult ID to see if they feel there is concern for occult infection and reason to treat with antibiotics as suggested by endocrine.   · Will check Vit B12, folate, thiamine, vit D  · - consult neurology for help with workup      Oral thrush  Much improved. Patient with very mild case and will continue treatement with Nystatin swish and spit 4 times daily.       Abnormal LFTs  Resolved. RUQ ultrasound unremarkable except for hepatic cysts and unlikely to be causing mild increase in LFT's but LFT's are improving so no further evaluation at this time and could just be hepatic congestion from hypervolemia related to chronic renal failure in combination with chronic heart failure. Monitor daily CMP. No need to discontinue Lipitor at this time as statin not likely cause and needs for CAD and severe PVD.      ESRD on hemodialysis  Nephrology consulted and managing patient's HD needs in hospital. Patient dialyzed on 4/15. Patient hypervolemic on exam today so dialyzed by Nephrology today, 4/18 and 3 liters removed. Patient dialyzed 4/19 and 1 liter removed.       Bradycardia, drug induced  Controlled. Patient with HR in 50's. Patient still with persistent sinus bradycardia but HR now in 50-60's instead of 40-50's as on last hospital stay in early April. Cardiology re-evaluated patient to see if they feel bradycardia could be contributing to patient's lethargy but they doubt and do not recommend pacemaker needed at this time and recommends outpatient follow-up with Dr. Antoine in EP Cardiology clinic. Avoid any AV  shikha blocking agents. Continue telemetry monitoring.       Closed displaced fracture of right femoral neck s/p hemiarthroplasty on 4/5/2017  Patient is POD # 14. Patient reports no pain in right hip currently and main pain related to her neuropathic pain. Plan to continue PT/OT for gait training and strengthening and restoration of ADL's on this admit to continue therapy. Patient is FWB/WBAT: right lower extremity, posterior hip precautions as per Orthopedic recommendations. Patient on Eliquis for long term anticoagulation for atrial fibrillation so no DVT prophylaxis needed. Pain is well controlled and will use just Tylenol for pain and avoid any sedating narcotics. Patient needs SNF placement on discharge and likely can return to [facility] once medically stable.       Renovascular hypertension  Patient normotensive on no BP medications. Bidil discontinued on previous admit due to issues with hypotension during HD and will continue to hold and continue Midodrine to help with BP and volume removal during HD. Patient tolerating volume removal with HD on Midodrine and will continue.       Acquired hypothyroidism  Appreciate Endocrine consult and they do not feel that any adjustments needed in Synthroid but will give trial of IV Synthroid as per Endocrine to see if helps patient. Endocrine notes no signs of myxedema so doubt lethargy related to thyroid disease.       Chronic combined systolic and diastolic heart failure  Improved. Patient with signs of volume overload on exam and has pleural effusions on CXR but volume status appears largely unchanged from her recent discharge on 4/13 and patient appears improved after last 2 dialysis sesssions. Nephrology consulted to continue HD for fluid removal and patient had 3 liters of fluid removed during HD on 4/17 and 1 liter on 4/19. Nephrology to assess patient daily for continued volume removal in hospital. Patient's oxygen status improved and patient with O2 sats > 90%  on 2 liters of oxygen.        Acute blood loss anemia  Hgb is stable and there are no signs of active bleeding. Hgb 8.1 so doubt cause of lethargy.  No indication to transfuse blood in this patient at this time. Nephrology to continue Epogen with HD to treat chronic anemia related to ESRD.      Atherosclerosis of native artery 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  Patient with known heel ulcers to both legs but no signs to indicate infection. Patient recently evaluated by Vascular Surgery on 4/13 and Vascular felt ulcers were improving and did not feel any further intervention needed at this time. Continue local wound care for heel ulcers on this admit.      PAF (paroxysmal atrial fibrillation)  Patient is actually bradycardiac so no AV shikha blocking agents are required. Patient has an implanted loop recorder in place. Continue Eliquis for long term anticoagulation.      3-vessel CAD  Controlled. Plan to continue ASA and Lipitor to treat. No beta blocker due to bradycardia.        Acute respiratory failure with hypoxia  Improved. Likely related to pleural effusions and hypervolemia from chronic renal failure and heart failure. Continue oxygen at 2 liters with plan to wean to keep oxygen sats > 90%.       VTE Risk Mitigation         Ordered     apixaban tablet 2.5 mg  2 times daily     Route:  Oral        04/15/17 1618     Medium Risk of VTE  Once      04/15/17 1431          Jalyn Saldana MD  Department of Hospital Medicine   [facility]

## 2017-04-19 NOTE — PROGRESS NOTES
AAOx3. Up to chair with PT. Able to verbalize needs to staff. Pain only mild controlled with prn meds. No acute distress noted. Vss. Will monitor.

## 2017-04-19 NOTE — ASSESSMENT & PLAN NOTE
84 y/o with chronic combined systolic and diastolic heart failure, 3 vessel CAD, atherosclerosis of bilateral lower extremity with left heel ulcer ( recent PTA of left SFA on 4/3 by Vascular surgery), essential HTN, hypothyroidism, ESRD on HD, and recent surgical repair of left hip fracture on 4/5 who was admitted for increasing lethargy and somnolence.   Endocrine consulted to evaluate given hypothyroidism (recent adjustment of synthroid for TSH of 21), but they find no evidence of myxedema and evaluation for adrenal insufficiency is negative.   Endocrine concerned for occult infection and ID was consulted for further evaluation.  In the interim has been seen by Neurology who is evaluating for cerebral hypoperfusion in light of know CAD     Patient is afebrile.  No leukocytosis.  No current blood cultures.  CXR does show pleural fluid, but no hypoxemia, no cough, no shortness of breath.  Urine culture pending collection, though patient is asymptomatic and produces little urine and in setting of HD, this may not be of clinical utility.     Not currently on antimicrobial therapy.   Per family, patient has clinically improved since admission, though still not back to baseline.       -  Blood cultures pending.     -  No indication for antimicrobials at this time.     -  Will sign off.  Please call us back if blood cultures positive.     Plan discussed with, ID staff  Discussed with Primary Team

## 2017-04-19 NOTE — PROGRESS NOTES
ID Follow Up Note:    Attempted to see patient X 2 today, but off floor both times.   Chart reviewed.  Per Primary note, patient more alert today.    Blood cultures NGTD.    Remains afebrile.   Neurology consulted - lethargy ? 2/2 hypoperfusion given hx of cardiovascular disease.  Carotid US pending.   Low suspicion for occult or underlying infectious etiology.      Will sign off.  Please notify us if blood cultures positive or if any other concerns or questions.    Discussed with ID staff.     Thank you.   Please call for any questions or concerns.  MILLA Jang, ANP-C  [phone]

## 2017-04-19 NOTE — PT/OT/SLP PROGRESS
Physical Therapy      Padmini Miranda  MRN: [id number]    Patient not seen today secondary to Dialysis. Pt off the floor for HD; PT unable to re-attempt. Will follow-up when next scheduled.    BRIAN DONIS, PT   4/19/2017

## 2017-04-19 NOTE — ASSESSMENT & PLAN NOTE
Patient is POD # 14. Patient reports no pain in right hip currently and main pain related to her neuropathic pain. Plan to continue PT/OT for gait training and strengthening and restoration of ADL's on this admit to continue therapy. Patient is FWB/WBAT: right lower extremity, posterior hip precautions as per Orthopedic recommendations. Patient on Eliquis for long term anticoagulation for atrial fibrillation so no DVT prophylaxis needed. Pain is well controlled and will use just Tylenol for pain and avoid any sedating narcotics. Patient needs SNF placement on discharge and likely can return to [facility] once medically stable.

## 2017-04-19 NOTE — ASSESSMENT & PLAN NOTE
Nephrology consulted and managing patient's HD needs in hospital. Patient dialyzed on 4/15. Patient hypervolemic on exam today so dialyzed by Nephrology today, 4/18 and 3 liters removed. Patient dialyzed 4/19 and 1 liter removed.

## 2017-04-19 NOTE — ASSESSMENT & PLAN NOTE
Controlled. Patient with HR in 50's. Patient still with persistent sinus bradycardia but HR now in 50-60's instead of 40-50's as on last hospital stay in early April. Cardiology re-evaluated patient to see if they feel bradycardia could be contributing to patient's lethargy but they doubt and do not recommend pacemaker needed at this time and recommends outpatient follow-up with Dr. Antoine in [facility] Cardiology clinic. Avoid any AV shikha blocking agents. Continue telemetry monitoring.

## 2017-04-19 NOTE — PLAN OF CARE
Problem: Patient Care Overview  Goal: Plan of Care Review  Outcome: Ongoing (interventions implemented as appropriate)  HD treatment complete. Duration of treatment 3.5 hours and 1 L removed. Treatment was tolerated well and no complications with access to left upper arm. Needles removed and hemostasis achieved. Dressing intact and no drainage noted. Thrill and bruit present.

## 2017-04-19 NOTE — SUBJECTIVE & OBJECTIVE
Subjective:     Interval History: NAEON were reported.  HR: 46-58, BP:  / 45-74.  Pt afebrile.  VS otherwise stable.  HD this morning.  U/S carotid arteries and transcranial doppler recommended.      Current Facility-Administered Medications   Medication Dose Route Frequency Provider Last Rate Last Dose    0.9%  NaCl infusion   Intravenous PRN Octavio Gong MD        0.9%  NaCl infusion   Intravenous PRN Octavio Gong MD        0.9%  NaCl infusion   Intravenous Once Octavio Gong MD        acetaminophen tablet 650 mg  650 mg Oral Q6H PRN Jalyn Saldana MD   650 mg at 04/18/17 1723    albumin human 25% bottle 25 g  25 g Intravenous PRN Octavio Gong MD   25 g at 04/17/17 1024    apixaban tablet 2.5 mg  2.5 mg Oral BID Jalyn Saldana MD   2.5 mg at 04/18/17 2129    aspirin chewable tablet 81 mg  81 mg Oral Daily Jalyn Saldana MD   81 mg at 04/18/17 0858    atorvastatin tablet 40 mg  40 mg Oral QAM Jalyn Saldana MD   40 mg at 04/19/17 0622    epoetin syed injection 3,000 Units  3,000 Units Subcutaneous Every Tues, Thurs, Sat Jalyn Saldana MD   Stopped at 04/18/17 1700    levothyroxine injection 40 mcg  40 mcg Intravenous Daily Tahira Tomlinson MD        lidocaine 5 % patch 1 patch  1 patch Transdermal Daily Tahira Tomlinson MD   1 patch at 04/18/17 1014    midodrine tablet 10 mg  10 mg Oral TID Jalyn Saldana MD   10 mg at 04/19/17 0622    nystatin 100,000 unit/mL suspension 500,000 Units  500,000 Units Oral QID Denia Fry MD   500,000 Units at 04/19/17 0622    ondansetron disintegrating tablet 8 mg  8 mg Oral Q8H PRN Jalyn Saldana MD   Stopped at 04/18/17 1222    pantoprazole EC tablet 40 mg  40 mg Oral Daily Jalyn Saldana MD   40 mg at 04/18/17 0859    polyethylene glycol packet 17 g  17 g Oral Daily Jalyn Saldana MD   17 g at 04/18/17 1015       Review of Systems   Constitutional:        Pt affirmed sleeping well  overnight.  Pt denies pain and has no complaints.   HENT: Negative for sore throat.    Eyes: Negative for pain and visual disturbance.   Respiratory: Negative for cough and shortness of breath (On 4 L NC).    Cardiovascular: Negative for chest pain and palpitations.   Gastrointestinal: Negative for abdominal pain, constipation, diarrhea, nausea and vomiting.   Genitourinary: Negative for dysuria.   Musculoskeletal: Negative for myalgias.   Neurological: Negative for dizziness and headaches.   Psychiatric/Behavioral:        Denies discouragement and anxiety     Objective:     Vital Signs (Most Recent):  Temp: 98.1 °F (36.7 °C) (04/19/17 0759)  Pulse: (!) 52 (04/19/17 0759)  Resp: 16 (04/19/17 0759)  BP: (!) 101/48 (04/19/17 0759)  SpO2: 100 % (04/19/17 0759) Vital Signs (24h Range):  Temp:  [97.5 °F (36.4 °C)-98.1 °F (36.7 °C)] 98.1 °F (36.7 °C)  Pulse:  [46-57] 52  Resp:  [14-18] 16  SpO2:  [91 %-100 %] 100 %  BP: ()/(45-74) 101/48     Weight: 50.8 kg (112 lb)  Body mass index is 20.49 kg/(m^2).    Physical Exam   Constitutional: She is oriented to person, place, and time. She appears well-developed and well-nourished. No distress.   HENT:   Mouth/Throat: Oropharynx is clear and moist. No oropharyngeal exudate.   Eyes: Conjunctivae and EOM are normal. Pupils are equal, round, and reactive to light. Right eye exhibits no discharge. Left eye exhibits no discharge. No scleral icterus.   Neck: Normal range of motion.   Pulmonary/Chest: No stridor. No respiratory distress.   Musculoskeletal:   See Neuro exam.   Neurological: She is oriented to person, place, and time.   See Neuro exam.   Skin: Skin is warm. She is not diaphoretic. No erythema. No pallor.   Psychiatric: Her speech is normal.   See Neuro exam.   Vitals reviewed.      NEUROLOGICAL EXAMINATION:     MENTAL STATUS   Oriented to person, place, and time.   Attention: normal. Concentration: normal.   Speech: speech is normal   Level of consciousness:  alert  Knowledge: good.        The patient is alert and oriented to person, place, year, and month.  Pt correctly spelled WORLD forward and backward.  Pt is cooperative, talkative, lucid, and articulate.    If given a few moments of silence, however, the patient tended to drift off into sleep.     CRANIAL NERVES   Cranial nerves II through XII intact.     CN III, IV, VI   Pupils are equal, round, and reactive to light.  Extraocular motions are normal.     MOTOR EXAM        5/5 bilateral bicep, tricep, handgrip strength.    Assessment of LE strength was not possible 2/2 inability to move LE's without pain.     REFLEXES        1+ bilateral brachioradialis.     SENSORY EXAM        Bilateral and equal sensation over the face, shoulders, arms, forearms, hands, abdomen, thighs, legs, and feet.       GAIT AND COORDINATION        Normal, bilateral random alternating movements (arm/hand), although the patient's right arm/hand movement was restricted by telemetry wiring. Normal, bilateral 1st and 2nd digit open/close movements.         Significant Labs:   Blood Culture: No results for input(s): LABBLOO in the last 48 hours.  CBC:   Recent Labs  Lab 04/18/17  0342   WBC 9.24   HGB 8.1*   HCT 25.4*        CMP:   Recent Labs  Lab 04/18/17  0342   GLU 93      K 4.1      CO2 22*   BUN 13   CREATININE 2.7*   CALCIUM 9.0   MG 2.0   PROT 6.0   ALBUMIN 3.1*   BILITOT 2.3*   ALKPHOS 77   *   ALT 17   ANIONGAP 12   EGFRNONAA 15.5*     All pertinent lab results from the past 24 hours have been reviewed.    Significant Imaging: I have reviewed all pertinent imaging results/findings within the past 24 hours.

## 2017-04-19 NOTE — ASSESSMENT & PLAN NOTE
Hgb is stable and there are no signs of active bleeding. Hgb 8.1 so doubt cause of lethargy.  No indication to transfuse blood in this patient at this time. Nephrology to continue Epogen with HD to treat chronic anemia related to ESRD.

## 2017-04-19 NOTE — ASSESSMENT & PLAN NOTE
Improved today as patient more alert and responsive compared to when I last saw patient on 4/18. Patient oriented x 4. Patient afebrile with normal WBC and no bandemia so infectious etiology unlikely. ID consulted and highly doubt infectious cause but did order blood cultures that are so far no growth. Patient with no evidence of acute hypoxia as oxygen sats > 90 on 2 liters of oxygen. Medication list reviewed for new medications recently added and no new medications added recently that could be causing encephalopathy but Neurontin on hold as can be sedating even though patient on a very low dose but may need to restart if patient's pain in legs starts getting worse.   · MRI of brain unremarkable for acute stroke or bleed on 4/15.   · AM Cortisol level normal 14.7. Endocrine consulted and recommended Cosyntropin stim test that was ordered by them on 4/18 to evaluate for relative adrenal insufficiency and was normal.   · Will hold all narcotics/opiates for pain. Avoid any sleep aid agents at this time that could affect alertness level.  · Endocrine consulted on 4/18 to see if hypothyroidism is causing fatigue and lethargy but they stated no evidence of myxedema but stated they would do trial of IV Synthroid to see if helps patient but they doubt hypothyroidism cause of lethargy.   · EP Cardiology consulted and evaluated patient to see if bradycardia causing fatigue/weakness but feel unlikely and do not recommend pacemaker placement at this time and recommended follow-up with Dr. Antoine in EP clinic as outpatient and he would consider once patient euthyroid.   · Neurology consulted on 4/18 and recommend carotid and transcranial doppler ultrasound to look for low flow to brain and ordered on 4/19.   · Please avoid any benzodiazepines as can worsen delirium and only should use in delirium caused by benzodiazepine or alcohol withdrawal.   · Please avoid all anticholinergic medications as can worsen delirium.   · Please  keep curtains and blinds open during the day and closed during the night.   · Please continue to have nursing and family reorient patient and encourage family to visit and stay at night if possible.   · Please avoid physical restraints if possible as likely to worsen and increase agitation in delirious patient.   · Will consult ID to see if they feel there is concern for occult infection and reason to treat with antibiotics as suggested by endocrine.   · Will check Vit B12, folate, thiamine, vit D  · - consult neurology for help with workup

## 2017-04-20 PROBLEM — G93.40 ENCEPHALOPATHY: Status: ACTIVE | Noted: 2017-01-01

## 2017-04-20 NOTE — PLAN OF CARE
Problem: Patient Care Overview  Goal: Plan of Care Review  Outcome: Ongoing (interventions implemented as appropriate)  Pt remains free from fall/injury. VSS. Afebrile. PRN medication administered for c/o pain in L foot. Pt repositioned by staff and family. No new s/s of skin breakdown. Wound care performed to heel and sacrum. Low air cushion supplied to pt to help with comfort and further skin breakdown. Family remains at bedside and able to voice needs/concerns to staff. WCMADIE.

## 2017-04-20 NOTE — ASSESSMENT & PLAN NOTE
Improved today as patient more alert and responsive. Patient oriented x 4. Patient afebrile with normal WBC and no bandemia so infectious etiology unlikely. ID consulted and highly doubt infectious cause but did order blood cultures that are so far no growth. Patient with no evidence of acute hypoxia as oxygen sats > 90 on 2 liters of oxygen. Medication list reviewed for new medications recently added and no new medications added recently that could be causing encephalopathy but Neurontin on hold as can be sedating even though patient on a very low dose.   · MRI of brain unremarkable for acute stroke or bleed on 4/15.   · AM Cortisol level normal 14.7. Endocrine consulted and recommended Cosyntropin stim test that was ordered by them on 4/18 to evaluate for relative adrenal insufficiency and was normal.   · Will hold all narcotics/opiates for pain. Avoid any sleep aid agents at this time that could affect alertness level.  · Endocrine consulted on 4/18 to see if hypothyroidism is causing fatigue and lethargy but they stated no evidence of myxedema but stated they would do trial of IV Synthroid to see if helps patient but they doubt hypothyroidism cause of lethargy.   · EP Cardiology consulted and evaluated patient to see if bradycardia causing fatigue/weakness but feel unlikely and do not recommend pacemaker placement at this time and recommended follow-up with Dr. Antoine in EP clinic as outpatient and he would consider once patient euthyroid.   · Neurology consulted on 4/18 and recommend carotid and transcranial doppler ultrasound to look for low flow to brain and ordered on 4/19.   · Please avoid any benzodiazepines as can worsen delirium and only should use in delirium caused by benzodiazepine or alcohol withdrawal.   · Please avoid all anticholinergic medications as can worsen delirium.   · Please keep curtains and blinds open during the day and closed during the night.   · Please continue to have nursing and  family reorient patient and encourage family to visit and stay at night if possible.   · Please avoid physical restraints if possible as likely to worsen and increase agitation in delirious patient.  · Infectious disease consulted for possible occult infection as cause but they felt highly unlikely as patient had no clinical signs to suggest infection but did draw blood cultures due to heart murmur on 4/18 and so far blood cultures are negative but ID did not feel any other infectious work-up or empiric treatment with antibiotics indicated. Neurology consulted to evaluate patient for lethargy. They did not feel any clinical evidence of stroke but did recommend transcranial doppler and Carotid US to assess for decreased blood flow to brain as cause. Carotid ultrasound shows <39% stenosis bilaterally and transcranial doppler was limited, but what was able to be assessed was normal.  Patient alertness level is minimally improved, but she is not back to baseline.   · Will add liothyronine 5 mcg daily as a trial to see if it help with symptoms  · - will repeat TSH, free T4, and get reverse and previous T3 level was undetectable.

## 2017-04-20 NOTE — PROCEDURES
**THIS IS A PRELIMINARY REPORT**     Notable abnormal values in bold:          L Prox VA aidee 37 PI 1.7       L Mid VA aidee 36 PI 1.8      Prox BA aidee 51 PI 1.8 L Dist VA aidee 57 PI 1.8      Mid BA aidee 58 PI 1.8 R Prox VA aidee 36 PI 1.7    R ext ICA aidee 28 PI 0.5 L ext ICA aidee 30 PI 1.3 Dist BA aidee 49 PI 1.9 R Mid VA aidee 28 PI 1.8 R Dist VA aidee 43 PI 1.6     Findings:   1.) Suboptimal, limited study due to absent bilateral transtemporal windows.  2.) Normal cerebral blood flow in posterior circulation at this time.    Please correlate clinically.    See final report for detailed and verified results.       Rose Farrar  Transcranial Doppler Technologist   [phone] (Office)  [phone] (Beeper)

## 2017-04-20 NOTE — PROGRESS NOTES
[facility]  Neurology  Progress Note    Patient Name: Padmini Miranda  MRN: [id number]  Admission Date: 4/15/2017  Hospital Length of Stay: 5 days  Code Status: Full Code   Attending Provider: Tahira Tomlinson MD  Primary Care Physician: Randy Lyles MD   Principal Problem:Acute encephalopathy      Subjective:     Interval History: NAEON.  Afebrile, bradycardic in 50's overnight.  HD yesterday - removed 1 L fluid, tolerated well.      Current Facility-Administered Medications   Medication Dose Route Frequency Provider Last Rate Last Dose    0.9%  NaCl infusion   Intravenous PRN Octavio Gong MD        0.9%  NaCl infusion   Intravenous PRN Octavio Gong MD        0.9%  NaCl infusion   Intravenous Once Octavio Gong MD        acetaminophen tablet 650 mg  650 mg Oral Q6H PRN Jalyn Saldana MD   650 mg at 04/20/17 0931    albumin human 25% bottle 25 g  25 g Intravenous PRN Octavio Gogn MD   25 g at 04/17/17 1024    apixaban tablet 2.5 mg  2.5 mg Oral BID Jalyn Saldana MD   2.5 mg at 04/20/17 0931    aspirin chewable tablet 81 mg  81 mg Oral Daily Jalyn Saldana MD   81 mg at 04/20/17 0931    atorvastatin tablet 40 mg  40 mg Oral QAM Jalyn Saldana MD   40 mg at 04/20/17 0609    epoetin syed injection 3,000 Units  3,000 Units Subcutaneous Every Tues, Thurs, Sat Jalyn Saldana MD   Stopped at 04/18/17 1700    [START ON 4/21/2017] levothyroxine injection 40 mcg  40 mcg Intravenous Daily Tahira Tomlinson MD        lidocaine 5 % patch 1 patch  1 patch Transdermal Daily Tahira Tomlinson MD   1 patch at 04/20/17 0930    [START ON 4/21/2017] liothyronine tablet 5 mcg  5 mcg Oral Daily Tahira Tomlinson MD        midodrine tablet 10 mg  10 mg Oral TID Jalyn Saldana MD   10 mg at 04/20/17 1344    nystatin 100,000 unit/mL suspension 500,000 Units  500,000 Units Oral QID Denia Fry MD   500,000 Units at 04/20/17 1148    ondansetron  disintegrating tablet 8 mg  8 mg Oral Q8H PRN Jalyn Saldana MD   Stopped at 04/18/17 1222    pantoprazole EC tablet 40 mg  40 mg Oral Daily Jalyn Saldana MD   40 mg at 04/20/17 0932    polyethylene glycol packet 17 g  17 g Oral Daily Jalyn Saldana MD   17 g at 04/19/17 1448    vitamin D 1000 units tablet 1,000 Units  1,000 Units Oral Daily Tahira Tomlinson MD   1,000 Units at 04/20/17 1148       Review of Systems   Constitutional:        Pt affirmed not sleeping well.   HENT: Negative for sore throat.    Eyes: Positive for visual disturbance (Blurriness when sitting up - lasts for approximately 2 minutes). Negative for pain.   Respiratory: Negative for cough and shortness of breath.    Cardiovascular: Negative for chest pain and palpitations.   Gastrointestinal: Negative for abdominal pain, constipation, diarrhea, nausea and vomiting.        Last BM today   Genitourinary: Negative for dysuria.   Musculoskeletal: Negative for myalgias.   Neurological: Positive for dizziness (Dizzy with sitting up - denies vertigo) and headaches.   Psychiatric/Behavioral:        Affirms her mood is ok.     Objective:     Vital Signs (Most Recent):  Temp: 98.2 °F (36.8 °C) (04/20/17 1335)  Pulse: (!) 54 (04/20/17 1300)  Resp: 18 (04/20/17 0800)  BP: (!) 149/67 (04/20/17 1335)  SpO2: 100 % (04/20/17 1300) Vital Signs (24h Range):  Temp:  [98.1 °F (36.7 °C)-98.8 °F (37.1 °C)] 98.2 °F (36.8 °C)  Pulse:  [53-59] 54  Resp:  [18] 18  SpO2:  [92 %-100 %] 100 %  BP: ()/(44-67) 149/67     Weight: 50.8 kg (112 lb)  Body mass index is 20.49 kg/(m^2).    Physical Exam   Constitutional: She appears well-developed and well-nourished.   HENT:   Mouth/Throat: Oropharynx is clear and moist. No oropharyngeal exudate.   Eyes: Conjunctivae and EOM are normal. Pupils are equal, round, and reactive to light. Right eye exhibits no discharge. Left eye exhibits no discharge. No scleral icterus.   Pulmonary/Chest: No stridor. No  respiratory distress.   Musculoskeletal:   See Neuro exam.   Neurological:   See Neuro exam.   Skin: Skin is warm and dry. No rash noted. She is not diaphoretic. No erythema.   Psychiatric:   See Neuro exam.   Vitals reviewed.      NEUROLOGICAL EXAMINATION:     MENTAL STATUS        Pt is alert, lucid, articulate, talkative, cooperative, and has a good sense of humor.     CRANIAL NERVES   Cranial nerves II through XII intact.     CN III, IV, VI   Pupils are equal, round, and reactive to light.  Extraocular motions are normal.     MOTOR EXAM        5/5 bilateral bicep, tricep, and handgrip strength.  5/5 bilateral plantarflexion and dorsiflexion strength.  Pt unable to perform leg raise, flexion, and extension bilaterally.     REFLEXES        1+ bilateral and equal biceps, brachioradialis, patellar, and ankle reflexes.  Negative Babinski.     SENSORY EXAM        Bilateral and equal sensation over the face, shoulders, arms, forearms, hands, abdomen, thighs, legs, and feet.       GAIT AND COORDINATION        Normal but slow, bilateral random alternating movements (arm/hand). Normal, bilateral 1st and 2nd digit open/close movements.           Significant Labs:   BMP:   Recent Labs  Lab 04/20/17  0345   GLU 83      K 4.3      CO2 23   BUN 13   CREATININE 2.6*   CALCIUM 8.8     All pertinent lab results from the past 24 hours have been reviewed.    Significant Imaging: I have reviewed all pertinent imaging results/findings within the past 24 hours.    Assessment and Plan:     * Acute encephalopathy  - Hx of cardiovascular disease and bradycardia  - Hx of hypothyroidism  - Adrenal insufficiency negative 2/2 ACTH challenge  - [04/19/17] U/S carotids noted 1-39% stenosis of bilateral carotids w/ tardus parvus raising the    possibility of proximal stenosis such as a hemodynamically significant aortic stenosis.    (DX) Hypoperfusion VS Hypothyroidism (less likely) VS Adrenal Insufficiency (ruled-out)  -  Hypoperfusion is of high suspicion given hx of cardiovascular disease  - Evaluation of vasculature perfusing the brain is recommended  - CT angiogram is the gold-standard but is contraindicated 2/2 renal failure    Recommendations:  1) Transcranial doppler  2) Fludrocortisone 0.1 mg DAILY in AM as outpatient    General Neurology will sign-off.  Please call if you have any questions.              VTE Risk Mitigation         Ordered     apixaban tablet 2.5 mg  2 times daily     Route:  Oral        04/15/17 1618     Medium Risk of VTE  Once      04/15/17 1431          Samy Arriaga MD  Neurology  [facility]    ============   I agree with the history, assessment and plan within the resident's note as stated above.  Note has been edited by me to reflect my work and changes.    Not available during rounds.  I do not believe her symptoms are primarily neurologic, but rather may be perfusional from cardiac output with loew ef, mod AS, and symptomatic bradycardia.  Reassured by the nml carotids and TCDs.     Samy Laura MD, MPH  Division of Movement and Memory Disorders  [facility]

## 2017-04-20 NOTE — SUBJECTIVE & OBJECTIVE
Interval History: Patient more alert today and able to hold a conversation without falling asleep. ID and Neurology evaluated patient but no evidence to suggest occult infection or stroke or poor blood flow as a cause for lethargy.     Review of Systems   Constitutional: Positive for fatigue. Negative for chills and fever.   Respiratory: Negative for cough and shortness of breath.    Cardiovascular: Negative for chest pain.   Gastrointestinal: Negative for abdominal pain and nausea.   Musculoskeletal: Positive for myalgias (Right hip pain). Negative for back pain.   Skin: Negative for rash.   Neurological: Positive for weakness (Generalized). Negative for dizziness.   Psychiatric/Behavioral: Negative for confusion and hallucinations.     Objective:     Vital Signs (Most Recent):  Temp: 98.2 °F (36.8 °C) (04/20/17 1335)  Pulse: (!) 54 (04/20/17 1300)  Resp: 18 (04/20/17 0800)  BP: (!) 149/67 (04/20/17 1335)  SpO2: 100 % (04/20/17 1300) Vital Signs (24h Range):  Temp:  [98.1 °F (36.7 °C)-98.8 °F (37.1 °C)] 98.2 °F (36.8 °C)  Pulse:  [51-59] 54  Resp:  [18] 18  SpO2:  [92 %-100 %] 100 %  BP: ()/(44-67) 149/67     Weight: 50.8 kg (112 lb)  Body mass index is 20.49 kg/(m^2).  No intake or output data in the 24 hours ending 04/20/17 1441   Physical Exam   Constitutional: No distress.   Eyes: No scleral icterus.   Neck: Neck supple. No JVD present.   Cardiovascular: Regular rhythm and normal heart sounds.  Bradycardia present.  Exam reveals no gallop and no friction rub.    No murmur heard.  Pulmonary/Chest: Effort normal. She has rales (Mild rales in bases).   Abdominal: Soft. Bowel sounds are normal. She exhibits no distension. There is no tenderness.   Musculoskeletal: She exhibits edema (1-2 + edema in lower extremities; 1+ edema in left arm).   Neurological: She is alert.   Skin: Skin is warm and dry.   Psychiatric: She has a normal mood and affect. Her speech is normal.

## 2017-04-20 NOTE — PROGRESS NOTES
[facility] Hospital Medicine  Progress Note    Patient Name: Padmini Miranda  MRN: [id number]  Patient Class: IP- Inpatient   Admission Date: 4/15/2017  Length of Stay: 5 days  Attending Physician: Tahira Tomlinson MD  Primary Care Provider: Randy Lyles MD    [facility] Medicine Team: [facility] MED  Tahira Tomlinson MD    Subjective:     Principal Problem:Acute encephalopathy    HPI:  84 y/o with past medical history of chronic combined systolic and diastolic heart failure, 3 vessel CAD, atherosclerosis of bilateral lower extremity with left heel ulcer with recent PTA of left SFA on 4/3 by Vascular surgery, essential HTN, hypothyroidism and ESRD on HD was sent from acute dialysis today due to concern for increasing lethargy. Family also noted that patient appeared to be slurring her words and noted right eye was drooping and patient was having difficulty staying awake and due to thus concerns was sent to [facility]. CTscan of head showed no acute findings but MRI of brain has been ordered to better evaluate. Patient's daughters at bedside and report for past 1 week patient has been sleeping more than normal and not nearly as alert as she usually is. I am familiar with patient as she was just discharged from my medical service on 4/13 and patient does appear to be more sleepy than normal but when aroused in the ER patient is responding to questions appropriately. Patient reports she generally feels weak but denies any focal weakness in ER. Family is very upset as they feel patient was not progressing with PT prior to discharge to SNF on 4/13 and feel patient has not been herself for over 1 week with increased lethargy.     As noted patient was just discharged from [facility] on 4/13 to [facility] [place] after prolonged hospitalization following surgical repair of a right hip fracture. Patient even prior to that surgery was seen and evaluated by Cardiology for sinus bradycardia  with HR in 30-40's. Patient during admit was felt not to need pacemaker placement and felt bradycardia related to Amiodarone and Coreg that had been recently been discontinued. Patient eventually underwent right hip hemiarthroplasty on 4/5 posterior-op, patient noted to be hypothermic and persistently bradycardiac so patient was evaluated for possible infection but there was no concern for infection, as clinically, the patient had normal WBC and no clinical symptoms to suggest infection. TSH (normal on 3/10) was checked during recent admit and found to be severely elevated at 21.6, with low normal Free T4 of 0.88. Synthroid was adjusted from 50 mcg to 75 mcg daily to treat hypothyroidism. Patient did complain of odynophagia after surgery felt related to ET tube. Speech therapy consult who recommended MBSS and patient underwent MBSS on 4/12 and patient passed with recommendation by Speech for regular diet and nectar thickened liquids. Patient to be discharged to [facility] for continued PT/OT but needs outpatient follow-up with EP Cardiology to reassess need for pacemaker for sinus bradycardia on 4/13.       Hospital Course:  MRI of rafat unremarkable for any signs of an acute stroke or bleed to explain increased fatigue or lethargy. Patient afebrile with normal WBC so infectious etiology felt highly unlikely. Random am cortisol done was 14.7 so does not appear to be consistent with adrenal insufficiency but Endocrine consulted. Cardiology re-consulted for patient's bradycardia to see if contributing to patient's increasing lethargy and fatigue and unclear if it is causing patient's symptoms but Cardiology doubts is true cause of lethargy and recommended Endocrine consult to look for thyroid issues as cause of bradycardia and somulence. Cardiology recommends no need for pacemaker at this time and to follow-up with EP Cardiology as outpatient. Neurontin and opiates for pain discontinued in case they were contributing  to cause. Patient with recent diagnosis of hypothyroidism likely related to Amiodarone that recently was discontinued due to bradycardia. Endocrine consulted on 4/18 and evaluated patient and not worried by thyroid function but concerned for adrenal insufficiency so Cosyntropin stimulation test ordered on 4/18 to evaluate patient. Patient dialyzed by Nephrology on 4/18 a day prior to normal dialysis day as showing signs of hypervolemia. Patient slightly more alert and responsive on 4/18 and did work with PT. Infectious disease consulted for possible occult infection as cause but they felt highly unlikely as patient had no clinical signs to suggest infection but did draw blood cultures due to heart murmur on 4/18 and so far blood cultures are negative but ID did not feel any other infectious work-up or empiric treatment with antibiotics indicated. Neurology consulted to evaluate patient for lethargy. They did not feel any clinical evidence of stroke but did recommend transcranial doppler and Carotid US to assess for decreased blood flow to brain as cause. Carotid ultrasound shows <39% stenosis bilaterally and transcranial doppler was limited, but what was able to be assessed was normal.  Patient alertness level is minimally improved, but she is not back to baseline.     Interval History: Patient more alert today and able to hold a conversation without falling asleep. ID and Neurology evaluated patient but no evidence to suggest occult infection or stroke or poor blood flow as a cause for lethargy.     Review of Systems   Constitutional: Positive for fatigue. Negative for chills and fever.   Respiratory: Negative for cough and shortness of breath.    Cardiovascular: Negative for chest pain.   Gastrointestinal: Negative for abdominal pain and nausea.   Musculoskeletal: Positive for myalgias (Right hip pain). Negative for back pain.   Skin: Negative for rash.   Neurological: Positive for weakness (Generalized). Negative  for dizziness.   Psychiatric/Behavioral: Negative for confusion and hallucinations.     Objective:     Vital Signs (Most Recent):  Temp: 98.2 °F (36.8 °C) (04/20/17 1335)  Pulse: (!) 54 (04/20/17 1300)  Resp: 18 (04/20/17 0800)  BP: (!) 149/67 (04/20/17 1335)  SpO2: 100 % (04/20/17 1300) Vital Signs (24h Range):  Temp:  [98.1 °F (36.7 °C)-98.8 °F (37.1 °C)] 98.2 °F (36.8 °C)  Pulse:  [51-59] 54  Resp:  [18] 18  SpO2:  [92 %-100 %] 100 %  BP: ()/(44-67) 149/67     Weight: 50.8 kg (112 lb)  Body mass index is 20.49 kg/(m^2).  No intake or output data in the 24 hours ending 04/20/17 1441   Physical Exam   Constitutional: No distress.   Eyes: No scleral icterus.   Neck: Neck supple. No JVD present.   Cardiovascular: Regular rhythm and normal heart sounds.  Bradycardia present.  Exam reveals no gallop and no friction rub.    No murmur heard.  Pulmonary/Chest: Effort normal. She has rales (Mild rales in bases).   Abdominal: Soft. Bowel sounds are normal. She exhibits no distension. There is no tenderness.   Musculoskeletal: She exhibits edema (1-2 + edema in lower extremities; 1+ edema in left arm).   Neurological: She is alert.   Skin: Skin is warm and dry.   Psychiatric: She has a normal mood and affect. Her speech is normal.       Assessment/Plan:      * Acute encephalopathy  Improved today as patient more alert and responsive. Patient oriented x 4. Patient afebrile with normal WBC and no bandemia so infectious etiology unlikely. ID consulted and highly doubt infectious cause but did order blood cultures that are so far no growth. Patient with no evidence of acute hypoxia as oxygen sats > 90 on 2 liters of oxygen. Medication list reviewed for new medications recently added and no new medications added recently that could be causing encephalopathy but Neurontin on hold as can be sedating even though patient on a very low dose.   · MRI of brain unremarkable for acute stroke or bleed on 4/15.   · AM Cortisol level  normal 14.7. Endocrine consulted and recommended Cosyntropin stim test that was ordered by them on 4/18 to evaluate for relative adrenal insufficiency and was normal.   · Will hold all narcotics/opiates for pain. Avoid any sleep aid agents at this time that could affect alertness level.  · Endocrine consulted on 4/18 to see if hypothyroidism is causing fatigue and lethargy but they stated no evidence of myxedema but stated they would do trial of IV Synthroid to see if helps patient but they doubt hypothyroidism cause of lethargy.   · EP Cardiology consulted and evaluated patient to see if bradycardia causing fatigue/weakness but feel unlikely and do not recommend pacemaker placement at this time and recommended follow-up with Dr. Antoine in EP clinic as outpatient and he would consider once patient euthyroid.   · Neurology consulted on 4/18 and recommend carotid and transcranial doppler ultrasound to look for low flow to brain and ordered on 4/19.   · Please avoid any benzodiazepines as can worsen delirium and only should use in delirium caused by benzodiazepine or alcohol withdrawal.   · Please avoid all anticholinergic medications as can worsen delirium.   · Please keep curtains and blinds open during the day and closed during the night.   · Please continue to have nursing and family reorient patient and encourage family to visit and stay at night if possible.   · Please avoid physical restraints if possible as likely to worsen and increase agitation in delirious patient.  · Infectious disease consulted for possible occult infection as cause but they felt highly unlikely as patient had no clinical signs to suggest infection but did draw blood cultures due to heart murmur on 4/18 and so far blood cultures are negative but ID did not feel any other infectious work-up or empiric treatment with antibiotics indicated. Neurology consulted to evaluate patient for lethargy. They did not feel any clinical evidence of stroke  but did recommend transcranial doppler and Carotid US to assess for decreased blood flow to brain as cause. Carotid ultrasound shows <39% stenosis bilaterally and transcranial doppler was limited, but what was able to be assessed was normal.  Patient alertness level is minimally improved, but she is not back to baseline.   · Will add liothyronine 5 mcg daily as a trial to see if it help with symptoms  · - will repeat TSH, free T4, and get reverse and previous T3 level was undetectable.       Acquired hypothyroidism  Appreciate Endocrine consult and they do not feel that any adjustments needed in Synthroid but will give trial of IV Synthroid as per Endocrine to see if helps patient. Endocrine notes no signs of myxedema so doubt lethargy related to thyroid disease.   · Will add liothyronine 5 mcg daily as a trial to see if it help with symptoms  · Will repeat TSH, free T4, and get reverse and previous T3 level was undetectable.       Acute blood loss anemia  Hgb is stable and there are no signs of active bleeding. Hgb 8.1 so doubt cause of lethargy.  No indication to transfuse blood in this patient at this time. Nephrology to continue Epogen with HD to treat chronic anemia related to ESRD.      Oral thrush  Much improved. Patient with very mild case and will continue treatement with Nystatin swish and spit 4 times daily.       Renovascular hypertension  Patient normotensive on no BP medications. Bidil discontinued on previous admit due to issues with hypotension during HD and will continue to hold and continue Midodrine to help with BP and volume removal during HD. Patient tolerating volume removal with HD on Midodrine and will continue.       Abnormal LFTs  Resolved. RUQ ultrasound unremarkable except for hepatic cysts and unlikely to be causing mild increase in LFT's but LFT's are improving so no further evaluation at this time and could just be hepatic congestion from hypervolemia related to chronic renal failure  in combination with chronic heart failure. Monitor daily CMP. No need to discontinue Lipitor at this time as statin not likely cause and needs for CAD and severe PVD.      Chronic combined systolic and diastolic heart failure  Improved. Nephrology to assess patient daily for continued volume removal in hospital. Patient's oxygen status improved and patient with O2 sats > 90% on 2 liters of oxygen.        ESRD on hemodialysis  Nephrology consulted and managing patient's HD needs in hospital.     Closed displaced fracture of right femoral neck s/p hemiarthroplasty on 4/5/2017  Patient is POD # 14. Patient reports no pain in right hip currently and main pain related to her neuropathic pain. Plan to continue PT/OT for gait training and strengthening and restoration of ADL's on this admit to continue therapy. Patient is FWB/WBAT: right lower extremity, posterior hip precautions as per Orthopedic recommendations. Patient on Eliquis for long term anticoagulation for atrial fibrillation so no DVT prophylaxis needed. Pain is well controlled and will use just Tylenol for pain and avoid any sedating narcotics. Patient needs SNF placement on discharge.    Atherosclerosis of native artery 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  Patient with known heel ulcers to both legs but no signs to indicate infection. Patient recently evaluated by Vascular Surgery on 4/13 and Vascular felt ulcers were improving and did not feel any further intervention needed at this time. Continue local wound care for heel ulcers on this admit.      Bradycardia, drug induced  Controlled. Patient with HR in 50's. Patient still with persistent sinus bradycardia but HR now in 50-60's instead of 40-50's as on last hospital stay in early April. Cardiology re-evaluated patient to see if they feel bradycardia could be contributing to patient's lethargy but they doubt and do not recommend pacemaker needed at this time and recommends outpatient follow-up  with Dr. Antoine in EP Cardiology clinic. Avoid any AV shikha blocking agents. Continue telemetry monitoring.       PAF (paroxysmal atrial fibrillation)  Patient is actually bradycardiac so no AV shikha blocking agents are required. Patient has an implanted loop recorder in place. Continue Eliquis for long term anticoagulation.      3-vessel CAD  Controlled. Plan to continue ASA and Lipitor to treat. No beta blocker due to bradycardia.        VTE Risk Mitigation         Ordered     apixaban tablet 2.5 mg  2 times daily     Route:  Oral        04/15/17 1618     Medium Risk of VTE  Once      04/15/17 1431          Tahira Tomlinson MD  Department of Hospital Medicine   [facility]

## 2017-04-20 NOTE — ASSESSMENT & PLAN NOTE
Improved. Nephrology to assess patient daily for continued volume removal in hospital. Patient's oxygen status improved and patient with O2 sats > 90% on 2 liters of oxygen.

## 2017-04-20 NOTE — ASSESSMENT & PLAN NOTE
- Hx of cardiovascular disease and bradycardia  - Hx of hypothyroidism  - Adrenal insufficiency negative 2/2 ACTH challenge  - [04/19/17] U/S carotids noted 1-39% stenosis of bilateral carotids w/ tardus parvus raising the    possibility of proximal stenosis such as a hemodynamically significant aortic stenosis.    (DX) Hypoperfusion VS Hypothyroidism (less likely) VS Adrenal Insufficiency (ruled-out)  - Hypoperfusion is of high suspicion given hx of cardiovascular disease  - Evaluation of vasculature perfusing the brain is recommended  - CT angiogram is the gold-standard but is contraindicated 2/2 renal failure    Recommendations:  1) Transcranial doppler  2) Fludrocortisone 0.1 mg DAILY in AM as outpatient    General Neurology will sign-off.  Please call if you have any questions.

## 2017-04-20 NOTE — PLAN OF CARE
12:19 PM. MARQUITA received call back from Latanya Morrow [phone] pts daughter listed first on pts emergency contact list. Informed by Latanya that Rafaela is the daughter in charge of making all decisions for pt.and that she is at the hospital now. MARQUITA spoke with pts family members in pts room however informed that pts daughter Rafaela [phone] was currently down stairs at the time. MARQUITA spoke with pts granddaughter Ronna answering questions pertaining pts SHAYNA. MARQUITA informed family in pts room that pts SHAYNA as of right now is for Monday the 24. MARQUITA reported that pt has been and is still currently receiving a complete work up by physicians and specialists while in the hospital and is not medically stable for DC at this time. MARQUITA informed family that the DC plan however will need to be started for when the time comes that the pt is medically stable for the transfer of level of care. Pts family all verbalized understanding. MARQUITA reports that she will be contacting Rafaela with the same update.     12:24 PM. MARQUITA received VM from Jacqueline in pt relations stating that Rafaela has met with her concerned of pt being DC'd too soon. MARQUITA reports the meeting that took place with the family and the plan to call pts daughter Rafaela. MARQUITA placed call to Rafaela with update. Rafaela verbalized understanding also requesting a [facility] SNF list for the [facility]. MARQUITA repots the list will be dropped off in pts room along with the [place] and [facility] lists. Rafaela verbalized understanding and thankful for the call.       RENALDO Payton, PAWAN  [id number]

## 2017-04-20 NOTE — PT/OT/SLP PROGRESS
Physical Therapy  Treatment    Padmini Miranda   MRN: [id number]   Admitting Diagnosis: Acute encephalopathy    PT Received On: 17  PT Start Time: 759     PT Stop Time: 824    PT Total Time (min): 25 min       Billable Minutes:  Therapeutic Activity 15 and Therapeutic Exercise 10    Treatment Type: Treatment  PT/PTA: PT     PTA Visit Number: 0       General Precautions: Standard, fall, aspiration  Orthopedic Precautions: RLE posterior precautions, RLE weight bearing as tolerated   Braces: N/A    Do you have any cultural, spiritual, Holiness conflicts, given your current situation?: none noted    Subjective:  Communicated with RN prior to session.  Pt agreeable to therapy session.     Pain Ratin/10  Location - Side: Bilateral  Location - Orientation: generalized  Location: neck  Pain Addressed: Reposition, Distraction, Nurse notified  Pain Rating Post-Intervention: 8/10    Objective:        Functional Mobility:  Bed Mobility:   Supine to Sit: Moderate Assistance    Transfers:  Sit <> Stand Assistance: Moderate Assistance (x2 trials)    Gait:   Gait Distance: 3 side steps EOB min A with AD management and 1 post LOB with mod A to correct; amb limited by mod SOB  Assistance 1: Minimum assistance, Moderate assistance  Gait Assistive Device: Rolling walker  Gait Pattern: reciprocal  Gait Deviation(s): decreased velocity of limb motion, decreased toe-to-floor clearance, decreased weight-shifting ability     Balance:   Static Sit: FAIR: Maintains without assist, but unable to take any challenges   Dynamic Sit: FAIR: Cannot move trunk without losing balance  Static Stand: POOR+: Needs MINIMAL assist to maintain  Dynamic stand: POOR: N/A     Therapeutic Activities and Exercises:  Pt educated on safety with transfers and amb.  Pt performed seated there-ex; glut sets, knee ext, ankle pumps x10 reps R LE; limited there-ex 2* fatigue and SOB.  Pt able to recall 2/3 post hip precautions.  Pt and daughter educated on  importance of OOB activity.  Pt safe to perform transfers with RN staff.      AM-PAC 6 CLICK MOBILITY  How much help from another person does this patient currently need?   1 = Unable, Total/Dependent Assistance  2 = A lot, Maximum/Moderate Assistance  3 = A little, Minimum/Contact Guard/Supervision  4 = None, Modified [place]/Independent    Turning over in bed (including adjusting bedclothes, sheets and blankets)?: 2  Sitting down on and standing up from a chair with arms (e.g., wheelchair, bedside commode, etc.): 2  Moving from lying on back to sitting on the side of the bed?: 2  Moving to and from a bed to a chair (including a wheelchair)?: 2  Need to walk in hospital room?: 2  Climbing 3-5 steps with a railing?: 1  Total Score: 11    AM-PAC Raw Score CMS G-Code Modifier Level of Impairment Assistance   6 % Total / Unable   7 - 9 CM 80 - 100% Maximal Assist   10 - 14 CL 60 - 80% Moderate Assist   15 - 19 CK 40 - 60% Moderate Assist   20 - 22 CJ 20 - 40% Minimal Assist   23 CI 1-20% SBA / CGA   24 CH 0% Independent/ Mod I     Patient left seated EOB with all lines intact, call button in reach, RN notified and daughter present.    Assessment:  Padmini Miranda is a 85 y.o. female with a medical diagnosis of Acute encephalopathy and presents with increased pain and decreased strength, endurance, balance and overall functional mobility. Pt performed bed mobility mod A and transfers mod A with RW. Pt took 3 side steps EOB min A with AD management and 1 post LOB with mod A to correct. Pt will continue to benefit from skilled PT to improve deficits and increase overall functional mobility.    Rehab identified problem list/impairments: Rehab identified problem list/impairments: weakness, impaired endurance, gait instability, impaired functional mobilty, impaired balance, decreased coordination, decreased safety awareness, decreased lower extremity function, orthopedic precautions    Rehab potential is  good.    Activity tolerance: Good    Discharge recommendations: Discharge Facility/Level Of Care Needs: nursing facility, skilled     Barriers to discharge: Barriers to Discharge: Inaccessible home environment, Decreased caregiver support    Equipment recommendations: Equipment Needed After Discharge: bedside commode, walker, rolling, wheelchair, bath bench, hip kit     GOALS:   Physical Therapy Goals        Problem: Physical Therapy Goal    Goal Priority Disciplines Outcome Goal Variances Interventions   Physical Therapy Goal     PT/OT, PT Ongoing (interventions implemented as appropriate)     Description:  Goals to be met by: 17     Patient will increase functional independence with mobility by performin. Supine to sit with MInimal Assistance  2. Sit to supine with MInimal Assistance  3. Rolling to Left and Right with Set-up Assistance.  4. Sit to stand transfer with Minimal Assistance  5. Bed to chair transfer with Moderate Assistance using Rolling Walker  6. Gait  x 30 feet with Moderate Assistance using Rolling Walker.   7. Lower extremity exercise program x15 reps per handout, with assistance as needed                PLAN:    Patient to be seen 6 x/week  to address the above listed problems via therapeutic activities, therapeutic exercises, gait training  Plan of Care expires: 17  Plan of Care reviewed with: patient, daughter         BRIAN DONIS, PT  2017

## 2017-04-20 NOTE — SUBJECTIVE & OBJECTIVE
Subjective:     Interval History: NAEON.  Afebrile, bradycardic in 50's overnight.  HD yesterday - removed 1 L fluid, tolerated well.      Current Facility-Administered Medications   Medication Dose Route Frequency Provider Last Rate Last Dose    0.9%  NaCl infusion   Intravenous PRN Octavio Gong MD        0.9%  NaCl infusion   Intravenous PRN Octavio Gong MD        0.9%  NaCl infusion   Intravenous Once Octavio Gong MD        acetaminophen tablet 650 mg  650 mg Oral Q6H PRN Jalyn Saldana MD   650 mg at 04/20/17 0931    albumin human 25% bottle 25 g  25 g Intravenous PRN Octavio Gong MD   25 g at 04/17/17 1024    apixaban tablet 2.5 mg  2.5 mg Oral BID Jalyn Saldana MD   2.5 mg at 04/20/17 0931    aspirin chewable tablet 81 mg  81 mg Oral Daily Jalyn Saldana MD   81 mg at 04/20/17 0931    atorvastatin tablet 40 mg  40 mg Oral QAM Jalyn Saldana MD   40 mg at 04/20/17 0609    epoetin syed injection 3,000 Units  3,000 Units Subcutaneous Every Tues, Thurs, Sat Jalyn Saldana MD   Stopped at 04/18/17 1700    [START ON 4/21/2017] levothyroxine injection 40 mcg  40 mcg Intravenous Daily Tahira Tomlinson MD        lidocaine 5 % patch 1 patch  1 patch Transdermal Daily Tahira Tomlinson MD   1 patch at 04/20/17 0930    [START ON 4/21/2017] liothyronine tablet 5 mcg  5 mcg Oral Daily Tahira Tomlinson MD        midodrine tablet 10 mg  10 mg Oral TID Jalyn Saldana MD   10 mg at 04/20/17 1344    nystatin 100,000 unit/mL suspension 500,000 Units  500,000 Units Oral QID Denia Fry MD   500,000 Units at 04/20/17 1148    ondansetron disintegrating tablet 8 mg  8 mg Oral Q8H PRN Jalyn Saldana MD   Stopped at 04/18/17 1222    pantoprazole EC tablet 40 mg  40 mg Oral Daily Jalyn Saldana MD   40 mg at 04/20/17 0932    polyethylene glycol packet 17 g  17 g Oral Daily Jalyn Saldana MD   17 g at 04/19/17 1448    vitamin D 1000 units  tablet 1,000 Units  1,000 Units Oral Daily Tahira Tomlinson MD   1,000 Units at 04/20/17 1148       Review of Systems   Constitutional:        Pt affirmed not sleeping well.   HENT: Negative for sore throat.    Eyes: Positive for visual disturbance (Blurriness when sitting up - lasts for approximately 2 minutes). Negative for pain.   Respiratory: Negative for cough and shortness of breath.    Cardiovascular: Negative for chest pain and palpitations.   Gastrointestinal: Negative for abdominal pain, constipation, diarrhea, nausea and vomiting.        Last BM today   Genitourinary: Negative for dysuria.   Musculoskeletal: Negative for myalgias.   Neurological: Positive for dizziness (Dizzy with sitting up - denies vertigo) and headaches.   Psychiatric/Behavioral:        Affirms her mood is ok.     Objective:     Vital Signs (Most Recent):  Temp: 98.2 °F (36.8 °C) (04/20/17 1335)  Pulse: (!) 54 (04/20/17 1300)  Resp: 18 (04/20/17 0800)  BP: (!) 149/67 (04/20/17 1335)  SpO2: 100 % (04/20/17 1300) Vital Signs (24h Range):  Temp:  [98.1 °F (36.7 °C)-98.8 °F (37.1 °C)] 98.2 °F (36.8 °C)  Pulse:  [53-59] 54  Resp:  [18] 18  SpO2:  [92 %-100 %] 100 %  BP: ()/(44-67) 149/67     Weight: 50.8 kg (112 lb)  Body mass index is 20.49 kg/(m^2).    Physical Exam   Constitutional: She appears well-developed and well-nourished.   HENT:   Mouth/Throat: Oropharynx is clear and moist. No oropharyngeal exudate.   Eyes: Conjunctivae and EOM are normal. Pupils are equal, round, and reactive to light. Right eye exhibits no discharge. Left eye exhibits no discharge. No scleral icterus.   Pulmonary/Chest: No stridor. No respiratory distress.   Musculoskeletal:   See Neuro exam.   Neurological:   See Neuro exam.   Skin: Skin is warm and dry. No rash noted. She is not diaphoretic. No erythema.   Psychiatric:   See Neuro exam.   Vitals reviewed.      NEUROLOGICAL EXAMINATION:     MENTAL STATUS        Pt is alert, lucid, articulate,  talkative, cooperative, and has a good sense of humor.     CRANIAL NERVES   Cranial nerves II through XII intact.     CN III, IV, VI   Pupils are equal, round, and reactive to light.  Extraocular motions are normal.     MOTOR EXAM        5/5 bilateral bicep, tricep, and handgrip strength.  5/5 bilateral plantarflexion and dorsiflexion strength.  Pt unable to perform leg raise, flexion, and extension bilaterally.     REFLEXES        1+ bilateral and equal biceps, brachioradialis, patellar, and ankle reflexes.  Negative Babinski.     SENSORY EXAM        Bilateral and equal sensation over the face, shoulders, arms, forearms, hands, abdomen, thighs, legs, and feet.       GAIT AND COORDINATION        Normal but slow, bilateral random alternating movements (arm/hand). Normal, bilateral 1st and 2nd digit open/close movements.           Significant Labs:   BMP:   Recent Labs  Lab 04/20/17  0345   GLU 83      K 4.3      CO2 23   BUN 13   CREATININE 2.6*   CALCIUM 8.8     All pertinent lab results from the past 24 hours have been reviewed.    Significant Imaging: I have reviewed all pertinent imaging results/findings within the past 24 hours.

## 2017-04-20 NOTE — PROGRESS NOTES
Patient seen and examined today with Staff. POD 15 Right hemiarthroplasty s/p femoral neck fracture. Re-admit from SNF for somnolence.     Wound inspected, clean dry and intact without evidence of drainage. Healing nicely.     Continue PT/OT. Weight bearing as tolerated with posterior hip precautions  Wound re-dressed with mepilex dressing. Can remove in 3-4 days and leave open to air.     Call with questions. Will arrange a 4 week follow up in clinic for repeat x-rays     Inocencio Chen MD  Orthopedic Surgery, PGY3  [phone] (p)       Attg Note:  I agree with the above assessment and plan.  Incision looks good.  PT/OT WBAT with posterior hip precautions.   We will schedule her 6 week post op appt in our clinic.    Jeff Barrios MD

## 2017-04-20 NOTE — ASSESSMENT & PLAN NOTE
Appreciate Endocrine consult and they do not feel that any adjustments needed in Synthroid but will give trial of IV Synthroid as per Endocrine to see if helps patient. Endocrine notes no signs of myxedema so doubt lethargy related to thyroid disease.   · Will add liothyronine 5 mcg daily as a trial to see if it help with symptoms  · Will repeat TSH, free T4, and get reverse and previous T3 level was undetectable.

## 2017-04-20 NOTE — PROGRESS NOTES
Attempted at perform TCD 2x's this morning. Will attempt one more time later today.    Rose Farrar  TCD Technologist

## 2017-04-20 NOTE — PLAN OF CARE
Problem: Physical Therapy Goal  Goal: Physical Therapy Goal  Goals to be met by: 17     Patient will increase functional independence with mobility by performin. Supine to sit with MInimal Assistance  2. Sit to supine with MInimal Assistance  3. Rolling to Left and Right with Set-up Assistance.  4. Sit to stand transfer with Minimal Assistance  5. Bed to chair transfer with Moderate Assistance using Rolling Walker  6. Gait x 30 feet with Moderate Assistance using Rolling Walker.   7. Lower extremity exercise program x15 reps per handout, with assistance as needed   Outcome: Ongoing (interventions implemented as appropriate)  Pt progressing towards goals.      BRIAN DONIS, PT  2017

## 2017-04-20 NOTE — PHYSICIAN QUERY
PT Name: Padmini Miranda  MR #: [id number]    Physician Query Form - Consultant Diagnosis Clarification     CDS/: Venancio Aaron               Contact information:[phone]  This form is a permanent document in the medical record.     Query Date: April 20, 2017      By submitting this query, we are merely seeking further clarification of documentation.  Please utilize your independent clinical judgment when addressing the question(s) below.      The Medical record contains the following:   Diagnosis Supporting Clinical Information Location in Medical Record       Stage 2 pressure ulcer to right buttock     The buttocks are darkened, the right buttock has a stage 2 pressure injury and a   The stage 2 is ~ 2 cm x 1 cm x 0.2      04/18/17 Wound Care Progress Note            Do you agree with the Consultants diagnosis of ______Stage 2 Pressure ulcer to  Right Buttock _____________________________?                 [ x  ]   Yes               [   ]   Yes, but it resolved prior to my assessment of the patient               [   ]   No                [   ]   Clinically insignificant               [   ]   Clinically undetermined               [   ]   Other/Clarification of findings: ___________________________________________

## 2017-04-20 NOTE — PROGRESS NOTES
AAOx4. Lethargic but response to voice. Pain  Controlled moderately controlled with prn and lidocaine patch. Wound cleaned and new dressing applied on sacral area. No acute distress noted. Vss. Will monitor.

## 2017-04-21 PROBLEM — M81.0 OSTEOPOROSIS: Status: ACTIVE | Noted: 2017-01-01

## 2017-04-21 NOTE — PLAN OF CARE
Problem: Occupational Therapy Goal  Goal: Occupational Therapy Goal  Goals to be met by: 4/27     Patient will increase functional independence with ADLs by performing:    Feeding with Set-up Assistance.  UE Dressing with Minimal Assistance.  LE Dressing with Moderate Assistance or Mod (I).   Grooming while standing with Supervision.  Toileting from bedside commode with Moderate Assistance for hygiene and clothing management.   Sitting at edge of bed x5 minutes with Supervision.  Stand pivot transfers with Minimal Assistance.   Outcome: Ongoing (interventions implemented as appropriate)  No goals met this session - con't with POC.     SARAY Mcleod

## 2017-04-21 NOTE — NURSING
Pt complained of blood tinged vomit. Admitting to feeling nausea. Pt was given prescribed PRN zofran.

## 2017-04-21 NOTE — PLAN OF CARE
12:28 PM. MARQUITA spoke with Ivanna at [facility], pts referral is under review. SW send wound notes as requested via [facility].    3:28 PM. MARQUITA updated by Ivanna at [facility] that pt does not seem appropriate for their care at this time. SW request they hold onto the referral until pt is medically stable for DC>       C. Fito, PAWAN  [id number]

## 2017-04-21 NOTE — PROGRESS NOTES
"[facility]  Endocrinology  Progress Note    Admit Date: 4/15/2017     Reason for Consult: Management of hypothyroidism     Surgical Procedure and Date:   Right hip hemiarthroplasty for femoral neck fracture 4/5/17  PTA left SFA for PAD on 4/3/17    HPI:   Patient is a 85 y.o. female with a diagnosis of P-Afib, CHF, CAD, PAD, ESRD on HD with anemia and secondary HPT, HTN, hypothyroidism who is admitted for increase lethargy and hypersomnolence. She was recently admitted and discharged to SNF after prolonged hospitalization for right hip fx 2/2 mechanical fall. Post-op, she became hypotensive, hypothermic, and bradycardic. Cards rec stopping amio and coreg. TSH was high 21 with normal FT4 and LT4 was increased from 50-->75mcg daily. She was also started on midodrine to maintain BP. Endo consulted for eval of hypothyroidism contributing to encephalopathy. Infectious w/u unrevealing. MRI brain negative for acute stroke/bleed.     HR 40-60s  BP is stable but on midodrine   Hyponatremia 125-135   Lower limit BG 58-60s  GFR at baseline, intermittent hyperkalemia     TSH improved from 21-->15 in 5 days after increasing LT4 to 75mcg daily   FT4 0.88-->0.81 and has always been within normal  Low FT3 <1  7-am cortisol 14.7, albumin 2.7    No recent steroid use   Hx of PO prednisone and IA injections in 1/2015 and 9/2015 for gout   Hx of solumedrol in 1/2015 for bronchitis   No steroid use perioperatively in 4/2017              Interval HPI:   Overnight events:  Neurology consulted for encephalopathy and concerned about hypoperfusion etiology 2/2 vasculopathy.   HR remains 50-60.   On IV LT4 40mcg daily. FT4 0.81->0.86, TSH 15->9.   Primary team starting cytomel 5mcg daily today    BP (!) 130/48  Pulse 60  Temp 98.7 °F (37.1 °C) (Oral)   Resp 19  Ht 5' 2" (1.575 m)  Wt 50.8 kg (112 lb)  LMP  (LMP Unknown)  SpO2 98%  Breastfeeding? No  BMI 20.49 kg/m2    Labs Reviewed and Include      Recent " Labs  Lab 04/21/17  0355      CALCIUM 9.4   ALBUMIN 2.9*   *   K 4.9   CO2 22*      BUN 26*   CREATININE 3.7*     Lab Results   Component Value Date    WBC 9.24 04/18/2017    HGB 8.1 (L) 04/18/2017    HCT 25.4 (L) 04/18/2017    MCV 86 04/18/2017     04/18/2017       Recent Labs  Lab 04/15/17  1239 04/20/17  1410 04/20/17  2327   TSH 15.558* 9.757*  --    FREET4 0.81 0.89 0.86     No results found for: HGBA1C    Nutritional status:   Body mass index is 20.49 kg/(m^2).  Lab Results   Component Value Date    ALBUMIN 2.9 (L) 04/21/2017    ALBUMIN 2.9 (L) 04/20/2017    ALBUMIN 3.1 (L) 04/18/2017     No results found for: PREALBUMIN    Estimated Creatinine Clearance: 8.8 mL/min (based on Cr of 3.7).    Accu-Checks  No results for input(s): POCTGLUCOSE in the last 72 hours.    Current Medications and/or Treatments Impacting Glycemic Control  Immunotherapy:  Immunosuppressants     None        Steroids:   Hormones     None        Pressors:    Autonomic Drugs     Start     Stop Route Frequency Ordered    04/15/17 2200  midodrine tablet 10 mg      -- Oral 3 times daily 04/15/17 1618        Hyperglycemia/Diabetes Medications: Antihyperglycemics     None          ASSESSMENT and PLAN    Acquired hypothyroidism  Clinically hypothyroid, not in myxedema.   Remains brea. Monitor for hypoglycemia, hypotension, hyponatremia   rec continuing IV LT4 40mcg daily (and switch back to equivalent dose of PO LT4 75mcg daily at time of discharge). Check FT4 qMon and Thurs while on IV LT4.   Primary team adding cytomel as trial, rec bid dosing (ie 5mcg bid or 2.5mcg bid if half tab available) due to its short half life, and would continue x5-7 days overlapped with LT4.   The low FT3 is expected in setting of amiodarone use and illness and would expect rT3 to be high and would not alter management.  Repeat TSH in 6 weeks once discharged.  Cortisol >18 on stim test, rules out AI.       ESRD on hemodialysis  Per  renal      Closed displaced fracture of right femoral neck s/p hemiarthroplasty on 4/5/2017  Needs o/p DEXA and optimization of pth, vit D, phos as her esrd does not allow other med mgmt   Replete vit d rec ergocal 50K iu weekly    Osteoporosis  As above       Yenifer Oshea MD  Endocrinology  [facility] CUETO, Cherri Crain MD,  have personally taken the history and examined the patient and agree with the resident's note as stated above.

## 2017-04-21 NOTE — ASSESSMENT & PLAN NOTE
Clinically hypothyroid, not in myxedema.   Remains brea. Monitor for hypoglycemia, hypotension, hyponatremia   rec continuing IV LT4 40mcg daily (and switch back to equivalent dose of PO LT4 75mcg daily at time of discharge). Check FT4 qMon and Thurs while on IV LT4.   Primary team adding cytomel as trial, rec bid dosing (ie 2.5mcg bid) due to its short half life, and would continue x5-7 days overlapped with LT4.   The low FT3 is expected in setting of amiodarone use and illness and would expect rT3 to be high and would not alter management.  Repeat TSH in 6 weeks once discharged.  Cortisol >18 on stim test, rules out AI.

## 2017-04-21 NOTE — PROGRESS NOTES
[facility] Hospital Medicine  Progress Note    Patient Name: Padmini Miranda  MRN: [id number]  Patient Class: IP- Inpatient   Admission Date: 4/15/2017  Length of Stay: 6 days  Attending Physician: Tahira Tomlinson MD  Primary Care Provider: Randy Lyles MD    [facility] Medicine Team: [facility]OSP MED  Tahira Tomlinson MD    Subjective:     Principal Problem:Acute encephalopathy    HPI:  84 y/o with past medical history of chronic combined systolic and diastolic heart failure, 3 vessel CAD, atherosclerosis of bilateral lower extremity with left heel ulcer with recent PTA of left SFA on 4/3 by Vascular surgery, essential HTN, hypothyroidism and ESRD on HD was sent from acute dialysis today due to concern for increasing lethargy. Family also noted that patient appeared to be slurring her words and noted right eye was drooping and patient was having difficulty staying awake and due to thus concerns was sent to [facility]. CTscan of head showed no acute findings but MRI of brain has been ordered to better evaluate. Patient's daughters at bedside and report for past 1 week patient has been sleeping more than normal and not nearly as alert as she usually is. I am familiar with patient as she was just discharged from my medical service on 4/13 and patient does appear to be more sleepy than normal but when aroused in the ER patient is responding to questions appropriately. Patient reports she generally feels weak but denies any focal weakness in ER. Family is very upset as they feel patient was not progressing with PT prior to discharge to SNF on 4/13 and feel patient has not been herself for over 1 week with increased lethargy.     As noted patient was just discharged from [facility] on 4/13 to [facility] [place] after prolonged hospitalization following surgical repair of a right hip fracture. Patient even prior to that surgery was seen and evaluated by Cardiology for sinus bradycardia  with HR in 30-40's. Patient during admit was felt not to need pacemaker placement and felt bradycardia related to Amiodarone and Coreg that had been recently been discontinued. Patient eventually underwent right hip hemiarthroplasty on 4/5 posterior-op, patient noted to be hypothermic and persistently bradycardiac so patient was evaluated for possible infection but there was no concern for infection, as clinically, the patient had normal WBC and no clinical symptoms to suggest infection. TSH (normal on 3/10) was checked during recent admit and found to be severely elevated at 21.6, with low normal Free T4 of 0.88. Synthroid was adjusted from 50 mcg to 75 mcg daily to treat hypothyroidism. Patient did complain of odynophagia after surgery felt related to ET tube. Speech therapy consult who recommended MBSS and patient underwent MBSS on 4/12 and patient passed with recommendation by Speech for regular diet and nectar thickened liquids. Patient to be discharged to [facility] for continued PT/OT but needs outpatient follow-up with EP Cardiology to reassess need for pacemaker for sinus bradycardia on 4/13.       Hospital Course:  MRI of rafat unremarkable for any signs of an acute stroke or bleed to explain increased fatigue or lethargy. Patient afebrile with normal WBC so infectious etiology felt highly unlikely. Random am cortisol done was 14.7 so does not appear to be consistent with adrenal insufficiency but Endocrine consulted. Cardiology re-consulted for patient's bradycardia to see if contributing to patient's increasing lethargy and fatigue and unclear if it is causing patient's symptoms but Cardiology doubts is true cause of lethargy and recommended Endocrine consult to look for thyroid issues as cause of bradycardia and somulence. Cardiology recommends no need for pacemaker at this time and to follow-up with EP Cardiology as outpatient. Neurontin and opiates for pain discontinued in case they were contributing  to cause. Patient with recent diagnosis of hypothyroidism likely related to Amiodarone that recently was discontinued due to bradycardia. Endocrine consulted on 4/18 and evaluated patient and not worried by thyroid function but concerned for adrenal insufficiency so Cosyntropin stimulation test ordered on 4/18 to evaluate patient. Patient dialyzed by Nephrology on 4/18 a day prior to normal dialysis day as showing signs of hypervolemia. Patient slightly more alert and responsive on 4/18 and did work with PT. Infectious disease consulted for possible occult infection as cause but they felt highly unlikely as patient had no clinical signs to suggest infection but did draw blood cultures due to heart murmur on 4/18 and so far blood cultures are negative but ID did not feel any other infectious work-up or empiric treatment with antibiotics indicated. Neurology consulted to evaluate patient for lethargy. They did not feel any clinical evidence of stroke but did recommend transcranial doppler and Carotid US to assess for decreased blood flow to brain as cause. Carotid ultrasound shows <39% stenosis bilaterally and transcranial doppler was limited, but what was able to be assessed was normal.  Patient alertness level is minimally improved, but she is not back to baseline. Cytomel 5 mg BID started on 4/20 to see if that helps improve symptoms.     Interval History: Patient more alert today and able to hold a conversation without falling asleep. ID and Neurology evaluated patient but no evidence to suggest occult infection or stroke or poor blood flow as a cause for lethargy. Started on Cytomel yesterday.  Perhaps minimally improved today.     Review of Systems   Constitutional: Positive for fatigue. Negative for chills and fever.   Respiratory: Negative for cough and shortness of breath.    Cardiovascular: Negative for chest pain.   Gastrointestinal: Negative for abdominal pain and nausea.   Musculoskeletal: Positive for  myalgias (Right hip pain). Negative for back pain.   Skin: Negative for rash.   Neurological: Positive for weakness (Generalized). Negative for dizziness.   Psychiatric/Behavioral: Negative for confusion and hallucinations.     Objective:     Vital Signs (Most Recent):  Temp: 97.8 °F (36.6 °C) (04/21/17 1240)  Pulse: 60 (04/21/17 1240)  Resp: 20 (04/21/17 1240)  BP: (!) 169/72 (04/21/17 1240)  SpO2: 96 % (04/21/17 1240) Vital Signs (24h Range):  Temp:  [97.6 °F (36.4 °C)-98.7 °F (37.1 °C)] 97.8 °F (36.6 °C)  Pulse:  [52-62] 60  Resp:  [16-20] 20  SpO2:  [87 %-100 %] 96 %  BP: (114-169)/(43-72) 169/72     Weight: 50.8 kg (112 lb)  Body mass index is 20.49 kg/(m^2).    Intake/Output Summary (Last 24 hours) at 04/21/17 1343  Last data filed at 04/21/17 1125   Gross per 24 hour   Intake              600 ml   Output             3102 ml   Net            -2502 ml      Physical Exam   Constitutional: No distress.   Eyes: No scleral icterus.   Neck: Neck supple. No JVD present.   Cardiovascular: Regular rhythm and normal heart sounds.  Bradycardia present.  Exam reveals no gallop and no friction rub.    No murmur heard.  Pulmonary/Chest: Effort normal. She has rales (Mild rales in bases).   Abdominal: Soft. Bowel sounds are normal. She exhibits no distension. There is no tenderness.   Musculoskeletal: She exhibits edema (1-2 + edema in lower extremities; 1+ edema in left arm).   Neurological: She is alert.   Skin: Skin is warm and dry.   Psychiatric: She has a normal mood and affect. Her speech is normal.       Assessment/Plan:      * Acute encephalopathy  Improved today as patient more alert and responsive. Patient oriented x 4. Patient afebrile with normal WBC and no bandemia so infectious etiology unlikely. ID consulted and highly doubt infectious cause but did order blood cultures that are so far no growth. Patient with no evidence of acute hypoxia as oxygen sats > 90 on 2 liters of oxygen. Medication list reviewed for  new medications recently added and no new medications added recently that could be causing encephalopathy but Neurontin on hold as can be sedating even though patient on a very low dose.   · MRI of brain unremarkable for acute stroke or bleed on 4/15.   · AM Cortisol level normal 14.7. Endocrine consulted and recommended Cosyntropin stim test that was ordered by them on 4/18 to evaluate for relative adrenal insufficiency and was normal.   · Will hold all narcotics/opiates for pain. Avoid any sleep aid agents at this time that could affect alertness level.  · Endocrine consulted on 4/18 to see if hypothyroidism is causing fatigue and lethargy but they stated no evidence of myxedema but stated they would do trial of IV Synthroid to see if helps patient but they doubt hypothyroidism cause of lethargy.   · EP Cardiology consulted and evaluated patient to see if bradycardia causing fatigue/weakness but feel unlikely and do not recommend pacemaker placement at this time and recommended follow-up with Dr. Antoine in EP clinic as outpatient and he would consider once patient euthyroid.   · Neurology consulted on 4/18 and recommend carotid and transcranial doppler ultrasound to look for low flow to brain and ordered on 4/19.   · Please avoid any benzodiazepines as can worsen delirium and only should use in delirium caused by benzodiazepine or alcohol withdrawal.   · Please avoid all anticholinergic medications as can worsen delirium.   · Please keep curtains and blinds open during the day and closed during the night.   · Please continue to have nursing and family reorient patient and encourage family to visit and stay at night if possible.   · Please avoid physical restraints if possible as likely to worsen and increase agitation in delirious patient.  · Infectious disease consulted for possible occult infection as cause but they felt highly unlikely as patient had no clinical signs to suggest infection but did draw blood  cultures due to heart murmur on 4/18 and so far blood cultures are negative but ID did not feel any other infectious work-up or empiric treatment with antibiotics indicated. Neurology consulted to evaluate patient for lethargy. They did not feel any clinical evidence of stroke but did recommend transcranial doppler and Carotid US to assess for decreased blood flow to brain as cause. Carotid ultrasound shows <39% stenosis bilaterally and transcranial doppler was limited, but what was able to be assessed was normal.  Patient alertness level is minimally improved, but she is not back to baseline.   · Will continue liothyronine 5 mcg daily as a trial to see if it help with symptoms    Acquired hypothyroidism  Appreciate Endocrine consult and they do not feel that any adjustments needed in Synthroid but will give trial of IV Synthroid as per Endocrine to see if helps patient. Endocrine notes no signs of myxedema so doubt lethargy related to thyroid disease.   · Will add liothyronine 5 mcg daily as a trial to see if it help with symptoms  · Reverse T3 pending      Acute blood loss anemia  Hgb is stable and there are no signs of active bleeding. Hgb 8.1 so doubt cause of lethargy.  No indication to transfuse blood in this patient at this time. Nephrology to continue Epogen with HD to treat chronic anemia related to ESRD.      Oral thrush  Much improved. Patient with very mild case and will continue treatement with Nystatin swish and spit 4 times daily.       Renovascular hypertension  Patient normotensive on no BP medications. Bidil discontinued on previous admit due to issues with hypotension during HD and will continue to hold and continue Midodrine to help with BP and volume removal during HD. Patient tolerating volume removal with HD on Midodrine and will continue.       Abnormal LFTs  Resolved. RUQ ultrasound unremarkable except for hepatic cysts and unlikely to be causing mild increase in LFT's but LFT's are  improving so no further evaluation at this time and could just be hepatic congestion from hypervolemia related to chronic renal failure in combination with chronic heart failure. Monitor daily CMP. No need to discontinue Lipitor at this time as statin not likely cause and needs for CAD and severe PVD.      Chronic combined systolic and diastolic heart failure  Improved. Nephrology to assess patient daily for continued volume removal in hospital. Patient's oxygen status improved and patient with O2 sats > 90% on 2 liters of oxygen.        ESRD on hemodialysis  Nephrology consulted and managing patient's HD needs in hospital.     Closed displaced fracture of right femoral neck s/p hemiarthroplasty on 4/5/2017  Patient is POD # 14. Patient reports no pain in right hip currently and main pain related to her neuropathic pain. Plan to continue PT/OT for gait training and strengthening and restoration of ADL's on this admit to continue therapy. Patient is FWB/WBAT: right lower extremity, posterior hip precautions as per Orthopedic recommendations. Patient on Eliquis for long term anticoagulation for atrial fibrillation so no DVT prophylaxis needed. Pain is well controlled and will use just Tylenol for pain and avoid any sedating narcotics. Patient needs SNF placement on discharge.    Atherosclerosis of native artery 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  Patient with known heel ulcers to both legs but no signs to indicate infection. Patient recently evaluated by Vascular Surgery on 4/13 and Vascular felt ulcers were improving and did not feel any further intervention needed at this time. Continue local wound care for heel ulcers on this admit.      Bradycardia, drug induced  Controlled. Patient with HR in 50's. Patient still with persistent sinus bradycardia but HR now in 50-60's instead of 40-50's as on last hospital stay in early April. Cardiology re-evaluated patient to see if they feel bradycardia could be  contributing to patient's lethargy but they doubt and do not recommend pacemaker needed at this time and recommends outpatient follow-up with Dr. Antoine in EP Cardiology clinic. Avoid any AV shikha blocking agents. Continue telemetry monitoring.       PAF (paroxysmal atrial fibrillation)  Patient is actually bradycardiac so no AV shikha blocking agents are required. Patient has an implanted loop recorder in place. Continue Eliquis for long term anticoagulation.      3-vessel CAD  Controlled. Plan to continue ASA and Lipitor to treat. No beta blocker due to bradycardia.        VTE Risk Mitigation         Ordered     apixaban tablet 2.5 mg  2 times daily     Route:  Oral        04/15/17 1618     Medium Risk of VTE  Once      04/15/17 1431          Tahira Tomlinson MD  Department of Hospital Medicine   [facility]

## 2017-04-21 NOTE — ASSESSMENT & PLAN NOTE
Patient is POD # 14. Patient reports no pain in right hip currently and main pain related to her neuropathic pain. Plan to continue PT/OT for gait training and strengthening and restoration of ADL's on this admit to continue therapy. Patient is FWB/WBAT: right lower extremity, posterior hip precautions as per Orthopedic recommendations. Patient on Eliquis for long term anticoagulation for atrial fibrillation so no DVT prophylaxis needed. Pain is well controlled and will use just Tylenol for pain and avoid any sedating narcotics. Patient needs SNF placement on discharge.

## 2017-04-21 NOTE — PROGRESS NOTES
[facility]  Consult Note Nephrology    Admit Date: 4/15/2017  Consult Requested By: Tahira Tomlinson MD   LOS: 6 days     SUBJECTIVE:     Follow-up For:  ESRD    Interval History:  CL[facility], seen in [facility] while on HD tolerated well on HD no complications or complains.    Review of Systems:  Constitutional: no fever or chills  Respiratory: no cough or shortness of breath  Cardiovascular: no chest pain or palpitations  Gastrointestinal: no nausea or vomiting, no abdominal pain or change in bowel habits  Hematologic/Lymphatic: no easy bruising or lymphadenopathy positive edema  Musculoskeletal: no arthralgias or myalgias  Neurological: no seizures or tremors      OBJECTIVE:     Vital Signs (Most Recent)  Temp: 98.7 °F (37.1 °C) (04/21/17 0740)  Pulse: 60 (04/21/17 0945)  Resp: 19 (04/21/17 0740)  BP: (!) 134/45 (04/21/17 0945)  SpO2: 98 % (04/21/17 0900)    Vital Signs Range (Last 24H):  Temp:  [97.7 °F (36.5 °C)-98.7 °F (37.1 °C)]   Pulse:  [52-62]   Resp:  [16-19]   BP: (114-149)/(43-67)   SpO2:  [87 %-100 %]     No intake or output data in the 24 hours ending 04/21/17 1001      Physical Exam:   General: Well developed, well nourished in NAD  HEENT: Conjunctiva clear; Oropharynx clear  Neck: No JVD noted, Supple  CV- Normal S1, S2 with no murmurs,gallops,rubs  Resp- Lungs CTA Bilaterally, Unlabored  Abdomen- NTND, BS normoactive x4 quads, soft  Extrem- No cyanosis, clubbing, trace + pitting edema.  Skin- No rashes, lesions, ulcers  Neuro: awake, Oriented x3, no FND      Laboratory:  CBC:     Recent Labs  Lab 04/18/17  0342   WBC 9.24   RBC 2.95*   HGB 8.1*   HCT 25.4*      MCV 86   MCH 27.5   MCHC 31.9*     BMP:     Recent Labs  Lab 04/18/17  0342  04/21/17  0355   GLU 93  < > 109     < > 102   CO2 22*  < > 22*   BUN 13  < > 26*   CREATININE 2.7*  < > 3.7*   CALCIUM 9.0  < > 9.4   MG 2.0  --   --    < > = values in this interval not displayed.  CMP:     Recent Labs  Lab 04/18/17  4180   04/21/17  0355   GLU 93  < > 109   CALCIUM 9.0  < > 9.4   ALBUMIN 3.1*  < > 2.9*   PROT 6.0  --   --      < > 133*   K 4.1  < > 4.9   CO2 22*  < > 22*     < > 102   BUN 13  < > 26*   CREATININE 2.7*  < > 3.7*   ALKPHOS 77  --   --    ALT 17  --   --    *  --   --    BILITOT 2.3*  --   --    < > = values in this interval not displayed.  PTH: No results for input(s): PTH in the last 168 hours.  Coagulation:     Recent Labs  Lab 04/15/17  1239   INR 1.4*     Cardiac Markers: No results for input(s): CKMB, TROPONINT, MYOGLOBIN in the last 168 hours.  ABGs: No results for input(s): PH, PCO2, HCO3, POCSATURATED, BE in the last 168 hours.    Labs reviewed  Diagnostic Results:  X-Ray: Reviewed  US: Reviewed  Echo: Reviewed    ASSESSMENT/PLAN:     Active Hospital Problems    Diagnosis  POA    *Acute encephalopathy [G93.40]  Yes    Osteoporosis [M81.0]  Yes    Encephalopathy [G93.40]  Yes    Bibasilar crackles [R09.89]  Yes    Oral thrush [B37.0]  Yes    Abnormal LFTs [R79.89]  Yes     Chronic    Hyponatremia [E87.1]  Yes    Acute blood loss anemia [D62]  Yes    Bradycardia [R00.1]  Yes    Bradycardia, drug induced [R00.1, T50.905A]  Yes    Closed displaced fracture of right femoral neck s/p hemiarthroplasty on 4/5/2017 [S72.001A]  Yes    3-vessel CAD [I25.10]  Yes    PAF (paroxysmal atrial fibrillation) [I48.0]  Yes    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     ESRD on IHD TTS   Out patient HD [facility] - [facility]/ Dr. Kaminski  On HD for: ~ 1  year  Duration of outpatient dialysis session - 3.75 hrs  EDW - 54 kg  Residual Mary Ann Function - no  - Will provide dialysis for metabolic clearance and volume management x 4 hrs  - Seen and examined today during hemodialysis; tolerating treatment well without issues. Denied headaches, chest pain, abdominal pain, or muscle cramps   -   - Target ultrafiltration 1 lts  - Dialysate adjusted to current labs   Aceess: EDILBERTO AVF with good thrill and bruit      Active problem in hospital  - AMS      Anemia of Chronic Kidney Disease   - Will resume CURTIS with HD EPO 3000 uts  - Hg 8.1 at goal   - TSAT 77 Ferritin 674 un able to give Fe supplememt secondary to infection      Mineral Bone Disease in CKD   - Renal Function Panel Daily for electrolytes monitoring  - Phos 1.3 poor PO intake  - DC binders today  - CoCa 10.3      Nutrition   - please change diet to cardiac diet      HTN   - Controlled BP, will continue to monitor. Goal for BP is <130 mmHg SBP and BDP <80 mmHg.   - Cont Bidil 1 tab TID  - Coreg on hold secondary to bradycardia      Case discuss with Staff further recs with attestation.      Octavio Gong MD  Nephrology Fellow PGY4  [phone]

## 2017-04-21 NOTE — ASSESSMENT & PLAN NOTE
Improved today as patient more alert and responsive. Patient oriented x 4. Patient afebrile with normal WBC and no bandemia so infectious etiology unlikely. ID consulted and highly doubt infectious cause but did order blood cultures that are so far no growth. Patient with no evidence of acute hypoxia as oxygen sats > 90 on 2 liters of oxygen. Medication list reviewed for new medications recently added and no new medications added recently that could be causing encephalopathy but Neurontin on hold as can be sedating even though patient on a very low dose.   · MRI of brain unremarkable for acute stroke or bleed on 4/15.   · AM Cortisol level normal 14.7. Endocrine consulted and recommended Cosyntropin stim test that was ordered by them on 4/18 to evaluate for relative adrenal insufficiency and was normal.   · Will hold all narcotics/opiates for pain. Avoid any sleep aid agents at this time that could affect alertness level.  · Endocrine consulted on 4/18 to see if hypothyroidism is causing fatigue and lethargy but they stated no evidence of myxedema but stated they would do trial of IV Synthroid to see if helps patient but they doubt hypothyroidism cause of lethargy.   · EP Cardiology consulted and evaluated patient to see if bradycardia causing fatigue/weakness but feel unlikely and do not recommend pacemaker placement at this time and recommended follow-up with Dr. Antoine in EP clinic as outpatient and he would consider once patient euthyroid.   · Neurology consulted on 4/18 and recommend carotid and transcranial doppler ultrasound to look for low flow to brain and ordered on 4/19.   · Please avoid any benzodiazepines as can worsen delirium and only should use in delirium caused by benzodiazepine or alcohol withdrawal.   · Please avoid all anticholinergic medications as can worsen delirium.   · Please keep curtains and blinds open during the day and closed during the night.   · Please continue to have nursing and  family reorient patient and encourage family to visit and stay at night if possible.   · Please avoid physical restraints if possible as likely to worsen and increase agitation in delirious patient.  · Infectious disease consulted for possible occult infection as cause but they felt highly unlikely as patient had no clinical signs to suggest infection but did draw blood cultures due to heart murmur on 4/18 and so far blood cultures are negative but ID did not feel any other infectious work-up or empiric treatment with antibiotics indicated. Neurology consulted to evaluate patient for lethargy. They did not feel any clinical evidence of stroke but did recommend transcranial doppler and Carotid US to assess for decreased blood flow to brain as cause. Carotid ultrasound shows <39% stenosis bilaterally and transcranial doppler was limited, but what was able to be assessed was normal.  Patient alertness level is minimally improved, but she is not back to baseline.   · Will continue liothyronine 5 mcg daily as a trial to see if it help with symptoms

## 2017-04-21 NOTE — PLAN OF CARE
Problem: Patient Care Overview  Goal: Plan of Care Review  Outcome: Ongoing (interventions implemented as appropriate)  Patient tolerated dialysis well. 2500 ml removed with no drops in blood pressure below 110's. Needles pulled pressure held X 7 minutes. Hemostasis achieved; pressure dressing applied. + thrill and bruit noted. Patient without complaint.

## 2017-04-21 NOTE — SUBJECTIVE & OBJECTIVE
"Interval HPI:   Overnight events:  Neurology consulted for encephalopathy and concerned about hypoperfusion etiology 2/2 vasculopathy.   HR remains 50-60.   On IV LT4 40mcg daily. FT4 0.81->0.86, TSH 15->9.   Primary team starting cytomel 5mcg daily today    BP (!) 130/48  Pulse 60  Temp 98.7 °F (37.1 °C) (Oral)   Resp 19  Ht 5' 2" (1.575 m)  Wt 50.8 kg (112 lb)  LMP  (LMP Unknown)  SpO2 98%  Breastfeeding? No  BMI 20.49 kg/m2    Labs Reviewed and Include      Recent Labs  Lab 04/21/17  0355      CALCIUM 9.4   ALBUMIN 2.9*   *   K 4.9   CO2 22*      BUN 26*   CREATININE 3.7*     Lab Results   Component Value Date    WBC 9.24 04/18/2017    HGB 8.1 (L) 04/18/2017    HCT 25.4 (L) 04/18/2017    MCV 86 04/18/2017     04/18/2017       Recent Labs  Lab 04/15/17  1239 04/20/17  1410 04/20/17  2327   TSH 15.558* 9.757*  --    FREET4 0.81 0.89 0.86     No results found for: HGBA1C    Nutritional status:   Body mass index is 20.49 kg/(m^2).  Lab Results   Component Value Date    ALBUMIN 2.9 (L) 04/21/2017    ALBUMIN 2.9 (L) 04/20/2017    ALBUMIN 3.1 (L) 04/18/2017     No results found for: PREALBUMIN    Estimated Creatinine Clearance: 8.8 mL/min (based on Cr of 3.7).    Accu-Checks  No results for input(s): POCTGLUCOSE in the last 72 hours.    Current Medications and/or Treatments Impacting Glycemic Control  Immunotherapy:  Immunosuppressants     None        Steroids:   Hormones     None        Pressors:    Autonomic Drugs     Start     Stop Route Frequency Ordered    04/15/17 2200  midodrine tablet 10 mg      -- Oral 3 times daily 04/15/17 1618        Hyperglycemia/Diabetes Medications: Antihyperglycemics     None        "

## 2017-04-21 NOTE — PT/OT/SLP PROGRESS
Occupational Therapy  Treatment    Padmini Miranda   MRN: [id number]   Admitting Diagnosis: Acute encephalopathy    OT Date of Treatment: 17   OT Start Time: 141  OT Stop Time: 143  OT Total Time (min): 24 min    Billable Minutes:  Therapeutic Activity 14 and Therapeutic Exercise 10    General Precautions: Standard, fall, aspiration  Orthopedic Precautions: RLE weight bearing as tolerated, RLE posterior precautions  Braces:      Do you have any cultural, spiritual, Spiritism conflicts, given your current situation?: none    Subjective:  Communicated with RN prior to session.      Pain Ratin/10        Objective:  Patient found with: oxygen, pressure relief boots     Functional Mobility:  Bed Mobility:  Rolling/Turning Right: Moderate assistance  Supine to Sit:  (Refused EOB)    Transfers:   Sit <> Stand Assistance: Activity did not occur    Functional Ambulation: Did not occur    Activities of Daily Living:       Therapeutic Activities and Exercises:  Pt refused EOB activities 2/2 fatigue from earlier dialysis session.   Performed supine BUE AAROM therex: elbow/shld flex & chest press with 4lb dowel; elbow ext with yellow theraband, 2x10 reps each.    AM-PAC 6 CLICK ADL   How much help from another person does this patient currently need?   1 = Unable, Total/Dependent Assistance  2 = A lot, Maximum/Moderate Assistance  3 = A little, Minimum/Contact Guard/Supervision  4 = None, Modified [place]/Independent    Putting on and taking off regular lower body clothing? : 1  Bathing (including washing, rinsing, drying)?: 1  Toileting, which includes using toilet, bedpan, or urinal? : 1  Putting on and taking off regular upper body clothing?: 2  Taking care of personal grooming such as brushing teeth?: 3  Eating meals?: 3  Total Score: 11     AM-PAC Raw Score CMS G-Code Modifier Level of Impairment Assistance   6 % Total / Unable   7 - 9 CM 80 - 100% Maximal Assist   10 - 14 CL 60 - 80% Moderate  Assist   15 - 19 CK 40 - 60% Moderate Assist   20 - 22 CJ 20 - 40% Minimal Assist   23 CI 1-20% SBA / CGA   24 CH 0% Independent/ Mod I     Patient left supine with all lines intact and call button in reach    ASSESSMENT:  Padmini Miranda is a 85 y.o. female with a medical diagnosis of Acute encephalopathy and s/p R hemiarthroplasty on 4/5/17. Pt refused EOB due to fatigue from earlier dialysis session. Pt exhibits decreased (I) in multiple ADL areas, functional mobility & t/fs as well as decreased overall strength, ROM, endurance and balance and would benefit from IP OT to address these deficits and to facilitate improving (I) with daily tasks.    Rehab identified problem list/impairments: Rehab identified problem list/impairments: weakness, impaired self care skills, impaired balance, decreased coordination, decreased safety awareness, impaired endurance, impaired functional mobilty, decreased upper extremity function, pain, gait instability, decreased lower extremity function, orthopedic precautions    Rehab potential is fair.    Activity tolerance: Fair    Discharge recommendations: Discharge Facility/Level Of Care Needs: nursing facility, skilled     Barriers to discharge: Barriers to Discharge: Inaccessible home environment, Decreased caregiver support    Equipment recommendations: bedside commode, walker, rolling, wheelchair, bath bench, hip kit     GOALS:   Occupational Therapy Goals        Problem: Occupational Therapy Goal    Goal Priority Disciplines Outcome Interventions   Occupational Therapy Goal     OT, PT/OT Ongoing (interventions implemented as appropriate)    Description:  Goals to be met by: 4/27     Patient will increase functional independence with ADLs by performing:    Feeding with Set-up Assistance.  UE Dressing with Minimal Assistance.  LE Dressing with Moderate Assistance or Mod (I).   Grooming while standing with Supervision.  Toileting from bedside commode with Moderate Assistance for  hygiene and clothing management.   Sitting at edge of bed x5 minutes with Supervision.  Stand pivot transfers with Minimal Assistance.                Plan:  Patient to be seen 5 x/week to address the above listed problems via self-care/home management, therapeutic activities, therapeutic exercises, neuromuscular re-education  Plan of Care expires: 05/17/17  Plan of Care reviewed with: patient         SARAY Mcleod  04/21/2017

## 2017-04-21 NOTE — PROGRESS NOTES
[facility]  Consult Note Nephrology    Admit Date: 4/15/2017  Consult Requested By: Tahira Tomlinson MD   LOS: 6 days     SUBJECTIVE:     Follow-up For:  ESRD    Interval History:  CLOVIS, seen in [place] while on HD tolerated well on HD no complications or complains. Received EPO today    Review of Systems:  Constitutional: no fever or chills  Respiratory: no cough or shortness of breath  Cardiovascular: no chest pain or palpitations  Gastrointestinal: no nausea or vomiting, no abdominal pain or change in bowel habits  Hematologic/Lymphatic: no easy bruising or lymphadenopathy  Musculoskeletal: no arthralgias or myalgias  Neurological: no seizures or tremors      OBJECTIVE:     Vital Signs (Most Recent)  Temp: 98.2 °F (36.8 °C) (04/21/17 1600)  Pulse: (!) 58 (04/21/17 1600)  Resp: 20 (04/21/17 1600)  BP: (!) 141/63 (04/21/17 1600)  SpO2: 99 % (04/21/17 1600)    Vital Signs Range (Last 24H):  Temp:  [97.6 °F (36.4 °C)-98.7 °F (37.1 °C)]   Pulse:  [52-64]   Resp:  [16-20]   BP: (114-169)/(43-72)   SpO2:  [92 %-99 %]       Intake/Output Summary (Last 24 hours) at 04/21/17 1645  Last data filed at 04/21/17 1125   Gross per 24 hour   Intake              600 ml   Output             3102 ml   Net            -2502 ml         Physical Exam:   General: Well developed, well nourished in NAD  HEENT: Conjunctiva clear; Oropharynx clear  Neck: No JVD noted, Supple  CV- Normal S1, S2 with no murmurs,gallops,rubs  Resp- Lungs CTA Bilaterally, Unlabored  Abdomen- NTND, BS normoactive x4 quads, soft  Extrem- No cyanosis, clubbing, trace + pitting edema.  Skin- No rashes, lesions, ulcers  Neuro: awake, Oriented x3, no FND      Laboratory:  CBC:     Recent Labs  Lab 04/21/17  1020   WBC 11.82   RBC 3.10*   HGB 8.6*   HCT 26.0*   *   MCV 84   MCH 27.7   MCHC 33.1     BMP:     Recent Labs  Lab 04/18/17  0342  04/21/17  0355   GLU 93  < > 109     < > 102   CO2 22*  < > 22*   BUN 13  < > 26*   CREATININE 2.7*   < > 3.7*   CALCIUM 9.0  < > 9.4   MG 2.0  --   --    < > = values in this interval not displayed.  CMP:     Recent Labs  Lab 04/18/17  0342  04/21/17  0355   GLU 93  < > 109   CALCIUM 9.0  < > 9.4   ALBUMIN 3.1*  < > 2.9*   PROT 6.0  --   --      < > 133*   K 4.1  < > 4.9   CO2 22*  < > 22*     < > 102   BUN 13  < > 26*   CREATININE 2.7*  < > 3.7*   ALKPHOS 77  --   --    ALT 17  --   --    *  --   --    BILITOT 2.3*  --   --    < > = values in this interval not displayed.  PTH: No results for input(s): PTH in the last 168 hours.  Coagulation:     Recent Labs  Lab 04/15/17  1239   INR 1.4*     Cardiac Markers: No results for input(s): CKMB, TROPONINT, MYOGLOBIN in the last 168 hours.  ABGs: No results for input(s): PH, PCO2, HCO3, POCSATURATED, BE in the last 168 hours.    Labs reviewed  Diagnostic Results:  X-Ray: Reviewed  US: Reviewed  Echo: Reviewed    ASSESSMENT/PLAN:     Active Hospital Problems    Diagnosis  POA    *Acute encephalopathy [G93.40]  Yes    Osteoporosis [M81.0]  Yes    Encephalopathy [G93.40]  Yes    Bibasilar crackles [R09.89]  Yes    Oral thrush [B37.0]  Yes    Abnormal LFTs [R79.89]  Yes     Chronic    Hyponatremia [E87.1]  Yes    Acute blood loss anemia [D62]  Yes    Bradycardia [R00.1]  Yes    Bradycardia, drug induced [R00.1, T50.905A]  Yes    Closed displaced fracture of right femoral neck s/p hemiarthroplasty on 4/5/2017 [S72.001A]  Yes    3-vessel CAD [I25.10]  Yes    PAF (paroxysmal atrial fibrillation) [I48.0]  Yes    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     ESRD on IHD TTS   Out patient [facility] - [facility]/ Dr. Kaminski  On HD for: ~ 1 year  Duration of outpatient dialysis session - 3.75 hrs  EDW - 54 kg  Residual Mary Ann Function - no  - Will provide dialysis for metabolic clearance and volume management x 3.5 hrs  - Seen and examined today during hemodialysis; tolerating treatment well without issues. Denied headaches, chest pain, abdominal pain, or muscle cramps   -   - Target ultrafiltration 1 lts  - Dialysate adjusted to current labs   Aceess: EDILBERTO AVF with good thrill and bruit      Active problem in hospital  - AMS      Anemia of Chronic Kidney Disease   - Will resume CURTIS with HD EPO 3000 uts  - Hg 8.6    - TSAT 77 Ferritin 674 will Fe supplement Iv on Monday      Mineral Bone Disease in CKD   - Renal Function Panel Daily for electrolytes monitoring  - Phos 1.9  - Will replace with Sodium Phos 30 mmols IV  - DC binders today  - CoCa WNL      Nutrition   - please change diet to cardiac diet      HTN   - Controlled BP, will continue to monitor. Goal for BP is <130 mmHg SBP and BDP <80 mmHg.   - Cont Bidil 1 tab TID  - Coreg on hold secondary to bradycardia      Case discuss with Staff further recs with attestation.      Octavio Gong MD  Nephrology Fellow PGY4  [phone]

## 2017-04-21 NOTE — PROGRESS NOTES
Patient arrived via bed with transport and was attached to unit monitor. 0755 Needles inserted without difficulty to Left Upper arm fistula and secured. Maintenance HD initiated. Patient without complaint. Patient repositioned to left side. Monitoring

## 2017-04-21 NOTE — PLAN OF CARE
POD 16 s/p right hip hemiarthroplasty. PT/OT recommended SNF. SW and CM will continue to follow for any additional needs. Plans to discharge to SNF as soon as medically stable. See [facility] notes regarding pending SNF placement.     04/21/17 7344   Right Care Assessment   Can the patient answer the patient profile reliably? Yes, cognitively intact   How often would a person be available to care for the patient? Often   Describe the patient's ability to walk at the present time. Major restrictions/daily assistance from another person   How does the patient rate their overall health at the present time? Fair   Number of comorbid conditions (as recorded on the chart) Five or more   During the past month, has the patient often been bothered by feeling down, depressed or hopeless? No   Have you felt down, depressed, or hopeless? 0   During the past month, has the patient often been bothered by little interest or pleasure in doing things? No   Have you felt little interest or pleasure in doing things? 0

## 2017-04-21 NOTE — ASSESSMENT & PLAN NOTE
Appreciate Endocrine consult and they do not feel that any adjustments needed in Synthroid but will give trial of IV Synthroid as per Endocrine to see if helps patient. Endocrine notes no signs of myxedema so doubt lethargy related to thyroid disease.   · Will add liothyronine 5 mcg daily as a trial to see if it help with symptoms  · Reverse T3 pending

## 2017-04-21 NOTE — PT/OT/SLP PROGRESS
Physical Therapy      Padmini Miranda  MRN: [id number]    Patient not seen today secondary to Dialysis (attempted at 1140, pt LETICIA). Will follow-up as POC allows.    Lorena Alfaro, PT

## 2017-04-21 NOTE — SUBJECTIVE & OBJECTIVE
Interval History: Patient more alert today and able to hold a conversation without falling asleep. ID and Neurology evaluated patient but no evidence to suggest occult infection or stroke or poor blood flow as a cause for lethargy. Started on Cytomel yesterday.  Perhaps minimally improved today.     Review of Systems   Constitutional: Positive for fatigue. Negative for chills and fever.   Respiratory: Negative for cough and shortness of breath.    Cardiovascular: Negative for chest pain.   Gastrointestinal: Negative for abdominal pain and nausea.   Musculoskeletal: Positive for myalgias (Right hip pain). Negative for back pain.   Skin: Negative for rash.   Neurological: Positive for weakness (Generalized). Negative for dizziness.   Psychiatric/Behavioral: Negative for confusion and hallucinations.     Objective:     Vital Signs (Most Recent):  Temp: 97.8 °F (36.6 °C) (04/21/17 1240)  Pulse: 60 (04/21/17 1240)  Resp: 20 (04/21/17 1240)  BP: (!) 169/72 (04/21/17 1240)  SpO2: 96 % (04/21/17 1240) Vital Signs (24h Range):  Temp:  [97.6 °F (36.4 °C)-98.7 °F (37.1 °C)] 97.8 °F (36.6 °C)  Pulse:  [52-62] 60  Resp:  [16-20] 20  SpO2:  [87 %-100 %] 96 %  BP: (114-169)/(43-72) 169/72     Weight: 50.8 kg (112 lb)  Body mass index is 20.49 kg/(m^2).    Intake/Output Summary (Last 24 hours) at 04/21/17 1343  Last data filed at 04/21/17 1125   Gross per 24 hour   Intake              600 ml   Output             3102 ml   Net            -2502 ml      Physical Exam   Constitutional: No distress.   Eyes: No scleral icterus.   Neck: Neck supple. No JVD present.   Cardiovascular: Regular rhythm and normal heart sounds.  Bradycardia present.  Exam reveals no gallop and no friction rub.    No murmur heard.  Pulmonary/Chest: Effort normal. She has rales (Mild rales in bases).   Abdominal: Soft. Bowel sounds are normal. She exhibits no distension. There is no tenderness.   Musculoskeletal: She exhibits edema (1-2 + edema in lower  extremities; 1+ edema in left arm).   Neurological: She is alert.   Skin: Skin is warm and dry.   Psychiatric: She has a normal mood and affect. Her speech is normal.

## 2017-04-21 NOTE — ASSESSMENT & PLAN NOTE
Needs o/p DEXA and optimization of pth, vit D, phos as her esrd does not allow other med mgmt   Replete vit d rec ergocal 50K iu weekly

## 2017-04-22 PROBLEM — R11.0 NAUSEA: Status: ACTIVE | Noted: 2017-01-01

## 2017-04-22 NOTE — ASSESSMENT & PLAN NOTE
Improved today as patient more alert and responsive. Patient oriented x 4. Patient afebrile with normal WBC and no bandemia so infectious etiology unlikely. ID consulted and highly doubt infectious cause but did order blood cultures that are so far no growth. Patient with no evidence of acute hypoxia as oxygen sats > 90 on 2 liters of oxygen. Medication list reviewed for new medications recently added and no new medications added recently that could be causing encephalopathy but Neurontin on hold as can be sedating even though patient on a very low dose.   · MRI of brain unremarkable for acute stroke or bleed on 4/15.   · AM Cortisol level normal 14.7. Endocrine consulted and recommended Cosyntropin stim test that was ordered by them on 4/18 to evaluate for relative adrenal insufficiency and was normal.   · Will hold all narcotics/opiates for pain. Avoid any sleep aid agents at this time that could affect alertness level.  · Endocrine consulted on 4/18 to see if hypothyroidism is causing fatigue and lethargy but they stated no evidence of myxedema but stated they would do trial of IV Synthroid to see if helps patient but they doubt hypothyroidism cause of lethargy.   · EP Cardiology consulted and evaluated patient to see if bradycardia causing fatigue/weakness but feel unlikely and do not recommend pacemaker placement at this time and recommended follow-up with Dr. Antoine in EP clinic as outpatient and he would consider once patient euthyroid.   · Neurology consulted on 4/18 and recommend carotid and transcranial doppler ultrasound to look for low flow to brain and ordered on 4/19.   · Please avoid any benzodiazepines as can worsen delirium and only should use in delirium caused by benzodiazepine or alcohol withdrawal.   · Please avoid all anticholinergic medications as can worsen delirium.   · Please keep curtains and blinds open during the day and closed during the night.   · Please continue to have nursing and  family reorient patient and encourage family to visit and stay at night if possible.   · Please avoid physical restraints if possible as likely to worsen and increase agitation in delirious patient.  · Infectious disease consulted for possible occult infection as cause but they felt highly unlikely as patient had no clinical signs to suggest infection but did draw blood cultures due to heart murmur on 4/18 and so far blood cultures are negative but ID did not feel any other infectious work-up or empiric treatment with antibiotics indicated. Neurology consulted to evaluate patient for lethargy. They did not feel any clinical evidence of stroke but did recommend transcranial doppler and Carotid US to assess for decreased blood flow to brain as cause. Carotid ultrasound shows <39% stenosis bilaterally and transcranial doppler was limited, but what was able to be assessed was normal.  Patient alertness level is minimally improved, but she is not back to baseline.   · Will continue liothyronine 5 mcg daily as a trial to see if it help with symptoms - much improved today

## 2017-04-22 NOTE — PT/OT/SLP PROGRESS
Physical Therapy  Treatment    Padmini Miranda   MRN: [id number]   Admitting Diagnosis: Acute encephalopathy    PT Received On: 17  PT Start Time: 1105     PT Stop Time: 1131    PT Total Time (min): 26 min       Billable Minutes:  Gait [id number], Therapeutic Activity 10 and Therapeutic Exercise 6    Treatment Type: Treatment  PT/PTA: PTA     PTA Visit Number: 1       General Precautions: Standard, fall, aspiration  Orthopedic Precautions: RLE weight bearing as tolerated, RLE posterior precautions   Braces:      Do you have any cultural, spiritual, Mosque conflicts, given your current situation?: none noted    Subjective:  Communicated with NSG prior to session.  Patient agreeable to therapy.    Pain Ratin/10                   Objective:   Patient found with: oxygen    Functional Mobility:  Bed Mobility: none, patient standing with NSG and daughter donning a diaper.       Transfers:  Sit <> Stand Assistance: Moderate Assistance  Sit <> Stand Assistive Device: Rolling Walker  Bed <> Chair Technique: Stand Pivot  Bed <> Chair Assistance: Minimum Assistance  Bed <> Chair Assistive Device: Rolling Walker    Gait:   Gait Distance: 5-6 feet  Assistance 1: Minimum assistance  Gait Assistive Device: Rolling walker  Gait Pattern: 3-point gait  Gait Deviation(s): decreased rogelio, increased time in double stance, decreased velocity of limb motion, decreased step length, decreased stride length, decreased weight-shifting ability, decreased toe-to-floor clearance, forward lean          Balance:   Static Sit: GOOD: Takes MODERATE challenges from all directions  Dynamic Sit: GOOD-: Maintains balance through MODERATE excursions of active trunk movement,     Static Stand: POOR+: Needs MINIMAL assist to maintain  Dynamic stand: POOR: N/A     Therapeutic Activities and Exercises:  Patient performed THR protocol exercises x20 reps  Patient tolerated sitting EOB 5 min with Supervision  Patient recalled 2/3 hip  precautions         AM-PAC 6 CLICK MOBILITY  How much help from another person does this patient currently need?   1 = Unable, Total/Dependent Assistance  2 = A lot, Maximum/Moderate Assistance  3 = A little, Minimum/Contact Guard/Supervision  4 = None, Modified [place]/Independent    Turning over in bed (including adjusting bedclothes, sheets and blankets)?: 2  Sitting down on and standing up from a chair with arms (e.g., wheelchair, bedside commode, etc.): 2  Moving from lying on back to sitting on the side of the bed?: 2  Moving to and from a bed to a chair (including a wheelchair)?: 2  Need to walk in hospital room?: 2  Climbing 3-5 steps with a railing?: 1  Total Score: 11    AM-PAC Raw Score CMS G-Code Modifier Level of Impairment Assistance   6 % Total / Unable   7 - 9 CM 80 - 100% Maximal Assist   10 - 14 CL 60 - 80% Moderate Assist   15 - 19 CK 40 - 60% Moderate Assist   20 - 22 CJ 20 - 40% Minimal Assist   23 CI 1-20% SBA / CGA   24 CH 0% Independent/ Mod I     Patient left up in chair with all lines intact and NSG and family present.    Assessment:  Padmini Miranda is a 85 y.o. female with a medical diagnosis of Acute encephalopathy and presents with decrease strength, mobility, balance and endurance.  Patient tolerated increase distance with gait training fair.  Noted increase SOB with mobility.  Patient demonstrated better sitting and standing balance.  Patient progressing well toward goals.  Patient would benefit from further IP therapy.    Rehab identified problem list/impairments: Rehab identified problem list/impairments: weakness, impaired endurance, impaired balance, gait instability, impaired functional mobilty, decreased lower extremity function, impaired self care skills, decreased ROM, orthopedic precautions    Rehab potential is good.    Activity tolerance: Good    Discharge recommendations: Discharge Facility/Level Of Care Needs: nursing facility, skilled     Barriers to  discharge: Barriers to Discharge: Inaccessible home environment, Decreased caregiver support    Equipment recommendations: Equipment Needed After Discharge: bedside commode, bath bench, walker, rolling, wheelchair, hip kit     GOALS:   Physical Therapy Goals        Problem: Physical Therapy Goal    Goal Priority Disciplines Outcome Goal Variances Interventions   Physical Therapy Goal     PT/OT, PT Ongoing (interventions implemented as appropriate)     Description:  Goals to be met by: 17     Patient will increase functional independence with mobility by performin. Supine to sit with MInimal Assistance  2. Sit to supine with MInimal Assistance  3. Rolling to Left and Right with Set-up Assistance.  4. Sit to stand transfer with Minimal Assistance  5. Bed to chair transfer with Moderate Assistance using Rolling Walker  6. Gait  x 30 feet with Moderate Assistance using Rolling Walker.   7. Lower extremity exercise program x15 reps per handout, with assistance as needed                PLAN:    Patient to be seen 6 x/week  to address the above listed problems via gait training, therapeutic activities, therapeutic exercises  Plan of Care expires: 17  Plan of Care reviewed with: patient         Jani Dumont II, PTA  2017

## 2017-04-22 NOTE — PT/OT/SLP PROGRESS
"Occupational Therapy  Treatment    Padmini Miranda   MRN: [id number]   Admitting Diagnosis: Acute encephalopathy    OT Date of Treatment: 17   OT Start Time: 1310  OT Stop Time: 1342  OT Total Time (min): 32 min    Billable Minutes:  Self Care/Home Management 15 and Therapeutic Exercise 17    General Precautions: Standard, fall  Orthopedic Precautions: RLE weight bearing as tolerated, RLE posterior precautions  Braces:      Do you have any cultural, spiritual, Nondenominational conflicts, given your current situation?: none    Subjective:  Communicated with RN prior to session.  "I'm ready to get back to bed."    Pain Ratin/10        Objective:  Patient found with: oxygen     Functional Mobility:  Bed Mobility:  Rolling/Turning to Left: Minimum assistance  Rolling/Turning Right: Minimum assistance  Sit to Supine: Moderate Assistance    Transfers:   Sit <> Stand Assistance: Moderate Assistance  Sit <> Stand Assistive Device: Rolling Walker  Bed <> Chair Technique: Stand Pivot  Bed <> Chair Transfer Assistance: Moderate Assistance  Bed <> Chair Assistive Device: Rolling Walker    Functional Ambulation: Chair>bed t/f using RW Mod A with unsafe technique (plopped down on bed w/o reaching back).    Activities of Daily Living:     LE Dressing Level of Assistance: Minimum assistance, Assistive Devices as needed (Using sock aid after training)  LE adaptive equipment: Sock aid           Balance:   Static Sit: GOOD: Takes MODERATE challenges from all directions  Dynamic Sit: GOOD: Maintains balance through MODERATE excursions of active trunk movement  Static Stand: FAIR+: Takes MINIMAL challenges from all directions  Dynamic stand: POOR: N/A    Therapeutic Activities and Exercises:  (B) rolling in bed to situate cushion 2/2 buttocks pressure sore  Reviewed Post. Hip precautions with pt/family and issued handout  BUE AROM using 2 lb. Dow[place] and yellow theraband: elbow/shld flexion/ext, 2x10 reps each.    AM-PAC 6 CLICK ADL "   How much help from another person does this patient currently need?   1 = Unable, Total/Dependent Assistance  2 = A lot, Maximum/Moderate Assistance  3 = A little, Minimum/Contact Guard/Supervision  4 = None, Modified [place]/Independent    Putting on and taking off regular lower body clothing? : 3  Bathing (including washing, rinsing, drying)?: 1  Toileting, which includes using toilet, bedpan, or urinal? : 1  Putting on and taking off regular upper body clothing?: 2  Taking care of personal grooming such as brushing teeth?: 3  Eating meals?: 3  Total Score: 13     AM-PAC Raw Score CMS G-Code Modifier Level of Impairment Assistance   6 % Total / Unable   7 - 9 CM 80 - 100% Maximal Assist   10 - 14 CL 60 - 80% Moderate Assist   15 - 19 CK 40 - 60% Moderate Assist   20 - 22 CJ 20 - 40% Minimal Assist   23 CI 1-20% SBA / CGA   24 CH 0% Independent/ Mod I     Patient left supine with all lines intact, call button in reach and family present, (B) heel protectors donned    ASSESSMENT:  Padmini Miranda is a 85 y.o. female with a medical diagnosis of Acute encephalopathy and presents s/p (R) hip replacement. Pt exhibits decreased (I) in multiple ADL areas, functional mobility & t/fs as well as decreased overall strength, ROM endurance and balance. Pt would benefit from IP OT to address these deficits and to facilitate improving (I) with daily tasks.    Rehab identified problem list/impairments: Rehab identified problem list/impairments: weakness, impaired balance, decreased safety awareness, impaired endurance, pain, impaired self care skills, decreased coordination, impaired functional mobilty, decreased upper extremity function, decreased lower extremity function, gait instability, orthopedic precautions    Rehab potential is good.    Activity tolerance: Good    Discharge recommendations: Discharge Facility/Level Of Care Needs: nursing facility, skilled     Barriers to discharge: Barriers to Discharge:  Inaccessible home environment, Decreased caregiver support    Equipment recommendations: bedside commode, bath bench, walker, rolling, wheelchair, hip kit     GOALS:   Occupational Therapy Goals        Problem: Occupational Therapy Goal    Goal Priority Disciplines Outcome Interventions   Occupational Therapy Goal     OT, PT/OT Ongoing (interventions implemented as appropriate)    Description:  Goals to be met by: 4/27     Patient will increase functional independence with ADLs by performing:    Feeding with Set-up Assistance.  UE Dressing with Minimal Assistance.  LE Dressing with Moderate Assistance or Mod (I).   Grooming while standing with Supervision.  Toileting from bedside commode with Moderate Assistance for hygiene and clothing management.   Sitting at edge of bed x5 minutes with Supervision.  Stand pivot transfers with Minimal Assistance.                Plan:  Patient to be seen 5 x/week to address the above listed problems via self-care/home management, therapeutic activities, therapeutic exercises, neuromuscular re-education  Plan of Care expires: 05/17/17  Plan of Care reviewed with: patient, family         SARAY Mcleod  04/22/2017

## 2017-04-22 NOTE — PROGRESS NOTES
[facility] Hospital Medicine  Progress Note    Patient Name: Padmini Miranda  MRN: [id number]  Patient Class: IP- Inpatient   Admission Date: 4/15/2017  Length of Stay: 7 days  Attending Physician: Tahira Tomlinson MD  Primary Care Provider: Randy Lyles MD    [facility] Medicine Team: [facility]OS[facility] MED  Tahira Tomlinson MD    Subjective:     Principal Problem:Acute encephalopathy    HPI:  84 y/o with past medical history of chronic combined systolic and diastolic heart failure, 3 vessel CAD, atherosclerosis of bilateral lower extremity with left heel ulcer with recent PTA of left SFA on 4/3 by Vascular surgery, essential HTN, hypothyroidism and ESRD on HD was sent from acute dialysis today due to concern for increasing lethargy. Family also noted that patient appeared to be slurring her words and noted right eye was drooping and patient was having difficulty staying awake and due to thus concerns was sent to [facility]. CTscan of head showed no acute findings but MRI of brain has been ordered to better evaluate. Patient's daughters at bedside and report for past 1 week patient has been sleeping more than normal and not nearly as alert as she usually is. I am familiar with patient as she was just discharged from my medical service on 4/13 and patient does appear to be more sleepy than normal but when aroused in the ER patient is responding to questions appropriately. Patient reports she generally feels weak but denies any focal weakness in ER. Family is very upset as they feel patient was not progressing with PT prior to discharge to SNF on 4/13 and feel patient has not been herself for over 1 week with increased lethargy.     As noted patient was just discharged from [facility] on 4/13 to [facility] [place] after prolonged hospitalization following surgical repair of a right hip fracture. Patient even prior to that surgery was seen and evaluated by Cardiology for sinus bradycardia  with HR in 30-40's. Patient during admit was felt not to need pacemaker placement and felt bradycardia related to Amiodarone and Coreg that had been recently been discontinued. Patient eventually underwent right hip hemiarthroplasty on 4/5 posterior-op, patient noted to be hypothermic and persistently bradycardiac so patient was evaluated for possible infection but there was no concern for infection, as clinically, the patient had normal WBC and no clinical symptoms to suggest infection. TSH (normal on 3/10) was checked during recent admit and found to be severely elevated at 21.6, with low normal Free T4 of 0.88. Synthroid was adjusted from 50 mcg to 75 mcg daily to treat hypothyroidism. Patient did complain of odynophagia after surgery felt related to ET tube. Speech therapy consult who recommended MBSS and patient underwent MBSS on 4/12 and patient passed with recommendation by Speech for regular diet and nectar thickened liquids. Patient to be discharged to [facility] for continued PT/OT but needs outpatient follow-up with EP Cardiology to reassess need for pacemaker for sinus bradycardia on 4/13.       Hospital Course:  MRI of rafat unremarkable for any signs of an acute stroke or bleed to explain increased fatigue or lethargy. Patient afebrile with normal WBC so infectious etiology felt highly unlikely. Random am cortisol done was 14.7 so does not appear to be consistent with adrenal insufficiency but Endocrine consulted. Cardiology re-consulted for patient's bradycardia to see if contributing to patient's increasing lethargy and fatigue and unclear if it is causing patient's symptoms but Cardiology doubts is true cause of lethargy and recommended Endocrine consult to look for thyroid issues as cause of bradycardia and somulence. Cardiology recommends no need for pacemaker at this time and to follow-up with EP Cardiology as outpatient. Neurontin and opiates for pain discontinued in case they were contributing  to cause. Patient with recent diagnosis of hypothyroidism likely related to Amiodarone that recently was discontinued due to bradycardia. Endocrine consulted on 4/18 and evaluated patient and not worried by thyroid function but concerned for adrenal insufficiency so Cosyntropin stimulation test ordered on 4/18 to evaluate patient. Patient dialyzed by Nephrology on 4/18 a day prior to normal dialysis day as showing signs of hypervolemia. Patient slightly more alert and responsive on 4/18 and did work with PT. Infectious disease consulted for possible occult infection as cause but they felt highly unlikely as patient had no clinical signs to suggest infection but did draw blood cultures due to heart murmur on 4/18 and so far blood cultures are negative but ID did not feel any other infectious work-up or empiric treatment with antibiotics indicated. Neurology consulted to evaluate patient for lethargy. They did not feel any clinical evidence of stroke but did recommend transcranial doppler and Carotid US to assess for decreased blood flow to brain as cause. Carotid ultrasound shows <39% stenosis bilaterally and transcranial doppler was limited, but what was able to be assessed was normal.  Patient alertness level is minimally improved, but she is not back to baseline. Cytomel 5 mg BID started on 4/20 to see if that helps improve symptoms. 4/22: pt is showing considerable improvement.     Interval History: Patient showing considerable improvement today. Much more awake. Sitting up in bedside chair. Having some nausea and not eating well.  Complaining about left heel pain.       Review of Systems   Constitutional: Positive for fatigue. Negative for chills and fever.   Respiratory: Negative for cough and shortness of breath.    Cardiovascular: Negative for chest pain.   Gastrointestinal: Negative for abdominal pain and nausea.   Musculoskeletal: Positive for myalgias (Right hip pain). Negative for back pain.   Skin:  Negative for rash.   Neurological: Positive for weakness (Generalized). Negative for dizziness.   Psychiatric/Behavioral: Negative for confusion and hallucinations.     Objective:     Vital Signs (Most Recent):  Temp: 97.8 °F (36.6 °C) (04/22/17 1157)  Pulse: 60 (04/22/17 1157)  Resp: 18 (04/22/17 1157)  BP: 126/61 (04/22/17 1157)  SpO2: 99 % (04/22/17 1157) Vital Signs (24h Range):  Temp:  [97.8 °F (36.6 °C)-98.4 °F (36.9 °C)] 97.8 °F (36.6 °C)  Pulse:  [58-69] 60  Resp:  [14-20] 18  SpO2:  [16 %-99 %] 99 %  BP: (126-146)/(61-67) 126/61     Weight: 50.8 kg (112 lb)  Body mass index is 20.49 kg/(m^2).  No intake or output data in the 24 hours ending 04/22/17 1345   Physical Exam   Constitutional: No distress.   Eyes: No scleral icterus.   Neck: Neck supple. No JVD present.   Cardiovascular: Regular rhythm and normal heart sounds.  Bradycardia present.  Exam reveals no gallop and no friction rub.    No murmur heard.  Pulmonary/Chest: Effort normal. She has rales (Mild rales in bases).   Abdominal: Soft. Bowel sounds are normal. She exhibits no distension. There is no tenderness.   Musculoskeletal: She exhibits edema (1-2 + edema in lower extremities; 1+ edema in left arm).   Neurological: She is alert.   Skin: Skin is warm and dry.   Psychiatric: She has a normal mood and affect. Her speech is normal.       Assessment/Plan:      * Acute encephalopathy  Improved today as patient more alert and responsive. Patient oriented x 4. Patient afebrile with normal WBC and no bandemia so infectious etiology unlikely. ID consulted and highly doubt infectious cause but did order blood cultures that are so far no growth. Patient with no evidence of acute hypoxia as oxygen sats > 90 on 2 liters of oxygen. Medication list reviewed for new medications recently added and no new medications added recently that could be causing encephalopathy but Neurontin on hold as can be sedating even though patient on a very low dose.   · MRI of  brain unremarkable for acute stroke or bleed on 4/15.   · AM Cortisol level normal 14.7. Endocrine consulted and recommended Cosyntropin stim test that was ordered by them on 4/18 to evaluate for relative adrenal insufficiency and was normal.   · Will hold all narcotics/opiates for pain. Avoid any sleep aid agents at this time that could affect alertness level.  · Endocrine consulted on 4/18 to see if hypothyroidism is causing fatigue and lethargy but they stated no evidence of myxedema but stated they would do trial of IV Synthroid to see if helps patient but they doubt hypothyroidism cause of lethargy.   · EP Cardiology consulted and evaluated patient to see if bradycardia causing fatigue/weakness but feel unlikely and do not recommend pacemaker placement at this time and recommended follow-up with Dr. Antoine in EP clinic as outpatient and he would consider once patient euthyroid.   · Neurology consulted on 4/18 and recommend carotid and transcranial doppler ultrasound to look for low flow to brain and ordered on 4/19.   · Please avoid any benzodiazepines as can worsen delirium and only should use in delirium caused by benzodiazepine or alcohol withdrawal.   · Please avoid all anticholinergic medications as can worsen delirium.   · Please keep curtains and blinds open during the day and closed during the night.   · Please continue to have nursing and family reorient patient and encourage family to visit and stay at night if possible.   · Please avoid physical restraints if possible as likely to worsen and increase agitation in delirious patient.  · Infectious disease consulted for possible occult infection as cause but they felt highly unlikely as patient had no clinical signs to suggest infection but did draw blood cultures due to heart murmur on 4/18 and so far blood cultures are negative but ID did not feel any other infectious work-up or empiric treatment with antibiotics indicated. Neurology consulted to  evaluate patient for lethargy. They did not feel any clinical evidence of stroke but did recommend transcranial doppler and Carotid US to assess for decreased blood flow to brain as cause. Carotid ultrasound shows <39% stenosis bilaterally and transcranial doppler was limited, but what was able to be assessed was normal.  Patient alertness level is minimally improved, but she is not back to baseline.   · Will continue liothyronine 5 mcg daily as a trial to see if it help with symptoms - much improved today    Acquired hypothyroidism  Appreciate Endocrine consult and they do not feel that any adjustments needed in Synthroid but will give trial of IV Synthroid as per Endocrine to see if helps patient. Endocrine notes no signs of myxedema so doubt lethargy related to thyroid disease.   · Will add liothyronine 5 mcg daily as a trial to see if it help with symptoms  · Reverse T3 pending      Acute blood loss anemia  Hgb is stable and there are no signs of active bleeding. Hgb 8.1 so doubt cause of lethargy.  No indication to transfuse blood in this patient at this time. Nephrology to continue Epogen with HD to treat chronic anemia related to ESRD.      Oral thrush  Much improved. Patient with very mild case and will continue treatement with Nystatin swish and spit 4 times daily.       Nausea  Will get KUB      Renovascular hypertension  Patient normotensive on no BP medications. Bidil discontinued on previous admit due to issues with hypotension during HD and will continue to hold and continue Midodrine to help with BP and volume removal during HD. Patient tolerating volume removal with HD on Midodrine and will continue.       Abnormal LFTs  Resolved. RUQ ultrasound unremarkable except for hepatic cysts and unlikely to be causing mild increase in LFT's but LFT's are improving so no further evaluation at this time and could just be hepatic congestion from hypervolemia related to chronic renal failure in combination with  chronic heart failure. Monitor daily CMP. No need to discontinue Lipitor at this time as statin not likely cause and needs for CAD and severe PVD.      Chronic combined systolic and diastolic heart failure  Improved. Nephrology to assess patient daily for continued volume removal in hospital. Patient's oxygen status improved and patient with O2 sats > 90% on 2 liters of oxygen.        ESRD on hemodialysis  Nephrology consulted and managing patient's HD needs in hospital.     Closed displaced fracture of right femoral neck s/p hemiarthroplasty on 4/5/2017  Patient is POD # 15. Patient reports no pain in right hip currently and main pain related to her neuropathic pain. Plan to continue PT/OT for gait training and strengthening and restoration of ADL's on this admit to continue therapy. Patient is FWB/WBAT: right lower extremity, posterior hip precautions as per Orthopedic recommendations. Patient on Eliquis for long term anticoagulation for atrial fibrillation so no DVT prophylaxis needed. Pain is well controlled and will use just Tylenol for pain and avoid any sedating narcotics. Patient needs SNF placement on discharge.    Atherosclerosis of native artery 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  Patient with known heel ulcers to both legs but no signs to indicate infection. Patient recently evaluated by Vascular Surgery on 4/13 and Vascular felt ulcers were improving and did not feel any further intervention needed at this time. Continue local wound care for heel ulcers on this admit.      Bradycardia, drug induced  Controlled. Patient still with persistent sinus bradycardia but HR now in 50-60's instead of 40-50's as on last hospital stay in early April. Cardiology re-evaluated patient to see if they feel bradycardia could be contributing to patient's lethargy but they doubt and do not recommend pacemaker needed at this time and recommends outpatient follow-up with Dr. Antoine in EP Cardiology clinic. Avoid  any AV shikha blocking agents. Continue telemetry monitoring.       PAF (paroxysmal atrial fibrillation)  Patient is actually bradycardiac so no AV shikha blocking agents are required. Patient has an implanted loop recorder in place. Continue Eliquis for long term anticoagulation.      3-vessel CAD  Controlled. Plan to continue ASA and Lipitor to treat. No beta blocker due to bradycardia.        VTE Risk Mitigation         Ordered     apixaban tablet 2.5 mg  2 times daily     Route:  Oral        04/15/17 1618     Medium Risk of VTE  Once      04/15/17 1431          Tahira Tomlinson MD  Department of Hospital Medicine   [facility]

## 2017-04-22 NOTE — SUBJECTIVE & OBJECTIVE
Interval History: Patient showing considerable improvement today. Much more awake. Sitting up in bedside chair. Having some nausea and not eating well.  Complaining about left heel pain.       Review of Systems   Constitutional: Positive for fatigue. Negative for chills and fever.   Respiratory: Negative for cough and shortness of breath.    Cardiovascular: Negative for chest pain.   Gastrointestinal: Negative for abdominal pain and nausea.   Musculoskeletal: Positive for myalgias (Right hip pain). Negative for back pain.   Skin: Negative for rash.   Neurological: Positive for weakness (Generalized). Negative for dizziness.   Psychiatric/Behavioral: Negative for confusion and hallucinations.     Objective:     Vital Signs (Most Recent):  Temp: 97.8 °F (36.6 °C) (04/22/17 1157)  Pulse: 60 (04/22/17 1157)  Resp: 18 (04/22/17 1157)  BP: 126/61 (04/22/17 1157)  SpO2: 99 % (04/22/17 1157) Vital Signs (24h Range):  Temp:  [97.8 °F (36.6 °C)-98.4 °F (36.9 °C)] 97.8 °F (36.6 °C)  Pulse:  [58-69] 60  Resp:  [14-20] 18  SpO2:  [16 %-99 %] 99 %  BP: (126-146)/(61-67) 126/61     Weight: 50.8 kg (112 lb)  Body mass index is 20.49 kg/(m^2).  No intake or output data in the 24 hours ending 04/22/17 1345   Physical Exam   Constitutional: No distress.   Eyes: No scleral icterus.   Neck: Neck supple. No JVD present.   Cardiovascular: Regular rhythm and normal heart sounds.  Bradycardia present.  Exam reveals no gallop and no friction rub.    No murmur heard.  Pulmonary/Chest: Effort normal. She has rales (Mild rales in bases).   Abdominal: Soft. Bowel sounds are normal. She exhibits no distension. There is no tenderness.   Musculoskeletal: She exhibits edema (1-2 + edema in lower extremities; 1+ edema in left arm).   Neurological: She is alert.   Skin: Skin is warm and dry.   Psychiatric: She has a normal mood and affect. Her speech is normal.

## 2017-04-22 NOTE — ASSESSMENT & PLAN NOTE
Controlled. Patient still with persistent sinus bradycardia but HR now in 50-60's instead of 40-50's as on last hospital stay in early April. Cardiology re-evaluated patient to see if they feel bradycardia could be contributing to patient's lethargy but they doubt and do not recommend pacemaker needed at this time and recommends outpatient follow-up with Dr. Antoine in EP Cardiology clinic. Avoid any AV shikha blocking agents. Continue telemetry monitoring.

## 2017-04-22 NOTE — ASSESSMENT & PLAN NOTE
Patient is POD # 15. Patient reports no pain in right hip currently and main pain related to her neuropathic pain. Plan to continue PT/OT for gait training and strengthening and restoration of ADL's on this admit to continue therapy. Patient is FWB/WBAT: right lower extremity, posterior hip precautions as per Orthopedic recommendations. Patient on Eliquis for long term anticoagulation for atrial fibrillation so no DVT prophylaxis needed. Pain is well controlled and will use just Tylenol for pain and avoid any sedating narcotics. Patient needs SNF placement on discharge.

## 2017-04-23 PROBLEM — L89.95 UNSTAGEABLE PRESSURE ULCER: Status: ACTIVE | Noted: 2017-01-01

## 2017-04-23 NOTE — PROGRESS NOTES
[facility] Hospital Medicine  Progress Note    Patient Name: Padmini Miranda  MRN: [id number]  Patient Class: IP- Inpatient   Admission Date: 4/15/2017  Length of Stay: 8 days  Attending Physician: Tahira Tomlinson MD  Primary Care Provider: Randy Lyles MD    [facility] Medicine Team: [facility] [place] [facility] [place]  Tahira Tomlinson MD    Subjective:     Principal Problem:Acute encephalopathy    HPI:  84 y/o with past medical history of chronic combined systolic and diastolic heart failure, 3 vessel CAD, atherosclerosis of bilateral lower extremity with left heel ulcer with recent PTA of left SFA on 4/3 by Vascular surgery, essential HTN, hypothyroidism and ESRD on HD was sent from acute dialysis today due to concern for increasing lethargy. Family also noted that patient appeared to be slurring her words and noted right eye was drooping and patient was having difficulty staying awake and due to thus concerns was sent to [facility]. CTscan of head showed no acute findings but MRI of brain has been ordered to better evaluate. Patient's daughters at bedside and report for past 1 week patient has been sleeping more than normal and not nearly as alert as she usually is. I am familiar with patient as she was just discharged from my medical service on 4/13 and patient does appear to be more sleepy than normal but when aroused in the ER patient is responding to questions appropriately. Patient reports she generally feels weak but denies any focal weakness in ER. Family is very upset as they feel patient was not progressing with PT prior to discharge to SNF on 4/13 and feel patient has not been herself for over 1 week with increased lethargy.     As noted patient was just discharged from [facility] on 4/13 to [facility] [place] after prolonged hospitalization following surgical repair of a right hip fracture. Patient even prior to that surgery was seen and evaluated by Cardiology for sinus bradycardia  with HR in 30-40's. Patient during admit was felt not to need pacemaker placement and felt bradycardia related to Amiodarone and Coreg that had been recently been discontinued. Patient eventually underwent right hip hemiarthroplasty on 4/5 posterior-op, patient noted to be hypothermic and persistently bradycardiac so patient was evaluated for possible infection but there was no concern for infection, as clinically, the patient had normal WBC and no clinical symptoms to suggest infection. TSH (normal on 3/10) was checked during recent admit and found to be severely elevated at 21.6, with low normal Free T4 of 0.88. Synthroid was adjusted from 50 mcg to 75 mcg daily to treat hypothyroidism. Patient did complain of odynophagia after surgery felt related to ET tube. Speech therapy consult who recommended MBSS and patient underwent MBSS on 4/12 and patient passed with recommendation by Speech for regular diet and nectar thickened liquids. Patient to be discharged to [facility] for continued PT/OT but needs outpatient follow-up with EP Cardiology to reassess need for pacemaker for sinus bradycardia on 4/13.       Hospital Course:  MRI of rafat unremarkable for any signs of an acute stroke or bleed to explain increased fatigue or lethargy. Patient afebrile with normal WBC so infectious etiology felt highly unlikely. Random am cortisol done was 14.7 so does not appear to be consistent with adrenal insufficiency but Endocrine consulted. Cardiology re-consulted for patient's bradycardia to see if contributing to patient's increasing lethargy and fatigue and unclear if it is causing patient's symptoms but Cardiology doubts is true cause of lethargy and recommended Endocrine consult to look for thyroid issues as cause of bradycardia and somulence. Cardiology recommends no need for pacemaker at this time and to follow-up with EP Cardiology as outpatient. Neurontin and opiates for pain discontinued in case they were contributing  to cause. Patient with recent diagnosis of hypothyroidism likely related to Amiodarone that recently was discontinued due to bradycardia. Endocrine consulted on 4/18 and evaluated patient and not worried by thyroid function but concerned for adrenal insufficiency so Cosyntropin stimulation test ordered on 4/18 to evaluate patient. Patient dialyzed by Nephrology on 4/18 a day prior to normal dialysis day as showing signs of hypervolemia. Patient slightly more alert and responsive on 4/18 and did work with PT. Infectious disease consulted for possible occult infection as cause but they felt highly unlikely as patient had no clinical signs to suggest infection but did draw blood cultures due to heart murmur on 4/18 and so far blood cultures are negative but ID did not feel any other infectious work-up or empiric treatment with antibiotics indicated. Neurology consulted to evaluate patient for lethargy. They did not feel any clinical evidence of stroke but did recommend transcranial doppler and Carotid US to assess for decreased blood flow to brain as cause. Carotid ultrasound shows <39% stenosis bilaterally and transcranial doppler was limited, but what was able to be assessed was normal.  Patient alertness level is minimally improved, but she is not back to baseline. Cytomel 5 mg BID started on 4/20 to see if that helps improve symptoms. 4/22: pt is showing considerable improvement.     Interval History: Patient Sitting up on edge of bed. Looks much more awake and interactive, but is still tired. Eating has improved a little and she was able to eat a supplement drink and a bowl of soup yesterday.  KUB showed moderate amount of stool, but no obstruction.     Review of Systems   Constitutional: Positive for fatigue. Negative for chills and fever.   Respiratory: Negative for cough and shortness of breath.    Cardiovascular: Negative for chest pain.   Gastrointestinal: Negative for abdominal pain and nausea.    Musculoskeletal: Positive for myalgias (Right hip pain). Negative for back pain.   Skin: Negative for rash.   Neurological: Positive for weakness (Generalized). Negative for dizziness.   Psychiatric/Behavioral: Negative for confusion and hallucinations.     Objective:     Vital Signs (Most Recent):  Temp: 97.5 °F (36.4 °C) (04/23/17 1100)  Pulse: (!) 59 (04/23/17 1100)  Resp: 18 (04/23/17 0730)  BP: 135/64 (04/23/17 1100)  SpO2: 100 % (04/23/17 1100) Vital Signs (24h Range):  Temp:  [97.5 °F (36.4 °C)-97.9 °F (36.6 °C)] 97.5 °F (36.4 °C)  Pulse:  [54-62] 59  Resp:  [16-18] 18  SpO2:  [93 %-100 %] 100 %  BP: (127-164)/(61-70) 135/64     Weight: 50.8 kg (112 lb)  Body mass index is 20.49 kg/(m^2).  No intake or output data in the 24 hours ending 04/23/17 1321   Physical Exam   Constitutional: No distress.   Eyes: No scleral icterus.   Neck: Neck supple. No JVD present.   Cardiovascular: Regular rhythm and normal heart sounds.  Bradycardia present.  Exam reveals no gallop and no friction rub.    No murmur heard.  Pulmonary/Chest: Effort normal. She has rales (Mild rales in bases).   Abdominal: Soft. Bowel sounds are normal. She exhibits no distension. There is no tenderness.   Musculoskeletal: She exhibits edema (1-2 + edema in lower extremities; 1+ edema in left arm).   Neurological: She is alert.   Skin: Skin is warm and dry.   Psychiatric: She has a normal mood and affect. Her speech is normal.       Assessment/Plan:      * Acute encephalopathy  Improved today as patient more alert and responsive. Patient oriented x 4. Patient afebrile with normal WBC and no bandemia so infectious etiology unlikely. ID consulted and highly doubt infectious cause but did order blood cultures that are so far no growth. Patient with no evidence of acute hypoxia as oxygen sats > 90 on 2 liters of oxygen. Medication list reviewed for new medications recently added and no new medications added recently that could be causing  encephalopathy but Neurontin on hold as can be sedating even though patient on a very low dose.   · MRI of brain unremarkable for acute stroke or bleed on 4/15.   · AM Cortisol level normal 14.7. Endocrine consulted and recommended Cosyntropin stim test that was ordered by them on 4/18 to evaluate for relative adrenal insufficiency and was normal.   · Will hold all narcotics/opiates for pain. Avoid any sleep aid agents at this time that could affect alertness level.  · Endocrine consulted on 4/18 to see if hypothyroidism is causing fatigue and lethargy but they stated no evidence of myxedema but stated they would do trial of IV Synthroid to see if helps patient but they doubt hypothyroidism cause of lethargy.   · EP Cardiology consulted and evaluated patient to see if bradycardia causing fatigue/weakness but feel unlikely and do not recommend pacemaker placement at this time and recommended follow-up with Dr. Antoine in EP clinic as outpatient and he would consider once patient euthyroid.   · Neurology consulted on 4/18 and recommend carotid and transcranial doppler ultrasound to look for low flow to brain and ordered on 4/19.   · Please avoid any benzodiazepines as can worsen delirium and only should use in delirium caused by benzodiazepine or alcohol withdrawal.   · Please avoid all anticholinergic medications as can worsen delirium.   · Please keep curtains and blinds open during the day and closed during the night.   · Please continue to have nursing and family reorient patient and encourage family to visit and stay at night if possible.   · Please avoid physical restraints if possible as likely to worsen and increase agitation in delirious patient.  · Infectious disease consulted for possible occult infection as cause but they felt highly unlikely as patient had no clinical signs to suggest infection but did draw blood cultures due to heart murmur on 4/18 and so far blood cultures are negative but ID did not feel  any other infectious work-up or empiric treatment with antibiotics indicated. Neurology consulted to evaluate patient for lethargy. They did not feel any clinical evidence of stroke but did recommend transcranial doppler and Carotid US to assess for decreased blood flow to brain as cause. Carotid ultrasound shows <39% stenosis bilaterally and transcranial doppler was limited, but what was able to be assessed was normal.  Patient alertness level is minimally improved, but she is not back to baseline.   · Will continue liothyronine 5 mcg daily as a trial to see if it help with symptoms - much improved today    Acquired hypothyroidism  Appreciate Endocrine consult and they do not feel that any adjustments needed in Synthroid but will give trial of IV Synthroid as per Endocrine to see if helps patient. Endocrine notes no signs of myxedema so doubt lethargy related to thyroid disease.   · Will add liothyronine 5 mcg daily as a trial to see if it help with symptoms  · Reverse T3 pending      Acute blood loss anemia  Hgb is stable and there are no signs of active bleeding. Hgb 8.1 so doubt cause of lethargy.  No indication to transfuse blood in this patient at this time. Nephrology to continue Epogen with HD to treat chronic anemia related to ESRD.      Oral thrush  Much improved. Patient with very mild case and will continue treatement with Nystatin swish and spit 4 times daily.       Nausea  Will get KUB      Renovascular hypertension  Patient normotensive on no BP medications. Bidil discontinued on previous admit due to issues with hypotension during HD and will continue to hold and continue Midodrine to help with BP and volume removal during HD. Patient tolerating volume removal with HD on Midodrine and will continue.       Abnormal LFTs  Resolved. RUQ ultrasound unremarkable except for hepatic cysts and unlikely to be causing mild increase in LFT's but LFT's are improving so no further evaluation at this time and  could just be hepatic congestion from hypervolemia related to chronic renal failure in combination with chronic heart failure. Monitor daily CMP. No need to discontinue Lipitor at this time as statin not likely cause and needs for CAD and severe PVD.      Chronic combined systolic and diastolic heart failure  Improved. Nephrology to assess patient daily for continued volume removal in hospital. Patient's oxygen status improved and patient with O2 sats > 90% on 2 liters of oxygen.        ESRD on hemodialysis  Nephrology consulted and managing patient's HD needs in hospital.     Closed displaced fracture of right femoral neck s/p hemiarthroplasty on 4/5/2017  Patient is POD # 16. Patient reports no pain in right hip currently and main pain related to her neuropathic pain. Plan to continue PT/OT for gait training and strengthening and restoration of ADL's on this admit to continue therapy. Patient is FWB/WBAT: right lower extremity, posterior hip precautions as per Orthopedic recommendations. Patient on Eliquis for long term anticoagulation for atrial fibrillation so no DVT prophylaxis needed. Pain is well controlled and will use just Tylenol for pain and avoid any sedating narcotics. Patient needs SNF placement on discharge.    Atherosclerosis of native artery 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  Patient with known heel ulcers to both legs but no signs to indicate infection. Patient recently evaluated by Vascular Surgery on 4/13 and Vascular felt ulcers were improving and did not feel any further intervention needed at this time. Continue local wound care for heel ulcers on this admit.      Bradycardia, drug induced  Controlled. Patient still with persistent sinus bradycardia but HR now in 50-60's instead of 40-50's as on last hospital stay in early April. Cardiology re-evaluated patient to see if they feel bradycardia could be contributing to patient's lethargy but they doubt and do not recommend  pacemaker needed at this time and recommends outpatient follow-up with Dr. Antoine in EP Cardiology clinic. Avoid any AV shikha blocking agents. Continue telemetry monitoring.       PAF (paroxysmal atrial fibrillation)  Patient is actually bradycardiac so no AV shikha blocking agents are required. Patient has an implanted loop recorder in place. Continue Eliquis for long term anticoagulation.      3-vessel CAD  Controlled. Plan to continue ASA and Lipitor to treat. No beta blocker due to bradycardia.        Unstageable pressure ulcer  Multiple pressure ulcers.  Sacral, right calf, and left heel.  Will consult wound care.       VTE Risk Mitigation         Ordered     apixaban tablet 2.5 mg  2 times daily     Route:  Oral        04/15/17 1618     Medium Risk of VTE  Once      04/15/17 1431          Tahira Tomlinson MD  Department of Hospital Medicine   [facility]

## 2017-04-23 NOTE — SUBJECTIVE & OBJECTIVE
Interval History: Patient Sitting up on edge of bed. Looks much more awake and interactive, but is still tired. Eating has improved a little and she was able to eat a supplement drink and a bowl of soup yesterday.  KUB showed moderate amount of stool, but no obstruction.     Review of Systems   Constitutional: Positive for fatigue. Negative for chills and fever.   Respiratory: Negative for cough and shortness of breath.    Cardiovascular: Negative for chest pain.   Gastrointestinal: Negative for abdominal pain and nausea.   Musculoskeletal: Positive for myalgias (Right hip pain). Negative for back pain.   Skin: Negative for rash.   Neurological: Positive for weakness (Generalized). Negative for dizziness.   Psychiatric/Behavioral: Negative for confusion and hallucinations.     Objective:     Vital Signs (Most Recent):  Temp: 97.5 °F (36.4 °C) (04/23/17 1100)  Pulse: (!) 59 (04/23/17 1100)  Resp: 18 (04/23/17 0730)  BP: 135/64 (04/23/17 1100)  SpO2: 100 % (04/23/17 1100) Vital Signs (24h Range):  Temp:  [97.5 °F (36.4 °C)-97.9 °F (36.6 °C)] 97.5 °F (36.4 °C)  Pulse:  [54-62] 59  Resp:  [16-18] 18  SpO2:  [93 %-100 %] 100 %  BP: (127-164)/(61-70) 135/64     Weight: 50.8 kg (112 lb)  Body mass index is 20.49 kg/(m^2).  No intake or output data in the 24 hours ending 04/23/17 1321   Physical Exam   Constitutional: No distress.   Eyes: No scleral icterus.   Neck: Neck supple. No JVD present.   Cardiovascular: Regular rhythm and normal heart sounds.  Bradycardia present.  Exam reveals no gallop and no friction rub.    No murmur heard.  Pulmonary/Chest: Effort normal. She has rales (Mild rales in bases).   Abdominal: Soft. Bowel sounds are normal. She exhibits no distension. There is no tenderness.   Musculoskeletal: She exhibits edema (1-2 + edema in lower extremities; 1+ edema in left arm).   Neurological: She is alert.   Skin: Skin is warm and dry.   Psychiatric: She has a normal mood and affect. Her speech is normal.

## 2017-04-23 NOTE — ASSESSMENT & PLAN NOTE
Patient is POD # 16. Patient reports no pain in right hip currently and main pain related to her neuropathic pain. Plan to continue PT/OT for gait training and strengthening and restoration of ADL's on this admit to continue therapy. Patient is FWB/WBAT: right lower extremity, posterior hip precautions as per Orthopedic recommendations. Patient on Eliquis for long term anticoagulation for atrial fibrillation so no DVT prophylaxis needed. Pain is well controlled and will use just Tylenol for pain and avoid any sedating narcotics. Patient needs SNF placement on discharge.

## 2017-04-24 PROBLEM — E43 SEVERE PROTEIN-CALORIE MALNUTRITION: Status: ACTIVE | Noted: 2017-01-01

## 2017-04-24 PROBLEM — S72.001A CLOSED FRACTURE OF NECK OF RIGHT FEMUR: Status: RESOLVED | Noted: 2017-01-01 | Resolved: 2017-01-01

## 2017-04-24 PROBLEM — G93.40 ENCEPHALOPATHY: Status: RESOLVED | Noted: 2017-01-01 | Resolved: 2017-01-01

## 2017-04-24 PROBLEM — R09.89 BIBASILAR CRACKLES: Status: RESOLVED | Noted: 2017-01-01 | Resolved: 2017-01-01

## 2017-04-24 PROBLEM — E87.1 HYPONATREMIA: Status: RESOLVED | Noted: 2017-01-01 | Resolved: 2017-01-01

## 2017-04-24 PROBLEM — R11.0 NAUSEA: Status: RESOLVED | Noted: 2017-01-01 | Resolved: 2017-01-01

## 2017-04-24 PROBLEM — R13.10 ODYNOPHAGIA: Status: RESOLVED | Noted: 2017-01-01 | Resolved: 2017-01-01

## 2017-04-24 NOTE — SIGNIFICANT EVENT
Critical Care Outreach (CORE)  Critical Care Medicine  Consult Note      CORE Metrics:     Admit Date: 4/15/2017  LOS: 9  Code Status: Full Code   CC: Hypoxia  Date of Consult: 2017  : 1931  Age: 85 y.o.  Weight:   Wt Readings from Last 1 Encounters:   04/15/17 50.8 kg (112 lb)     Race:   Sex: female  Bed: [facility] A:   MRN: [id number]  RRT Indication(s): concern for worsening hypoxia, not able to get spo2 to read  Time CORE Call Received: 1416  Time CORE at Bedside: 1418  Was the patient discharged from an ICU this admission? no  Was the patient discharged from a PACU within last 24 hours? no  Did the patient receive conscious sedation/general anesthesia within last 24 hours? no  Was the patient in the ED within the past 24 hours? no  Was the patient started on NIPPV within the past 24 hours? no  Did this progress into an ARC or CPA: no  Attending Physician: Tahira Tomlinson MD  Primary Service: [facility]  Illness Category: medical  (Medical Cardiac, Neurologic, Surgical Cardiac, Medical Non-Cardiac, Surgical Non-Cardiac, Barton, Obstetric, Ambulatory/Outpatient, Trauma, Other i.e. 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 Hospitalization complicated by continue encephalopathy.  Neurology evaluated given concern for CVA; however, MRI Brain was remarkable. Carotid ultrasound without significant stenosis. Cardiology and Endocrine have been  consulted for bradycardia, lethargy, and fatigue.  She was started on Cytomel on 17 with improvement in symptoms.     On note, she was recently hospitalized for right hip hemiarthroplasty for femoral neck fracture following a fall.  She was discharged 17 to [facility]. She had bradycardia at that time.  Amiodarone and coreg were discontinued.       Rapid response called for possible worsening hypoxemia as they were not able to obtain accurate spo2 reading.  VS /86, HR 73 irregular, RR 20. Patient awake, alert, oriented and following request.  Denies shortness of breath, cough, chest pain, abdominal pain, vision changes, or weakness.         Patient had recently received dialysis where no fluid was removed given episode of tachycardia and nausea.  EKG showed Atrial fibrillation. She was given 5 mg IV metoprolol and zofran with improvement.     Past Medical History:   Diagnosis Date    3-vessel CAD     Acquired hypothyroidism     ALLERGIC RHINITIS     Anemia in ESRD (end-stage renal disease) 2017    Anticoagulant long-term use     Atherosclerosis of native artery 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 2016    Atherosclerotic PVD with ulceration 2016    Blood transfusion     Cataract     Chronic combined systolic and diastolic heart failure 2015    Closed displaced fracture of right femoral neck s/p hemiarthroplasty on 2017    Cramp of both lower extremities 2016    ESRD on hemodialysis 2015    Hyperlipidemia     Ischemic cardiomyopathy 10/20/2015    Non-ST elevation MI (NSTEMI) 2015    PAF (paroxysmal atrial fibrillation) 3/10/2017    Renovascular hypertension 2015    Sinus brea-tachy syndrome 2017    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      Past Surgical History:   Procedure Laterality Date    ANGIOPLASTY      Renal PTA    CARDIAC CATHETERIZATION       SECTION      HIP FRACTURE SURGERY  Right 04/05/2017    Hemiarthroplasty for displaced femoral neck fracture    HYSTERECTOMY      ovaries intact    RENAL ARTERY STENT      TONSILLECTOMY      VASCULAR SURGERY        Review of patient's allergies indicates:   Allergen Reactions    Ativan [lorazepam] Hallucinations    Crab Other (See Comments)     Causes Gout    Crayfish Other (See Comments)     Causes Gout    Promethazine Other (See Comments)     Hallucinations        Family History   Problem Relation Age of Onset    Adopted: Yes    Heart disease Father       Social History   Substance Use Topics    Smoking status: Never Smoker    Smokeless tobacco: Never Used    Alcohol use Yes      Comment: occasional wine use         Inpatient Medications:  Continuous Infusions:    Scheduled Meds:   apixaban  2.5 mg Oral BID    aspirin  81 mg Oral Daily    atorvastatin  40 mg Oral QAM    docusate sodium  100 mg Oral BID    epoetin syed (PROCRIT) injection  3,000 Units Intravenous Every Mon, Wed, Fri    levothyroxine  40 mcg Intravenous Daily    lidocaine  1 patch Transdermal Daily    liothyronine  5 mcg Oral BID    metoprolol        metoprolol  5 mg Intravenous Once    metoprolol tartrate  12.5 mg Oral BID    midodrine  10 mg Oral TID    nystatin  500,000 Units Oral QID    ondansetron  4 mg Oral Q6H    pantoprazole  40 mg Oral Daily    polyethylene glycol  17 g Oral Daily    vitamin D  1,000 Units Oral Daily      PRN Meds:.sodium chloride 0.9%, acetaminophen, albumin human 25%, ramelteon     Review of Systems:  Constitutional: Denies fevers or weakness.  Eyes: Denies changes in vision..  Cardiovascular: Denies chest pain, palpitations  Respiratory: Denies shortness of breath, cough.   GI: Denies abdominal pain  Neurologic: Denies headache, dizziness       OBJECTIVE:     Vital Signs (Most Recent):  Temp: 98.2 °F (36.8 °C) (04/24/17 1409)  Pulse: 77 (04/24/17 1415)  Resp: (!) 26 (04/24/17 1415)  BP: (!) 115/58 (04/24/17 1415)  SpO2: (!) 66  % (04/24/17 1405) Vital Signs (24h Range):  Temp:  [97.5 °F (36.4 °C)-98.6 °F (37 °C)] 98.2 °F (36.8 °C)  Pulse:  [] 77  Resp:  [14-28] 26  SpO2:  [66 %-100 %] 66 %  BP: (101-148)/(29-71) 115/58     I & O (24h):    Intake/Output Summary (Last 24 hours) at 04/24/17 1427  Last data filed at 04/24/17 1230   Gross per 24 hour   Intake              900 ml   Output             1157 ml   Net             -257 ml        Physical Exam:  GA: Alert, comfortable, no acute distress.   HEENT: . No visible oropharyngeal abnormalities. No scleral icterus. No visible thyromegally.   Pulmonary: Chest wall symmetric. No accessory muscle use. No abnormalities on percussion. No tenderness to palpation. Decreased breath sound on left. No wheezing, rhonchi noted.   Cardiac: Irregularly, irregular.  S1 & S2 + murmur.  1+ peripheral edema.   Abdominal: Bowel sounds present. Abdomen soft, distended.  Skin: Healing incision to right lateral thigh. No jaundice, rashes, or visible lesions.  Musculoskeletal: Moves all extremities   Extremities: No clubbing or cyanosis.  Neuro:  --Mental Status:  Awake, alert, oriented, following request and moving all extremities.   --Pupils 4 mm, PERRL.    Lines/Drains/Airway:          Hemodialysis AV Fistula (Active)   Site Assessment Clean;Dry;Intact 4/24/2017 12:30 PM   Patency Present;Thrill;Bruit 4/24/2017 12:30 PM   Status Deaccessed 4/24/2017 12:30 PM   Flows Good 4/24/2017  8:05 AM   Dressing Intervention New dressing 4/24/2017 12:30 PM   Dressing Status Clean;Dry;Intact 4/24/2017 12:30 PM   Site Condition No complications 4/24/2017 12:30 PM   Dressing Gauze 4/24/2017 12:30 PM        Nutrition:  Current Diet Order   Procedures    Diet Dental Soft Renal     Nectar thick liquids     Order Specific Question:   Additional restrictions:     Answer:   Renal         Labs:  CBC/Anemia Profile:     Recent Labs  Lab 04/18/17  0008 04/18/17  0342 04/18/17  1359 04/21/17  1020   WBC  --  9.24  --  11.82   HGB   --  8.1*  --  8.6*   HCT  --  25.4*  --  26.0*   PLT  --  173  --  145*   MCV  --  86  --  84   RDW  --  18.6*  --  20.0*   IRON 13*  --   --   --    FERRITIN 674*  --   --   --    FOLATE  --   --  11.6  --    [id number]  --   --  >2000*  --         Coags:   No results for input(s): INR, APTT in the last 168 hours.    Invalid input(s): PT     Chemistries:     Recent Labs  Lab 04/18/17  0342  04/22/17  0457 04/23/17  0437 04/24/17  0534     < > 136 135* 134*   K 4.1  < > 3.9 3.8 4.1     < > 103 102 101   CO2 22*  < > 25 22* 21*   BUN 13  < > 20 32* 51*   CREATININE 2.7*  < > 2.8* 3.7* 4.6*   CALCIUM 9.0  < > 8.9 8.8 8.9   PROT 6.0  --   --   --   --    BILITOT 2.3*  --   --   --   --    ALKPHOS 77  --   --   --   --    ALT 17  --   --   --   --    *  --   --   --   --    MG 2.0  --   --   --   --    PHOS 1.9*  < > 3.1 2.9 2.8   < > = values in this interval not displayed.     Urine Studies:   Lab Results   Component Value Date    COLORU Yellow 04/04/2017    APPEARANCEUA Clear 04/04/2017    PHUR 7.0 04/04/2017    SPECGRAV 1.020 04/04/2017    PROTEINUA 2+ (A) 04/04/2017    GLUCUA Negative 04/04/2017    KETONESU Negative 04/04/2017    BILIRUBINUA Negative 04/04/2017    OCCULTUA Negative 04/04/2017    NITRITE Negative 04/04/2017    UROBILINOGEN Negative 04/04/2017    LEUKOCYTESUR Negative 04/04/2017    RBCUA 0 04/04/2017    WBCUA 1 04/04/2017    BACTERIA None 04/04/2017    SQUAMEPITHEL 1 04/04/2017    HYALINECASTS 0 04/04/2017        Lactic Acid:   Lab Results   Component Value Date    LACTATE 2.2 04/15/2017        Cardiac Enzymes:   No results for input(s): CPK, CPKMB, MB, TROPONINI in the last 72 hours.     Arterial Blood Gas:   No results for input(s): PH, PCO2, HCO3, POCSATURATED, BE in the last 24 hours.    Invalid input(s):  PO2     Microbiology Results (last 7 days)     Procedure Component Value Units Date/Time    Blood culture [[id number]] Collected:  04/19/17 0338    Order Status:  Completed  Specimen:  Blood Updated:  04/24/17 0812     Blood Culture, Routine No growth after 5 days.    Narrative:       From 2 different sites 30 minutes apart    Blood culture [[id number]] Collected:  04/19/17 0338    Order Status:  Completed Specimen:  Blood Updated:  04/24/17 0812     Blood Culture, Routine No growth after 5 days.    Narrative:       From 2 different sites 30 minutes apart          Imaging:       Diagnostics/Interventions by CORE:     1. ABG and chest xray ordered by primary team, Dr. Tomlinson.     ASSESSMENT/PLAN:     Active Hospital Problems    Diagnosis    *Acute encephalopathy    Unstageable pressure ulcer    Nausea    Osteoporosis    Encephalopathy    Bibasilar crackles    Oral thrush    Abnormal LFTs    Hyponatremia    Acute blood loss anemia    Bradycardia    Bradycardia, drug induced    Closed displaced fracture of right femoral neck s/p hemiarthroplasty on 4/5/2017    3-vessel CAD    PAF (paroxysmal atrial fibrillation)    Atherosclerosis of native artery 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    Acute respiratory failure with hypoxia    ESRD on hemodialysis    Chronic combined systolic and diastolic heart failure    Renovascular hypertension    Acquired hypothyroidism          Hypoxia  --concern for worsening hypoxia given inability to obtain accurate Spo2 reading.  ABG with PO2 421 on NRB.  Transitioned to 5 L nasal cannula with sats 99% on monitor.  --chest xray with mild improvement in left pleural effusion. Left pleural effusion worsened since 4/10/17 film  --continues to be afebrile without leukocytosis, recent blood cultures ngtd, not on antibiotics.   --overall negative 6 L on I&Os.   --continuous pulse ox in place.  Will attempt nasal probe for monitoring spo2 if needed.   --consider checking troponin given recent episode of atrial fibrillation with nausea.       No indication to transfer to ICU at this time. Dr. Tomlinson at bedside and in agreement.      Discussed with Dr. Allen, Critical Care Attending    Please call for any questions, concerns, or changes in clinical condition.     I spent >25 minutes reviewing patient records, examining, and counseling the patient with greater than 50% of the time spent with direct patient care and coordination.       APRIL LOYOLA, [facility] CenterP-BC  Critical Care Medicine  [phone]

## 2017-04-24 NOTE — PROGRESS NOTES
[facility] Hospital Medicine  Progress Note    Patient Name: Padmini Miranda  MRN: [id number]  Patient Class: IP- Inpatient   Admission Date: 4/15/2017  Length of Stay: 9 days  Attending Physician: Tahira Tomlinson MD  Primary Care Provider: Randy Lyles MD    [place] [facility] Medicine Team: [facility] HOSP MED  Tahira Tomlinson MD    Subjective:     Principal Problem:Acute encephalopathy    HPI:  86 y/o with past medical history of chronic combined systolic and diastolic heart failure, 3 vessel CAD, atherosclerosis of bilateral lower extremity with left heel ulcer with recent PTA of left SFA on 4/3 by Vascular surgery, essential HTN, hypothyroidism and ESRD on HD was sent from acute dialysis today due to concern for increasing lethargy. Family also noted that patient appeared to be slurring her words and noted right eye was drooping and patient was having difficulty staying awake and due to thus concerns was sent to [facility]. CTscan of head showed no acute findings but MRI of brain has been ordered to better evaluate. Patient's daughters at bedside and report for past 1 week patient has been sleeping more than normal and not nearly as alert as she usually is. I am familiar with patient as she was just discharged from my medical service on 4/13 and patient does appear to be more sleepy than normal but when aroused in the ER patient is responding to questions appropriately. Patient reports she generally feels weak but denies any focal weakness in ER. Family is very upset as they feel patient was not progressing with PT prior to discharge to SNF on 4/13 and feel patient has not been herself for over 1 week with increased lethargy.     As noted patient was just discharged from [facility] on 4/13 to [facility] [place] after prolonged hospitalization following surgical repair of a right hip fracture. Patient even prior to that surgery was seen and evaluated by Cardiology for sinus bradycardia  with HR in 30-40's. Patient during admit was felt not to need pacemaker placement and felt bradycardia related to Amiodarone and Coreg that had been recently been discontinued. Patient eventually underwent right hip hemiarthroplasty on 4/5 posterior-op, patient noted to be hypothermic and persistently bradycardiac so patient was evaluated for possible infection but there was no concern for infection, as clinically, the patient had normal WBC and no clinical symptoms to suggest infection. TSH (normal on 3/10) was checked during recent admit and found to be severely elevated at 21.6, with low normal Free T4 of 0.88. Synthroid was adjusted from 50 mcg to 75 mcg daily to treat hypothyroidism. Patient did complain of odynophagia after surgery felt related to ET tube. Speech therapy consult who recommended MBSS and patient underwent MBSS on 4/12 and patient passed with recommendation by Speech for regular diet and nectar thickened liquids. Patient to be discharged to [facility] for continued PT/OT but needs outpatient follow-up with EP Cardiology to reassess need for pacemaker for sinus bradycardia on 4/13.       Hospital Course:  MRI of rafat unremarkable for any signs of an acute stroke or bleed to explain increased fatigue or lethargy. Patient afebrile with normal WBC so infectious etiology felt highly unlikely. Random am cortisol done was 14.7 so does not appear to be consistent with adrenal insufficiency but Endocrine consulted. Cardiology re-consulted for patient's bradycardia to see if contributing to patient's increasing lethargy and fatigue and unclear if it is causing patient's symptoms but Cardiology doubts is true cause of lethargy and recommended Endocrine consult to look for thyroid issues as cause of bradycardia and somulence. Cardiology recommends no need for pacemaker at this time and to follow-up with EP Cardiology as outpatient. Neurontin and opiates for pain discontinued in case they were contributing  to cause. Patient with recent diagnosis of hypothyroidism likely related to Amiodarone that recently was discontinued due to bradycardia. Endocrine consulted on 4/18 and evaluated patient and not worried by thyroid function but concerned for adrenal insufficiency so Cosyntropin stimulation test ordered on 4/18 to evaluate patient. Patient dialyzed by Nephrology on 4/18 a day prior to normal dialysis day as showing signs of hypervolemia. Patient slightly more alert and responsive on 4/18 and did work with PT. Infectious disease consulted for possible occult infection as cause but they felt highly unlikely as patient had no clinical signs to suggest infection but did draw blood cultures due to heart murmur on 4/18 and so far blood cultures are negative but ID did not feel any other infectious work-up or empiric treatment with antibiotics indicated. Neurology consulted to evaluate patient for lethargy. They did not feel any clinical evidence of stroke but did recommend transcranial doppler and Carotid US to assess for decreased blood flow to brain as cause. Carotid ultrasound shows <39% stenosis bilaterally and transcranial doppler was limited, but what was able to be assessed was normal.  Patient alertness level is minimally improved, but she is not back to baseline. Cytomel 5 mg BID started on 4/20 to see if that helps improve symptoms. Definite improvement was noted on new medication and pt was sitting up in bedside chair for the majority of the day, very interactive, and cracking jokes.  She did complain of some abdominal pain and a KUB showed mild constipation and she was started on a stool softener. On 4/24 while pt was in HD, she went into Afib with HR in the 90s.  She became very tachypnic and SOB and clinically was working hard to breath. Although peripheral oxygen saturation was undetectable due to poor correlation, an ABG showed a PO2 >400 on a non re breather. Pt was given a 5 mg IV push of metoprolol and  started on metoprolol 12.5 BID, with great clinical improvement and pt able to breath much more comfortably. An hour later, she converted back to normal sinus. Cytomel stopped as it likely precipitated this episode.     Interval History: while pt was in HD, she went into Afib with HR in the 90s.  She became very tachypnic and SOB and clinically was working hard to breath. Although peripheral oxygen saturation was undetectable due to poor correlation, an ABG showed a PO2 >400 on a non re breather. Pt was given a 5 mg IV push of metoprolol and started on metoprolol 12.5 BID, with great clinical improvement and pt able to breath much more comfortably. An hour later, she converted back to normal sinus.    Review of Systems   Constitutional: Positive for fatigue. Negative for chills and fever.   Respiratory: Negative for cough and shortness of breath.    Cardiovascular: Negative for chest pain.   Gastrointestinal: Negative for abdominal pain and nausea.   Musculoskeletal: Positive for myalgias (Right hip pain). Negative for back pain.   Skin: Negative for rash.   Neurological: Positive for weakness (Generalized). Negative for dizziness.   Psychiatric/Behavioral: Negative for confusion and hallucinations.     Objective:     Vital Signs (Most Recent):  Temp: 97.8 °F (36.6 °C) (04/24/17 1600)  Pulse: (!) 55 (04/24/17 1600)  Resp: (!) 55 (04/24/17 1600)  BP: (!) 106/53 (04/24/17 1600)  SpO2: 99 % (04/24/17 1600) Vital Signs (24h Range):  Temp:  [97.5 °F (36.4 °C)-98.2 °F (36.8 °C)] 97.8 °F (36.6 °C)  Pulse:  [] 55  Resp:  [14-55] 55  SpO2:  [66 %-100 %] 99 %  BP: (101-148)/(29-71) 106/53     Weight: 50.8 kg (112 lb)  Body mass index is 20.49 kg/(m^2).    Intake/Output Summary (Last 24 hours) at 04/24/17 1719  Last data filed at 04/24/17 1230   Gross per 24 hour   Intake              900 ml   Output             1157 ml   Net             -257 ml      Physical Exam   Constitutional: No distress.   Eyes: No scleral  icterus.   Neck: Neck supple. No JVD present.   Cardiovascular: Regular rhythm and normal heart sounds.  Bradycardia present.  Exam reveals no gallop and no friction rub.    No murmur heard.  Pulmonary/Chest: Effort normal. She has rales (Mild rales in bases).   Abdominal: Soft. Bowel sounds are normal. She exhibits no distension. There is no tenderness.   Musculoskeletal: She exhibits edema (1-2 + edema in lower extremities; 1+ edema in left arm).   Neurological: She is alert.   Skin: Skin is warm and dry.   Psychiatric: She has a normal mood and affect. Her speech is normal.       Assessment/Plan:      * Acute encephalopathy  Now Resolved.   · MRI of brain unremarkable for acute stroke or bleed on 4/15.   · AM Cortisol level normal 14.7. Endocrine consulted and recommended Cosyntropin stim test that was ordered by them on 4/18 to evaluate for relative adrenal insufficiency and was normal.   · Will hold all narcotics/opiates for pain. Avoid any sleep aid agents at this time that could affect alertness level.  · Endocrine consulted on 4/18 to see if hypothyroidism is causing fatigue and lethargy but they stated no evidence of myxedema but stated they would do trial of IV Synthroid to see if helps patient but they doubt hypothyroidism cause of lethargy.   · EP Cardiology consulted and evaluated patient to see if bradycardia causing fatigue/weakness but feel unlikely and do not recommend pacemaker placement at this time and recommended follow-up with Dr. Antoine in EP clinic as outpatient and he would consider once patient euthyroid.   · Neurology consulted on 4/18 and recommend carotid and transcranial doppler ultrasound to look for low flow to brain - normal.   · Infectious disease consulted for possible occult infection as cause but they felt highly unlikely as patient had no clinical signs to suggest infection but did draw blood cultures due to heart murmur on 4/18 and so far blood cultures are negative but ID did  not feel any other infectious work-up or empiric treatment with antibiotics indicated. Neurology consulted to evaluate patient for lethargy. They did not feel any clinical evidence of stroke but did recommend transcranial doppler and Carotid US to assess for decreased blood flow to brain as cause. Carotid ultrasound shows <39% stenosis bilaterally and transcranial doppler was limited, but what was able to be assessed was normal.   · On 4/20, I started the pt on liothyronine 5 mcg daily as a trial to see if it help with symptoms, and she responded very well and was much more alert and less tired. Endocrine recommended stopping after 5-7 days and due to episode of Afib today, will discontinue.   · Continue synthroid 40 IV daily and at discharge change to 75 mcg po daily.     Acquired hypothyroidism  Appreciate Endocrine consult and they do not feel that any adjustments needed in Synthroid but will give trial of IV Synthroid as per Endocrine to see if helps patient. Endocrine notes no signs of myxedema so doubt lethargy related to thyroid disease.   · On 4/20, I started the pt on liothyronine 5 mcg daily as a trial to see if it help with symptoms, and she responded very well and was much more alert and less tired. Endocrine recommended stopping after 5-7 days and due to episode of Afib today, will discontinue.   · Continue synthroid 40 IV daily and at discharge change to 75 mcg po daily.   · Reverse T3 pending      Acute blood loss anemia  Hgb is stable and there are no signs of active bleeding. Hgb 8.1 so doubt cause of lethargy.  No indication to transfuse blood in this patient at this time. Nephrology to continue Epogen with HD to treat chronic anemia related to ESRD.      Oral thrush  Much improved. Patient with very mild case and will continue treatement with Nystatin swish and spit 4 times daily.       Nausea, resolved as of 4/24/2017  Will get KUB      Renovascular hypertension  Patient normotensive on no BP  medications. Bidil discontinued on previous admit due to issues with hypotension during HD and will continue to hold and continue Midodrine to help with BP and volume removal during HD. Patient tolerating volume removal with HD on Midodrine and will continue.       Abnormal LFTs  Resolved. RUQ ultrasound unremarkable except for hepatic cysts and unlikely to be causing mild increase in LFT's but LFT's are improving so no further evaluation at this time and could just be hepatic congestion from hypervolemia related to chronic renal failure in combination with chronic heart failure. Monitor daily CMP. No need to discontinue Lipitor at this time as statin not likely cause and needs for CAD and severe PVD.      Chronic combined systolic and diastolic heart failure  Improved. Nephrology to assess patient daily for continued volume removal in hospital. Patient's oxygen status improved and patient with O2 sats > 90% on 2 liters of oxygen.        ESRD on hemodialysis  Nephrology consulted and managing patient's HD needs in hospital.     Closed displaced fracture of right femoral neck s/p hemiarthroplasty on 4/5/2017  Patient is POD # 17. Patient reports no pain in right hip currently and main pain related to her neuropathic pain. Plan to continue PT/OT for gait training and strengthening and restoration of ADL's on this admit to continue therapy. Patient is FWB/WBAT: right lower extremity, posterior hip precautions as per Orthopedic recommendations. Patient on Eliquis for long term anticoagulation for atrial fibrillation so no DVT prophylaxis needed. Pain is well controlled and will use just Tylenol for pain and avoid any sedating narcotics. Patient needs SNF placement on discharge.    Atherosclerosis of native artery 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  Patient with known heel ulcers to both legs but no signs to indicate infection. Patient recently evaluated by Vascular Surgery on 4/13 and Vascular felt ulcers  were improving and did not feel any further intervention needed at this time. Continue local wound care for heel ulcers on this admit.      Bradycardia, drug induced  Controlled. Patient still with persistent sinus bradycardia but HR now in 50-60's instead of 40-50's as on last hospital stay in early April. Cardiology re-evaluated patient to see if they feel bradycardia could be contributing to patient's lethargy but they doubt and do not recommend pacemaker needed at this time and recommends outpatient follow-up with Dr. Antoine in EP Cardiology clinic.  Continue telemetry monitoring.   - restarted metoprolol 12.5 mg BID today.  Due to Afib.  Monitor closely and discontinue if persistently bradycardic.       PAF (paroxysmal atrial fibrillation)  On 4/24 while pt was in HD, she went into Afib with HR in the 90s.  She became very tachypnic and SOB and clinically was working hard to breath. Although peripheral oxygen saturation was undetectable due to poor correlation, an ABG showed a PO2 >400 on a non re breather. Pt was given a 5 mg IV push of metoprolol and started on metoprolol 12.5 BID, with great clinical improvement and pt able to breath much more comfortably. An hour later, she converted back to normal sinus. Continue Eliquis for long term anticoagulation.      3-vessel CAD  Controlled. Plan to continue ASA and Lipitor to treat.       Unstageable pressure ulcer  Multiple pressure ulcers.  Sacral, right calf, and left heel.  Will consult wound care.       Severe protein-calorie malnutrition  Continue novasource      VTE Risk Mitigation         Ordered     apixaban tablet 2.5 mg  2 times daily     Route:  Oral        04/15/17 1618     Medium Risk of VTE  Once      04/15/17 1431          Tahira Tomlinson MD  Department of Hospital Medicine   [facility]

## 2017-04-24 NOTE — ASSESSMENT & PLAN NOTE
Now Resolved.   · MRI of brain unremarkable for acute stroke or bleed on 4/15.   · AM Cortisol level normal 14.7. Endocrine consulted and recommended Cosyntropin stim test that was ordered by them on 4/18 to evaluate for relative adrenal insufficiency and was normal.   · Will hold all narcotics/opiates for pain. Avoid any sleep aid agents at this time that could affect alertness level.  · Endocrine consulted on 4/18 to see if hypothyroidism is causing fatigue and lethargy but they stated no evidence of myxedema but stated they would do trial of IV Synthroid to see if helps patient but they doubt hypothyroidism cause of lethargy.   · EP Cardiology consulted and evaluated patient to see if bradycardia causing fatigue/weakness but feel unlikely and do not recommend pacemaker placement at this time and recommended follow-up with Dr. Antoine in EP clinic as outpatient and he would consider once patient euthyroid.   · Neurology consulted on 4/18 and recommend carotid and transcranial doppler ultrasound to look for low flow to brain - normal.   · Infectious disease consulted for possible occult infection as cause but they felt highly unlikely as patient had no clinical signs to suggest infection but did draw blood cultures due to heart murmur on 4/18 and so far blood cultures are negative but ID did not feel any other infectious work-up or empiric treatment with antibiotics indicated. Neurology consulted to evaluate patient for lethargy. They did not feel any clinical evidence of stroke but did recommend transcranial doppler and Carotid US to assess for decreased blood flow to brain as cause. Carotid ultrasound shows <39% stenosis bilaterally and transcranial doppler was limited, but what was able to be assessed was normal.   · On 4/20, I started the pt on liothyronine 5 mcg daily as a trial to see if it help with symptoms, and she responded very well and was much more alert and less tired. Endocrine recommended stopping  after 5-7 days and due to episode of Afib today, will discontinue.   · Continue synthroid 40 IV daily and at discharge change to 75 mcg po daily.

## 2017-04-24 NOTE — NURSING
"Pt noted to increase in work of breathing again, which has been a slow increase since the initial episode at 1330. Pt did improve, but has slowing increased again. Pt asking for "that mask", family noting "that mask" is the bipap mask. Pt getting restless. Family reports pt trying to get out of bed. Will pass information along to MD. HR 55, pulse ox 97%, RR 24 Dr. Tomlinson paged.  "

## 2017-04-24 NOTE — PROGRESS NOTES
Blood returned to patient and dialysis machine changed out.  Treatment restarted on new machine with new system

## 2017-04-24 NOTE — PLAN OF CARE
Pt denied from 3 SNF referrals: [facility], [facility] and [facility]. SW to re-refer pt pending new therapy notes.    Debbie Galarza LMSW, CCM  [phone]

## 2017-04-24 NOTE — PROGRESS NOTES
Received report from Margie MARTINEZ  Patient arrived via bed.  Unable to obtain pre weight due to bed not zeroed  15 gauge needles x 2 placed in upper left arm fistula  Obtained ordered BFR without difficulty  Net fluid removal set for 2.5L  3 3/4 hour maintence hemodialysis started

## 2017-04-24 NOTE — PHYSICIAN QUERY
PT Name: Padmini Miranda  MR #: [id number]    Physician Query Form - Nutrition Clarification     CDS/: Venancio Aaron               Contact information: [phone]    This form is a permanent document in the medical record.     Query Date: April 24, 2017    By submitting this query, we are merely seeking further clarification of documentation.. Please utilize your independent clinical judgment when addressing the question(s) below.    The Medical record contains the following:   Indicators  Supporting Clinical Findings Location in Medical Record   x % of Estimated Energy Intake over a time frame from p.o., TF, or TPN Eating has improved a little and she was able to eat a supplement  drink and a bowl of soup yesterday.    04/23/17 Hospital Medicine   Progress Note    Weight Status over a time frame     x Subcutaneous Fat and/or Muscle Loss She appears lethargic. She appears cachectic  04/15/17 H&P    Fluid Accumulation or Edema      Reduced  Strength     x Wt / BMI / Usual Body Weight [id number]  BMI=20.49  04/15/17 H&P   x Delayed Wound Healing / Failure to Thrive Right buttock Stage 2 pressure injury  04/18/17 Wound Care   Progress Note   x Acute or Chronic Illness Metabolic encephalopathy, fatigue, left heel ulcer, dialysis, ESRD, Cachetic    04/15/17 H&P   x Medication nystatin 100,000 unit/mL suspension 500,000 Units  :  Dose 500,000 Units  :  Oral  :  4 times daily  :      MAR    Treatment     x Other Mild thrush in oropharynx  04/15/17 H&P     AND / ASPEN Clinical Characteristics (October 2011)  A minimum of two characteristics is recommended for diagnosing either moderate or severe malnutrition   Mild Malnutrition Moderate Malnutrition Severe Malnutrition   Energy Intake from p.o., TF or TPN. < 75% intake of estimated energy needs for less than 7 days < 75% intake of estimated energy needs for greater than 7 days < 50% intake of estimated energy needs for > 5 days   Weight Loss 1-2% in 1 month  5%  in 3 months  7.5% in 6 months  10% in 1 year 1-2 % in 1 week  5% in 1 month  7.5% in 3 months  10% in 6 months  20% in 1 year > 2% in 1 week  > 5% in 1 month  > 7.5% in 3 months  > 10% in 6 months  > 20% in 1 year   Physical Findings     None *Mild subcutaneous fat and/or muscle loss  *Mild fluid accumulation  *Stage II decubitus  *Surgical wound or non-healing wound *Mod/severe subcutaneous fat and/or muscle loss  *Mod/severe fluid accumulation  *Stage III or IV decubitus  *Non-healing surgical wound     Provider, please specify diagnosis or diagnoses associated with above clinical findings.    [ ] Mild Protein-Calorie Malnutrition  [ ] Moderate Protein-Calorie Malnutrition  [x ] Severe Protein-Calorie Malnutrition  [ ] Cachexia  [ ] Anorexia  [ ] Abnormal Weight Loss  [ ] Underweight  [ ] Other Nutritional Diagnosis (please specify): ____________________________________  [ ] Other: ________________________________  [ ] Clinically Undetermined    Please document in your progress notes daily for the duration of treatment until resolved and include in your discharge summary.

## 2017-04-24 NOTE — ASSESSMENT & PLAN NOTE
Controlled. Patient still with persistent sinus bradycardia but HR now in 50-60's instead of 40-50's as on last hospital stay in early April. Cardiology re-evaluated patient to see if they feel bradycardia could be contributing to patient's lethargy but they doubt and do not recommend pacemaker needed at this time and recommends outpatient follow-up with Dr. Antoine in EP Cardiology clinic.  Continue telemetry monitoring.   - restarted metoprolol 12.5 mg BID today.  Due to Afib.  Monitor closely and discontinue if persistently bradycardic.

## 2017-04-24 NOTE — NURSING
Pt noted with tachypnea upon arrival to floor post dialysis with low O2 sat 87% on NC 2L. Dr. Tomlinson was paged and later a rapid response was called 2nd to respiratory status and inability to get a pulse ox, except a 1 time in the 60s. Orders were noted and carried out. Pt is resting and more at ease at this point. Pt is not tolerating lying flat or turing for cleanings. Pt gets dizzy and SOB. Will con't to monitor.

## 2017-04-24 NOTE — PROGRESS NOTES
Patient completed 3 3/4 hour hemodialysis  Patient had episodes of tachycardia with complaints of palapations during treatment and during rinseback  Fluid removal stopped and 100cc of 25% albumin along with 200cc flush given with HR decreasing into 90's  Dr. APRIL Gong aware and ordered no UF for rest of treatment  Patient had episode of tachycardia with palpations and nausea during rinseback.  Dr. Tomlinson and Dr. Iverson notified.  Received order for EKG and sublingual zofran.  Patient given Zofran.  Stat EKG done and A fib noted.  Dr. Tomlinson aware and okayed for patient to return to unit to HERB Kincaid RN  Net fluid removal of 0 for treatment  Hemostasis obtained after pressure applied to needle sites x 15 minutes  Patient left [facility] via own bed in no apparent distress

## 2017-04-24 NOTE — PROGRESS NOTES
[facility]  Consult Note Nephrology    Admit Date: 4/15/2017  Consult Requested By: Tahira Tomlinson MD   LOS: 9 days     SUBJECTIVE:     Follow-up For:  ESRD    Interval History:  CLOVIS, seen in [facility] while on HD, During HD HR increased to 's UF was stopped and remained stable for rest of treatment. She was not feeling well 200 cc NS bolus plus rinse back no UF was done. Treatment was stopped short and she felt better. HR slowly improving.    Review of Systems:  Constitutional: no fever or chills  Respiratory: no cough or shortness of breath  Cardiovascular: no chest pain positive palpitations  Gastrointestinal: no nausea or vomiting, no abdominal pain or change in bowel habits  Hematologic/Lymphatic: no easy bruising or lymphadenopathy  Musculoskeletal: no arthralgias or myalgias  Neurological: no seizures or tremors      OBJECTIVE:     Vital Signs (Most Recent)  Temp: 97.8 °F (36.6 °C) (04/24/17 1600)  Pulse: (!) 55 (04/24/17 1600)  Resp: (!) 55 (04/24/17 1600)  BP: (!) 106/53 (04/24/17 1600)  SpO2: 99 % (04/24/17 1600)    Vital Signs Range (Last 24H):  Temp:  [97.5 °F (36.4 °C)-98.2 °F (36.8 °C)]   Pulse:  []   Resp:  [14-55]   BP: (101-148)/(29-71)   SpO2:  [66 %-100 %]       Intake/Output Summary (Last 24 hours) at 04/24/17 1711  Last data filed at 04/24/17 1230   Gross per 24 hour   Intake              900 ml   Output             1157 ml   Net             -257 ml         Physical Exam:   General: Well developed, well nourished in NAD  HEENT: Conjunctiva clear; Oropharynx clear  Neck: No JVD noted, Supple  CV- Normal S1, S2 with no murmurs,gallops,rubs  Resp- Lungs CTA Bilaterally, Unlabored  Abdomen- NTND, BS normoactive x4 quads, soft  Extrem- No cyanosis, clubbing, trace + pitting edema.  Skin- No rashes, lesions, ulcers  Neuro: awake, Oriented x3, no FND      Laboratory:  CBC:     Recent Labs  Lab 04/21/17  1020   WBC 11.82   RBC 3.10*   HGB 8.6*   HCT 26.0*   *   MCV  84   MCH 27.7   MCHC 33.1     BMP:     Recent Labs  Lab 04/18/17  0342  04/24/17  0534   GLU 93  < > 110     < > 101   CO2 22*  < > 21*   BUN 13  < > 51*   CREATININE 2.7*  < > 4.6*   CALCIUM 9.0  < > 8.9   MG 2.0  --   --    < > = values in this interval not displayed.  CMP:     Recent Labs  Lab 04/18/17  0342  04/24/17  0534   GLU 93  < > 110   CALCIUM 9.0  < > 8.9   ALBUMIN 3.1*  < > 2.5*   PROT 6.0  --   --      < > 134*   K 4.1  < > 4.1   CO2 22*  < > 21*     < > 101   BUN 13  < > 51*   CREATININE 2.7*  < > 4.6*   ALKPHOS 77  --   --    ALT 17  --   --    *  --   --    BILITOT 2.3*  --   --    < > = values in this interval not displayed.  PTH: No results for input(s): PTH in the last 168 hours.  Coagulation:   No results for input(s): INR, APTT in the last 168 hours.    Invalid input(s): PT  Cardiac Markers: No results for input(s): CKMB, TROPONINT, MYOGLOBIN in the last 168 hours.  ABGs:     Recent Labs  Lab 04/24/17  1419   PH 7.382   PCO2 52.5*   HCO3 31.1*   POCSATURATED 100   BE 6       Labs reviewed  Diagnostic Results:  X-Ray: Reviewed  US: Reviewed  Echo: Reviewed    ASSESSMENT/PLAN:     Active Hospital Problems    Diagnosis  POA    *Acute encephalopathy [G93.40]  Yes    Unstageable pressure ulcer [L89.95]  Clinically Undetermined    Nausea [R11.0]  No    Osteoporosis [M81.0]  Yes    Encephalopathy [G93.40]  Yes    Bibasilar crackles [R09.89]  Yes    Oral thrush [B37.0]  Yes    Abnormal LFTs [R79.89]  Yes     Chronic    Hyponatremia [E87.1]  Yes    Acute blood loss anemia [D62]  Yes    Bradycardia [R00.1]  Yes    Bradycardia, drug induced [R00.1, T50.905A]  Yes    Closed displaced fracture of right femoral neck s/p hemiarthroplasty on 4/5/2017 [S72.001A]  Yes    3-vessel CAD [I25.10]  Yes    PAF (paroxysmal atrial fibrillation) [I48.0]  Yes    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     ESRD on IHD TTS   Out patient [facility] - [facility]/ Dr. Kaminski  On HD for: ~ 1 year  Duration of outpatient dialysis session - 3.75 hrs  EDW - 54 kg  Residual Mary Ann Function - no  - Will provide dialysis for metabolic clearance and volume management x 3.5 hrs  - Seen and examined today during hemodialysis; tolerating treatment well without issues. Denied headaches, chest pain, abdominal pain, or muscle cramps   - Albumin with HD   - Midodrine 30 min prior HD  -   - Target ultrafiltration 0 lts  - Dialysate adjusted to current labs   Aceess: EDILBERTO AVF with good thrill and bruit      Active problem in hospital  - AMS      Anemia of Chronic Kidney Disease   - Will cont CURTIS with HD EPO 3000 uts  - TSAT 77 Ferritin 674 will need Fe supplement      Mineral Bone Disease in CKD   - Renal Function Panel Daily for electrolytes monitoring  - Phos 2.8  -  Off DC binders   - CoCa WNL      Nutrition   - Cont cardiac diet      HTN   - Controlled BP, will continue to monitor. Goal for BP is <130 mmHg SBP and BDP <80 mmHg.   - Cont Bidil 1 tab TID  - Coreg on hold secondary to bradycardia      Case discuss with Staff further recs with attestation.      Octavio Gong MD  Nephrology Fellow PGY4  [phone]

## 2017-04-24 NOTE — SUBJECTIVE & OBJECTIVE
Interval History: while pt was in HD, she went into Afib with HR in the 90s.  She became very tachypnic and SOB and clinically was working hard to breath. Although peripheral oxygen saturation was undetectable due to poor correlation, an ABG showed a PO2 >400 on a non re breather. Pt was given a 5 mg IV push of metoprolol and started on metoprolol 12.5 BID, with great clinical improvement and pt able to breath much more comfortably. An hour later, she converted back to normal sinus.    Review of Systems   Constitutional: Positive for fatigue. Negative for chills and fever.   Respiratory: Negative for cough and shortness of breath.    Cardiovascular: Negative for chest pain.   Gastrointestinal: Negative for abdominal pain and nausea.   Musculoskeletal: Positive for myalgias (Right hip pain). Negative for back pain.   Skin: Negative for rash.   Neurological: Positive for weakness (Generalized). Negative for dizziness.   Psychiatric/Behavioral: Negative for confusion and hallucinations.     Objective:     Vital Signs (Most Recent):  Temp: 97.8 °F (36.6 °C) (04/24/17 1600)  Pulse: (!) 55 (04/24/17 1600)  Resp: (!) 55 (04/24/17 1600)  BP: (!) 106/53 (04/24/17 1600)  SpO2: 99 % (04/24/17 1600) Vital Signs (24h Range):  Temp:  [97.5 °F (36.4 °C)-98.2 °F (36.8 °C)] 97.8 °F (36.6 °C)  Pulse:  [] 55  Resp:  [14-55] 55  SpO2:  [66 %-100 %] 99 %  BP: (101-148)/(29-71) 106/53     Weight: 50.8 kg (112 lb)  Body mass index is 20.49 kg/(m^2).    Intake/Output Summary (Last 24 hours) at 04/24/17 1719  Last data filed at 04/24/17 1230   Gross per 24 hour   Intake              900 ml   Output             1157 ml   Net             -257 ml      Physical Exam   Constitutional: No distress.   Eyes: No scleral icterus.   Neck: Neck supple. No JVD present.   Cardiovascular: Regular rhythm and normal heart sounds.  Bradycardia present.  Exam reveals no gallop and no friction rub.    No murmur heard.  Pulmonary/Chest: Effort normal. She  has rales (Mild rales in bases).   Abdominal: Soft. Bowel sounds are normal. She exhibits no distension. There is no tenderness.   Musculoskeletal: She exhibits edema (1-2 + edema in lower extremities; 1+ edema in left arm).   Neurological: She is alert.   Skin: Skin is warm and dry.   Psychiatric: She has a normal mood and affect. Her speech is normal.

## 2017-04-24 NOTE — ASSESSMENT & PLAN NOTE
Patient is POD # 17. Patient reports no pain in right hip currently and main pain related to her neuropathic pain. Plan to continue PT/OT for gait training and strengthening and restoration of ADL's on this admit to continue therapy. Patient is FWB/WBAT: right lower extremity, posterior hip precautions as per Orthopedic recommendations. Patient on Eliquis for long term anticoagulation for atrial fibrillation so no DVT prophylaxis needed. Pain is well controlled and will use just Tylenol for pain and avoid any sedating narcotics. Patient needs SNF placement on discharge.

## 2017-04-24 NOTE — ASSESSMENT & PLAN NOTE
On 4/24 while pt was in HD, she went into Afib with HR in the 90s.  She became very tachypnic and SOB and clinically was working hard to breath. Although peripheral oxygen saturation was undetectable due to poor correlation, an ABG showed a PO2 >400 on a non re breather. Pt was given a 5 mg IV push of metoprolol and started on metoprolol 12.5 BID, with great clinical improvement and pt able to breath much more comfortably. An hour later, she converted back to normal sinus. Continue Eliquis for long term anticoagulation.

## 2017-04-24 NOTE — ASSESSMENT & PLAN NOTE
Appreciate Endocrine consult and they do not feel that any adjustments needed in Synthroid but will give trial of IV Synthroid as per Endocrine to see if helps patient. Endocrine notes no signs of myxedema so doubt lethargy related to thyroid disease.   · On 4/20, I started the pt on liothyronine 5 mcg daily as a trial to see if it help with symptoms, and she responded very well and was much more alert and less tired. Endocrine recommended stopping after 5-7 days and due to episode of Afib today, will discontinue.   · Continue synthroid 40 IV daily and at discharge change to 75 mcg po daily.   · Reverse T3 pending

## 2017-04-24 NOTE — PROGRESS NOTES
Dr. APRIL Dupont notified of patient increased HR and palpations.  Albumin and 200cc bolus of saline given.  HR decreased to 90 range.  Received order to keep UF rest for additional 30 minutes

## 2017-04-25 PROBLEM — D72.829 LEUKOCYTOSIS: Status: ACTIVE | Noted: 2017-01-01

## 2017-04-25 NOTE — PROGRESS NOTES
Paged Dr. Saldana to inform of lab glucose = 57mg/dl and troponin increased to 0.322 at 4 am from 0.271 at midnight. Also paged to inform of HR = 46 and patient scheduled to receive Lopressor po at 9am.  0828am: Dr. Saldana returned page and informed of above. Will hold am Lopressor. Also will provide total assist with meals and recheck glucose after meal. Will continue to monitor.

## 2017-04-25 NOTE — PLAN OF CARE
Problem: Hemodialysis (Adult)  Intervention: Protect/Monitor Dialysis Access Site  EDILBERTO AV graft patent with audible bruit and palpable thrill. Patient scheduled for HD in the am.

## 2017-04-25 NOTE — NURSING
Patient sustained HR 34 for minute and a half, notified by telemetry. Patient was sleeping, no c/o pain, /65, o2 100% on 4L, patient HR currently 44, IMH services called no further orders given, will continue to monitor patient.

## 2017-04-25 NOTE — PLAN OF CARE
Problem: Fall Risk (Adult)  Intervention: Patient Rounds  No injuries noted this shift. Bed alarm on.

## 2017-04-25 NOTE — PLAN OF CARE
Problem: Patient Care Overview  Goal: Plan of Care Review  Patient awake, alert and oriented x 4. Poor appetite noted with renal diet; honey thickened liquids to liquids. Troponin being monitored every 6 hours; currently increased to 0.454 from 0.322.

## 2017-04-25 NOTE — PLAN OF CARE
Problem: Pressure Ulcer Risk (Amandeep Scale) (Adult,Obstetrics,Pediatric)  Intervention: Turn/Reposition Often  Right calf skin tear cleansed with NS and covered with Mepore dressing. Left heel laceration covered with Mepilex dressing per patient's request (due to be changed on Mon and Thursdays). Stage II to buttocks noted; cleansed with wound cleansed, Medihoney applied with Mepore .

## 2017-04-25 NOTE — PLAN OF CARE
Pt. with abnormal labs and vital signs. Pt. Not medically stable for d/c. CM  will continue to follow. AMRIT Aguilar RN/CM

## 2017-04-26 PROBLEM — I46.9 CARDIAC ARREST: Status: ACTIVE | Noted: 2017-01-01

## 2017-04-26 NOTE — DISCHARGE SUMMARY
[facility]  Critical Care Medicine  Discharge Summary      Patient Name: Padmini Miranda  MRN: [id number]  Admission Date: 4/15/2017  Hospital Length of Stay: 11 days  Discharge Date and Time:  04/26/2017   Attending Physician: Ethan Perry MD   Discharging Provider: Herminia Wade MD  Primary Care Provider: Randy Lyles MD  Reason for Admission: lethargy    HPI:   84 y/o with past medical history of chronic combined systolic and diastolic heart failure, 3 vessel CAD, atherosclerosis of bilateral lower extremity with left heel ulcer with recent PTA of left SFA on 4/3 by Vascular surgery, essential HTN, hypothyroidism and ESRD on HD was sent from acute dialysis today due to concern for increasing lethargy. Family also noted that patient appeared to be slurring her words and noted right eye was drooping and patient was having difficulty staying awake and due to thus concerns was sent to [facility]. CTscan of head showed no acute findings but MRI of brain has been ordered to better evaluate. Patient's daughters at bedside and report for past 1 week patient has been sleeping more than normal and not nearly as alert as she usually is. I am familiar with patient as she was just discharged from my medical service on 4/13 and patient does appear to be more sleepy than normal but when aroused in the ER patient is responding to questions appropriately. Patient reports she generally feels weak but denies any focal weakness in ER. Family is very upset as they feel patient was not progressing with PT prior to discharge to SNF on 4/13 and feel patient has not been herself for over 1 week with increased lethargy.     As noted patient was just discharged from [facility] on 4/13 to [facility] [place] after prolonged hospitalization following surgical repair of a right hip fracture. Patient even prior to that surgery was seen and evaluated by Cardiology for sinus bradycardia with HR in  30-40's. Patient during admit was felt not to need pacemaker placement and felt bradycardia related to Amiodarone and Coreg that had been recently been discontinued. Patient eventually underwent right hip hemiarthroplasty on 4/5 posterior-op, patient noted to be hypothermic and persistently bradycardiac so patient was evaluated for possible infection but there was no concern for infection, as clinically, the patient had normal WBC and no clinical symptoms to suggest infection. TSH (normal on 3/10) was checked during recent admit and found to be severely elevated at 21.6, with low normal Free T4 of 0.88. Synthroid was adjusted from 50 mcg to 75 mcg daily to treat hypothyroidism. Patient did complain of odynophagia after surgery felt related to ET tube. Speech therapy consult who recommended MBSS and patient underwent MBSS on 4/12 and patient passed with recommendation by Speech for regular diet and nectar thickened liquids. Patient to be discharged to [facility] for continued PT/OT but needs outpatient follow-up with EP Cardiology to reassess need for pacemaker for sinus bradycardia on 4/13.       * No surgery found *    Indwelling Lines/Drains at Time of Discharge:   Lines/Drains/Airways     Central Venous Catheter Line                 Trialysis (Dialysis) Catheter 04/26/17 1153 left femoral less than 1 day          Drain                 Hemodialysis AV Fistula -- days         NG/OG Tube 04/26/17 Right nostril less than 1 day          Airway                 Airway - Non-Surgical 04/26/17 1045 less than 1 day          Pressure Ulcer                 Pressure Ulcer 04/15/17 1922 sacral spine Stage II 10 days              Hospital Course:   MRI of rafat unremarkable for any signs of an acute stroke or bleed to explain increased fatigue or lethargy. Patient afebrile with normal WBC so infectious etiology felt highly unlikely. Random am cortisol done was 14.7 so does not appear to be consistent with adrenal insufficiency  but Endocrine consulted. Cardiology re-consulted for patient's bradycardia to see if contributing to patient's increasing lethargy and fatigue and unclear if it is causing patient's symptoms but Cardiology doubts is true cause of lethargy and recommended Endocrine consult to look for thyroid issues as cause of bradycardia and somulence. Cardiology recommends no need for pacemaker at this time and to follow-up with EP Cardiology as outpatient. Neurontin and opiates for pain discontinued in case they were contributing to cause. Patient with recent diagnosis of hypothyroidism likely related to Amiodarone that recently was discontinued due to bradycardia. Endocrine consulted on 4/18 and evaluated patient and not worried by thyroid function but concerned for adrenal insufficiency so Cosyntropin stimulation test ordered on 4/18 to evaluate patient. Patient dialyzed by Nephrology on 4/18 a day prior to normal dialysis day as showing signs of hypervolemia. Patient slightly more alert and responsive on 4/18 and did work with PT. Infectious disease consulted for possible occult infection as cause but they felt highly unlikely as patient had no clinical signs to suggest infection but did draw blood cultures due to heart murmur on 4/18 and so far blood cultures are negative but ID did not feel any other infectious work-up or empiric treatment with antibiotics indicated. Neurology consulted to evaluate patient for lethargy. They did not feel any clinical evidence of stroke but did recommend transcranial doppler and Carotid US to assess for decreased blood flow to brain as cause. Carotid ultrasound shows <39% stenosis bilaterally and transcranial doppler was limited, but what was able to be assessed was normal.  Patient alertness level is minimally improved, but she is not back to baseline. Cytomel 5 mg BID started on 4/20 to see if that helps improve symptoms. Definite improvement was noted on new medication and pt was sitting  up in bedside chair for the majority of the day, very interactive, and cracking jokes.  She did complain of some abdominal pain and a KUB showed mild constipation and she was started on a stool softener. On 4/24 while pt was in HD, she went into Afib with HR in the 90s.  She became very tachypnic and SOB and clinically was working hard to breath. Although peripheral oxygen saturation was undetectable due to poor correlation, an ABG showed a PO2 >400 on a non re breather. Pt was given a 5 mg IV push of metoprolol and started on metoprolol 12.5 BID, with great clinical improvement and pt able to breath much more comfortably. An hour later, she converted back to normal sinus. Cytomel stopped as it likely precipitated this episode.     4/26 - Patient was admitted to the ICU after coding in dialysis.  The patient underwent cardiopulmonary resuscitation and was intubated during the code.  She was subsequently transferred to ICU after achieving ROSC.  Upon arrival to ICU, the patient coded again; however the patient was made DNR and the patient passed.        Consults         Status Ordering Provider     Inpatient consult to Cardiology  Once     Provider:  (Not yet assigned)    Completed SONALI MCKINNEY     Inpatient consult to Electrophysiology  Once     Provider:  (Not yet assigned)    Completed JESSICA JEREZ     Inpatient consult to Endocrinology  Once     Provider:  (Not yet assigned)    Completed SONALI MCKINNEY     Inpatient consult to Infectious Diseases  Once     Provider:  (Not yet assigned)    Completed PAULA REEDER     Inpatient consult to Midline team  Once     Provider:  (Not yet assigned)    Completed SONALI MCKINNEY     Inpatient consult to Nephrology  Once     Provider:  (Not yet assigned)    Completed SONALI MCKINNEY     Inpatient consult to Neurology  Once     Provider:  (Not yet assigned)    Completed PAULA REEDER        Significant Labs:  ABGs:   Recent Labs  Lab  04/24/17  1419   PH 7.382   PCO2 52.5*   HCO3 31.1*   POCSATURATED 100   BE 6     CMP:   Recent Labs  Lab 04/25/17  0351  04/26/17  0606 04/26/17  1100 04/26/17  1133     < > 137 148* 149*   K 4.7  < > 5.2* 4.2 4.2     < > 100 102 101   CO2 20*  < > 21* 23 22*   GLU 57*  < > 90 151* 145*   BUN 34*  < > 72* 46* 45*   CREATININE 3.0*  < > 3.9* 2.5* 2.5*   CALCIUM 8.7  < > 8.9 8.9 8.8   PROT  --   --   --  4.5*  --    ALBUMIN 2.7*  --  2.5* 2.7*  --    BILITOT  --   --   --  2.3*  --    ALKPHOS  --   --   --  74  --    AST  --   --   --  716*  --    ALT  --   --   --  134*  --    ANIONGAP 16  < > 16 23* 26*   EGFRNONAA 13.6*  < > 9.9* 17.0* 17.0*   < > = values in this interval not displayed.  Cardiac Markers: No results for input(s): CKMB, TROPONINT, MYOGLOBIN in the last 48 hours.  Lactic Acid:   Recent Labs  Lab 04/26/17  1133   LACTATE >12.0*       Significant Imaging:  I have reviewed all pertinent imaging results/findings within the past 24 hours.    Pending Diagnostic Studies:     Procedure Component Value Units Date/Time    CBC auto differential [[id number]] Collected:  04/26/17 1100    Order Status:  Sent Lab Status:  In process Updated:  04/26/17 1141    Specimen:  Blood from Blood     Narrative:       ADD-ON ORDERS ONLY:->Redraw test if specimen not available    CBC auto differential [[id number]] Collected:  04/26/17 1133    Order Status:  Sent Lab Status:  In process Updated:  04/26/17 1133    Specimen:  Blood from Blood         Final Active Diagnoses:    Diagnosis Date Noted POA    PRINCIPAL PROBLEM:  Acute encephalopathy [G93.40] 04/15/2017 Yes    Cardiac arrest [I46.9] 04/26/2017 Unknown    Leukocytosis [D72.829] 04/25/2017 No    Severe protein-calorie malnutrition [E43] 04/24/2017 Yes    Unstageable pressure ulcer [L89.95] 04/23/2017 Clinically Undetermined    Osteoporosis [M81.0] 04/21/2017 Yes    Oral thrush [B37.0] 04/15/2017 Yes    Abnormal LFTs [R79.89] 04/15/2017 Yes     Chronic     Acute blood loss anemia [D62] 2017 Yes    Bradycardia [R00.1] 2017 Yes    Bradycardia, drug induced [R00.1, T50.905A] 2017 Yes    Closed displaced fracture of right femoral neck s/p hemiarthroplasty on 2017 [S72.001A] 2017 Yes    3-vessel CAD [I25.10] 2017 Yes    PAF (paroxysmal atrial fibrillation) [I48.0] 03/10/2017 Yes    Atherosclerosis of native artery 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 [I70.244] 2016 Yes    ESRD on hemodialysis [N18.6, Z99.2] 2015 Not Applicable     Chronic    Chronic combined systolic and diastolic heart failure [I50.42] 2015 Yes    Renovascular hypertension [I15.0] 2015 Yes     Chronic    Acquired hypothyroidism [E03.9]  Yes      Problems Resolved During this Admission:    Diagnosis Date Noted Date Resolved POA    Hypoxia [R09.02]  2017 Unknown    Nausea [R11.0] 2017 No    Encephalopathy [G93.40] 2017 Yes    Bibasilar crackles [R09.89] 2017 Yes    Hyponatremia [E87.1] 2017 Yes    Acute respiratory failure with hypoxia [J96.01] 12/15/2015 2017 Yes     Cardiac  Cardiac arrest  Patient was admitted to the ICU after coding in dialysis.  The patient underwent cardiopulmonary resuscitation and was intubated during the code.  She was subsequently transferred to ICU after achieving ROSC.  Upon arrival to ICU, the patient coded again; however the patient was made DNR and the patient passed.          Discharged Condition:     Disposition:       Patient Instructions:   No discharge procedures on file.  Medications:  None (patient  at medical facility)    Herminia Wade MD  Critical Care Medicine  [facility]

## 2017-04-26 NOTE — ASSESSMENT & PLAN NOTE
On 4/24 while pt was in HD, she went into Afib with HR in the 90s.  She became very tachypnic and SOB and clinically was working hard to breath. Although peripheral oxygen saturation was undetectable due to poor correlation, an ABG showed a PO2 >400 on a non re breather. Pt was given a 5 mg IV push of metoprolol and started on metoprolol 12.5 BID, with great clinical improvement and pt able to breath much more comfortably. An hour later, she converted back to normal sinus. Continue Eliquis for long term anticoagulation. Metoprolol stopped on 4/25 due to low HR in 40's.

## 2017-04-26 NOTE — SUBJECTIVE & OBJECTIVE
Past Medical History:   Diagnosis Date    3-vessel CAD     Acquired hypothyroidism     ALLERGIC RHINITIS     Anemia in ESRD (end-stage renal disease) 2017    Anticoagulant long-term use     Atherosclerosis of native artery 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 2016    Atherosclerotic PVD with ulceration 2016    Blood transfusion     Cataract     Chronic combined systolic and diastolic heart failure 2015    Closed displaced fracture of right femoral neck s/p hemiarthroplasty on 2017    Cramp of both lower extremities 2016    ESRD on hemodialysis 2015    Hyperlipidemia     Ischemic cardiomyopathy 10/20/2015    Non-ST elevation MI (NSTEMI) 2015    PAF (paroxysmal atrial fibrillation) 3/10/2017    Renovascular hypertension 2015    Sinus brea-tachy syndrome 2017    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       Past Surgical History:   Procedure Laterality Date    ANGIOPLASTY      Renal PTA    CARDIAC CATHETERIZATION       SECTION      HIP FRACTURE SURGERY Right 2017    Hemiarthroplasty for displaced femoral neck fracture    HYSTERECTOMY      ovaries intact    RENAL ARTERY STENT      TONSILLECTOMY      VASCULAR SURGERY         Review of patient's allergies indicates:   Allergen Reactions    Ativan [lorazepam] Hallucinations    Crab Other (See Comments)     Causes Gout    Crayfish Other (See Comments)     Causes Gout    Promethazine Other (See Comments)     Hallucinations        Family History     Problem Relation (Age of Onset)    Heart disease Father        Social History Main Topics    Smoking status: Never Smoker    Smokeless tobacco: Never Used    Alcohol use Yes      Comment: occasional wine use    Drug use: No    Sexual activity: No      Review of Systems   Unable to perform ROS: Acuity of condition     Objective:     Vital Signs (Most Recent):  Temp: (!) 92.8 °F (33.8 °C) (17  1216)  Pulse: 67 (04/26/17 1216)  Resp: (!) 36 (04/26/17 1216)  BP: (!) 137/57 (04/26/17 1216)  SpO2: (!) 72 % (04/26/17 1216) Vital Signs (24h Range):  Temp:  [92.8 °F (33.8 °C)-97.5 °F (36.4 °C)] 92.8 °F (33.8 °C)  Pulse:  [36-76] 67  Resp:  [9-36] 36  SpO2:  [72 %-100 %] 72 %  BP: ()/(36-74) 137/57   Weight: 50.8 kg (112 lb)  Body mass index is 20.49 kg/(m^2).      Intake/Output Summary (Last 24 hours) at 04/26/17 1235  Last data filed at 04/26/17 1216   Gross per 24 hour   Intake             1518 ml   Output              144 ml   Net             1374 ml       Physical Exam    Vents:  Vent Mode: A/C (04/26/17 1142)  Ventilator Initiated: Yes (04/26/17 1142)  Set Rate: 16 bmp (04/26/17 1142)  Vt Set: 380 mL (04/26/17 1142)  Pressure Support: 0 cmH20 (04/26/17 1142)  PEEP/CPAP: 10 cmH20 (04/26/17 1142)  Oxygen Concentration (%): 60 (04/26/17 1145)  Peak Airway Pressure: 37 cmH2O (04/26/17 1142)  Total Ve: 12.9 mL (04/26/17 1142)  F/VT Ratio<105 (RSBI): (!) 82.71 (04/26/17 1142)  Lines/Drains/Airways     Central Venous Catheter Line                 Trialysis (Dialysis) Catheter 04/26/17 1153 left femoral less than 1 day          Drain                 Hemodialysis AV Fistula -- days         NG/OG Tube 04/26/17 Right nostril less than 1 day          Airway                 Airway - Non-Surgical 04/26/17 1045 less than 1 day          Pressure Ulcer                 Pressure Ulcer 04/15/17 1922 sacral spine Stage II 10 days          Peripheral Intravenous Line                 Peripheral IV - Single Lumen 04/25/17 1040 Right Forearm 1 day              Significant Labs:    CBC/Anemia Profile:    Recent Labs  Lab 04/24/17  1835 04/26/17  0606 04/26/17  0739 04/26/17  1100   WBC 25.84* 13.93*  --   --    HGB 7.7* 5.6* 6.0*  --    HCT 24.6* 17.6* 17.8*  --     186  --   --    MCV 88 85  --   --    RDW 20.7* 20.9*  --   --    RETIC  --   --   --  2.9*        Chemistries:    Recent Labs  Lab 04/25/17  0351   04/26/17  0606 04/26/17  1100 04/26/17  1133     < > 137 148* 149*   K 4.7  < > 5.2* 4.2 4.2     < > 100 102 101   CO2 20*  < > 21* 23 22*   BUN 34*  < > 72* 46* 45*   CREATININE 3.0*  < > 3.9* 2.5* 2.5*   CALCIUM 8.7  < > 8.9 8.9 8.8   ALBUMIN 2.7*  --  2.5* 2.7*  --    PROT  --   --   --  4.5*  --    BILITOT  --   --   --  2.3*  --    ALKPHOS  --   --   --  74  --    ALT  --   --   --  134*  --    AST  --   --   --  716*  --    MG  --   --   --  2.0 1.8   PHOS 3.8  --  3.4 4.0 3.8   < > = values in this interval not displayed.    ABGs:   Recent Labs  Lab 04/24/17  1419   PH 7.382   PCO2 52.5*   HCO3 31.1*   POCSATURATED 100   BE 6     CMP:   Recent Labs  Lab 04/25/17  0351  04/26/17  0606 04/26/17  1100 04/26/17  1133     < > 137 148* 149*   K 4.7  < > 5.2* 4.2 4.2     < > 100 102 101   CO2 20*  < > 21* 23 22*   GLU 57*  < > 90 151* 145*   BUN 34*  < > 72* 46* 45*   CREATININE 3.0*  < > 3.9* 2.5* 2.5*   CALCIUM 8.7  < > 8.9 8.9 8.8   PROT  --   --   --  4.5*  --    ALBUMIN 2.7*  --  2.5* 2.7*  --    BILITOT  --   --   --  2.3*  --    ALKPHOS  --   --   --  74  --    AST  --   --   --  716*  --    ALT  --   --   --  134*  --    ANIONGAP 16  < > 16 23* 26*   EGFRNONAA 13.6*  < > 9.9* 17.0* 17.0*   < > = values in this interval not displayed.  Cardiac Markers: No results for input(s): CKMB, TROPONINT, MYOGLOBIN in the last 48 hours.    Significant Imaging: I have reviewed all pertinent imaging results/findings within the past 24 hours.

## 2017-04-26 NOTE — PROGRESS NOTES
Notified HD of repeat H&H  = 6/17.8 and new MD orders to transfuse 2 units PRBC in HD; stated understanding.

## 2017-04-26 NOTE — SIGNIFICANT EVENT
Patient coded while dialysis this am at around 10:10 am. I went bedside and ICU code team at bedside and was coding patient upon my arrival in acute dialysis. I called patient's daughter Rafaela Champagne and informed her of change in patient's status. After about 15 minutes of ACLS protocol patient regained pulse, BP and return of spontaneous circulation and transferred to medical ICU. Patient's daughter arrived to hospital about 15 minutes after code ended and she was updated by myself on her condition. Unfortunately, patient coded again while in ICU and passed away at 1313.     SONALI MCKINNEY MD  Attending Staff Physician   [facility]  Pager: [phone]  Spectralink: [phone]

## 2017-04-26 NOTE — SIGNIFICANT EVENT
Patient coded, taken off vent and bagged with Ambu bag. Patient declared DNR. Family requested to extubate patient for withdrawal of care.

## 2017-04-26 NOTE — ASSESSMENT & PLAN NOTE
Controlled. Patient still with persistent sinus bradycardia but HR now in 50-60's instead of 40-50's as on last hospital stay in early April. Cardiology re-evaluated patient to see if they feel bradycardia could be contributing to patient's lethargy but they doubt and do not recommend pacemaker needed at this time and recommends outpatient follow-up with Dr. Antoine in EP Cardiology clinic.  Continue telemetry monitoring.   Patient attempted to be placed on low dose Metoprolol after episode of atrial fib with RVR in HD on 4/24 but HR unable to tolerate as HR was in 40-50 range so had to be discontinued on 4/25.

## 2017-04-26 NOTE — PT/OT/SLP PROGRESS
Occupational Therapy      Padmini Bartholomewby  MRN: [id number]    Patient not seen today secondary to t/fd to ICU after coding then  thereafter.    SARAY Mcleod  2017

## 2017-04-26 NOTE — ASSESSMENT & PLAN NOTE
Appreciate Endocrine consult and they do not feel that any adjustments needed in Synthroid but will give trial of IV Synthroid as per Endocrine to see if helps patient. Endocrine notes no signs of myxedema so doubt lethargy related to thyroid disease.   · On 4/20, patient placed on Liothyronine 5 mcg daily as a trial to see if it help with symptoms, and she responded very well and was much more alert and less tired. Endocrine recommended stopping after 5-7 days but had episode of A. Fib with RVR on HD on 4/24 so discontinued on 4/24 as Cytomel felt to have contributed to SVT.   · Continue synthroid 40 IV daily and at discharge change to 75 mcg po daily.   · Reverse T3 pending

## 2017-04-26 NOTE — PROGRESS NOTES
[facility]  Consult Note Nephrology    Admit Date: 4/15/2017  Consult Requested By: Jalyn Saldana MD   LOS: 11 days     SUBJECTIVE:     Follow-up For:  ESRD    Interval History:  CLOVIS, seen in [facility] while on HD, Hg dropped this am Hg 6. One hour into treatment with no UF we stopped HD as she became more lethargic and her HR fluctuated mid 40's to 60's. She was alert and talking prior initiating HD. Shortly after she had NS bolus 200 cc x2 Plus albumin 25 gms 25% x2 and another 500 cc of NS bolus. Core call was called. Upon arrival of core call team pulse was lost and ACLS was started. ACLS s/p Cardioversion x 1. She was intubated and started on Levophed ROSC was about 20 minutes. She was then transferred to MICU under the care of CCS..  Review of Systems:  Un able to obtain due to intubation      OBJECTIVE:     Vital Signs (Most Recent)  Temp: 97.5 °F (36.4 °C) (04/26/17 0739)  Pulse: (!) 48 (04/26/17 0739)  Resp: 16 (04/26/17 0739)  BP: (!) 99/49 (04/26/17 0739)  SpO2: 100 % (04/26/17 0739)    Vital Signs Range (Last 24H):  Temp:  [97.2 °F (36.2 °C)-97.8 °F (36.6 °C)]   Pulse:  [36-66]   Resp:  [16]   BP: ()/(44-64)   SpO2:  [94 %-100 %]       Intake/Output Summary (Last 24 hours) at 04/26/17 1120  Last data filed at 04/26/17 0800   Gross per 24 hour   Intake              600 ml   Output                2 ml   Net              598 ml         Physical Exam:   General: Well developed, thin in NAD  HEENT: Conjunctiva clear; Oropharynx ETT  Neck: No JVD noted, Supple  CV- Normal S1, S2 with no murmurs,gallops,rubs  Resp- Lungs Decreased BS Bilaterally, Rales at bases and scant wheezes noted. Unlabored  Abdomen- NT, moderated Distention, BS normoactive x4 quads, soft  Extrem- No cyanosis, clubbing, trace + pitting edema.  Skin- No rashes, lesions, ulcers  Neuro: awake, Oriented x3, no FND      Laboratory:  CBC:     Recent Labs  Lab 04/26/17  0606 04/26/17  0739   WBC 13.93*  --    RBC 2.06*   --    HGB 5.6* 6.0*   HCT 17.6* 17.8*     --    MCV 85  --    MCH 27.2  --    MCHC 31.8*  --      BMP:     Recent Labs  Lab 04/26/17  0606   GLU 90      CO2 21*   BUN 72*   CREATININE 3.9*   CALCIUM 8.9     CMP:     Recent Labs  Lab 04/26/17  0606   GLU 90   CALCIUM 8.9   ALBUMIN 2.5*      K 5.2*   CO2 21*      BUN 72*   CREATININE 3.9*     PTH: No results for input(s): PTH in the last 168 hours.  Coagulation:   No results for input(s): INR, APTT in the last 168 hours.    Invalid input(s): PT  Cardiac Markers: No results for input(s): CKMB, TROPONINT, MYOGLOBIN in the last 168 hours.  ABGs:     Recent Labs  Lab 04/24/17  1419   PH 7.382   PCO2 52.5*   HCO3 31.1*   POCSATURATED 100   BE 6       Labs reviewed  Diagnostic Results:  X-Ray: Reviewed  US: Reviewed  Echo: Reviewed    ASSESSMENT/PLAN:     Active Hospital Problems    Diagnosis  POA    *Acute encephalopathy [G93.40]  Yes    Leukocytosis [D72.829]  No    Severe protein-calorie malnutrition [E43]  Yes    Unstageable pressure ulcer [L89.95]  Clinically Undetermined    Osteoporosis [M81.0]  Yes    Oral thrush [B37.0]  Yes    Abnormal LFTs [R79.89]  Yes     Chronic    Acute blood loss anemia [D62]  Yes    Bradycardia [R00.1]  Yes    Bradycardia, drug induced [R00.1, T50.905A]  Yes    Closed displaced fracture of right femoral neck s/p hemiarthroplasty on 4/5/2017 [S72.001A]  Yes    3-vessel CAD [I25.10]  Yes    PAF (paroxysmal atrial fibrillation) [I48.0]  Yes    Atherosclerosis of native artery 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 [I70.244]  Yes    ESRD on hemodialysis [N18.6, Z99.2]  Not Applicable     Chronic    Chronic combined systolic and diastolic heart failure [I50.42]  Yes    Renovascular hypertension [I15.0]  Yes     Chronic    Acquired hypothyroidism [E03.9]  Yes      Resolved Hospital Problems    Diagnosis Date Resolved POA    Hypoxia [R09.02] 04/24/2017 Unknown    Nausea [R11.0] 04/24/2017 No     Encephalopathy [G93.40] 04/24/2017 Yes    Bibasilar crackles [R09.89] 04/24/2017 Yes    Hyponatremia [E87.1] 04/24/2017 Yes    Acute respiratory failure with hypoxia [J96.01] 04/24/2017 Yes     ESRD on IHD TTS   Out patient [facility] - [facility]/ Dr. Kaminski  On HD for: ~ 1 year  Duration of outpatient dialysis session - 3.75 hrs  EDW - 54 kg  Residual Mary Ann Function - no  - Will provide dialysis for metabolic clearance and volume management x 3.5 hrs but treatment was stop early secondary to hypotesion and MS change  - Seen and examined today during hemodialysis; tolerating treatment well without issues. Denied headaches, chest pain, abdominal pain, or muscle cramps   - Hypotension developed   - Albumin with HD for hypetansion  - Midodrine 30 min prior HDB   - Target ultrafiltration 0 lts  - Dialysate adjusted to current labs   - CORE team called with ACLS started. ROSC about 20 minutes   - Will assess for RRt/SLED for metabolic clearance in ICU  Aceess: EDILBERTO AVF with good thrill and bruit      Active problem in hospital  - AMS      Anemia of Chronic Kidney Disease   - Decreased in Hg this am suspect she might be drll Bleeding.  - Will cont CURTIS with HD EPO 3000 uts  - TSAT 77 Ferritin 674 will need Fe supplement      Mineral Bone Disease in CKD   - Renal Function Panel Daily for electrolytes monitoring  - Phos 2.8  -  Off DC binders   - CoCa 10.1      Nutrition   - Cont cardiac diet      HTN   - Low BP, will continue to monitor. Goal for BP is <130 mmHg SBP and BDP <80 mmHg.   - Cont Bidil 1 tab TID  - Coreg on hold secondary to bradycardia    Transfer to MICU for suspected hemorrhagic shock.      Case discuss with Staff further recs with attestation.      Octavio Gong MD  Nephrology Fellow PGY4  [phone]

## 2017-04-26 NOTE — SIGNIFICANT EVENT
Patient coded twice since arrival to floor this afternoon. Dr. Neil spoke with family and made aware of situation, conversion witnessed by this RN. Family decided to stop all life support (ventilator & EPI gtt) and transition to comfort care. EPI gtt stopped. Ventilator turned off and ET tube removed per family request.    Asystole noted on monitor. No visible chest rise or fall noted. No heart or lung sounds auscultated. Pupils fixed and dilated. Dr. Prince at bedside, time of death 1313, cause of death cardiac arrest.     [facility] notified of patient's death. No donation, ruled out due to age.

## 2017-04-26 NOTE — SIGNIFICANT EVENT
CODE SUMMARY  Critical Care Medicine    Admit Date: 4/15/2017  LOS: 11    CC: Acute encephalopathy    Code Status: Full Code   Code Date: 2017    CODE Metrics:     Location of CODE: 3093/3093 A  Patient Age: 85 y.o.  MRN: [id number]  Was the patient discharged from an ICU this admission (include date)? No   Was the patient discharged from a PACU within last 24 hours? No  Did the patient receive conscious sedation/general anesthesia within last 24 hours? No  Was the patient in the ED within the past 24 hours? No  Was the patient started on NIPPV within the past 24 hours? No  Attending Physician: Jalyn Saldana MD  Primary Service: [facility]  Primary Service Notified?: Yes at bedside    CODE Category (Acute Respiratory Compromise or Cardiopulmonary Arrest): Cardiopulmonary arrest  Time of need for chest compressions or airway support: 10:26  Time CPR initiated: 10:26  Time CODE Page Received: 10:18 (originally rapid response)   Time CODE team arrived: 10:19  First documented rhythm: NSR-->PEA  Time to first epinephrine given: <1min after indicated  Time to first defibrillation: 7 minutes. Not initially indicated  Time to intubation: 1 min 43 seconds  ROSC? (if yes provide time): yes (multiple)  Post cardiac arrest hypothermia considered?: No due to debility and hemodynamic instability  Disposition (transferred to ICU, , etc): Transferred to ICU    CODE Team Members:  ES Resident/Fellow: N/A  Cardiology Fellow: N/A  Anesthesia Resident: ROBIN Draper MD  [facility] APC: DECLAN Moore PA-C, MAJOR Florian  CODE : DECLAN Moore PA-C  CODE Team Recorder: DECLAN Moore PA-C    SUBJECTIVE:     Events preceding cardiopulmonary arrest: Pt became hypotensive ~1 hour into HD session. Rapid response called. Shortly after RRT arrived pt went into PEA arrest.     Objective:     Physical Exam:  Last Vitals:  Vitals:    17 1145   BP: (!) 160/70   Pulse: (!) 58   Resp: (!) 33   Temp:      GA: Comatose,  unresponsive.  HEENT: No scleral icterus or JVD.   Pulmonary: Apneic. Bilateral rales/rhonchi.   Cardiac: Pulseless. No chest deformities.   Abdominal: No visible abdominal lesions. No appreciable hepatosplenomegaly.  Neuro:  --GCS: E1 V1 M1  --Mental Status:  Not awake or alert    IV Access PTA::       Trialysis (Dialysis) Catheter 04/26/17 1153 left femoral (Active)           Peripheral IV - Single Lumen 04/25/17 1040 Right Forearm (Active)   Site Assessment Clean;Dry;Intact 4/26/2017  8:55 AM   Line Status Flushed 4/26/2017  8:55 AM   Dressing Status Clean;Dry;Intact 4/26/2017  8:55 AM   Dressing Intervention New dressing 4/25/2017 10:32 AM   Dressing Change Due 04/29/17 4/26/2017  8:55 AM   Site Change Due 04/29/17 4/26/2017  8:55 AM   Reason Not Rotated Not due 4/26/2017  8:55 AM       Labs:  ABG: No results for input(s): PH, PO2, PCO2, HCO3, POCSATURATED, BE in the last 24 hours.  BMP:  Recent Labs  Lab 04/26/17  0606      K 5.2*      CO2 21*   BUN 72*   CREATININE 3.9*   GLU 90   PHOS 3.4     LFT: Lab Results   Component Value Date     (H) 04/18/2017    ALT 17 04/18/2017    ALKPHOS 77 04/18/2017    BILITOT 2.3 (H) 04/18/2017    ALBUMIN 2.5 (L) 04/26/2017    PROT 6.0 04/18/2017     CBC:   Lab Results   Component Value Date    WBC 13.93 (H) 04/26/2017    HGB 6.0 (L) 04/26/2017    HCT 17.8 (LL) 04/26/2017    MCV 85 04/26/2017     04/26/2017       CODE Timeline: Time/Event     Code Started    10:26:01  CPR Started    10:26:04  Epinephrine     10:26:05  Advanced airway: Supraglottic airway         10:27:44  CPR Paused      10:28:10  CPR Restarted  10:28:16  Epinephrine     10:28:49  calcium 10:29:22  CPR Paused      10:30:24  ROSC            10:30:36  Code Started    10:30:57  ROSC    10:30:57  Advanced airway replaced: Supraglottic airway    10:31:05  CPR Started     10:31:12  d50             10:32:06  CPR Paused      10:32:59  Shock           10:33:20  CPR Restarted    10:33:21  Epinephrine     10:33:39  CPR Paused  10:35:22  CPR Restarted   10:35:46  Epinephrine     10:36:51  CPR Paused  10:37:48  CPR Restarted   10:38:04  PEA            10:38:08  bicarb  10:38:54  Epinephrine     10:39:57  CPR Paused  10:40:11  CPR Restarted   10:40:24  CPR Paused      10:42:28  ROSC    10:42:42  Stop Code 10:57:12    DECLAN Moore PA-C  Critical Care Medicine  [phone]

## 2017-04-26 NOTE — ASSESSMENT & PLAN NOTE
Improved from admit.   · MRI of brain unremarkable for acute stroke or bleed on 4/15.   · AM Cortisol level normal 14.7. Endocrine consulted and recommended Cosyntropin stim test that was ordered by them on 4/18 to evaluate for relative adrenal insufficiency and was normal.   · Will hold all narcotics/opiates for pain. Avoid any sleep aid agents at this time that could affect alertness level.  · Endocrine consulted on 4/18 to see if hypothyroidism is causing fatigue and lethargy but they stated no evidence of myxedema but stated they would do trial of IV Synthroid to see if helps patient but they doubt hypothyroidism cause of lethargy.   · EP Cardiology consulted and evaluated patient to see if bradycardia causing fatigue/weakness but feel unlikely and do not recommend pacemaker placement at this time and recommended follow-up with Dr. Antoine in EP clinic as outpatient and he would consider once patient euthyroid.   · Infectious disease consulted for possible occult infection as cause but they felt highly unlikely as patient had no clinical signs to suggest infection but did draw blood cultures due to heart murmur on 4/18 and so far blood cultures are negative but ID did not feel any other infectious work-up or empiric treatment with antibiotics indicated.  ·  Neurology consulted to evaluate patient for lethargy. They did not feel any clinical evidence of stroke but did recommend transcranial doppler and Carotid US to assess for decreased blood flow to brain as cause. Carotid ultrasound shows <39% stenosis bilaterally and transcranial doppler was limited, but what was able to be assessed was normal.   · On 4/20, Patient started on Liothyronine 5 mcg daily as a trial to see if it help with symptoms, and she responded very well and was much more alert and less tired but developed a. Fib with RVR while on Cytomel so stopped on 4/24 as per Endocrine recs.  Plan for now to continue synthroid 40 IV daily and at discharge  change to 75 mcg po daily.

## 2017-04-26 NOTE — H&P
[facility]  Critical Care Medicine  History & Physical    Patient Name: Padmini Miranda  MRN: [id number]  Admission Date: 4/15/2017  Hospital Length of Stay: 11 days  Code Status: Full Code  Attending Physician: Ethan Perry MD   Primary Care Provider: Randy Lyles MD   Principal Problem: Acute encephalopathy    Subjective:     HPI:  84 y/o with past medical history of chronic combined systolic and diastolic heart failure, 3 vessel CAD, atherosclerosis of bilateral lower extremity with left heel ulcer with recent PTA of left SFA on 4/3 by Vascular surgery, essential HTN, hypothyroidism and ESRD on HD was sent from acute dialysis today due to concern for increasing lethargy. Family also noted that patient appeared to be slurring her words and noted right eye was drooping and patient was having difficulty staying awake and due to thus concerns was sent to [facility]. CTscan of head showed no acute findings but MRI of brain has been ordered to better evaluate. Patient's daughters at bedside and report for past 1 week patient has been sleeping more than normal and not nearly as alert as she usually is. I am familiar with patient as she was just discharged from my medical service on 4/13 and patient does appear to be more sleepy than normal but when aroused in the ER patient is responding to questions appropriately. Patient reports she generally feels weak but denies any focal weakness in ER. Family is very upset as they feel patient was not progressing with PT prior to discharge to SNF on 4/13 and feel patient has not been herself for over 1 week with increased lethargy.     As noted patient was just discharged from [facility] on 4/13 to [facility] [place] after prolonged hospitalization following surgical repair of a right hip fracture. Patient even prior to that surgery was seen and evaluated by Cardiology for sinus bradycardia with HR in 30-40's. Patient during admit was  felt not to need pacemaker placement and felt bradycardia related to Amiodarone and Coreg that had been recently been discontinued. Patient eventually underwent right hip hemiarthroplasty on 4/5 posterior-op, patient noted to be hypothermic and persistently bradycardiac so patient was evaluated for possible infection but there was no concern for infection, as clinically, the patient had normal WBC and no clinical symptoms to suggest infection. TSH (normal on 3/10) was checked during recent admit and found to be severely elevated at 21.6, with low normal Free T4 of 0.88. Synthroid was adjusted from 50 mcg to 75 mcg daily to treat hypothyroidism. Patient did complain of odynophagia after surgery felt related to ET tube. Speech therapy consult who recommended MBSS and patient underwent MBSS on 4/12 and patient passed with recommendation by Speech for regular diet and nectar thickened liquids. Patient to be discharged to [facility] for continued PT/OT but needs outpatient follow-up with EP Cardiology to reassess need for pacemaker for sinus bradycardia on 4/13.       Hospital/ICU Course:  MRI of rafat unremarkable for any signs of an acute stroke or bleed to explain increased fatigue or lethargy. Patient afebrile with normal WBC so infectious etiology felt highly unlikely. Random am cortisol done was 14.7 so does not appear to be consistent with adrenal insufficiency but Endocrine consulted. Cardiology re-consulted for patient's bradycardia to see if contributing to patient's increasing lethargy and fatigue and unclear if it is causing patient's symptoms but Cardiology doubts is true cause of lethargy and recommended Endocrine consult to look for thyroid issues as cause of bradycardia and somulence. Cardiology recommends no need for pacemaker at this time and to follow-up with EP Cardiology as outpatient. Neurontin and opiates for pain discontinued in case they were contributing to cause. Patient with recent diagnosis  of hypothyroidism likely related to Amiodarone that recently was discontinued due to bradycardia. Endocrine consulted on 4/18 and evaluated patient and not worried by thyroid function but concerned for adrenal insufficiency so Cosyntropin stimulation test ordered on 4/18 to evaluate patient. Patient dialyzed by Nephrology on 4/18 a day prior to normal dialysis day as showing signs of hypervolemia. Patient slightly more alert and responsive on 4/18 and did work with PT. Infectious disease consulted for possible occult infection as cause but they felt highly unlikely as patient had no clinical signs to suggest infection but did draw blood cultures due to heart murmur on 4/18 and so far blood cultures are negative but ID did not feel any other infectious work-up or empiric treatment with antibiotics indicated. Neurology consulted to evaluate patient for lethargy. They did not feel any clinical evidence of stroke but did recommend transcranial doppler and Carotid US to assess for decreased blood flow to brain as cause. Carotid ultrasound shows <39% stenosis bilaterally and transcranial doppler was limited, but what was able to be assessed was normal.  Patient alertness level is minimally improved, but she is not back to baseline. Cytomel 5 mg BID started on 4/20 to see if that helps improve symptoms. Definite improvement was noted on new medication and pt was sitting up in bedside chair for the majority of the day, very interactive, and cracking jokes.  She did complain of some abdominal pain and a KUB showed mild constipation and she was started on a stool softener. On 4/24 while pt was in HD, she went into Afib with HR in the 90s.  She became very tachypnic and SOB and clinically was working hard to breath. Although peripheral oxygen saturation was undetectable due to poor correlation, an ABG showed a PO2 >400 on a non re breather. Pt was given a 5 mg IV push of metoprolol and started on metoprolol 12.5 BID, with  great clinical improvement and pt able to breath much more comfortably. An hour later, she converted back to normal sinus. Cytomel stopped as it likely precipitated this episode.      - Patient was admitted to the ICU after coding in dialysis.  The patient underwent cardiopulmonary resuscitation and was intubated during the code.  She was subsequently transferred to ICU after achieving ROSC.  Upon arrival to ICU, the patient coded again; however the patient was made DNR and the patient passed.         Past Medical History:   Diagnosis Date    3-vessel CAD     Acquired hypothyroidism     ALLERGIC RHINITIS     Anemia in ESRD (end-stage renal disease) 2017    Anticoagulant long-term use     Atherosclerosis of native artery 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 2016    Atherosclerotic PVD with ulceration 2016    Blood transfusion     Cataract     Chronic combined systolic and diastolic heart failure 2015    Closed displaced fracture of right femoral neck s/p hemiarthroplasty on 2017    Cramp of both lower extremities 2016    ESRD on hemodialysis 2015    Hyperlipidemia     Ischemic cardiomyopathy 10/20/2015    Non-ST elevation MI (NSTEMI) 2015    PAF (paroxysmal atrial fibrillation) 3/10/2017    Renovascular hypertension 2015    Sinus brea-tachy syndrome 2017    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       Past Surgical History:   Procedure Laterality Date    ANGIOPLASTY      Renal PTA    CARDIAC CATHETERIZATION       SECTION      HIP FRACTURE SURGERY Right 2017    Hemiarthroplasty for displaced femoral neck fracture    HYSTERECTOMY      ovaries intact    RENAL ARTERY STENT      TONSILLECTOMY      VASCULAR SURGERY         Review of patient's allergies indicates:   Allergen Reactions    Ativan [lorazepam] Hallucinations    Crab Other (See Comments)     Causes Gout    Crayfish Other (See  Comments)     Causes Gout    Promethazine Other (See Comments)     Hallucinations        Family History     Problem Relation (Age of Onset)    Heart disease Father        Social History Main Topics    Smoking status: Never Smoker    Smokeless tobacco: Never Used    Alcohol use Yes      Comment: occasional wine use    Drug use: No    Sexual activity: No      Review of Systems   Unable to perform ROS: Acuity of condition     Objective:     Vital Signs (Most Recent):  Temp: (!) 92.8 °F (33.8 °C) (04/26/17 1216)  Pulse: 67 (04/26/17 1216)  Resp: (!) 36 (04/26/17 1216)  BP: (!) 137/57 (04/26/17 1216)  SpO2: (!) 72 % (04/26/17 1216) Vital Signs (24h Range):  Temp:  [92.8 °F (33.8 °C)-97.5 °F (36.4 °C)] 92.8 °F (33.8 °C)  Pulse:  [36-76] 67  Resp:  [9-36] 36  SpO2:  [72 %-100 %] 72 %  BP: ()/(36-74) 137/57   Weight: 50.8 kg (112 lb)  Body mass index is 20.49 kg/(m^2).      Intake/Output Summary (Last 24 hours) at 04/26/17 1235  Last data filed at 04/26/17 1216   Gross per 24 hour   Intake             1518 ml   Output              144 ml   Net             1374 ml       Physical Exam  The patient passed    Vents:  Vent Mode: A/C (04/26/17 1142)  Ventilator Initiated: Yes (04/26/17 1142)  Set Rate: 16 bmp (04/26/17 1142)  Vt Set: 380 mL (04/26/17 1142)  Pressure Support: 0 cmH20 (04/26/17 1142)  PEEP/CPAP: 10 cmH20 (04/26/17 1142)  Oxygen Concentration (%): 60 (04/26/17 1145)  Peak Airway Pressure: 37 cmH2O (04/26/17 1142)  Total Ve: 12.9 mL (04/26/17 1142)  F/VT Ratio<105 (RSBI): (!) 82.71 (04/26/17 1142)  Lines/Drains/Airways     Central Venous Catheter Line                 Trialysis (Dialysis) Catheter 04/26/17 1153 left femoral less than 1 day          Drain                 Hemodialysis AV Fistula -- days         NG/OG Tube 04/26/17 Right nostril less than 1 day          Airway                 Airway - Non-Surgical 04/26/17 1045 less than 1 day          Pressure Ulcer                 Pressure Ulcer 04/15/17  1922 sacral spine Stage II 10 days          Peripheral Intravenous Line                 Peripheral IV - Single Lumen 04/25/17 1040 Right Forearm 1 day              Significant Labs:    CBC/Anemia Profile:    Recent Labs  Lab 04/24/17  1835 04/26/17  0606 04/26/17  0739 04/26/17  1100   WBC 25.84* 13.93*  --   --    HGB 7.7* 5.6* 6.0*  --    HCT 24.6* 17.6* 17.8*  --     186  --   --    MCV 88 85  --   --    RDW 20.7* 20.9*  --   --    RETIC  --   --   --  2.9*        Chemistries:    Recent Labs  Lab 04/25/17  0351 04/26/17  0606 04/26/17  1100 04/26/17  1133     < > 137 148* 149*   K 4.7  < > 5.2* 4.2 4.2     < > 100 102 101   CO2 20*  < > 21* 23 22*   BUN 34*  < > 72* 46* 45*   CREATININE 3.0*  < > 3.9* 2.5* 2.5*   CALCIUM 8.7  < > 8.9 8.9 8.8   ALBUMIN 2.7*  --  2.5* 2.7*  --    PROT  --   --   --  4.5*  --    BILITOT  --   --   --  2.3*  --    ALKPHOS  --   --   --  74  --    ALT  --   --   --  134*  --    AST  --   --   --  716*  --    MG  --   --   --  2.0 1.8   PHOS 3.8  --  3.4 4.0 3.8   < > = values in this interval not displayed.    ABGs:   Recent Labs  Lab 04/24/17  1419   PH 7.382   PCO2 52.5*   HCO3 31.1*   POCSATURATED 100   BE 6     CMP:   Recent Labs  Lab 04/25/17  0351 04/26/17  0606 04/26/17  1100 04/26/17  1133     < > 137 148* 149*   K 4.7  < > 5.2* 4.2 4.2     < > 100 102 101   CO2 20*  < > 21* 23 22*   GLU 57*  < > 90 151* 145*   BUN 34*  < > 72* 46* 45*   CREATININE 3.0*  < > 3.9* 2.5* 2.5*   CALCIUM 8.7  < > 8.9 8.9 8.8   PROT  --   --   --  4.5*  --    ALBUMIN 2.7*  --  2.5* 2.7*  --    BILITOT  --   --   --  2.3*  --    ALKPHOS  --   --   --  74  --    AST  --   --   --  716*  --    ALT  --   --   --  134*  --    ANIONGAP 16  < > 16 23* 26*   EGFRNONAA 13.6*  < > 9.9* 17.0* 17.0*   < > = values in this interval not displayed.  Cardiac Markers: No results for input(s): CKMB, TROPONINT, MYOGLOBIN in the last 48 hours.    Significant Imaging: I have reviewed all  pertinent imaging results/findings within the past 24 hours.    Assessment/Plan:     No new Assessment & Plan notes have been filed under this hospital service since the last note was generated.  Service: Critical Care Medicine    Post cardiac arrest  --The patient has passed      Herminia Wade MD  Critical Care Medicine  [facility]

## 2017-04-26 NOTE — ASSESSMENT & PLAN NOTE
Patient was admitted to the ICU after coding in dialysis.  The patient underwent cardiopulmonary resuscitation and was intubated during the code.  She was subsequently transferred to ICU after achieving ROSC.  Upon arrival to ICU, the patient coded again; however the patient was made DNR and the patient passed.

## 2017-04-26 NOTE — PROGRESS NOTES
[facility]  Adult Nutrition  Progress Note    SUMMARY     Recommendations    Recommendation/Intervention: 1. Cont current diet & Novasoure Renal TID; Novasource Renal alone has 475 cals/21 gms protein per carton  2. Pt's daughter inquiring about appetite stimulant, which I explained to her is up to MD  3. Enc adequate meal intake daily  4. RD to cont to monitor all nutr parameters  Goals: Maintain meal intake >50%; consume @ least 1 OS daily  Nutrition Goal Status: new       Reason for Assessment    Reason for Assessment: length of stay  Diagnosis: other (see comments) (pt readmitted from SNF 2/2 acute encephalopathy)  Relevent Medical History: CHF, CAD, afib, ESRD on HD, HTN         General Information Comments: S/p CORE call yest. Details noted. Pt familiar to nutr services from prior stay here; she was d/c'd to SNF on 4/13 s/p R)NICHELLE. Pt's daughter @ bedside when I visited her this a.m. Daughter reports they are familiar w/ Renal diet restrictions as pt has been on HD x 2 hrs now. See RD @ HD regularly. Pt w/ poor appetite currently but is taking some of Novasource Renal OS daily    Nutrition Discharge Planning: D/c back to SNF on Renal diet + OS    Nutrition Prescription Ordered    Current Diet Order: Renal/dental soft  Nutrition Order Comments: nectar thickened liquids           Oral Nutrition Supplement: Novasource Renal TID     Evaluation of Received Nutrients/Fluid Intake        % Intake of Estimated Energy Needs: ~50 % (b/w meals & OS)  % Meal Intake: 25%     Energy Calories Required: not meeting needs                 Protein Required: not meeting needs                  Fluid Required: other (see comments) (per MD)  Comments: I/O noted, pt anuric, LBM 4/25        Nutrition Risk Screen     Nutrition Risk Screen: dysphagia or difficulty swallowing    Nutrition/Diet History    Patient Reported Diet/Restrictions/Preferences: renal  Typical Food/Fluid Intake: Inadequate currently (~25% of  meals + some OS)  Food Preferences: No Bahai/cultural prefs reported        Factors Affecting Nutritional Intake: decreased appetite        Labs/Tests/Procedures/Meds       Pertinent Labs Reviewed: reviewed  Pertinent Labs Comments: CO2 20, BUN 34, Crt 3.0, Glu 57, troponin noted  Pertinent Medications Reviewed: reviewed  Pertinent Medications Comments: statin, docusate, pantoprazole, Vit D    Physical Findings    Overall Physical Appearance: generalized wasting     Oral/Mouth Cavity: tooth/teeth missing  Skin: pressure ulcer(s), incision (Stg 2 sacaral spine; healing sx incision to hip)    Anthropometrics       Height (inches): 62.01 in  Weight Method: Stated  Weight (kg): 50.8 kg     Ideal Body Weight (IBW), Female: 110.05 lb     % Ideal Body Weight, Female (lb): 101.76 lb  BMI (kg/m2): 20.48  BMI Grade: 18.5-24.9 - normal  Usual Body Weight (UBW), k kg (dry wt)  % Usual Body Weight: 97.69          Estimated/Assessed Needs    Weight Used For Calorie Calculations: 50.8 kg (111 lb 15.9 oz)   Height (cm): 157.5 cm     Energy Need Method: Kcal/kg (1524 cals (30 cals/kg))     RMR ([place]-St. Jeor Equation): 912.67        Weight Used For Protein Calculations: 50.8 kg (111 lb 15.9 oz)  Protein Requirements: 56-71 gms    Fluid Need Method: RDA Method (1ml/kcal or per MD)        Assessment and Plan    Nutr dx/problem: Inadequate energy intake  Related to: dx/decreased appetite  As evidenced by: % meals consumed; pt meeting <85% of EEN/EPN  Status: New    Monitor and Evaluation    Food and Nutrient Intake: energy intake, food and beverage intake  Food and Nutrient Adminstration: diet order     Physical Activity and Function: nutrition-related ADLs and IADLs  Anthropometric Measurements: weight, weight change  Biochemical Data, Medical Tests and Procedures: electrolyte and renal panel, glucose/endocrine profile  Nutrition-Focused Physical Findings: overall appearance      Nutrition Risk    Level of Risk: other (see  comments) (F/u 1 x weekly)    Nutrition Follow-Up    RD Follow-up?: Yes

## 2017-04-26 NOTE — PROCEDURES
"Padmini Miranda is a 85 y.o. female patient.    Temp: (!) 91.9 °F (33.3 °C) (04/26/17 1230)  Pulse: 66 (04/26/17 1238)  Resp: (!) 36 (04/26/17 1216)  BP: (!) 142/67 (04/26/17 1238)  SpO2: (!) 67 % (04/26/17 1238)  Weight: 50.8 kg (112 lb) (04/15/17 1117)  Height: 5' 2" (157.5 cm) (04/15/17 1710)       Central Line  Date/Time: 4/26/2017 11:45 AM  Location procedure was performed: [facility] ([facility])  Performed by: SWAPNA DE L ACRUZ  Assisting provider: AUSTIN CALDWELL  Consent Done: Yes  Time out: Immediately prior to procedure a "time out" was called to verify the correct patient, procedure, equipment, support staff and site/side marked as required.  Indications: med administration, hemodialysis and vascular access  Preparation: skin prepped with chlorhexidine (without alcohol)  Skin prep agent dried: skin prep agent completely dried prior to procedure  Sterile barriers: all five maximum sterile barriers used - cap, mask, sterile gown, sterile gloves, and large sterile sheet  Hand hygiene: hand hygiene performed prior to central venous catheter insertion  Location details: left femoral  Catheter type: trialysis  Catheter size: 12 Fr  Catheter Length: 20cm    Ultrasound guidance: yes  Vessel Caliber: medium, compressibility normal  Needle advanced into vessel with real time Ultrasound guidance.  Guidewire confirmed in vessel.  Sterile sheath used.  Manometry: Yes  Number of attempts: 2  Estimated blood loss (mL): 10  Post-procedure: line sutured,  chlorhexidine patch,  sterile dressing applied and blood return through all ports  Complications: No          Swapna De La Cruz  4/26/2017  "

## 2017-04-26 NOTE — PROGRESS NOTES
Pt less responsive/lethargic. Nephrology Fellow at bedside.  Pt given Albumin bolus.  With 500 ml of N/s boulus.   Pt placed on nonrebreather.  Then converted to ambu bag.   After n/s bolus and albumin bolus b/p 68/40 pulse 69.   Core team initiated.

## 2017-04-26 NOTE — SUBJECTIVE & OBJECTIVE
Interval History: Patient back in sinus rhythm with HR in 40-50 range so unable to give any further Metoprolol since HD yesterday. Patient reports SOB resolved. Daughter at bedside reports patient not as alert as previous days since we stopped Cytomel yesterday. Patient reports feeling tired today.     Review of Systems   Constitutional: Positive for fatigue. Negative for chills and fever.   Respiratory: Negative for cough and shortness of breath.    Cardiovascular: Negative for chest pain.   Gastrointestinal: Negative for abdominal pain and nausea.   Musculoskeletal: Positive for myalgias (Right hip pain). Negative for back pain.   Skin: Negative for rash.   Neurological: Positive for weakness (Generalized). Negative for dizziness.   Psychiatric/Behavioral: Negative for confusion and hallucinations.     Objective:     Vital Signs (Most Recent):  Temp: 97.2 °F (36.2 °C) (04/25/17 1600)  Pulse: 66 (04/25/17 1900)  Resp: 16 (04/25/17 1600)  BP: (!) 105/44 (04/25/17 1600)  SpO2: 100 % (04/25/17 1600) Vital Signs (24h Range):  Temp:  [97.2 °F (36.2 °C)-97.9 °F (36.6 °C)] 97.2 °F (36.2 °C)  Pulse:  [41-66] 66  Resp:  [16-20] 16  SpO2:  [94 %-100 %] 100 %  BP: (101-123)/(44-58) 105/44     Weight: 50.8 kg (112 lb)  Body mass index is 20.49 kg/(m^2).    Intake/Output Summary (Last 24 hours) at 04/25/17 1928  Last data filed at 04/25/17 1621   Gross per 24 hour   Intake              720 ml   Output                2 ml   Net              718 ml      Physical Exam   Constitutional: No distress.   Eyes: No scleral icterus.   Neck: Neck supple. No JVD present.   Cardiovascular: Regular rhythm and normal heart sounds.  Bradycardia present.  Exam reveals no gallop and no friction rub.    No murmur heard.  Pulmonary/Chest: Effort normal. She has rales (Mild rales in bases).   Abdominal: Soft. Bowel sounds are normal. She exhibits no distension. There is no tenderness.   Musculoskeletal: She exhibits edema (1-2 + edema in lower  extremities; 1+ edema in left arm).   Neurological: She is alert.   Skin: Skin is warm and dry.   Psychiatric: She has a normal mood and affect. Her speech is normal.       Significant Labs:   BMP:   Recent Labs  Lab 04/25/17  1156   GLU 98      K 5.1      CO2 20*   BUN 42*   CREATININE 3.2*   CALCIUM 9.0     CBC:   Recent Labs  Lab 04/24/17  1835   WBC 25.84*   HGB 7.7*   HCT 24.6*          Significant Imaging: I have reviewed all pertinent imaging results/findings within the past 24 hours.

## 2017-04-26 NOTE — ASSESSMENT & PLAN NOTE
Patient with WBC 25,000 today but no bands. Patient with recent infectious work-up that was negative including CXR and blood cultures. Patient does not make urine so no urine obtained. Will recheck WBC in the am and redraw blood cultures.

## 2017-04-26 NOTE — PT/OT/SLP DISCHARGE
Physical Therapy Discharge Summary    Padmini Miranda  MRN: [id number]   Acute encephalopathy   Patient Discharged from acute Physical Therapy on 17.  Please refer to prior PT noted date on 17 for functional status.     Assessment:   Patient was discharge unexpectedly.  Information required to complete and accurate discharge summary is unknown.  Refer to therapy initial evaluation and last progress note for initial and most recent functional status and goal achievement.  Recommendations made may be found in medical record. Patient has not met goals.  GOALS:   Physical Therapy Goals     Not on file      Multidisciplinary Problems (Resolved)        Problem: Physical Therapy Goal    Goal Priority Disciplines Outcome Goal Variances Interventions   Physical Therapy Goal   (Resolved)     PT/OT, PT Outcome(s) achieved     Description:  Goals to be met by: 17     Patient will increase functional independence with mobility by performin. Supine to sit with MInimal Assistance  2. Sit to supine with MInimal Assistance  3. Rolling to Left and Right with Set-up Assistance.  4. Sit to stand transfer with Minimal Assistance  5. Bed to chair transfer with Moderate Assistance using Rolling Walker  6. Gait  x 30 feet with Moderate Assistance using Rolling Walker.   7. Lower extremity exercise program x15 reps per handout, with assistance as needed              Reasons for Discontinuation of Therapy Services  Patient is unable to continue work toward goals because of medical or psychosocial complications.      Plan:  Patient Discharged to: pt ..    Brittney Fabian, PT DPT  2017

## 2017-04-26 NOTE — SIGNIFICANT EVENT
1:45 PM    Present at bedside when Mrs. Miranda was transitioned to comfort care. Epi gtt was stopped and patient was subsequently extubated. She was given 50mcg of fentanyl for air hunger. She  about 3 minutes later. Asystole was noted on tele, which was subsequently disconnected. No breath or heart sounds were auscultated. Pupils were fixed and non-reactive. She did not withdraw to pain. No corneal reflexes. She was surrounded by her children.     Time of death: 1:13pm    Katie Prince MD  PGY-3  [phone]

## 2017-04-26 NOTE — ASSESSMENT & PLAN NOTE
Patient is POD # 18. Patient reports no pain in right hip currently and main pain related to her neuropathic pain. Plan to continue PT/OT for gait training and strengthening and restoration of ADL's on this admit to continue therapy. Patient is FWB/WBAT: right lower extremity, posterior hip precautions as per Orthopedic recommendations. Patient on Eliquis for long term anticoagulation for atrial fibrillation so no DVT prophylaxis needed. Pain is well controlled and will use just Tylenol for pain and avoid any sedating narcotics. Patient needs SNF placement on discharge.

## 2017-04-26 NOTE — PROGRESS NOTES
[facility] Hospital Medicine  Progress Note    Patient Name: Padmini Miranda  MRN: [id number]  Patient Class: IP- Inpatient   Admission Date: 4/15/2017  Length of Stay: 10 days  Attending Physician: Jalyn Saldana MD  Primary Care Provider: Randy Lyles MD    [facility] Medicine Team: [facility]OSP MED  Jalyn Saldana MD    Subjective:     Principal Problem:Acute encephalopathy    HPI:  84 y/o with past medical history of chronic combined systolic and diastolic heart failure, 3 vessel CAD, atherosclerosis of bilateral lower extremity with left heel ulcer with recent PTA of left SFA on 4/3 by Vascular surgery, essential HTN, hypothyroidism and ESRD on HD was sent from acute dialysis today due to concern for increasing lethargy. Family also noted that patient appeared to be slurring her words and noted right eye was drooping and patient was having difficulty staying awake and due to thus concerns was sent to [facility]. CTscan of head showed no acute findings but MRI of brain has been ordered to better evaluate. Patient's daughters at bedside and report for past 1 week patient has been sleeping more than normal and not nearly as alert as she usually is. I am familiar with patient as she was just discharged from my medical service on 4/13 and patient does appear to be more sleepy than normal but when aroused in the ER patient is responding to questions appropriately. Patient reports she generally feels weak but denies any focal weakness in ER. Family is very upset as they feel patient was not progressing with PT prior to discharge to SNF on 4/13 and feel patient has not been herself for over 1 week with increased lethargy.     As noted patient was just discharged from [facility] on 4/13 to [facility] [place] after prolonged hospitalization following surgical repair of a right hip fracture. Patient even prior to that surgery was seen and evaluated by Cardiology for sinus  bradycardia with HR in 30-40's. Patient during admit was felt not to need pacemaker placement and felt bradycardia related to Amiodarone and Coreg that had been recently been discontinued. Patient eventually underwent right hip hemiarthroplasty on 4/5 posterior-op, patient noted to be hypothermic and persistently bradycardiac so patient was evaluated for possible infection but there was no concern for infection, as clinically, the patient had normal WBC and no clinical symptoms to suggest infection. TSH (normal on 3/10) was checked during recent admit and found to be severely elevated at 21.6, with low normal Free T4 of 0.88. Synthroid was adjusted from 50 mcg to 75 mcg daily to treat hypothyroidism. Patient did complain of odynophagia after surgery felt related to ET tube. Speech therapy consult who recommended MBSS and patient underwent MBSS on 4/12 and patient passed with recommendation by Speech for regular diet and nectar thickened liquids. Patient to be discharged to [facility] for continued PT/OT but needs outpatient follow-up with EP Cardiology to reassess need for pacemaker for sinus bradycardia on 4/13.       Hospital Course:  MRI of rafat unremarkable for any signs of an acute stroke or bleed to explain increased fatigue or lethargy. Patient afebrile with normal WBC so infectious etiology felt highly unlikely. Random am cortisol done was 14.7 so does not appear to be consistent with adrenal insufficiency but Endocrine consulted. Cardiology re-consulted for patient's bradycardia to see if contributing to patient's increasing lethargy and fatigue and unclear if it is causing patient's symptoms but Cardiology doubts is true cause of lethargy and recommended Endocrine consult to look for thyroid issues as cause of bradycardia and somulence. Cardiology recommends no need for pacemaker at this time and to follow-up with EP Cardiology as outpatient. Neurontin and opiates for pain discontinued in case they were  contributing to cause. Patient with recent diagnosis of hypothyroidism likely related to Amiodarone that recently was discontinued due to bradycardia. Endocrine consulted on 4/18 and evaluated patient and not worried by thyroid function but concerned for adrenal insufficiency so Cosyntropin stimulation test ordered on 4/18 to evaluate patient. Patient dialyzed by Nephrology on 4/18 a day prior to normal dialysis day as showing signs of hypervolemia. Patient slightly more alert and responsive on 4/18 and did work with PT. Infectious disease consulted for possible occult infection as cause but they felt highly unlikely as patient had no clinical signs to suggest infection but did draw blood cultures due to heart murmur on 4/18 and so far blood cultures are negative but ID did not feel any other infectious work-up or empiric treatment with antibiotics indicated. Neurology consulted to evaluate patient for lethargy. They did not feel any clinical evidence of stroke but did recommend transcranial doppler and Carotid US to assess for decreased blood flow to brain as cause. Carotid ultrasound shows <39% stenosis bilaterally and transcranial doppler was limited, but what was able to be assessed was normal.  Patient alertness level is minimally improved, but she is not back to baseline. Cytomel 5 mg BID started on 4/20 to see if that helps improve symptoms. Definite improvement was noted on new medication and pt was sitting up in bedside chair for the majority of the day, very interactive, and cracking jokes.  She did complain of some abdominal pain and a KUB showed mild constipation and she was started on a stool softener. On 4/24 while pt was in HD, she went into Afib with HR in the 90s.  She became very tachypnic and SOB and clinically was working hard to breath. Although peripheral oxygen saturation was undetectable due to poor correlation, an ABG showed a PO2 >400 on a non re breather. Pt was given a 5 mg IV push of  metoprolol and started on metoprolol 12.5 BID, with great clinical improvement and pt able to breath much more comfortably. An hour later, she converted back to normal sinus. Cytomel stopped as it likely precipitated this episode.     Interval History: Patient back in sinus rhythm with HR in 40-50 range so unable to give any further Metoprolol since HD yesterday. Patient reports SOB resolved. Daughter at bedside reports patient not as alert as previous days since we stopped Cytomel yesterday. Patient reports feeling tired today.     Review of Systems   Constitutional: Positive for fatigue. Negative for chills and fever.   Respiratory: Negative for cough and shortness of breath.    Cardiovascular: Negative for chest pain.   Gastrointestinal: Negative for abdominal pain and nausea.   Musculoskeletal: Positive for myalgias (Right hip pain). Negative for back pain.   Skin: Negative for rash.   Neurological: Positive for weakness (Generalized). Negative for dizziness.   Psychiatric/Behavioral: Negative for confusion and hallucinations.     Objective:     Vital Signs (Most Recent):  Temp: 97.2 °F (36.2 °C) (04/25/17 1600)  Pulse: 66 (04/25/17 1900)  Resp: 16 (04/25/17 1600)  BP: (!) 105/44 (04/25/17 1600)  SpO2: 100 % (04/25/17 1600) Vital Signs (24h Range):  Temp:  [97.2 °F (36.2 °C)-97.9 °F (36.6 °C)] 97.2 °F (36.2 °C)  Pulse:  [41-66] 66  Resp:  [16-20] 16  SpO2:  [94 %-100 %] 100 %  BP: (101-123)/(44-58) 105/44     Weight: 50.8 kg (112 lb)  Body mass index is 20.49 kg/(m^2).    Intake/Output Summary (Last 24 hours) at 04/25/17 1928  Last data filed at 04/25/17 1621   Gross per 24 hour   Intake              720 ml   Output                2 ml   Net              718 ml      Physical Exam   Constitutional: No distress.   Eyes: No scleral icterus.   Neck: Neck supple. No JVD present.   Cardiovascular: Regular rhythm and normal heart sounds.  Bradycardia present.  Exam reveals no gallop and no friction rub.    No murmur  heard.  Pulmonary/Chest: Effort normal. She has rales (Mild rales in bases).   Abdominal: Soft. Bowel sounds are normal. She exhibits no distension. There is no tenderness.   Musculoskeletal: She exhibits edema (1-2 + edema in lower extremities; 1+ edema in left arm).   Neurological: She is alert.   Skin: Skin is warm and dry.   Psychiatric: She has a normal mood and affect. Her speech is normal.       Significant Labs:   BMP:   Recent Labs  Lab 04/25/17  1156   GLU 98      K 5.1      CO2 20*   BUN 42*   CREATININE 3.2*   CALCIUM 9.0     CBC:   Recent Labs  Lab 04/24/17  1835   WBC 25.84*   HGB 7.7*   HCT 24.6*          Significant Imaging: I have reviewed all pertinent imaging results/findings within the past 24 hours.    Assessment/Plan:      * Acute encephalopathy  Improved from admit.   · MRI of brain unremarkable for acute stroke or bleed on 4/15.   · AM Cortisol level normal 14.7. Endocrine consulted and recommended Cosyntropin stim test that was ordered by them on 4/18 to evaluate for relative adrenal insufficiency and was normal.   · Will hold all narcotics/opiates for pain. Avoid any sleep aid agents at this time that could affect alertness level.  · Endocrine consulted on 4/18 to see if hypothyroidism is causing fatigue and lethargy but they stated no evidence of myxedema but stated they would do trial of IV Synthroid to see if helps patient but they doubt hypothyroidism cause of lethargy.   · EP Cardiology consulted and evaluated patient to see if bradycardia causing fatigue/weakness but feel unlikely and do not recommend pacemaker placement at this time and recommended follow-up with Dr. Antoine in EP clinic as outpatient and he would consider once patient euthyroid.   · Infectious disease consulted for possible occult infection as cause but they felt highly unlikely as patient had no clinical signs to suggest infection but did draw blood cultures due to heart murmur on 4/18 and so far  blood cultures are negative but ID did not feel any other infectious work-up or empiric treatment with antibiotics indicated.  ·  Neurology consulted to evaluate patient for lethargy. They did not feel any clinical evidence of stroke but did recommend transcranial doppler and Carotid US to assess for decreased blood flow to brain as cause. Carotid ultrasound shows <39% stenosis bilaterally and transcranial doppler was limited, but what was able to be assessed was normal.   · On 4/20, Patient started on Liothyronine 5 mcg daily as a trial to see if it help with symptoms, and she responded very well and was much more alert and less tired but developed a. Fib with RVR while on Cytomel so stopped on 4/24 as per Endocrine recs.  Plan for now to continue synthroid 40 IV daily and at discharge change to 75 mcg po daily.     Leukocytosis  Patient with WBC 25,000 today but no bands. Patient with recent infectious work-up that was negative including CXR and blood cultures. Patient does not make urine so no urine obtained. Will recheck WBC in the am and redraw blood cultures.       Oral thrush  Resolved. Patient with very mild case and treated with Nystatin swish and spit 4 times daily.       Abnormal LFTs  Resolved. RUQ ultrasound unremarkable except for hepatic cysts and unlikely to be causing mild increase in LFT's but LFT's are improving so no further evaluation at this time and could just be hepatic congestion from hypervolemia related to chronic renal failure in combination with chronic heart failure. Monitor daily CMP. No need to discontinue Lipitor at this time as statin not likely cause and needs for CAD and severe PVD.      ESRD on hemodialysis  Nephrology consulted and managing patient's HD needs in hospital.     Bradycardia, drug induced  Controlled. Patient still with persistent sinus bradycardia but HR now in 50-60's instead of 40-50's as on last hospital stay in early April. Cardiology re-evaluated patient to  see if they feel bradycardia could be contributing to patient's lethargy but they doubt and do not recommend pacemaker needed at this time and recommends outpatient follow-up with Dr. Antoine in EP Cardiology clinic.  Continue telemetry monitoring.   Patient attempted to be placed on low dose Metoprolol after episode of atrial fib with RVR in HD on 4/24 but HR unable to tolerate as HR was in 40-50 range so had to be discontinued on 4/25.       Closed displaced fracture of right femoral neck s/p hemiarthroplasty on 4/5/2017  Patient is POD # 18. Patient reports no pain in right hip currently and main pain related to her neuropathic pain. Plan to continue PT/OT for gait training and strengthening and restoration of ADL's on this admit to continue therapy. Patient is FWB/WBAT: right lower extremity, posterior hip precautions as per Orthopedic recommendations. Patient on Eliquis for long term anticoagulation for atrial fibrillation so no DVT prophylaxis needed. Pain is well controlled and will use just Tylenol for pain and avoid any sedating narcotics. Patient needs SNF placement on discharge.    Renovascular hypertension  Patient normotensive on no BP medications. Bidil discontinued on previous admit due to issues with hypotension during HD and will continue to hold and continue Midodrine to help with BP and volume removal during HD. Patient tolerating volume removal with HD on Midodrine and will continue.       Acquired hypothyroidism  Appreciate Endocrine consult and they do not feel that any adjustments needed in Synthroid but will give trial of IV Synthroid as per Endocrine to see if helps patient. Endocrine notes no signs of myxedema so doubt lethargy related to thyroid disease.   · On 4/20, patient placed on Liothyronine 5 mcg daily as a trial to see if it help with symptoms, and she responded very well and was much more alert and less tired. Endocrine recommended stopping after 5-7 days but had episode of A. Fib  with RVR on HD on 4/24 so discontinued on 4/24 as Cytomel felt to have contributed to SVT.   · Continue synthroid 40 IV daily and at discharge change to 75 mcg po daily.   · Reverse T3 pending      Chronic combined systolic and diastolic heart failure  Improved. Nephrology to assess patient daily for continued volume removal in hospital. Patient's oxygen status improved and patient with O2 sats > 90% on 2 liters of oxygen.        Acute blood loss anemia  Hgb is stable and there are no signs of active bleeding. Hgb 8.1 so doubt cause of lethargy.  No indication to transfuse blood in this patient at this time. Nephrology to continue Epogen with HD to treat chronic anemia related to ESRD.      Atherosclerosis of native artery 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  Patient with known heel ulcers to both legs but no signs to indicate infection. Patient recently evaluated by Vascular Surgery on 4/13 and Vascular felt ulcers were improving and did not feel any further intervention needed at this time. Continue local wound care for heel ulcers on this admit.      PAF (paroxysmal atrial fibrillation)  On 4/24 while pt was in HD, she went into Afib with HR in the 90s.  She became very tachypnic and SOB and clinically was working hard to breath. Although peripheral oxygen saturation was undetectable due to poor correlation, an ABG showed a PO2 >400 on a non re breather. Pt was given a 5 mg IV push of metoprolol and started on metoprolol 12.5 BID, with great clinical improvement and pt able to breath much more comfortably. An hour later, she converted back to normal sinus. Continue Eliquis for long term anticoagulation. Metoprolol stopped on 4/25 due to low HR in 40's.       3-vessel CAD  Controlled. Plan to continue ASA and Lipitor to treat.       VTE Risk Mitigation         Ordered     apixaban tablet 2.5 mg  2 times daily     Route:  Oral        04/15/17 1618     Medium Risk of VTE  Once      04/15/17 1431           Jalyn Saldana MD  Department of Hospital Medicine   [facility]

## 2017-04-26 NOTE — PROGRESS NOTES
Bp 76/37 pulse 51.  Uf turned off.  Pt given 200 n/s bolus.  Charge nurse an Renal fellow aware. Pt with increased labored breathing

## 2017-04-26 NOTE — PROGRESS NOTES
Pt arrived via hospital bed  Placed on cardiac monitor and b/p check every 15 minutes. Left upper arm a/v Dialysis access prep with prevantic swab.  maintenace  Hemodialysis started per orders.   No fluid removal.  Dr Saldana at pt bedside consenting pt for blood infusion.   Pt extremely weak to sign consent.  Daughter called and consented for blood transfusion. Dialysis charge nurse and Renal Fellow aware of pt hemogolobin 5.6 and hematocrit 5.6.  B/p96/38 pulse 46.  Plan to give pt albumin at start of treatment.   With Midodrine 2.5 po .  Pharmacy notified of need of po medication.   Pt pulse of in the 80s to 90s.  Called blood bank and notified ox need of blood products asap.  Blood bank states it will be at least 1 hr due to antibody.

## 2017-04-26 NOTE — ANESTHESIA PROCEDURE NOTES
Intubation    Diagnosis: ACLS being performed   Patient location during procedure: floor  Staffing  Resident/CRNA: AUSTIN DRAPER  Performed by: resident/CRNA   Preanesthetic Checklist  Completed: patient identified  Intubation  Indication: respiratory failure  Pre-oxygenation. Induction: intravenous, mask ventilation: easy mask.  Intubation: postinduction, laryngoscopy direct, Menendez 2.  Endotracheal Tube: 7.0 mm ID, cuffed (inflated to minimal occlusive pressure)  Attempts: 2, Grade I - full view of cords  DIFFICULT INTUBATION DESCRIPTOR: pt given 10 mg etomidate but given poor circulation pt not appropriately deeply anesthetized for intubation. No paralytic given. Upon intubation attempts pt regained a pulse briefly and was biting down on ETT and laryngoscope.  at the lips.  Findings post-intubation: bilateral breath sounds, positive ETCO2  Position Confirmation: auscultation  Additional Notes  Tube placement confirmed with EtCO2. First attempt at intubation pt with grade 1 view initially but pt began biting on laryngoscope and significant aspirate was present and view became completely obstructed. ETT placed with lost view in attempt to secure airway and pt suctioned once chest compressions initiated. First attempt at intubation with ETT in esophagus and left in place to prevent/minimize aspiration as significant gastric contents were seen coming out of esophageal placed ETT. Pt bagged with ambu bag immediately once initial attempt at intubation not successful. Chest rise observed. On next pulse check after 10 mg of etomidate administered the pt was successfully intubated with a grade I view although still spontaneously breathing during intubation attempt and biting down again as well. Pulse lost immediately after ETT placed and ACLS resumed per ICU team.     Austin Draper MD

## 2017-04-26 NOTE — PLAN OF CARE
Problem: Patient Care Overview  Goal: Plan of Care Review  Recommendations     Recommendation/Intervention: 1. Cont current diet & Novasoure Renal TID; Novasource Renal alone has 475 cals/21 gms protein per carton 2. Pt's daughter inquiring about appetite stimulant, which I explained to her is up to MD 3. Enc adequate meal intake daily 4. RD to cont to monitor all nutr parameters  Goals: Maintain meal intake >50%; consume @ least 1 OS daily  Nutrition Goal Status: new

## 2017-04-27 NOTE — PHYSICIAN QUERY
PT Name: Padmini Miranda  MR #: [id number]     Physician Query Form - Documentation Clarification      CDS/: Cammy Medina               Contact information: [phone]    This form is a permanent document in the medical record.     Query Date: April 27, 2017    By submitting this query, we are merely seeking further clarification of documentation. Please utilize your independent clinical judgment when addressing the question(s) below.    The Medical record reflects the following:    Supporting Clinical Findings Location in Medical Record   Decub ulcer sacrum stage II  Decub ulcer R buttock stage II    Unstageable pressure ulcer  Multiple pressure ulcers. Sacral, right calf, and left heel. Will consult wound care.      WC PN 4/18  Same        PN 4/26                                                                          Doctor, Please specify diagnosis or diagnoses associated with above clinical findings.    DOCTOR, PLEASE CLARIFY THE STATUS, STAGE AND POA STATUS OF DC ULCERS    Provider Use Only    Decub Sacrum--POA: Y_______ N_________      Stage II evolving to unstageable___x__________    Stage II  not evolved during encounter____________    Other______________________    Decub R Buttock--POA: Y__________ N____________      Stage II evolving to unstageable___________    Stage II not evolved during encounter____________    Other______________________________                                                                                                               [  ] Clinically undetermined

## 2017-04-27 NOTE — PHYSICIAN QUERY
PT Name: Padmini Miranda  MR #: [id number]     Physician Query Form - Documentation Clarification      CDS/: Carolee Nagy               Contact information:[email or web address]    This form is a permanent document in the medical record.     Query Date: April 27, 2017    By submitting this query, we are merely seeking further clarification of documentation. Please utilize your independent clinical judgment when addressing the question(s) below.    The Medical record reflects the following:    Supporting Clinical Findings Location in Medical Record   Cytomel 5 mg BID started on 4/20 to see if that helps improve symptoms.   Definite improvement was noted on new medication and pt was sitting up in bedside chair for the majority of the day, very interactive, and cracking jokes.      DC Summary   Daughter at bedside reports patient not as alert as previous days since we stopped Cytomel yesterday. Patient reports feeling tired today.        PN 04/25 @ 1944                                                                            Doctor, Please specify diagnosis or diagnoses associated with above clinical findings.    Please clarify the likely / probable cause of the patient's diagnosis of Encephalopathy if possible. Thank you     Provider Use Only                                                                                                                               [ x ] Clinically undetermined

## 2017-04-27 NOTE — PHYSICIAN QUERY
PT Name: Padmini Miranda  MR #: [id number]    Physician Query Form - Perfusion Diagnosis Clarification     CDS/: Cammy Medina               Contact information: [phone]  This form is a permanent document in the medical record.     Query Date: April 27, 2017    By submitting this query, we are merely seeking further clarification of documentation. Please utilize your independent clinical judgment when addressing the question(s) below.    The medical record contains the following:    Indicators   Supporting Clinical Findings   Location in Medical Record   xx Acute Illness (e.g. AMI, Sepsis, etc.) Metab Enceph, unk etiology, Combined CHF, ESRD, Persistant Bradycardia Reason for encounter, all PN    Acidosis documented      ABGs / Labs      Vital Signs     xx Hypotension or Low Blood Pressure documented Hypotension of dialysis Hist, PN 4/26   xx Altered Mental Status or Confusion continuous     Diaphoresis, Cold Extremities or Cyanosis     xx Oliguria ESRD on Dialysis, Vol overload    xx Medication/Treatment:  -Vasopressors  -Inotropic Drugs  -IV Fluids  -Cardiac Assist Devices  -Hemodynamic Monitoring  -Blood/Blood Products HD, Fluids, HD monitoring, Cardioresusitation, Epinephrine, Intubation Multiple PN   xx Other: Cardiac arrest x 2, anox brain injury PN 4/26     Provider, please specify diagnosis or diagnoses associated with above clinical findings.    [  ] Cardiogenic Shock  [  ] Hemorrhagic Shock  [ x ] Hypovolemic Shock  [  ] Septic Shock  [  ] Other Shock (please specify): _________________________________  [  ] Shock Unspecified  [  ] Other Condition (please specify): ___________________________________  [  ] Clinically Undetermined    Please document in your progress notes daily for the duration of treatment until resolved and include in your discharge summary.

## 2017-04-28 LAB
BACTERIA BLD CULT: NORMAL

## 2017-04-30 LAB
BLD PROD TYP BPU: NORMAL
BLD PROD TYP BPU: NORMAL
BLOOD UNIT EXPIRATION DATE: NORMAL
BLOOD UNIT EXPIRATION DATE: NORMAL
BLOOD UNIT TYPE CODE: 6200
BLOOD UNIT TYPE CODE: 6200
BLOOD UNIT TYPE: NORMAL
BLOOD UNIT TYPE: NORMAL
CODING SYSTEM: NORMAL
CODING SYSTEM: NORMAL
DISPENSE STATUS: NORMAL
DISPENSE STATUS: NORMAL
TRANS ERYTHROCYTES VOL PATIENT: NORMAL ML
TRANS ERYTHROCYTES VOL PATIENT: NORMAL ML

## 2017-05-02 LAB — PATHOLOGIST INTERPRETATION AB/XM: NORMAL

## 2017-05-02 NOTE — DISCHARGE SUMMARY
[facility] Hospital Medicine  Discharge Summary      Patient Name: Padmini Miranda  MRN: [id number]  Admission Date: 4/13/2017  Hospital Length of Stay: 2 days  Discharge Date and Time: 4/15/2017 10:15 AM  Attending Physician: Isabel att. providers found   Discharging Provider: Olena Dailey MD  Primary Care Provider: Randy Lyles MD      HPI:   The patient is an 84 y/o female with PMH of ESRD on HD, HTN, combined heart failure, atrial fibrillation on anticoagulation, and hypothyroidism who sustained a fall at home. She underwent LLE angiogram on 4/3 by vascular surgery for PAD and L heel ulcer then was found to be bradycardic. She was seen by cardiology and had been asymptomatic. It was recommended that she follow up on with EP as she had been followed by them for atrial fib. However shortly after returning home, she sustained a fall which brought her back to the ER. She underwent right hip hemiarthroplasty on 4/5 but had other complications during her hospital stay. She continued to be bradycardic into the 30s-40s and EP was consulted. Her amiodarone and carvedilol were held on admit. She underwent CRRT in the ICU due to her bradycardic episodes and was able to have her hemiarthroplasty performed on 4/5. She continued to have bradycardic episodes but then also became hypothermic with concern for sepsis but this work up was unrevealing for an underlying infection. Her TSH was found to be elevated in the low 20s and her levothyroxine was accordingly adjusted. She required a transfusion for acute blood loss anemia attributed to her surgery and also had hyponatremia due to suspected hypervolemia. Regarding her bradycardia, EP recommended outpatient follow up and holding off on pacemaker placement at this time but to be re-evaluated for this during her clinic appointment. She was seen by PTOT and recommended for SNF. She complains of shaky hands that is making it difficult to grab and hold things.  Additionally she reports some blurred vision in the right eye which started about one day ago and also some persistent pain of the left ankle down to her heel.           * No surgery found *      Indwelling Lines/Drains at time of discharge:   Lines/Drains/Airways     Drain                 Hemodialysis AV Fistula -- days          Pressure Ulcer                 Pressure Ulcer 04/15/17 1922 sacral spine Stage II 16 days              Hospital Course: The patient was admitted to SNF but had to be readmitted to [facility] for evaluation. She only spent less than 48 hours and quite frequently complained of lethargy. This was noted by PT as well. She was sent for HD as per her schedule but then became very lethargic and was slurring her speech per nursing and her family at the HD center. I was notified of these findings by nursing. I spoke with the patient's daughter and we decided that it would be best to have her sent back to [facility] ER for further evaluation.         Consults: none     Significant Diagnostic Studies: none    Pending Diagnostic Studies:     None        Final Active Diagnoses:    Diagnosis Date Noted POA    Debility, unspecified [R53.81] 04/14/2017 Yes    Ischemic ulcer of left heel, limited to breakdown of skin [L97.421] 04/14/2017 Yes    Bradycardia [R00.1] 04/06/2017 Yes    PAD (peripheral artery disease) [I73.9]  Yes    PAF (paroxysmal atrial fibrillation) [I48.0] 03/10/2017 Yes    ESRD on hemodialysis [N18.6, Z99.2] 09/18/2015 Not Applicable     Chronic    Chronic combined systolic and diastolic heart failure [I50.42] 07/14/2015 Yes    Renovascular hypertension [I15.0] 05/19/2015 Yes     Chronic    Anemia in chronic renal disease [N18.9, D63.1] 11/05/2012 Yes    Hyperlipidemia [E78.5]  Yes     Chronic    Acquired hypothyroidism [E03.9]  Yes      Problems Resolved During this Admission:    Diagnosis Date Noted Date Resolved POA    PRINCIPAL PROBLEM:  Closed fracture of neck of right femur [S72.001A]  04/13/2017 04/24/2017 Unknown    Hyponatremia [E87.1] 04/12/2017 04/24/2017 Yes      No new Assessment & Plan notes have been filed under this hospital service since the last note was generated.  Service: Hospital Medicine      Discharged Condition: guarded    Disposition: Short Term Hospital    Follow Up:    Patient Instructions:   No discharge procedures on file.  Medications:  Reconciled Home Medications:   Discharge Medication List as of 4/16/2017 12:00 PM      CONTINUE these medications which have NOT CHANGED    Details   aspirin 81 MG Chew Take 1 tablet (81 mg total) by mouth once daily., Starting 2/2/2016, Until Discontinued, Normal      atorvastatin (LIPITOR) 40 MG tablet Take 1 tablet (40 mg total) by mouth once daily., Starting 2/2/2016, Until Discontinued, Normal      ELIQUIS 2.5 mg Tab TAKE 1 BY MOUTH TWO TIMES  DAILY, Normal      epoetin syed (PROCRIT) 10,000 unit/mL injection Inject 0.28 mLs (2,800 Units total) into the skin every Tues, Thurs, Sat., Starting 10/29/2015, Until Discontinued, Normal      multivitamin (ONE DAILY MULTIVITAMIN) per tablet Take 1 tablet by mouth every morning., Until Discontinued, Historical Med      nitroGLYCERIN (NITROSTAT) 0.4 MG SL tablet Place 1 tablet (0.4 mg total) under the tongue every 5 (five) minutes as needed for Chest pain., Starting 2/10/2017, Until Sun 3/12/17, Normal      PROTONIX 40 mg tablet TAKE 1 BY MOUTH DAILY, Normal      RENVELA 0.8 gram PwPk Starting 9/30/2016, Until Discontinued, Historical Med      SYNTHROID 50 mcg tablet TAKE 1 BY MOUTH DAILY      BEFORE BREAKFAST, Normal         STOP taking these medications       amiodarone (PACERONE) 200 MG Tab Comments:   Reason for Stopping:         isosorbide-hydrALAZINE 20-37.5 mg (BIDIL) 20-37.5 mg Tab Comments:   Reason for Stopping:               Olena Dailey MD  Department of Hospital Medicine  [facility]

## 2017-05-12 LAB — PATHOLOGIST INTERPRETATION AB/XM: NORMAL

## 2017-12-18 NOTE — ASSESSMENT & PLAN NOTE
Improving. Patient complaining of sore throat post-op and felt related to ET tube but having difficulty swallowing even liquids post-op so Speech therapy consulted and evaluated patient on 4/11 and recommended nectar thickened liquids and modified barium swallow evaluation. Patient underwent MBSS on 4/12 and patient passed without difficulty and Speech recommended regular diet with nectar thickened liquids. Patient doing better with intake but will need to monitor oral intake at [facility].      Statement Selected

## 2018-11-14 NOTE — PROGRESS NOTES
Pt transferred to [id number] alert and oriented x4, right lower ext externally rotator and shorter, daughtwer at bedside.Report called to Olivia Bush rn.   VOMITING/DIARRHEA/ABDOMINAL PAIN

## 2019-03-19 NOTE — PLAN OF CARE
Pt picked up prescription.  Checked ID Received report from MICHELLE Hough. Patient s/p fistulagram , AAOx3. VSS, no c/o pain or discomfort at this time, resp even and unlabored. Gauze/tegaderm dressing to branden is CDI. No active bleeding. No hematoma noted. Post procedure protocol reviewed with patient and patient's family. Understanding verbalized. Family members at bedside. Nurse call bell within reach. Will continue to monitor per post procedure protocol.

## 2021-03-25 NOTE — ASSESSMENT & PLAN NOTE
Here for injection only. No contraindications noted. Orders Placed This Encounter    THER/PROPH/DIAG INJ, SC/IM ([id number])    VITAMIN B12 INJECTION ()    cyanocobalamin (Vitamin B-12) 1,000 mcg/mL injection       See nursing documentation for administration information.     Reymundo Bolanos NP  03/24/21 Per renal

## 2021-12-12 NOTE — ASSESSMENT & PLAN NOTE
Patient is POD # 14. Patient reports no pain in right hip currently and main pain related to her neuropathic pain. Plan to continue PT/OT for gait training and strengthening and restoration of ADL's on this admit to continue therapy. Patient is FWB/WBAT: right lower extremity, posterior hip precautions as per Orthopedic recommendations. Patient on Eliquis for long term anticoagulation for atrial fibrillation so no DVT prophylaxis needed. Pain is well controlled and will use just Tylenol for pain and avoid any sedating narcotics. Patient needs SNF placement on discharge.   Not applicable

## 2023-08-03 NOTE — ASSESSMENT & PLAN NOTE
Patient with known heel ulcers to both legs but no signs to indicate infection. Patient recently evaluated by Vascular Surgery on 4/13 and Vascular felt ulcers were improving and did not feel any further intervention needed at this time. Continue local wound care for heel ulcers on this admit.     Body Location Override (Optional - Billing Will Still Be Based On Selected Body Map Location If Applicable): right medial inferior breast

## (undated) DEVICE — NDL 18GA X1 1/2 REG BEVEL

## (undated) DEVICE — SOL IRR NACL .9% 3000ML

## (undated) DEVICE — DRESSING N ADH OIL EMUL 3X8

## (undated) DEVICE — HOOD T-5 TEAR AWAY STERILE

## (undated) DEVICE — DRESSING AQUACEL EXTRA 10X12.5

## (undated) DEVICE — APPLICATOR CHLORAPREP ORN 26ML

## (undated) DEVICE — SUT VICRYL+ 1 CT1 18IN

## (undated) DEVICE — SUT 0 VICRYL / CT-1

## (undated) DEVICE — Device

## (undated) DEVICE — COVER LIGHT HANDLE 80/CA

## (undated) DEVICE — GAUZE SPONGE 4X4 12PLY

## (undated) DEVICE — SUT VICRYL PLUS 0 CT1 18IN

## (undated) DEVICE — ELECTRODE REM PLYHSV RETURN 9

## (undated) DEVICE — SUT VICRYL PLUS 2-0

## (undated) DEVICE — SUT ETHIBOND XTRA 1 OS-6

## (undated) DEVICE — SEE L#156916

## (undated) DEVICE — SUT ETHILON 3/0 18IN PS-1

## (undated) DEVICE — BLADE SAGITTAL 18 X 1.27 X 90M

## (undated) DEVICE — KIT IRR SUCTION HND PIECE

## (undated) DEVICE — DRESSING TRANS 8X12 TEGADERM

## (undated) DEVICE — SUT VICRYL BR 1 GEN 27 CT-1

## (undated) DEVICE — SEE MEDLINE ITEM 152487

## (undated) DEVICE — SEE MEDLINE ITEM 146372